# Patient Record
Sex: FEMALE | NOT HISPANIC OR LATINO | Employment: OTHER | URBAN - METROPOLITAN AREA
[De-identification: names, ages, dates, MRNs, and addresses within clinical notes are randomized per-mention and may not be internally consistent; named-entity substitution may affect disease eponyms.]

---

## 2017-06-28 ENCOUNTER — OFFICE VISIT - HEALTHEAST (OUTPATIENT)
Dept: INTERNAL MEDICINE | Facility: CLINIC | Age: 72
End: 2017-06-28

## 2017-06-28 DIAGNOSIS — E78.5 HYPERLIPIDEMIA: ICD-10-CM

## 2017-06-28 DIAGNOSIS — K58.9 IRRITABLE BOWEL SYNDROME: ICD-10-CM

## 2017-06-28 LAB
CHOLEST SERPL-MCNC: 212 MG/DL
FASTING STATUS PATIENT QL REPORTED: YES
HDLC SERPL-MCNC: 59 MG/DL
LDLC SERPL CALC-MCNC: 136 MG/DL
TRIGL SERPL-MCNC: 86 MG/DL

## 2017-06-28 ASSESSMENT — MIFFLIN-ST. JEOR: SCORE: 1025.66

## 2017-07-24 ENCOUNTER — RECORDS - HEALTHEAST (OUTPATIENT)
Dept: ADMINISTRATIVE | Facility: OTHER | Age: 72
End: 2017-07-24

## 2017-08-16 ENCOUNTER — RECORDS - HEALTHEAST (OUTPATIENT)
Dept: ADMINISTRATIVE | Facility: OTHER | Age: 72
End: 2017-08-16

## 2017-08-21 ENCOUNTER — OFFICE VISIT - HEALTHEAST (OUTPATIENT)
Dept: FAMILY MEDICINE | Facility: CLINIC | Age: 72
End: 2017-08-21

## 2017-08-21 ENCOUNTER — COMMUNICATION - HEALTHEAST (OUTPATIENT)
Dept: INTERNAL MEDICINE | Facility: CLINIC | Age: 72
End: 2017-08-21

## 2017-08-21 DIAGNOSIS — S39.012A LUMBAR STRAIN, INITIAL ENCOUNTER: ICD-10-CM

## 2017-08-22 ENCOUNTER — RECORDS - HEALTHEAST (OUTPATIENT)
Dept: ADMINISTRATIVE | Facility: OTHER | Age: 72
End: 2017-08-22

## 2017-08-25 ENCOUNTER — HOSPITAL ENCOUNTER (OUTPATIENT)
Dept: MAMMOGRAPHY | Facility: CLINIC | Age: 72
Discharge: HOME | End: 2017-08-25
Attending: INTERNAL MEDICINE

## 2017-08-25 DIAGNOSIS — Z12.31 VISIT FOR SCREENING MAMMOGRAM: ICD-10-CM

## 2017-09-20 ENCOUNTER — AMBULATORY - HEALTHEAST (OUTPATIENT)
Dept: NURSING | Facility: CLINIC | Age: 72
End: 2017-09-20

## 2017-09-20 DIAGNOSIS — Z00.00 HEALTH MAINTENANCE EXAMINATION: ICD-10-CM

## 2017-12-10 ENCOUNTER — COMMUNICATION - HEALTHEAST (OUTPATIENT)
Dept: INTERNAL MEDICINE | Facility: CLINIC | Age: 72
End: 2017-12-10

## 2017-12-10 DIAGNOSIS — N95.1 SYMPTOMATIC MENOPAUSAL OR FEMALE CLIMACTERIC STATES: ICD-10-CM

## 2017-12-26 ENCOUNTER — OFFICE VISIT - HEALTHEAST (OUTPATIENT)
Dept: INTERNAL MEDICINE | Facility: CLINIC | Age: 72
End: 2017-12-26

## 2017-12-26 DIAGNOSIS — Z86.79 HISTORY OF CARDIAC DYSRHYTHMIA: ICD-10-CM

## 2017-12-26 DIAGNOSIS — E78.5 HYPERLIPIDEMIA: ICD-10-CM

## 2017-12-26 DIAGNOSIS — Z00.00 ROUTINE GENERAL MEDICAL EXAMINATION AT A HEALTH CARE FACILITY: ICD-10-CM

## 2017-12-26 DIAGNOSIS — M81.0 SENILE OSTEOPOROSIS: ICD-10-CM

## 2017-12-26 ASSESSMENT — MIFFLIN-ST. JEOR: SCORE: 1005.24

## 2018-06-06 ENCOUNTER — AMBULATORY - HEALTHEAST (OUTPATIENT)
Dept: INTERNAL MEDICINE | Facility: CLINIC | Age: 73
End: 2018-06-06

## 2018-06-06 ENCOUNTER — AMBULATORY - HEALTHEAST (OUTPATIENT)
Dept: LAB | Facility: CLINIC | Age: 73
End: 2018-06-06

## 2018-06-06 DIAGNOSIS — M81.0 SENILE OSTEOPOROSIS: ICD-10-CM

## 2018-06-06 DIAGNOSIS — E78.5 HYPERLIPIDEMIA: ICD-10-CM

## 2018-06-06 LAB
ALBUMIN SERPL-MCNC: 3.8 G/DL (ref 3.5–5)
ALP SERPL-CCNC: 63 U/L (ref 45–120)
ALT SERPL W P-5'-P-CCNC: 15 U/L (ref 0–45)
ANION GAP SERPL CALCULATED.3IONS-SCNC: 9 MMOL/L (ref 5–18)
AST SERPL W P-5'-P-CCNC: 24 U/L (ref 0–40)
BILIRUB SERPL-MCNC: 0.9 MG/DL (ref 0–1)
BUN SERPL-MCNC: 20 MG/DL (ref 8–28)
CALCIUM SERPL-MCNC: 9.8 MG/DL (ref 8.5–10.5)
CHLORIDE BLD-SCNC: 105 MMOL/L (ref 98–107)
CHOLEST SERPL-MCNC: 211 MG/DL
CO2 SERPL-SCNC: 28 MMOL/L (ref 22–31)
CREAT SERPL-MCNC: 1.03 MG/DL (ref 0.6–1.1)
FASTING STATUS PATIENT QL REPORTED: ABNORMAL
GFR SERPL CREATININE-BSD FRML MDRD: 52 ML/MIN/1.73M2
GLUCOSE BLD-MCNC: 90 MG/DL (ref 70–125)
HDLC SERPL-MCNC: 64 MG/DL
LDLC SERPL CALC-MCNC: 134 MG/DL
POTASSIUM BLD-SCNC: 4.7 MMOL/L (ref 3.5–5)
PROT SERPL-MCNC: 6.8 G/DL (ref 6–8)
SODIUM SERPL-SCNC: 142 MMOL/L (ref 136–145)
TRIGL SERPL-MCNC: 65 MG/DL

## 2018-06-07 LAB
25(OH)D3 SERPL-MCNC: 33.3 NG/ML (ref 30–80)
25(OH)D3 SERPL-MCNC: 33.3 NG/ML (ref 30–80)

## 2018-07-23 ENCOUNTER — RECORDS - HEALTHEAST (OUTPATIENT)
Dept: ADMINISTRATIVE | Facility: OTHER | Age: 73
End: 2018-07-23

## 2018-07-27 ENCOUNTER — RECORDS - HEALTHEAST (OUTPATIENT)
Dept: ADMINISTRATIVE | Facility: OTHER | Age: 73
End: 2018-07-27

## 2018-07-27 ENCOUNTER — RECORDS - HEALTHEAST (OUTPATIENT)
Dept: BONE DENSITY | Facility: CLINIC | Age: 73
End: 2018-07-27

## 2018-07-27 DIAGNOSIS — M81.0 AGE-RELATED OSTEOPOROSIS WITHOUT CURRENT PATHOLOGICAL FRACTURE: ICD-10-CM

## 2018-08-01 ENCOUNTER — AMBULATORY - HEALTHEAST (OUTPATIENT)
Dept: INTERNAL MEDICINE | Facility: CLINIC | Age: 73
End: 2018-08-01

## 2018-08-01 ENCOUNTER — COMMUNICATION - HEALTHEAST (OUTPATIENT)
Dept: ADMINISTRATIVE | Facility: CLINIC | Age: 73
End: 2018-08-01

## 2018-08-01 DIAGNOSIS — M85.80 OSTEOPENIA WITH HIGH RISK OF FRACTURE: ICD-10-CM

## 2018-09-12 ENCOUNTER — HOSPITAL ENCOUNTER (OUTPATIENT)
Dept: MAMMOGRAPHY | Facility: CLINIC | Age: 73
Discharge: HOME | End: 2018-09-12
Attending: INTERNAL MEDICINE

## 2018-09-12 DIAGNOSIS — Z12.31 VISIT FOR SCREENING MAMMOGRAM: ICD-10-CM

## 2018-09-14 ENCOUNTER — AMBULATORY - HEALTHEAST (OUTPATIENT)
Dept: NURSING | Facility: CLINIC | Age: 73
End: 2018-09-14

## 2018-09-14 DIAGNOSIS — Z23 FLU VACCINE NEED: ICD-10-CM

## 2018-10-18 ENCOUNTER — COMMUNICATION - HEALTHEAST (OUTPATIENT)
Dept: INTERNAL MEDICINE | Facility: CLINIC | Age: 73
End: 2018-10-18

## 2018-10-18 DIAGNOSIS — E78.5 HYPERLIPIDEMIA: ICD-10-CM

## 2018-11-27 ENCOUNTER — OFFICE VISIT - HEALTHEAST (OUTPATIENT)
Dept: ENDOCRINOLOGY | Facility: CLINIC | Age: 73
End: 2018-11-27

## 2018-11-27 DIAGNOSIS — M81.0 SENILE OSTEOPOROSIS: ICD-10-CM

## 2018-11-27 ASSESSMENT — MIFFLIN-ST. JEOR: SCORE: 999.35

## 2018-12-27 ENCOUNTER — OFFICE VISIT - HEALTHEAST (OUTPATIENT)
Dept: INTERNAL MEDICINE | Facility: CLINIC | Age: 73
End: 2018-12-27

## 2018-12-27 DIAGNOSIS — Z00.00 ROUTINE GENERAL MEDICAL EXAMINATION AT A HEALTH CARE FACILITY: ICD-10-CM

## 2018-12-27 DIAGNOSIS — Z86.79 HISTORY OF CARDIAC DYSRHYTHMIA: ICD-10-CM

## 2018-12-27 DIAGNOSIS — M81.0 SENILE OSTEOPOROSIS: ICD-10-CM

## 2018-12-27 DIAGNOSIS — E78.5 HYPERLIPIDEMIA: ICD-10-CM

## 2018-12-27 DIAGNOSIS — I49.9 CARDIAC ARRHYTHMIA, UNSPECIFIED CARDIAC ARRHYTHMIA TYPE: ICD-10-CM

## 2018-12-27 LAB
ALBUMIN SERPL-MCNC: 4 G/DL (ref 3.5–5)
ALP SERPL-CCNC: 50 U/L (ref 45–120)
ALT SERPL W P-5'-P-CCNC: 20 U/L (ref 0–45)
ANION GAP SERPL CALCULATED.3IONS-SCNC: 6 MMOL/L (ref 5–18)
AST SERPL W P-5'-P-CCNC: 26 U/L (ref 0–40)
BILIRUB SERPL-MCNC: 1.1 MG/DL (ref 0–1)
BUN SERPL-MCNC: 14 MG/DL (ref 8–28)
CALCIUM SERPL-MCNC: 9 MG/DL (ref 8.5–10.5)
CHLORIDE BLD-SCNC: 104 MMOL/L (ref 98–107)
CHOLEST SERPL-MCNC: 191 MG/DL
CO2 SERPL-SCNC: 30 MMOL/L (ref 22–31)
CREAT SERPL-MCNC: 0.95 MG/DL (ref 0.6–1.1)
FASTING STATUS PATIENT QL REPORTED: YES
GFR SERPL CREATININE-BSD FRML MDRD: 57 ML/MIN/1.73M2
GLUCOSE BLD-MCNC: 97 MG/DL (ref 70–125)
HDLC SERPL-MCNC: 63 MG/DL
LDLC SERPL CALC-MCNC: 112 MG/DL
POTASSIUM BLD-SCNC: 4.2 MMOL/L (ref 3.5–5)
PROT SERPL-MCNC: 6.9 G/DL (ref 6–8)
SODIUM SERPL-SCNC: 140 MMOL/L (ref 136–145)
TRIGL SERPL-MCNC: 82 MG/DL

## 2018-12-27 ASSESSMENT — MIFFLIN-ST. JEOR: SCORE: 997.99

## 2019-05-09 ENCOUNTER — COMMUNICATION - HEALTHEAST (OUTPATIENT)
Dept: ENDOCRINOLOGY | Facility: CLINIC | Age: 74
End: 2019-05-09

## 2019-05-09 DIAGNOSIS — M81.0 SENILE OSTEOPOROSIS: ICD-10-CM

## 2019-05-29 ENCOUNTER — OFFICE VISIT - HEALTHEAST (OUTPATIENT)
Dept: FAMILY MEDICINE | Facility: CLINIC | Age: 74
End: 2019-05-29

## 2019-05-29 DIAGNOSIS — M81.0 SENILE OSTEOPOROSIS: ICD-10-CM

## 2019-05-29 DIAGNOSIS — I49.9 CARDIAC ARRHYTHMIA, UNSPECIFIED CARDIAC ARRHYTHMIA TYPE: ICD-10-CM

## 2019-05-29 DIAGNOSIS — E78.5 HYPERLIPIDEMIA, UNSPECIFIED HYPERLIPIDEMIA TYPE: ICD-10-CM

## 2019-05-29 DIAGNOSIS — I34.0 NON-RHEUMATIC MITRAL REGURGITATION: ICD-10-CM

## 2019-05-29 DIAGNOSIS — Z76.89 ENCOUNTER TO ESTABLISH CARE: ICD-10-CM

## 2019-06-14 ENCOUNTER — RECORDS - HEALTHEAST (OUTPATIENT)
Dept: ADMINISTRATIVE | Facility: OTHER | Age: 74
End: 2019-06-14

## 2019-09-12 ENCOUNTER — AMBULATORY - HEALTHEAST (OUTPATIENT)
Dept: NURSING | Facility: CLINIC | Age: 74
End: 2019-09-12

## 2019-09-12 DIAGNOSIS — Z23 FLU VACCINE NEED: ICD-10-CM

## 2019-09-18 ENCOUNTER — HOSPITAL ENCOUNTER (OUTPATIENT)
Dept: MAMMOGRAPHY | Facility: CLINIC | Age: 74
Discharge: HOME | End: 2019-09-18
Attending: FAMILY MEDICINE

## 2019-09-18 DIAGNOSIS — Z12.31 VISIT FOR SCREENING MAMMOGRAM: ICD-10-CM

## 2019-10-18 ENCOUNTER — RECORDS - HEALTHEAST (OUTPATIENT)
Dept: ADMINISTRATIVE | Facility: OTHER | Age: 74
End: 2019-10-18

## 2019-10-22 ENCOUNTER — RECORDS - HEALTHEAST (OUTPATIENT)
Dept: ADMINISTRATIVE | Facility: OTHER | Age: 74
End: 2019-10-22

## 2019-11-25 ENCOUNTER — OFFICE VISIT - HEALTHEAST (OUTPATIENT)
Dept: FAMILY MEDICINE | Facility: CLINIC | Age: 74
End: 2019-11-25

## 2019-11-25 DIAGNOSIS — I49.9 CARDIAC ARRHYTHMIA, UNSPECIFIED CARDIAC ARRHYTHMIA TYPE: ICD-10-CM

## 2019-11-25 DIAGNOSIS — M81.0 SENILE OSTEOPOROSIS: ICD-10-CM

## 2019-11-25 DIAGNOSIS — E78.5 HYPERLIPIDEMIA: ICD-10-CM

## 2019-11-25 LAB
ANION GAP SERPL CALCULATED.3IONS-SCNC: 10 MMOL/L (ref 5–18)
BUN SERPL-MCNC: 14 MG/DL (ref 8–28)
CALCIUM SERPL-MCNC: 10 MG/DL (ref 8.5–10.5)
CHLORIDE BLD-SCNC: 103 MMOL/L (ref 98–107)
CHOLEST SERPL-MCNC: 193 MG/DL
CO2 SERPL-SCNC: 27 MMOL/L (ref 22–31)
CREAT SERPL-MCNC: 1.03 MG/DL (ref 0.6–1.1)
FASTING STATUS PATIENT QL REPORTED: YES
GFR SERPL CREATININE-BSD FRML MDRD: 52 ML/MIN/1.73M2
GLUCOSE BLD-MCNC: 105 MG/DL (ref 70–125)
HDLC SERPL-MCNC: 63 MG/DL
HGB BLD-MCNC: 13.6 G/DL (ref 12–16)
LDLC SERPL CALC-MCNC: 112 MG/DL
POTASSIUM BLD-SCNC: 5.5 MMOL/L (ref 3.5–5)
SODIUM SERPL-SCNC: 140 MMOL/L (ref 136–145)
TRIGL SERPL-MCNC: 89 MG/DL
TSH SERPL DL<=0.005 MIU/L-ACNC: 2.75 UIU/ML (ref 0.3–5)

## 2019-11-25 ASSESSMENT — MIFFLIN-ST. JEOR: SCORE: 1003.43

## 2019-11-26 LAB
25(OH)D3 SERPL-MCNC: 38.4 NG/ML (ref 30–80)
25(OH)D3 SERPL-MCNC: 38.4 NG/ML (ref 30–80)

## 2020-05-18 ENCOUNTER — COMMUNICATION - HEALTHEAST (OUTPATIENT)
Dept: FAMILY MEDICINE | Facility: CLINIC | Age: 75
End: 2020-05-18

## 2020-05-18 DIAGNOSIS — I49.9 CARDIAC ARRHYTHMIA, UNSPECIFIED CARDIAC ARRHYTHMIA TYPE: ICD-10-CM

## 2020-06-18 ENCOUNTER — ANESTHESIA - HEALTHEAST (OUTPATIENT)
Dept: CARDIOLOGY | Facility: CLINIC | Age: 75
End: 2020-06-18

## 2020-06-18 ENCOUNTER — SURGERY - HEALTHEAST (OUTPATIENT)
Dept: CARDIOLOGY | Facility: CLINIC | Age: 75
End: 2020-06-18

## 2020-06-18 ENCOUNTER — RECORDS - HEALTHEAST (OUTPATIENT)
Dept: ADMINISTRATIVE | Facility: OTHER | Age: 75
End: 2020-06-18

## 2020-06-19 ENCOUNTER — RECORDS - HEALTHEAST (OUTPATIENT)
Dept: ADMINISTRATIVE | Facility: OTHER | Age: 75
End: 2020-06-19

## 2020-06-19 ASSESSMENT — MIFFLIN-ST. JEOR
SCORE: 1017.94
SCORE: 1017.94

## 2020-06-25 ENCOUNTER — OFFICE VISIT - HEALTHEAST (OUTPATIENT)
Dept: FAMILY MEDICINE | Facility: CLINIC | Age: 75
End: 2020-06-25

## 2020-06-25 DIAGNOSIS — R00.0 WIDE-COMPLEX TACHYCARDIA: ICD-10-CM

## 2020-06-25 DIAGNOSIS — I48.91 ATRIAL FIBRILLATION WITH RAPID VENTRICULAR RESPONSE (H): ICD-10-CM

## 2020-06-25 DIAGNOSIS — I47.29 NSVT (NONSUSTAINED VENTRICULAR TACHYCARDIA) (H): ICD-10-CM

## 2020-06-26 ENCOUNTER — COMMUNICATION - HEALTHEAST (OUTPATIENT)
Dept: SCHEDULING | Facility: CLINIC | Age: 75
End: 2020-06-26

## 2020-06-26 DIAGNOSIS — I48.91 ATRIAL FIBRILLATION WITH RAPID VENTRICULAR RESPONSE (H): ICD-10-CM

## 2020-06-26 DIAGNOSIS — I47.29 NSVT (NONSUSTAINED VENTRICULAR TACHYCARDIA) (H): ICD-10-CM

## 2020-06-26 DIAGNOSIS — I42.8 NON-ISCHEMIC CARDIOMYOPATHY (H): ICD-10-CM

## 2020-06-26 DIAGNOSIS — R00.0 WIDE-COMPLEX TACHYCARDIA: ICD-10-CM

## 2020-07-01 ENCOUNTER — COMMUNICATION - HEALTHEAST (OUTPATIENT)
Dept: CARDIOLOGY | Facility: CLINIC | Age: 75
End: 2020-07-01

## 2020-07-02 ENCOUNTER — OFFICE VISIT - HEALTHEAST (OUTPATIENT)
Dept: CARDIOLOGY | Facility: CLINIC | Age: 75
End: 2020-07-02

## 2020-07-02 DIAGNOSIS — R00.0 WIDE-COMPLEX TACHYCARDIA: ICD-10-CM

## 2020-07-02 DIAGNOSIS — I10 ESSENTIAL HYPERTENSION: ICD-10-CM

## 2020-07-02 DIAGNOSIS — I38 VALVULAR HEART DISEASE: ICD-10-CM

## 2020-07-02 DIAGNOSIS — I25.10 CORONARY ARTERY DISEASE INVOLVING NATIVE CORONARY ARTERY OF NATIVE HEART WITHOUT ANGINA PECTORIS: ICD-10-CM

## 2020-07-02 DIAGNOSIS — I48.0 PAROXYSMAL ATRIAL FIBRILLATION (H): ICD-10-CM

## 2020-07-02 DIAGNOSIS — E78.2 MIXED HYPERLIPIDEMIA: ICD-10-CM

## 2020-07-02 ASSESSMENT — MIFFLIN-ST. JEOR: SCORE: 999.79

## 2020-07-15 ENCOUNTER — OFFICE VISIT - HEALTHEAST (OUTPATIENT)
Dept: CARDIOLOGY | Facility: CLINIC | Age: 75
End: 2020-07-15

## 2020-07-15 ENCOUNTER — AMBULATORY - HEALTHEAST (OUTPATIENT)
Dept: CARDIOLOGY | Facility: CLINIC | Age: 75
End: 2020-07-15

## 2020-07-15 DIAGNOSIS — I25.10 CORONARY ARTERY DISEASE INVOLVING NATIVE CORONARY ARTERY OF NATIVE HEART WITHOUT ANGINA PECTORIS: ICD-10-CM

## 2020-07-15 DIAGNOSIS — I48.0 PAROXYSMAL ATRIAL FIBRILLATION (H): ICD-10-CM

## 2020-07-15 DIAGNOSIS — I10 ESSENTIAL HYPERTENSION: ICD-10-CM

## 2020-07-15 DIAGNOSIS — I47.29 NSVT (NONSUSTAINED VENTRICULAR TACHYCARDIA) (H): ICD-10-CM

## 2020-07-15 DIAGNOSIS — Z79.899 LONG TERM USE OF DRUG: ICD-10-CM

## 2020-07-15 DIAGNOSIS — I38 VALVULAR HEART DISEASE: ICD-10-CM

## 2020-07-17 ENCOUNTER — AMBULATORY - HEALTHEAST (OUTPATIENT)
Dept: LAB | Facility: CLINIC | Age: 75
End: 2020-07-17

## 2020-07-17 DIAGNOSIS — Z79.899 LONG TERM USE OF DRUG: ICD-10-CM

## 2020-07-17 LAB
ALT SERPL W P-5'-P-CCNC: 17 U/L (ref 0–45)
TSH SERPL DL<=0.005 MIU/L-ACNC: 5.2 UIU/ML (ref 0.3–5)

## 2020-07-20 LAB
AMIODARONE SERPL-MCNC: 1.7 UG/ML (ref 0.5–2)
DESETHYLAMIODARONE SERPL-MCNC: 1 UG/ML

## 2020-08-18 ENCOUNTER — OFFICE VISIT - HEALTHEAST (OUTPATIENT)
Dept: FAMILY MEDICINE | Facility: CLINIC | Age: 75
End: 2020-08-18

## 2020-08-18 DIAGNOSIS — I49.9 CARDIAC ARRHYTHMIA, UNSPECIFIED CARDIAC ARRHYTHMIA TYPE: ICD-10-CM

## 2020-08-18 DIAGNOSIS — I34.0 NONRHEUMATIC MITRAL VALVE REGURGITATION: ICD-10-CM

## 2020-08-18 DIAGNOSIS — I10 ESSENTIAL HYPERTENSION: ICD-10-CM

## 2020-08-18 DIAGNOSIS — I47.29 NSVT (NONSUSTAINED VENTRICULAR TACHYCARDIA) (H): ICD-10-CM

## 2020-08-19 ENCOUNTER — AMBULATORY - HEALTHEAST (OUTPATIENT)
Dept: FAMILY MEDICINE | Facility: CLINIC | Age: 75
End: 2020-08-19

## 2020-08-19 ENCOUNTER — AMBULATORY - HEALTHEAST (OUTPATIENT)
Dept: LAB | Facility: CLINIC | Age: 75
End: 2020-08-19

## 2020-08-19 DIAGNOSIS — I49.9 CARDIAC ARRHYTHMIA, UNSPECIFIED CARDIAC ARRHYTHMIA TYPE: ICD-10-CM

## 2020-08-19 DIAGNOSIS — R79.89 ELEVATED SERUM CREATININE: ICD-10-CM

## 2020-08-19 LAB
ANION GAP SERPL CALCULATED.3IONS-SCNC: 7 MMOL/L (ref 5–18)
BUN SERPL-MCNC: 15 MG/DL (ref 8–28)
CALCIUM SERPL-MCNC: 9.5 MG/DL (ref 8.5–10.5)
CHLORIDE BLD-SCNC: 104 MMOL/L (ref 98–107)
CO2 SERPL-SCNC: 29 MMOL/L (ref 22–31)
CREAT SERPL-MCNC: 1.3 MG/DL (ref 0.6–1.1)
ERYTHROCYTE [DISTWIDTH] IN BLOOD BY AUTOMATED COUNT: 11.9 % (ref 11–14.5)
GFR SERPL CREATININE-BSD FRML MDRD: 40 ML/MIN/1.73M2
GLUCOSE BLD-MCNC: 94 MG/DL (ref 70–125)
HCT VFR BLD AUTO: 38.7 % (ref 35–47)
HGB BLD-MCNC: 13 G/DL (ref 12–16)
MAGNESIUM SERPL-MCNC: 2.2 MG/DL (ref 1.8–2.6)
MCH RBC QN AUTO: 32.9 PG (ref 27–34)
MCHC RBC AUTO-ENTMCNC: 33.5 G/DL (ref 32–36)
MCV RBC AUTO: 98 FL (ref 80–100)
PLATELET # BLD AUTO: 167 THOU/UL (ref 140–440)
PMV BLD AUTO: 10.4 FL (ref 7–10)
POTASSIUM BLD-SCNC: 4.9 MMOL/L (ref 3.5–5)
RBC # BLD AUTO: 3.94 MILL/UL (ref 3.8–5.4)
SODIUM SERPL-SCNC: 140 MMOL/L (ref 136–145)
T4 FREE SERPL-MCNC: 1.1 NG/DL (ref 0.7–1.8)
TSH SERPL DL<=0.005 MIU/L-ACNC: 8.2 UIU/ML (ref 0.3–5)
WBC: 5.9 THOU/UL (ref 4–11)

## 2020-08-20 ENCOUNTER — COMMUNICATION - HEALTHEAST (OUTPATIENT)
Dept: ADMINISTRATIVE | Facility: CLINIC | Age: 75
End: 2020-08-20

## 2020-08-22 ENCOUNTER — OFFICE VISIT - HEALTHEAST (OUTPATIENT)
Dept: FAMILY MEDICINE | Facility: CLINIC | Age: 75
End: 2020-08-22

## 2020-08-22 DIAGNOSIS — R31.9 BLOOD IN URINE: ICD-10-CM

## 2020-08-22 DIAGNOSIS — I25.10 CORONARY ARTERY DISEASE INVOLVING NATIVE HEART WITHOUT ANGINA PECTORIS, UNSPECIFIED VESSEL OR LESION TYPE: ICD-10-CM

## 2020-08-22 DIAGNOSIS — I48.91 ATRIAL FIBRILLATION, UNSPECIFIED TYPE (H): ICD-10-CM

## 2020-08-22 LAB
BACTERIA #/AREA URNS HPF: ABNORMAL HPF
RBC #/AREA URNS AUTO: >100 HPF
SQUAMOUS #/AREA URNS AUTO: ABNORMAL LPF
WBC #/AREA URNS AUTO: ABNORMAL HPF

## 2020-08-24 ENCOUNTER — COMMUNICATION - HEALTHEAST (OUTPATIENT)
Dept: CARDIOLOGY | Facility: CLINIC | Age: 75
End: 2020-08-24

## 2020-08-25 ENCOUNTER — OFFICE VISIT - HEALTHEAST (OUTPATIENT)
Dept: FAMILY MEDICINE | Facility: CLINIC | Age: 75
End: 2020-08-25

## 2020-08-25 DIAGNOSIS — I48.0 PAROXYSMAL ATRIAL FIBRILLATION (H): ICD-10-CM

## 2020-08-25 DIAGNOSIS — R31.0 GROSS HEMATURIA: ICD-10-CM

## 2020-08-25 LAB
ALBUMIN UR-MCNC: ABNORMAL MG/DL
APPEARANCE UR: CLEAR
BILIRUB UR QL STRIP: NEGATIVE
COLOR UR AUTO: YELLOW
GLUCOSE UR STRIP-MCNC: NEGATIVE MG/DL
HGB UR QL STRIP: ABNORMAL
KETONES UR STRIP-MCNC: NEGATIVE MG/DL
LEUKOCYTE ESTERASE UR QL STRIP: ABNORMAL
NITRATE UR QL: NEGATIVE
PH UR STRIP: 5 [PH] (ref 5–8)
SP GR UR STRIP: 1.01 (ref 1–1.03)
UROBILINOGEN UR STRIP-ACNC: ABNORMAL

## 2020-08-26 ENCOUNTER — HOSPITAL ENCOUNTER (OUTPATIENT)
Dept: CARDIOLOGY | Facility: CLINIC | Age: 75
Discharge: HOME | End: 2020-08-26
Attending: FAMILY MEDICINE

## 2020-08-26 DIAGNOSIS — I49.9 CARDIAC ARRHYTHMIA, UNSPECIFIED CARDIAC ARRHYTHMIA TYPE: ICD-10-CM

## 2020-08-26 DIAGNOSIS — I47.29 NSVT (NONSUSTAINED VENTRICULAR TACHYCARDIA) (H): ICD-10-CM

## 2020-08-26 LAB — BACTERIA SPEC CULT: NORMAL

## 2020-09-02 ENCOUNTER — AMBULATORY - HEALTHEAST (OUTPATIENT)
Dept: CARDIOLOGY | Facility: CLINIC | Age: 75
End: 2020-09-02

## 2020-09-22 ENCOUNTER — COMMUNICATION - HEALTHEAST (OUTPATIENT)
Dept: CARDIOLOGY | Facility: CLINIC | Age: 75
End: 2020-09-22

## 2020-09-25 ENCOUNTER — COMMUNICATION - HEALTHEAST (OUTPATIENT)
Dept: SCHEDULING | Facility: CLINIC | Age: 75
End: 2020-09-25

## 2020-09-25 ENCOUNTER — RECORDS - HEALTHEAST (OUTPATIENT)
Dept: ADMINISTRATIVE | Facility: OTHER | Age: 75
End: 2020-09-25

## 2020-09-29 ENCOUNTER — OFFICE VISIT - HEALTHEAST (OUTPATIENT)
Dept: FAMILY MEDICINE | Facility: CLINIC | Age: 75
End: 2020-09-29

## 2020-09-29 DIAGNOSIS — J96.01 ACUTE RESPIRATORY FAILURE WITH HYPOXIA (H): ICD-10-CM

## 2020-09-29 DIAGNOSIS — F41.9 ANXIETY: ICD-10-CM

## 2020-09-29 DIAGNOSIS — I48.0 PAROXYSMAL ATRIAL FIBRILLATION (H): ICD-10-CM

## 2020-09-29 DIAGNOSIS — I10 ESSENTIAL HYPERTENSION: ICD-10-CM

## 2020-09-29 LAB
ANION GAP SERPL CALCULATED.3IONS-SCNC: 11 MMOL/L (ref 5–18)
BUN SERPL-MCNC: 20 MG/DL (ref 8–28)
CALCIUM SERPL-MCNC: 9.7 MG/DL (ref 8.5–10.5)
CHLORIDE BLD-SCNC: 99 MMOL/L (ref 98–107)
CO2 SERPL-SCNC: 29 MMOL/L (ref 22–31)
CREAT SERPL-MCNC: 1.36 MG/DL (ref 0.6–1.1)
GFR SERPL CREATININE-BSD FRML MDRD: 38 ML/MIN/1.73M2
GLUCOSE BLD-MCNC: 115 MG/DL (ref 70–125)
POTASSIUM BLD-SCNC: 4.7 MMOL/L (ref 3.5–5)
SODIUM SERPL-SCNC: 139 MMOL/L (ref 136–145)

## 2020-09-29 ASSESSMENT — MIFFLIN-ST. JEOR: SCORE: 988.46

## 2020-10-14 ENCOUNTER — OFFICE VISIT - HEALTHEAST (OUTPATIENT)
Dept: CARDIOLOGY | Facility: CLINIC | Age: 75
End: 2020-10-14

## 2020-10-14 ENCOUNTER — COMMUNICATION - HEALTHEAST (OUTPATIENT)
Dept: FAMILY MEDICINE | Facility: CLINIC | Age: 75
End: 2020-10-14

## 2020-10-14 ENCOUNTER — COMMUNICATION - HEALTHEAST (OUTPATIENT)
Dept: CARDIOLOGY | Facility: CLINIC | Age: 75
End: 2020-10-14

## 2020-10-14 DIAGNOSIS — I48.0 PAROXYSMAL ATRIAL FIBRILLATION (H): ICD-10-CM

## 2020-10-14 DIAGNOSIS — I10 ESSENTIAL HYPERTENSION: ICD-10-CM

## 2020-10-14 DIAGNOSIS — I50.21 ACUTE HFREF (HEART FAILURE WITH REDUCED EJECTION FRACTION) (H): ICD-10-CM

## 2020-10-14 DIAGNOSIS — N18.31 STAGE 3A CHRONIC KIDNEY DISEASE (H): ICD-10-CM

## 2020-10-14 DIAGNOSIS — I50.31 ACUTE HEART FAILURE WITH PRESERVED EJECTION FRACTION (H): ICD-10-CM

## 2020-10-14 DIAGNOSIS — I34.1 MVP (MITRAL VALVE PROLAPSE): ICD-10-CM

## 2020-10-14 LAB
ANION GAP SERPL CALCULATED.3IONS-SCNC: 9 MMOL/L (ref 5–18)
BNP SERPL-MCNC: 344 PG/ML (ref 0–140)
BUN SERPL-MCNC: 23 MG/DL (ref 8–28)
CALCIUM SERPL-MCNC: 9.2 MG/DL (ref 8.5–10.5)
CHLORIDE BLD-SCNC: 102 MMOL/L (ref 98–107)
CO2 SERPL-SCNC: 27 MMOL/L (ref 22–31)
CREAT SERPL-MCNC: 1.36 MG/DL (ref 0.6–1.1)
GFR SERPL CREATININE-BSD FRML MDRD: 38 ML/MIN/1.73M2
GLUCOSE BLD-MCNC: 149 MG/DL (ref 70–125)
POTASSIUM BLD-SCNC: 4.1 MMOL/L (ref 3.5–5)
SODIUM SERPL-SCNC: 138 MMOL/L (ref 136–145)

## 2020-10-23 ENCOUNTER — HOSPITAL ENCOUNTER (OUTPATIENT)
Dept: MAMMOGRAPHY | Facility: CLINIC | Age: 75
Discharge: HOME | End: 2020-10-23
Attending: FAMILY MEDICINE

## 2020-10-23 DIAGNOSIS — Z12.31 VISIT FOR SCREENING MAMMOGRAM: ICD-10-CM

## 2020-10-26 ENCOUNTER — OFFICE VISIT - HEALTHEAST (OUTPATIENT)
Dept: CARDIOLOGY | Facility: CLINIC | Age: 75
End: 2020-10-26

## 2020-10-26 DIAGNOSIS — E03.9 HYPOTHYROIDISM: ICD-10-CM

## 2020-10-26 DIAGNOSIS — I48.0 PAROXYSMAL ATRIAL FIBRILLATION (H): ICD-10-CM

## 2020-10-26 DIAGNOSIS — I34.0 NONRHEUMATIC MITRAL VALVE REGURGITATION: ICD-10-CM

## 2020-10-26 DIAGNOSIS — I25.10 CORONARY ARTERY DISEASE INVOLVING NATIVE CORONARY ARTERY OF NATIVE HEART WITHOUT ANGINA PECTORIS: ICD-10-CM

## 2020-10-26 DIAGNOSIS — I36.1 TRICUSPID VALVE REGURGITATION, NONRHEUMATIC: ICD-10-CM

## 2020-10-26 DIAGNOSIS — I34.1 MVP (MITRAL VALVE PROLAPSE): ICD-10-CM

## 2020-10-26 DIAGNOSIS — I07.1 TRICUSPID INSUFFICIENCY: ICD-10-CM

## 2020-10-26 DIAGNOSIS — R00.2 PALPITATIONS: ICD-10-CM

## 2020-10-26 DIAGNOSIS — I25.10 CAD (CORONARY ARTERY DISEASE): ICD-10-CM

## 2020-10-26 DIAGNOSIS — E78.2 MIXED HYPERLIPIDEMIA: ICD-10-CM

## 2020-10-26 ASSESSMENT — MIFFLIN-ST. JEOR: SCORE: 988.91

## 2020-11-07 ENCOUNTER — COMMUNICATION - HEALTHEAST (OUTPATIENT)
Dept: CARDIOLOGY | Facility: CLINIC | Age: 75
End: 2020-11-07

## 2020-11-12 ENCOUNTER — COMMUNICATION - HEALTHEAST (OUTPATIENT)
Dept: SCHEDULING | Facility: CLINIC | Age: 75
End: 2020-11-12

## 2020-11-12 DIAGNOSIS — R00.0 WIDE-COMPLEX TACHYCARDIA: ICD-10-CM

## 2020-11-12 DIAGNOSIS — I47.29 NSVT (NONSUSTAINED VENTRICULAR TACHYCARDIA) (H): ICD-10-CM

## 2020-11-12 DIAGNOSIS — I48.91 ATRIAL FIBRILLATION WITH RAPID VENTRICULAR RESPONSE (H): ICD-10-CM

## 2020-11-14 ENCOUNTER — COMMUNICATION - HEALTHEAST (OUTPATIENT)
Dept: CARDIOLOGY | Facility: CLINIC | Age: 75
End: 2020-11-14

## 2020-11-20 ENCOUNTER — AMBULATORY - HEALTHEAST (OUTPATIENT)
Dept: CARDIOLOGY | Facility: CLINIC | Age: 75
End: 2020-11-20

## 2020-11-20 ENCOUNTER — RECORDS - HEALTHEAST (OUTPATIENT)
Dept: ADMINISTRATIVE | Facility: OTHER | Age: 75
End: 2020-11-20

## 2020-12-03 ENCOUNTER — AMBULATORY - HEALTHEAST (OUTPATIENT)
Dept: CARDIOLOGY | Facility: CLINIC | Age: 75
End: 2020-12-03

## 2020-12-03 ENCOUNTER — HOSPITAL ENCOUNTER (OUTPATIENT)
Dept: CARDIOLOGY | Facility: CLINIC | Age: 75
Discharge: HOME | End: 2020-12-03
Attending: INTERNAL MEDICINE

## 2020-12-03 DIAGNOSIS — E03.9 HYPOTHYROIDISM: ICD-10-CM

## 2020-12-03 DIAGNOSIS — I25.10 CAD (CORONARY ARTERY DISEASE): ICD-10-CM

## 2020-12-03 DIAGNOSIS — R00.2 PALPITATIONS: ICD-10-CM

## 2020-12-03 DIAGNOSIS — I07.1 TRICUSPID INSUFFICIENCY: ICD-10-CM

## 2020-12-03 LAB
AORTIC ROOT: 2.4 CM
BSA FOR ECHO PROCEDURE: 1.52 M2
CHOLEST SERPL-MCNC: 230 MG/DL
CV BLOOD PRESSURE: ABNORMAL MMHG
CV ECHO HEIGHT: 65 IN
CV ECHO WEIGHT: 112 LBS
DOP CALC LVOT AREA: 2.54 CM2
DOP CALC LVOT DIAMETER: 1.8 CM
DOP CALC LVOT PEAK VEL: 76.7 CM/S
DOP CALC LVOT STROKE VOLUME: 38.7 CM3
DOP CALC MV VTI: 34.3 CM
DOP CALCLVOT PEAK VEL VTI: 15.2 CM
EJECTION FRACTION: 70 % (ref 55–75)
FASTING STATUS PATIENT QL REPORTED: YES
FRACTIONAL SHORTENING: 40.4 % (ref 28–44)
HDLC SERPL-MCNC: 67 MG/DL
INTERVENTRICULAR SEPTUM IN END DIASTOLE: 0.88 CM (ref 0.6–0.9)
IVS/PW RATIO: 1
LA AREA 1: 25.7 CM2
LA AREA 2: 26.2 CM2
LDLC SERPL CALC-MCNC: 139 MG/DL
LEFT ATRIUM LENGTH: 6 CM
LEFT ATRIUM SIZE: 5 CM
LEFT ATRIUM TO AORTIC ROOT RATIO: 2.08 NO UNITS
LEFT ATRIUM VOLUME INDEX: 62.8 ML/M2
LEFT ATRIUM VOLUME: 95.4 ML
LEFT VENTRICLE CARDIAC INDEX: 1.8 L/MIN/M2
LEFT VENTRICLE CARDIAC OUTPUT: 2.8 L/MIN
LEFT VENTRICLE DIASTOLIC VOLUME INDEX: 28.9 CM3/M2 (ref 29–61)
LEFT VENTRICLE DIASTOLIC VOLUME: 44 CM3 (ref 46–106)
LEFT VENTRICLE HEART RATE: 72 BPM
LEFT VENTRICLE MASS INDEX: 91 G/M2
LEFT VENTRICLE SYSTOLIC VOLUME INDEX: 8.6 CM3/M2 (ref 8–24)
LEFT VENTRICLE SYSTOLIC VOLUME: 13 CM3 (ref 14–42)
LEFT VENTRICULAR INTERNAL DIMENSION IN DIASTOLE: 4.68 CM (ref 3.8–5.2)
LEFT VENTRICULAR INTERNAL DIMENSION IN SYSTOLE: 2.79 CM (ref 2.2–3.5)
LEFT VENTRICULAR MASS: 138.4 G
LEFT VENTRICULAR OUTFLOW TRACT MEAN GRADIENT: 1 MMHG
LEFT VENTRICULAR OUTFLOW TRACT MEAN VELOCITY: 51.5 CM/S
LEFT VENTRICULAR OUTFLOW TRACT PEAK GRADIENT: 2 MMHG
LEFT VENTRICULAR POSTERIOR WALL IN END DIASTOLE: 0.89 CM (ref 0.6–0.9)
LV STROKE VOLUME INDEX: 25.4 ML/M2
MITRAL REGURGITANT VELOCITY TIME INTEGRAL: 161 CM
MITRAL VALVE MEAN INFLOW VELOCITY: 77.5 CM/S
MITRAL VALVE PEAK VELOCITY: 163 CM/S
MR FLOW: 44 CM3
MR MEAN GRADIENT: 73 MMHG
MR MEAN VELOCITY: 385 CM/S
MR PEAK GRADIENT: 109.8 MMHG
MR PISA EROA: 0.3 CM2
MR PISA RADIUS: 1 CM
MR PISA VN NYQUIST: 23.1 CM/S
MV AREA VTI: 1.13 CM2
MV DECELERATION TIME: 176 MS
MV MEAN GRADIENT: 3 MMHG
MV PEAK E VELOCITY: 134 CM/S
MV PEAK GRADIENT: 10.6 MMHG
MV REGURGITANT VOLUME: 44.6 CC
MV VALVE AREA BY CONTINUITY EQUATION: 1.1 CM2
NUC REST DIASTOLIC VOLUME INDEX: 1792 LBS
NUC REST SYSTOLIC VOLUME INDEX: 65 IN
PISA MR PEAK VEL: 524 CM/S
TRICUSPID REGURGITATION PEAK PRESSURE GRADIENT: 59.9 MMHG
TRICUSPID VALVE ANULAR PLANE SYSTOLIC EXCURSION: 1.4 CM
TRICUSPID VALVE PEAK REGURGITANT VELOCITY: 387 CM/S
TRIGL SERPL-MCNC: 118 MG/DL
TSH SERPL DL<=0.005 MIU/L-ACNC: 11.8 UIU/ML (ref 0.3–5)

## 2020-12-03 ASSESSMENT — MIFFLIN-ST. JEOR: SCORE: 993.91

## 2020-12-07 ENCOUNTER — COMMUNICATION - HEALTHEAST (OUTPATIENT)
Dept: FAMILY MEDICINE | Facility: CLINIC | Age: 75
End: 2020-12-07

## 2020-12-16 ENCOUNTER — OFFICE VISIT - HEALTHEAST (OUTPATIENT)
Dept: FAMILY MEDICINE | Facility: CLINIC | Age: 75
End: 2020-12-16

## 2020-12-16 DIAGNOSIS — E03.8 SUBCLINICAL HYPOTHYROIDISM: ICD-10-CM

## 2020-12-18 ENCOUNTER — COMMUNICATION - HEALTHEAST (OUTPATIENT)
Dept: FAMILY MEDICINE | Facility: CLINIC | Age: 75
End: 2020-12-18

## 2020-12-18 DIAGNOSIS — J81.0 ACUTE PULMONARY EDEMA (H): ICD-10-CM

## 2020-12-24 ENCOUNTER — AMBULATORY - HEALTHEAST (OUTPATIENT)
Dept: CARDIOLOGY | Facility: CLINIC | Age: 75
End: 2020-12-24

## 2020-12-24 DIAGNOSIS — Z79.899 LONG TERM USE OF DRUG: ICD-10-CM

## 2020-12-29 ENCOUNTER — AMBULATORY - HEALTHEAST (OUTPATIENT)
Dept: CARDIOLOGY | Facility: CLINIC | Age: 75
End: 2020-12-29

## 2020-12-31 ENCOUNTER — OFFICE VISIT - HEALTHEAST (OUTPATIENT)
Dept: CARDIOLOGY | Facility: CLINIC | Age: 75
End: 2020-12-31

## 2020-12-31 ENCOUNTER — COMMUNICATION - HEALTHEAST (OUTPATIENT)
Dept: CARDIOLOGY | Facility: CLINIC | Age: 75
End: 2020-12-31

## 2020-12-31 ENCOUNTER — AMBULATORY - HEALTHEAST (OUTPATIENT)
Dept: CARDIOLOGY | Facility: CLINIC | Age: 75
End: 2020-12-31

## 2020-12-31 DIAGNOSIS — I34.0 NONRHEUMATIC MITRAL VALVE REGURGITATION: ICD-10-CM

## 2020-12-31 DIAGNOSIS — I34.0 MITRAL REGURGITATION: ICD-10-CM

## 2020-12-31 DIAGNOSIS — I25.10 CAD (CORONARY ARTERY DISEASE): ICD-10-CM

## 2021-01-04 ENCOUNTER — AMBULATORY - HEALTHEAST (OUTPATIENT)
Dept: CARDIOLOGY | Facility: HOSPITAL | Age: 76
End: 2021-01-04

## 2021-01-04 DIAGNOSIS — Z11.59 ENCOUNTER FOR SCREENING FOR OTHER VIRAL DISEASES: ICD-10-CM

## 2021-01-05 ENCOUNTER — COMMUNICATION - HEALTHEAST (OUTPATIENT)
Dept: CARDIOLOGY | Facility: CLINIC | Age: 76
End: 2021-01-05

## 2021-01-05 ENCOUNTER — SURGERY - HEALTHEAST (OUTPATIENT)
Dept: CARDIOLOGY | Facility: CLINIC | Age: 76
End: 2021-01-05

## 2021-01-05 DIAGNOSIS — I34.0 MITRAL REGURGITATION: ICD-10-CM

## 2021-01-05 DIAGNOSIS — I25.10 CAD (CORONARY ARTERY DISEASE): ICD-10-CM

## 2021-01-08 ENCOUNTER — COMMUNICATION - HEALTHEAST (OUTPATIENT)
Dept: CARDIOLOGY | Facility: CLINIC | Age: 76
End: 2021-01-08

## 2021-01-10 ENCOUNTER — AMBULATORY - HEALTHEAST (OUTPATIENT)
Dept: FAMILY MEDICINE | Facility: CLINIC | Age: 76
End: 2021-01-10

## 2021-01-10 DIAGNOSIS — Z11.59 ENCOUNTER FOR SCREENING FOR OTHER VIRAL DISEASES: ICD-10-CM

## 2021-01-11 ENCOUNTER — COMMUNICATION - HEALTHEAST (OUTPATIENT)
Dept: SCHEDULING | Facility: CLINIC | Age: 76
End: 2021-01-11

## 2021-01-11 LAB
SARS-COV-2 PCR COMMENT: NORMAL
SARS-COV-2 RNA SPEC QL NAA+PROBE: NEGATIVE
SARS-COV-2 VIRUS SPECIMEN SOURCE: NORMAL

## 2021-01-13 ENCOUNTER — SURGERY - HEALTHEAST (OUTPATIENT)
Dept: CARDIOLOGY | Facility: HOSPITAL | Age: 76
End: 2021-01-13

## 2021-01-13 LAB
T3 SERPL-MCNC: 36 NG/DL (ref 45–175)
T4 FREE SERPL-MCNC: 1.1 NG/DL (ref 0.7–1.8)

## 2021-01-13 ASSESSMENT — MIFFLIN-ST. JEOR: SCORE: 992.3

## 2021-01-18 ENCOUNTER — OFFICE VISIT - HEALTHEAST (OUTPATIENT)
Dept: CARDIOLOGY | Facility: CLINIC | Age: 76
End: 2021-01-18

## 2021-01-18 ENCOUNTER — COMMUNICATION - HEALTHEAST (OUTPATIENT)
Dept: CARDIOLOGY | Facility: CLINIC | Age: 76
End: 2021-01-18

## 2021-01-18 DIAGNOSIS — I34.0 SEVERE MITRAL REGURGITATION: Primary | ICD-10-CM

## 2021-01-18 DIAGNOSIS — I34.0 NONRHEUMATIC MITRAL VALVE REGURGITATION: ICD-10-CM

## 2021-01-19 ENCOUNTER — AMBULATORY - HEALTHEAST (OUTPATIENT)
Dept: CARDIOLOGY | Facility: CLINIC | Age: 76
End: 2021-01-19

## 2021-01-19 DIAGNOSIS — R06.09 DOE (DYSPNEA ON EXERTION): ICD-10-CM

## 2021-01-19 DIAGNOSIS — I34.0 SEVERE MITRAL REGURGITATION: ICD-10-CM

## 2021-01-20 ENCOUNTER — COMMUNICATION - HEALTHEAST (OUTPATIENT)
Dept: CARDIOLOGY | Facility: CLINIC | Age: 76
End: 2021-01-20

## 2021-01-22 ENCOUNTER — AMBULATORY - HEALTHEAST (OUTPATIENT)
Dept: FAMILY MEDICINE | Facility: CLINIC | Age: 76
End: 2021-01-22

## 2021-01-22 ENCOUNTER — COMMUNICATION - HEALTHEAST (OUTPATIENT)
Dept: CARDIOLOGY | Facility: CLINIC | Age: 76
End: 2021-01-22

## 2021-01-22 DIAGNOSIS — Z11.59 ENCOUNTER FOR SCREENING FOR OTHER VIRAL DISEASES: Primary | ICD-10-CM

## 2021-01-22 DIAGNOSIS — Z11.59 ENCOUNTER FOR SCREENING FOR OTHER VIRAL DISEASES: ICD-10-CM

## 2021-01-25 ENCOUNTER — AMBULATORY - HEALTHEAST (OUTPATIENT)
Dept: CARDIOLOGY | Facility: CLINIC | Age: 76
End: 2021-01-25

## 2021-01-25 ENCOUNTER — OFFICE VISIT - HEALTHEAST (OUTPATIENT)
Dept: CARDIOLOGY | Facility: CLINIC | Age: 76
End: 2021-01-25

## 2021-01-25 ENCOUNTER — TRANSFERRED RECORDS (OUTPATIENT)
Dept: HEALTH INFORMATION MANAGEMENT | Facility: CLINIC | Age: 76
End: 2021-01-25

## 2021-01-25 ENCOUNTER — CARE COORDINATION (OUTPATIENT)
Dept: CARDIOLOGY | Facility: CLINIC | Age: 76
End: 2021-01-25

## 2021-01-25 ENCOUNTER — RECORDS - HEALTHEAST (OUTPATIENT)
Dept: ADMINISTRATIVE | Facility: OTHER | Age: 76
End: 2021-01-25

## 2021-01-25 DIAGNOSIS — I34.0 SEVERE MITRAL REGURGITATION: ICD-10-CM

## 2021-01-25 DIAGNOSIS — R06.09 DOE (DYSPNEA ON EXERTION): ICD-10-CM

## 2021-01-25 DIAGNOSIS — I48.0 PAROXYSMAL ATRIAL FIBRILLATION (H): ICD-10-CM

## 2021-01-25 DIAGNOSIS — I10 ESSENTIAL HYPERTENSION: ICD-10-CM

## 2021-01-25 DIAGNOSIS — I25.10 CORONARY ARTERY DISEASE INVOLVING NATIVE CORONARY ARTERY OF NATIVE HEART WITHOUT ANGINA PECTORIS: ICD-10-CM

## 2021-01-25 DIAGNOSIS — I34.0 NONRHEUMATIC MITRAL VALVE REGURGITATION: ICD-10-CM

## 2021-01-25 LAB
ALBUMIN SERPL-MCNC: 3.9 G/DL (ref 3.5–5)
ALP SERPL-CCNC: 108 U/L (ref 45–120)
ALT SERPL W P-5'-P-CCNC: 66 U/L (ref 0–45)
ANION GAP SERPL CALCULATED.3IONS-SCNC: 9 MMOL/L (ref 5–18)
AST SERPL W P-5'-P-CCNC: 47 U/L (ref 0–40)
ATRIAL RATE - MUSE: 76 BPM
BASOPHILS # BLD AUTO: 0 THOU/UL (ref 0–0.2)
BASOPHILS NFR BLD AUTO: 1 % (ref 0–2)
BILIRUB SERPL-MCNC: 0.6 MG/DL (ref 0–1)
BNP SERPL-MCNC: 388 PG/ML (ref 0–144)
BUN SERPL-MCNC: 21 MG/DL (ref 8–28)
CALCIUM SERPL-MCNC: 9.4 MG/DL (ref 8.5–10.5)
CHLORIDE BLD-SCNC: 101 MMOL/L (ref 98–107)
CO2 SERPL-SCNC: 31 MMOL/L (ref 22–31)
CREAT SERPL-MCNC: 1.36 MG/DL (ref 0.6–1.1)
DIASTOLIC BLOOD PRESSURE - MUSE: NORMAL
EOSINOPHIL # BLD AUTO: 0.2 THOU/UL (ref 0–0.4)
EOSINOPHIL NFR BLD AUTO: 3 % (ref 0–6)
ERYTHROCYTE [DISTWIDTH] IN BLOOD BY AUTOMATED COUNT: 13.3 % (ref 11–14.5)
GFR SERPL CREATININE-BSD FRML MDRD: 38 ML/MIN/1.73M2
GLUCOSE BLD-MCNC: 147 MG/DL (ref 70–125)
HCT VFR BLD AUTO: 40.2 % (ref 35–47)
HGB BLD-MCNC: 12.7 G/DL (ref 12–16)
IMM GRANULOCYTES # BLD: 0 THOU/UL
IMM GRANULOCYTES NFR BLD: 1 %
INR PPP: 1.22 (ref 0.9–1.1)
INTERPRETATION ECG - MUSE: NORMAL
LYMPHOCYTES # BLD AUTO: 1.4 THOU/UL (ref 0.8–4.4)
LYMPHOCYTES NFR BLD AUTO: 22 % (ref 20–40)
MAGNESIUM SERPL-MCNC: 2.1 MG/DL (ref 1.8–2.6)
MCH RBC QN AUTO: 32.2 PG (ref 27–34)
MCHC RBC AUTO-ENTMCNC: 31.6 G/DL (ref 32–36)
MCV RBC AUTO: 102 FL (ref 80–100)
MONOCYTES # BLD AUTO: 0.6 THOU/UL (ref 0–0.9)
MONOCYTES NFR BLD AUTO: 10 % (ref 2–10)
NEUTROPHILS # BLD AUTO: 4 THOU/UL (ref 2–7.7)
NEUTROPHILS NFR BLD AUTO: 64 % (ref 50–70)
P AXIS - MUSE: NORMAL
PLATELET # BLD AUTO: 233 THOU/UL (ref 140–440)
PMV BLD AUTO: 11.3 FL (ref 8.5–12.5)
POTASSIUM BLD-SCNC: 3.5 MMOL/L (ref 3.5–5)
PR INTERVAL - MUSE: 284 MS
PROT SERPL-MCNC: 7.6 G/DL (ref 6–8)
QRS DURATION - MUSE: 86 MS
QT - MUSE: 410 MS
QTC - MUSE: 461 MS
R AXIS - MUSE: 59 DEGREES
RBC # BLD AUTO: 3.94 MILL/UL (ref 3.8–5.4)
SODIUM SERPL-SCNC: 141 MMOL/L (ref 136–145)
SYSTOLIC BLOOD PRESSURE - MUSE: NORMAL
T AXIS - MUSE: 60 DEGREES
VENTRICULAR RATE- MUSE: 76 BPM
WBC: 6.3 THOU/UL (ref 4–11)

## 2021-01-25 ASSESSMENT — MIFFLIN-ST. JEOR: SCORE: 1005.91

## 2021-01-25 NOTE — PROGRESS NOTES
Reason for today s visit: Pre-op RN Visit    Preop H/P with Tati Mejia PA-C on 1/25/21 - see note in Care Everywhere    Patient scheduled for Transcatheter Mitral Valve Repair with MitraClip on Feb 1  arrival time: 0530 for 1st case  at [facility] with Dr. Fitzpatrick    Referring provider: Dr. Kim  Primary Cardiology: Dr. Kim  PCP: Selina Pozo MD      CT surgery consult completed: Dr. Kraus (date 1/18/21)  Dental Clearance obtained: pt is up to date with routine dental cleaning      Tests completed today:  Pre-procedure labs drawn: CMP, CBC, INR, Mag, BNP  EKG      6MWT: Not completed due to safety precautions of covid19 pandemic    STS score: 3% repair, 6.2% rplcmt    Serum albumin (date completed 1/25/21): 3.9  Alvarenga index (date completed 1/25/21): 6/6      Total Frailty Score: 0      Patient instructed on medications:   Take last dose of eliquis on Jan 27  Loading dose of Plavix: no  Loading dose of ASA: 325 mg in 3C  Am of procedure, take am meds with a few sips of water      Patient instructed on skin prep:  [facility] and instructed for skin prep be done evening before procedure.      Advanced Directive:  Does the patient have an advanced directive: gave copy of honoring choices      Surgery and anesthesia consents  Tati Mejia PA-C reviewed risks of mitraclip/ALEXANDR and signed consents  Consents faxed to 3C  Anesthesia consents to be completed am of surgery      Pre and post procedure education was also reviewed with the patient and spouse. No further questions and ready to proceed with surgery as planned.    Instructed to come to the main entrance of [facility] at 0530 AM on Feb 1      All questions were answered to patient and Spouse by Tati Mejia PA-C and Rory Villalba RN    Spouse, Giuseppe present at the time of appointment.      Rory Villalba RN  [facility] Valve [facility]  Phone: [phone]  Fax: [phone]  01/25/21  3:27 PM

## 2021-01-26 ENCOUNTER — PREP FOR PROCEDURE (OUTPATIENT)
Dept: CARDIOLOGY | Facility: CLINIC | Age: 76
End: 2021-01-26

## 2021-01-26 DIAGNOSIS — I34.0 SEVERE MITRAL REGURGITATION: Primary | ICD-10-CM

## 2021-01-26 RX ORDER — ASPIRIN 325 MG
325 TABLET ORAL ONCE
Status: CANCELLED | OUTPATIENT
Start: 2021-01-26 | End: 2021-01-26

## 2021-01-26 RX ORDER — SODIUM CHLORIDE 9 MG/ML
INJECTION, SOLUTION INTRAVENOUS CONTINUOUS
Status: CANCELLED | OUTPATIENT
Start: 2021-01-26

## 2021-01-26 RX ORDER — LIDOCAINE 40 MG/G
CREAM TOPICAL
Status: CANCELLED | OUTPATIENT
Start: 2021-01-26

## 2021-01-26 RX ORDER — CEFAZOLIN SODIUM 2 G/50ML
2 SOLUTION INTRAVENOUS
Status: CANCELLED | OUTPATIENT
Start: 2021-01-26

## 2021-01-28 ENCOUNTER — AMBULATORY - HEALTHEAST (OUTPATIENT)
Dept: FAMILY MEDICINE | Facility: CLINIC | Age: 76
End: 2021-01-28

## 2021-01-28 DIAGNOSIS — Z11.59 ENCOUNTER FOR SCREENING FOR OTHER VIRAL DISEASES: ICD-10-CM

## 2021-01-29 ENCOUNTER — TELEPHONE (OUTPATIENT)
Dept: CARDIOLOGY | Facility: CLINIC | Age: 76
End: 2021-01-29

## 2021-01-29 PROBLEM — I10 HYPERTENSIVE DISORDER: Status: ACTIVE | Noted: 2020-09-25

## 2021-01-29 PROBLEM — F43.22 ADJUSTMENT DISORDER WITH ANXIOUS MOOD: Status: ACTIVE | Noted: 2021-01-29

## 2021-01-29 PROBLEM — N18.31 STAGE 3A CHRONIC KIDNEY DISEASE (H): Status: ACTIVE | Noted: 2020-10-14

## 2021-01-29 PROBLEM — I50.31 ACUTE HEART FAILURE WITH PRESERVED EJECTION FRACTION (H): Status: ACTIVE | Noted: 2020-10-14

## 2021-01-29 PROBLEM — I25.10 CORONARY ARTERIOSCLEROSIS: Status: ACTIVE | Noted: 2020-09-25

## 2021-01-29 PROBLEM — F41.9 ANXIETY: Status: ACTIVE | Noted: 2020-09-25

## 2021-01-29 PROBLEM — I48.0 PAROXYSMAL ATRIAL FIBRILLATION (H): Status: ACTIVE | Noted: 2020-07-02

## 2021-01-29 PROBLEM — I34.1 MITRAL VALVE PROLAPSE: Status: ACTIVE | Noted: 2020-09-25

## 2021-01-29 PROBLEM — K58.9 IRRITABLE BOWEL SYNDROME: Status: ACTIVE | Noted: 2021-01-29

## 2021-01-29 PROBLEM — I36.1 TRICUSPID VALVE REGURGITATION, NONRHEUMATIC: Status: ACTIVE | Noted: 2021-01-29

## 2021-01-29 PROBLEM — M81.0 OSTEOPOROSIS: Status: ACTIVE | Noted: 2020-09-25

## 2021-01-29 PROBLEM — E03.8 SUBCLINICAL HYPOTHYROIDISM: Status: ACTIVE | Noted: 2020-12-16

## 2021-01-29 RX ORDER — MULTIVITAMIN,THERAPEUTIC
0.5 TABLET ORAL
COMMUNITY
End: 2021-11-27

## 2021-01-29 RX ORDER — METOPROLOL SUCCINATE 50 MG/1
50 TABLET, EXTENDED RELEASE ORAL EVERY 24 HOURS
COMMUNITY
Start: 2020-05-21 | End: 2021-08-06

## 2021-01-29 RX ORDER — LISINOPRIL 5 MG/1
5 TABLET ORAL EVERY 24 HOURS
COMMUNITY
Start: 2020-06-26 | End: 2021-12-10

## 2021-01-29 RX ORDER — FUROSEMIDE 20 MG
20 TABLET ORAL DAILY
COMMUNITY
Start: 2020-12-18 | End: 2021-11-27

## 2021-01-29 RX ORDER — AMIODARONE HYDROCHLORIDE 200 MG/1
200 TABLET ORAL EVERY EVENING
COMMUNITY
Start: 2020-10-15 | End: 2021-08-30

## 2021-01-29 RX ORDER — CLINDAMYCIN PHOSPHATE 900 MG/50ML
900 INJECTION, SOLUTION INTRAVENOUS
Status: CANCELLED | OUTPATIENT
Start: 2021-01-29

## 2021-01-29 RX ORDER — ROSUVASTATIN CALCIUM 20 MG/1
20 TABLET, COATED ORAL AT BEDTIME
COMMUNITY
End: 2021-09-27

## 2021-01-29 NOTE — TELEPHONE ENCOUNTER
Left voicemail for patient to complete Travel Screen for Cardiac Cath Lab appointment on 2/1 and inform patient of updated Visitor Policy.       covid in process. 1/28 [facility].

## 2021-01-30 ENCOUNTER — COMMUNICATION - HEALTHEAST (OUTPATIENT)
Dept: SCHEDULING | Facility: CLINIC | Age: 76
End: 2021-01-30

## 2021-01-31 ENCOUNTER — ANESTHESIA EVENT (OUTPATIENT)
Dept: CARDIOLOGY | Facility: CLINIC | Age: 76
DRG: 267 | End: 2021-01-31
Payer: COMMERCIAL

## 2021-01-31 NOTE — ANESTHESIA PREPROCEDURE EVALUATION
Anesthesia Pre-Procedure Evaluation    Patient: Kelly Robledo   MRN: [id number] : 1945        Preoperative Diagnosis: valuvlar disease  other   Procedure : Procedure(s):  CV MITRAL CLIP     No past medical history on file.   No past surgical history on file.   Allergies   Allergen Reactions     Penicillins Hives      Social History     Tobacco Use     Smoking status: Not on file   Substance Use Topics     Alcohol use: Not on file      Wt Readings from Last 1 Encounters:   No data found for Wt        Anesthesia Evaluation            ROS/MED HX  ENT/Pulmonary:       Neurologic:       Cardiovascular: Comment: First degree AVB     (+) hypertension--CAD ---valvular problems/murmurs type: MR severe. Previous cardiac testing   Echo: Date: 12/3/20 Results:  Mitral Valve: The following structural abnormalities were observed:   non-specific thickening. Mildly decreased anterior leaflet mobility.   Moderately decreased posterior leaflet mobility. Severe mitral   regurgitation. The jet is posterior directed and is eccentric.    Left atrial volume is severely increased.    Left ventricle ejection fraction is normal. The calculated left   ventricular ejection fraction is 70%.    Moderate tricuspid valve regurgitation. Moderate pulmonary hypertension   present. The estimated systolic pulmonary artery pressure is 68 mmhg.    Normal right ventricular size. TAPSE is abnormal, which is consistent   with abnormal right ventricular systolic function.    When compared to the previous study dated 2020, pulmonary   hypertension is present.  Stress Test: Date: Results:    ECG Reviewed: Date: Results:    Cath: Date: 21 Results:  Left main with 30% ostial narrowing  LAD with mild to moderate diffuse disease, and a 50% stenosis in the   midportion, that appears unchanged from her prior angiogram  Ramus with a 50% proximal to mid stenosis and mild disease distally  LCx is a small vessel with no significant obtuse marginals  noted  RCA is a large caliber, dominant vessel supplying the inferior and   posterior walls.  There is 40% mid to distal focal stenosis, also   unchanged from prior.    METS/Exercise Tolerance:     Hematologic:       Musculoskeletal:       GI/Hepatic:       Renal/Genitourinary:       Endo:    (-) thyroid disease   Psychiatric/Substance Use:       Infectious Disease:       Malignancy:       Other:            Physical Exam    Airway        Mallampati: II   TM distance: > 3 FB   Neck ROM: full     Respiratory Devices and Support         Dental  no notable dental history         Cardiovascular          Rhythm and rate: regular and normal     Pulmonary   pulmonary exam normal                OUTSIDE LABS:  CBC: No results found for: WBC, HGB, HCT, PLT  BMP: No results found for: NA, POTASSIUM, CHLORIDE, CO2, BUN, CR, GLC  COAGS: No results found for: PTT, INR, FIBR  POC: No results found for: BGM, HCG, HCGS  HEPATIC: No results found for: ALBUMIN, PROTTOTAL, ALT, AST, GGT, ALKPHOS, BILITOTAL, BILIDIRECT, HO  OTHER: No results found for: PH, LACT, A1C, SHADI, PHOS, MAG, LIPASE, AMYLASE, TSH, T4, T3, CRP, SED    Anesthesia Plan     History & Physical Review      ASA Status:  3.   Plan for General with Intravenous induction. Maintenance will be Inhalation.     Additional equipment: Arterial Line.    Drips/Meds.  Drips/Meds: Epinephrine and Norepi.  PONV prophylaxis:  Ondansetron (or other 5HT-3) and Dexamethasone or Solumedrol.       Consents  Anesthesia Plan(s) and associated risks, benefits, and realistic alternatives discussed.    Questions answered and patient/representative(s) expressed understanding.    Discussed with:  Patient.       Extended Intubation/Ventilatory Support Discussed No No     Use of blood products discussed: No.          Postoperative Care  Postoperative pain management: IV analgesics.           Humberto Collins MD

## 2021-02-01 ENCOUNTER — APPOINTMENT (OUTPATIENT)
Dept: CARDIOLOGY | Facility: CLINIC | Age: 76
DRG: 267 | End: 2021-02-01
Attending: INTERNAL MEDICINE
Payer: COMMERCIAL

## 2021-02-01 ENCOUNTER — HOSPITAL ENCOUNTER (INPATIENT)
Facility: CLINIC | Age: 76
LOS: 1 days | Discharge: HOME | DRG: 267 | End: 2021-02-02
Attending: INTERNAL MEDICINE | Admitting: INTERNAL MEDICINE
Payer: COMMERCIAL

## 2021-02-01 ENCOUNTER — RECORDS - HEALTHEAST (OUTPATIENT)
Dept: ADMINISTRATIVE | Facility: OTHER | Age: 76
End: 2021-02-01

## 2021-02-01 ENCOUNTER — COMMUNICATION - HEALTHEAST (OUTPATIENT)
Dept: FAMILY MEDICINE | Facility: CLINIC | Age: 76
End: 2021-02-01

## 2021-02-01 ENCOUNTER — ANESTHESIA (OUTPATIENT)
Dept: CARDIOLOGY | Facility: CLINIC | Age: 76
DRG: 267 | End: 2021-02-01
Payer: COMMERCIAL

## 2021-02-01 ENCOUNTER — AMBULATORY - HEALTHEAST (OUTPATIENT)
Dept: CARDIOLOGY | Facility: CLINIC | Age: 76
End: 2021-02-01

## 2021-02-01 DIAGNOSIS — I25.10 CORONARY ARTERIOSCLEROSIS: Primary | ICD-10-CM

## 2021-02-01 DIAGNOSIS — I34.0 SEVERE MITRAL REGURGITATION: ICD-10-CM

## 2021-02-01 LAB
ABO + RH BLD: NORMAL
ABO + RH BLD: NORMAL
ALBUMIN SERPL-MCNC: 3.3 G/DL (ref 3.4–5)
ALP SERPL-CCNC: 81 U/L (ref 40–150)
ALT SERPL W P-5'-P-CCNC: 47 U/L (ref 0–50)
ANION GAP SERPL CALCULATED.3IONS-SCNC: 3 MMOL/L (ref 3–14)
ANION GAP SERPL CALCULATED.3IONS-SCNC: <1 MMOL/L (ref 3–14)
AST SERPL W P-5'-P-CCNC: 30 U/L (ref 0–45)
BILIRUB SERPL-MCNC: 1 MG/DL (ref 0.2–1.3)
BLD GP AB SCN SERPL QL: NORMAL
BLD PROD TYP BPU: NORMAL
BLD UNIT ID BPU: 0
BLD UNIT ID BPU: 0
BLOOD BANK CMNT PATIENT-IMP: NORMAL
BLOOD PRODUCT CODE: NORMAL
BLOOD PRODUCT CODE: NORMAL
BPU ID: NORMAL
BPU ID: NORMAL
BUN SERPL-MCNC: 22 MG/DL (ref 7–30)
BUN SERPL-MCNC: 22 MG/DL (ref 7–30)
CALCIUM SERPL-MCNC: 9.1 MG/DL (ref 8.5–10.1)
CALCIUM SERPL-MCNC: 9.4 MG/DL (ref 8.5–10.1)
CHLORIDE SERPL-SCNC: 103 MMOL/L (ref 94–109)
CHLORIDE SERPL-SCNC: 107 MMOL/L (ref 94–109)
CO2 SERPL-SCNC: 29 MMOL/L (ref 20–32)
CO2 SERPL-SCNC: 31 MMOL/L (ref 20–32)
CREAT SERPL-MCNC: 1.26 MG/DL (ref 0.52–1.04)
CREAT SERPL-MCNC: 1.37 MG/DL (ref 0.52–1.04)
ERYTHROCYTE [DISTWIDTH] IN BLOOD BY AUTOMATED COUNT: 13.2 % (ref 10–15)
GFR SERPL CREATININE-BSD FRML MDRD: 37 ML/MIN/{1.73_M2}
GFR SERPL CREATININE-BSD FRML MDRD: 41 ML/MIN/{1.73_M2}
GLUCOSE BLDC GLUCOMTR-MCNC: 136 MG/DL (ref 70–99)
GLUCOSE SERPL-MCNC: 127 MG/DL (ref 70–99)
GLUCOSE SERPL-MCNC: 138 MG/DL (ref 70–99)
HCT VFR BLD AUTO: 35.7 % (ref 35–47)
HGB BLD-MCNC: 11.3 G/DL (ref 11.7–15.7)
KCT BLD-ACNC: 201 SEC (ref 75–150)
KCT BLD-ACNC: 213 SEC (ref 75–150)
KCT BLD-ACNC: 229 SEC (ref 75–150)
KCT BLD-ACNC: 270 SEC (ref 75–150)
KCT BLD-ACNC: 290 SEC (ref 75–150)
MAGNESIUM SERPL-MCNC: 2.1 MG/DL (ref 1.6–2.3)
MCH RBC QN AUTO: 31.7 PG (ref 26.5–33)
MCHC RBC AUTO-ENTMCNC: 31.7 G/DL (ref 31.5–36.5)
MCV RBC AUTO: 100 FL (ref 78–100)
NUM BPU REQUESTED: 2
PLATELET # BLD AUTO: 169 10E9/L (ref 150–450)
POTASSIUM SERPL-SCNC: 3.8 MMOL/L (ref 3.4–5.3)
POTASSIUM SERPL-SCNC: 4.3 MMOL/L (ref 3.4–5.3)
POTASSIUM SERPL-SCNC: 5.5 MMOL/L (ref 3.4–5.3)
POTASSIUM SERPL-SCNC: 5.5 MMOL/L (ref 3.4–5.3)
PROT SERPL-MCNC: 6.5 G/DL (ref 6.8–8.8)
RBC # BLD AUTO: 3.57 10E12/L (ref 3.8–5.2)
SODIUM SERPL-SCNC: 135 MMOL/L (ref 133–144)
SODIUM SERPL-SCNC: 140 MMOL/L (ref 133–144)
SPECIMEN EXP DATE BLD: NORMAL
TRANSFUSION STATUS PATIENT QL: NORMAL
WBC # BLD AUTO: 8.3 10E9/L (ref 4–11)

## 2021-02-01 PROCEDURE — 370N000017 HC ANESTHESIA TECHNICAL FEE, PER MIN: Performed by: INTERNAL MEDICINE

## 2021-02-01 PROCEDURE — 272N000001 HC OR GENERAL SUPPLY STERILE: Performed by: INTERNAL MEDICINE

## 2021-02-01 PROCEDURE — C1769 GUIDE WIRE: HCPCS | Performed by: INTERNAL MEDICINE

## 2021-02-01 PROCEDURE — 36415 COLL VENOUS BLD VENIPUNCTURE: CPT | Performed by: INTERNAL MEDICINE

## 2021-02-01 PROCEDURE — 250N000011 HC RX IP 250 OP 636: Performed by: ANESTHESIOLOGY

## 2021-02-01 PROCEDURE — 999N001017 HC STATISTIC GLUCOSE BY METER IP

## 2021-02-01 PROCEDURE — 85027 COMPLETE CBC AUTOMATED: CPT | Performed by: INTERNAL MEDICINE

## 2021-02-01 PROCEDURE — C1887 CATHETER, GUIDING: HCPCS | Performed by: INTERNAL MEDICINE

## 2021-02-01 PROCEDURE — C1894 INTRO/SHEATH, NON-LASER: HCPCS | Performed by: INTERNAL MEDICINE

## 2021-02-01 PROCEDURE — 250N000009 HC RX 250: Performed by: NURSE ANESTHETIST, CERTIFIED REGISTERED

## 2021-02-01 PROCEDURE — 02UG3JZ SUPPLEMENT MITRAL VALVE WITH SYNTHETIC SUBSTITUTE, PERCUTANEOUS APPROACH: ICD-10-PCS | Performed by: INTERNAL MEDICINE

## 2021-02-01 PROCEDURE — 93355 ECHO TRANSESOPHAGEAL (TEE): CPT | Performed by: INTERNAL MEDICINE

## 2021-02-01 PROCEDURE — 250N000011 HC RX IP 250 OP 636: Performed by: INTERNAL MEDICINE

## 2021-02-01 PROCEDURE — 83735 ASSAY OF MAGNESIUM: CPT | Performed by: INTERNAL MEDICINE

## 2021-02-01 PROCEDURE — 250N000024 HC ISOFLURANE, PER MIN: Performed by: INTERNAL MEDICINE

## 2021-02-01 PROCEDURE — P9016 RBC LEUKOCYTES REDUCED: HCPCS | Performed by: INTERNAL MEDICINE

## 2021-02-01 PROCEDURE — 250N000013 HC RX MED GY IP 250 OP 250 PS 637: Performed by: INTERNAL MEDICINE

## 2021-02-01 PROCEDURE — 93010 ELECTROCARDIOGRAM REPORT: CPT | Mod: 76 | Performed by: INTERNAL MEDICINE

## 2021-02-01 PROCEDURE — 258N000003 HC RX IP 258 OP 636: Performed by: ANESTHESIOLOGY

## 2021-02-01 PROCEDURE — 33418 REPAIR TCAT MITRAL VALVE: CPT | Mod: Q0 | Performed by: INTERNAL MEDICINE

## 2021-02-01 PROCEDURE — 93355 ECHO TRANSESOPHAGEAL (TEE): CPT

## 2021-02-01 PROCEDURE — 278N000051 HC OR IMPLANT GENERAL: Performed by: INTERNAL MEDICINE

## 2021-02-01 PROCEDURE — 250N000011 HC RX IP 250 OP 636: Performed by: NURSE ANESTHETIST, CERTIFIED REGISTERED

## 2021-02-01 PROCEDURE — 84132 ASSAY OF SERUM POTASSIUM: CPT | Performed by: PHYSICIAN ASSISTANT

## 2021-02-01 PROCEDURE — 85347 COAGULATION TIME ACTIVATED: CPT

## 2021-02-01 PROCEDURE — 86900 BLOOD TYPING SEROLOGIC ABO: CPT | Performed by: INTERNAL MEDICINE

## 2021-02-01 PROCEDURE — 99221 1ST HOSP IP/OBS SF/LOW 40: CPT | Mod: AI | Performed by: INTERNAL MEDICINE

## 2021-02-01 PROCEDURE — 272N000002 HC OR SUPPLY OTHER OPNP: Performed by: INTERNAL MEDICINE

## 2021-02-01 PROCEDURE — C1760 CLOSURE DEV, VASC: HCPCS | Performed by: INTERNAL MEDICINE

## 2021-02-01 PROCEDURE — 999N000054 HC STATISTIC EKG NON-CHARGEABLE

## 2021-02-01 PROCEDURE — 258N000003 HC RX IP 258 OP 636: Performed by: NURSE ANESTHETIST, CERTIFIED REGISTERED

## 2021-02-01 PROCEDURE — 86901 BLOOD TYPING SEROLOGIC RH(D): CPT | Performed by: INTERNAL MEDICINE

## 2021-02-01 PROCEDURE — 86923 COMPATIBILITY TEST ELECTRIC: CPT | Performed by: INTERNAL MEDICINE

## 2021-02-01 PROCEDURE — 250N000009 HC RX 250: Performed by: INTERNAL MEDICINE

## 2021-02-01 PROCEDURE — 80053 COMPREHEN METABOLIC PANEL: CPT | Performed by: INTERNAL MEDICINE

## 2021-02-01 PROCEDURE — 86850 RBC ANTIBODY SCREEN: CPT | Performed by: INTERNAL MEDICINE

## 2021-02-01 PROCEDURE — 214N000001 HC R&B CCU UMMC

## 2021-02-01 PROCEDURE — 99207 PR APP CREDIT; MD BILLING SHARED VISIT: CPT | Performed by: PHYSICIAN ASSISTANT

## 2021-02-01 PROCEDURE — 80048 BASIC METABOLIC PNL TOTAL CA: CPT | Performed by: INTERNAL MEDICINE

## 2021-02-01 DEVICE — IMPLANTABLE DEVICE: Type: IMPLANTABLE DEVICE | Site: HEART | Status: FUNCTIONAL

## 2021-02-01 RX ORDER — NALOXONE HYDROCHLORIDE 0.4 MG/ML
0.2 INJECTION, SOLUTION INTRAMUSCULAR; INTRAVENOUS; SUBCUTANEOUS
Status: DISCONTINUED | OUTPATIENT
Start: 2021-02-01 | End: 2021-02-01 | Stop reason: HOSPADM

## 2021-02-01 RX ORDER — NITROGLYCERIN 0.4 MG/1
0.4 TABLET SUBLINGUAL EVERY 5 MIN PRN
Status: DISCONTINUED | OUTPATIENT
Start: 2021-02-01 | End: 2021-02-02 | Stop reason: HOSPADM

## 2021-02-01 RX ORDER — NALOXONE HYDROCHLORIDE 0.4 MG/ML
0.4 INJECTION, SOLUTION INTRAMUSCULAR; INTRAVENOUS; SUBCUTANEOUS
Status: DISCONTINUED | OUTPATIENT
Start: 2021-02-01 | End: 2021-02-02 | Stop reason: HOSPADM

## 2021-02-01 RX ORDER — ONDANSETRON 4 MG/1
4 TABLET, ORALLY DISINTEGRATING ORAL EVERY 30 MIN PRN
Status: DISCONTINUED | OUTPATIENT
Start: 2021-02-01 | End: 2021-02-01 | Stop reason: HOSPADM

## 2021-02-01 RX ORDER — SODIUM CHLORIDE, SODIUM LACTATE, POTASSIUM CHLORIDE, CALCIUM CHLORIDE 600; 310; 30; 20 MG/100ML; MG/100ML; MG/100ML; MG/100ML
INJECTION, SOLUTION INTRAVENOUS CONTINUOUS
Status: DISCONTINUED | OUTPATIENT
Start: 2021-02-01 | End: 2021-02-01 | Stop reason: HOSPADM

## 2021-02-01 RX ORDER — LIDOCAINE 40 MG/G
CREAM TOPICAL
Status: DISCONTINUED | OUTPATIENT
Start: 2021-02-01 | End: 2021-02-01 | Stop reason: HOSPADM

## 2021-02-01 RX ORDER — LIDOCAINE 40 MG/G
CREAM TOPICAL
Status: DISCONTINUED | OUTPATIENT
Start: 2021-02-01 | End: 2021-02-02 | Stop reason: HOSPADM

## 2021-02-01 RX ORDER — PROTAMINE SULFATE 10 MG/ML
INJECTION, SOLUTION INTRAVENOUS PRN
Status: DISCONTINUED | OUTPATIENT
Start: 2021-02-01 | End: 2021-02-01

## 2021-02-01 RX ORDER — ONDANSETRON 4 MG/1
4 TABLET, ORALLY DISINTEGRATING ORAL EVERY 6 HOURS PRN
Status: DISCONTINUED | OUTPATIENT
Start: 2021-02-01 | End: 2021-02-02 | Stop reason: HOSPADM

## 2021-02-01 RX ORDER — FLUMAZENIL 0.1 MG/ML
0.2 INJECTION, SOLUTION INTRAVENOUS
Status: ACTIVE | OUTPATIENT
Start: 2021-02-01 | End: 2021-02-02

## 2021-02-01 RX ORDER — CLINDAMYCIN PHOSPHATE 900 MG/50ML
900 INJECTION, SOLUTION INTRAVENOUS
Status: COMPLETED | OUTPATIENT
Start: 2021-02-01 | End: 2021-02-01

## 2021-02-01 RX ORDER — HYDROMORPHONE HYDROCHLORIDE 1 MG/ML
.3-.5 INJECTION, SOLUTION INTRAMUSCULAR; INTRAVENOUS; SUBCUTANEOUS EVERY 10 MIN PRN
Status: DISCONTINUED | OUTPATIENT
Start: 2021-02-01 | End: 2021-02-01 | Stop reason: HOSPADM

## 2021-02-01 RX ORDER — ACETAMINOPHEN 325 MG/1
650 TABLET ORAL EVERY 4 HOURS PRN
Status: DISCONTINUED | OUTPATIENT
Start: 2021-02-01 | End: 2021-02-02 | Stop reason: HOSPADM

## 2021-02-01 RX ORDER — SODIUM CHLORIDE 9 MG/ML
INJECTION, SOLUTION INTRAVENOUS CONTINUOUS
Status: DISCONTINUED | OUTPATIENT
Start: 2021-02-01 | End: 2021-02-01 | Stop reason: HOSPADM

## 2021-02-01 RX ORDER — SODIUM CHLORIDE, SODIUM LACTATE, POTASSIUM CHLORIDE, CALCIUM CHLORIDE 600; 310; 30; 20 MG/100ML; MG/100ML; MG/100ML; MG/100ML
INJECTION, SOLUTION INTRAVENOUS CONTINUOUS PRN
Status: DISCONTINUED | OUTPATIENT
Start: 2021-02-01 | End: 2021-02-01

## 2021-02-01 RX ORDER — NOREPINEPHRINE BITARTRATE 0.06 MG/ML
0.03-0.4 INJECTION, SOLUTION INTRAVENOUS CONTINUOUS
Status: DISCONTINUED | OUTPATIENT
Start: 2021-02-01 | End: 2021-02-01 | Stop reason: HOSPADM

## 2021-02-01 RX ORDER — NALOXONE HYDROCHLORIDE 0.4 MG/ML
0.2 INJECTION, SOLUTION INTRAMUSCULAR; INTRAVENOUS; SUBCUTANEOUS
Status: DISCONTINUED | OUTPATIENT
Start: 2021-02-01 | End: 2021-02-01

## 2021-02-01 RX ORDER — AMIODARONE HYDROCHLORIDE 200 MG/1
200 TABLET ORAL EVERY EVENING
Status: DISCONTINUED | OUTPATIENT
Start: 2021-02-01 | End: 2021-02-02 | Stop reason: HOSPADM

## 2021-02-01 RX ORDER — NALOXONE HYDROCHLORIDE 0.4 MG/ML
0.2 INJECTION, SOLUTION INTRAMUSCULAR; INTRAVENOUS; SUBCUTANEOUS
Status: DISCONTINUED | OUTPATIENT
Start: 2021-02-01 | End: 2021-02-02 | Stop reason: HOSPADM

## 2021-02-01 RX ORDER — HEPARIN SODIUM 1000 [USP'U]/ML
INJECTION, SOLUTION INTRAVENOUS; SUBCUTANEOUS PRN
Status: DISCONTINUED | OUTPATIENT
Start: 2021-02-01 | End: 2021-02-01

## 2021-02-01 RX ORDER — ASPIRIN 325 MG
325 TABLET ORAL ONCE
Status: COMPLETED | OUTPATIENT
Start: 2021-02-01 | End: 2021-02-01

## 2021-02-01 RX ORDER — FENTANYL CITRATE 50 UG/ML
25-50 INJECTION, SOLUTION INTRAMUSCULAR; INTRAVENOUS EVERY 5 MIN PRN
Status: DISCONTINUED | OUTPATIENT
Start: 2021-02-01 | End: 2021-02-01 | Stop reason: HOSPADM

## 2021-02-01 RX ORDER — NALOXONE HYDROCHLORIDE 0.4 MG/ML
0.4 INJECTION, SOLUTION INTRAMUSCULAR; INTRAVENOUS; SUBCUTANEOUS
Status: DISCONTINUED | OUTPATIENT
Start: 2021-02-01 | End: 2021-02-01 | Stop reason: HOSPADM

## 2021-02-01 RX ORDER — OXYCODONE HYDROCHLORIDE 5 MG/1
5 TABLET ORAL EVERY 4 HOURS PRN
Status: DISCONTINUED | OUTPATIENT
Start: 2021-02-01 | End: 2021-02-02 | Stop reason: HOSPADM

## 2021-02-01 RX ORDER — LISINOPRIL 5 MG/1
5 TABLET ORAL DAILY
Status: DISCONTINUED | OUTPATIENT
Start: 2021-02-02 | End: 2021-02-02 | Stop reason: HOSPADM

## 2021-02-01 RX ORDER — FENTANYL CITRATE 50 UG/ML
INJECTION, SOLUTION INTRAMUSCULAR; INTRAVENOUS PRN
Status: DISCONTINUED | OUTPATIENT
Start: 2021-02-01 | End: 2021-02-01

## 2021-02-01 RX ORDER — FENTANYL CITRATE 50 UG/ML
25-50 INJECTION, SOLUTION INTRAMUSCULAR; INTRAVENOUS
Status: DISCONTINUED | OUTPATIENT
Start: 2021-02-01 | End: 2021-02-01 | Stop reason: HOSPADM

## 2021-02-01 RX ORDER — METOPROLOL SUCCINATE 50 MG/1
50 TABLET, EXTENDED RELEASE ORAL EVERY MORNING
Status: DISCONTINUED | OUTPATIENT
Start: 2021-02-02 | End: 2021-02-02 | Stop reason: HOSPADM

## 2021-02-01 RX ORDER — LIDOCAINE HYDROCHLORIDE 20 MG/ML
INJECTION, SOLUTION INFILTRATION; PERINEURAL PRN
Status: DISCONTINUED | OUTPATIENT
Start: 2021-02-01 | End: 2021-02-01

## 2021-02-01 RX ORDER — ONDANSETRON 2 MG/ML
4 INJECTION INTRAMUSCULAR; INTRAVENOUS EVERY 30 MIN PRN
Status: DISCONTINUED | OUTPATIENT
Start: 2021-02-01 | End: 2021-02-01 | Stop reason: HOSPADM

## 2021-02-01 RX ORDER — ONDANSETRON 2 MG/ML
INJECTION INTRAMUSCULAR; INTRAVENOUS PRN
Status: DISCONTINUED | OUTPATIENT
Start: 2021-02-01 | End: 2021-02-01

## 2021-02-01 RX ORDER — SODIUM CHLORIDE 9 MG/ML
INJECTION, SOLUTION INTRAVENOUS CONTINUOUS
Status: DISCONTINUED | OUTPATIENT
Start: 2021-02-01 | End: 2021-02-01

## 2021-02-01 RX ORDER — DEXAMETHASONE SODIUM PHOSPHATE 4 MG/ML
INJECTION, SOLUTION INTRA-ARTICULAR; INTRALESIONAL; INTRAMUSCULAR; INTRAVENOUS; SOFT TISSUE PRN
Status: DISCONTINUED | OUTPATIENT
Start: 2021-02-01 | End: 2021-02-01

## 2021-02-01 RX ORDER — HYDRALAZINE HYDROCHLORIDE 20 MG/ML
10 INJECTION INTRAMUSCULAR; INTRAVENOUS
Status: DISCONTINUED | OUTPATIENT
Start: 2021-02-01 | End: 2021-02-02 | Stop reason: HOSPADM

## 2021-02-01 RX ORDER — NALOXONE HYDROCHLORIDE 0.4 MG/ML
0.4 INJECTION, SOLUTION INTRAMUSCULAR; INTRAVENOUS; SUBCUTANEOUS
Status: DISCONTINUED | OUTPATIENT
Start: 2021-02-01 | End: 2021-02-01

## 2021-02-01 RX ORDER — ROSUVASTATIN CALCIUM 20 MG/1
20 TABLET, COATED ORAL AT BEDTIME
Status: DISCONTINUED | OUTPATIENT
Start: 2021-02-01 | End: 2021-02-02 | Stop reason: HOSPADM

## 2021-02-01 RX ORDER — ONDANSETRON 2 MG/ML
4 INJECTION INTRAMUSCULAR; INTRAVENOUS EVERY 6 HOURS PRN
Status: DISCONTINUED | OUTPATIENT
Start: 2021-02-01 | End: 2021-02-02 | Stop reason: HOSPADM

## 2021-02-01 RX ORDER — ASPIRIN 81 MG/1
81 TABLET ORAL DAILY
Status: DISCONTINUED | OUTPATIENT
Start: 2021-02-02 | End: 2021-02-02 | Stop reason: HOSPADM

## 2021-02-01 RX ORDER — PROPOFOL 10 MG/ML
INJECTION, EMULSION INTRAVENOUS PRN
Status: DISCONTINUED | OUTPATIENT
Start: 2021-02-01 | End: 2021-02-01

## 2021-02-01 RX ORDER — HYDROMORPHONE HCL IN WATER/PF 6 MG/30 ML
0.2 PATIENT CONTROLLED ANALGESIA SYRINGE INTRAVENOUS
Status: DISCONTINUED | OUTPATIENT
Start: 2021-02-01 | End: 2021-02-02 | Stop reason: HOSPADM

## 2021-02-01 RX ORDER — ATROPINE SULFATE 0.1 MG/ML
0.5 INJECTION INTRAVENOUS EVERY 5 MIN PRN
Status: ACTIVE | OUTPATIENT
Start: 2021-02-01 | End: 2021-02-02

## 2021-02-01 RX ORDER — FUROSEMIDE 20 MG
20 TABLET ORAL DAILY
Status: DISCONTINUED | OUTPATIENT
Start: 2021-02-02 | End: 2021-02-02 | Stop reason: HOSPADM

## 2021-02-01 RX ADMIN — NOREPINEPHRINE BITARTRATE 6.4 MCG: 1 INJECTION, SOLUTION, CONCENTRATE INTRAVENOUS at 09:35

## 2021-02-01 RX ADMIN — SODIUM CHLORIDE, POTASSIUM CHLORIDE, SODIUM LACTATE AND CALCIUM CHLORIDE: 600; 310; 30; 20 INJECTION, SOLUTION INTRAVENOUS at 07:45

## 2021-02-01 RX ADMIN — DEXAMETHASONE SODIUM PHOSPHATE 6 MG: 4 INJECTION, SOLUTION INTRA-ARTICULAR; INTRALESIONAL; INTRAMUSCULAR; INTRAVENOUS; SOFT TISSUE at 09:56

## 2021-02-01 RX ADMIN — PROPOFOL 20 MG: 10 INJECTION, EMULSION INTRAVENOUS at 09:51

## 2021-02-01 RX ADMIN — NOREPINEPHRINE BITARTRATE 6.4 MCG: 1 INJECTION, SOLUTION, CONCENTRATE INTRAVENOUS at 09:27

## 2021-02-01 RX ADMIN — SUGAMMADEX 200 MG: 100 INJECTION, SOLUTION INTRAVENOUS at 10:10

## 2021-02-01 RX ADMIN — Medication 3000 UNITS: at 09:21

## 2021-02-01 RX ADMIN — EPINEPHRINE 10 MCG: 1 INJECTION PARENTERAL at 08:12

## 2021-02-01 RX ADMIN — Medication 5000 UNITS: at 08:57

## 2021-02-01 RX ADMIN — LIDOCAINE HYDROCHLORIDE 60 MG: 20 INJECTION, SOLUTION INFILTRATION; PERINEURAL at 08:13

## 2021-02-01 RX ADMIN — EPINEPHRINE 10 MCG: 1 INJECTION PARENTERAL at 08:43

## 2021-02-01 RX ADMIN — APIXABAN 5 MG: 5 TABLET, FILM COATED ORAL at 20:03

## 2021-02-01 RX ADMIN — NOREPINEPHRINE BITARTRATE 6.4 MCG: 1 INJECTION, SOLUTION, CONCENTRATE INTRAVENOUS at 09:32

## 2021-02-01 RX ADMIN — ASPIRIN 325 MG ORAL TABLET 325 MG: 325 PILL ORAL at 06:59

## 2021-02-01 RX ADMIN — ROCURONIUM BROMIDE 20 MG: 10 INJECTION INTRAVENOUS at 09:10

## 2021-02-01 RX ADMIN — PROPOFOL 80 MG: 10 INJECTION, EMULSION INTRAVENOUS at 08:13

## 2021-02-01 RX ADMIN — EPINEPHRINE 10 MCG: 1 INJECTION PARENTERAL at 09:23

## 2021-02-01 RX ADMIN — PROTAMINE SULFATE 10 MG: 10 INJECTION, SOLUTION INTRAVENOUS at 10:19

## 2021-02-01 RX ADMIN — EPINEPHRINE 5 MCG: 1 INJECTION PARENTERAL at 09:15

## 2021-02-01 RX ADMIN — NOREPINEPHRINE BITARTRATE 6.4 MCG: 1 INJECTION, SOLUTION, CONCENTRATE INTRAVENOUS at 09:39

## 2021-02-01 RX ADMIN — ROSUVASTATIN CALCIUM 20 MG: 20 TABLET, FILM COATED ORAL at 22:18

## 2021-02-01 RX ADMIN — NOREPINEPHRINE BITARTRATE 6.4 MCG: 1 INJECTION, SOLUTION, CONCENTRATE INTRAVENOUS at 09:23

## 2021-02-01 RX ADMIN — Medication 3000 UNITS: at 09:33

## 2021-02-01 RX ADMIN — SODIUM CHLORIDE, POTASSIUM CHLORIDE, SODIUM LACTATE AND CALCIUM CHLORIDE: 600; 310; 30; 20 INJECTION, SOLUTION INTRAVENOUS at 10:16

## 2021-02-01 RX ADMIN — Medication 2000 UNITS: at 09:10

## 2021-02-01 RX ADMIN — EPINEPHRINE 0.03 MCG/KG/MIN: 1 INJECTION PARENTERAL at 08:55

## 2021-02-01 RX ADMIN — CLINDAMYCIN PHOSPHATE 900 MG: 900 INJECTION, SOLUTION INTRAVENOUS at 08:23

## 2021-02-01 RX ADMIN — ROCURONIUM BROMIDE 50 MG: 10 INJECTION INTRAVENOUS at 08:16

## 2021-02-01 RX ADMIN — EPINEPHRINE 10 MCG: 1 INJECTION PARENTERAL at 09:27

## 2021-02-01 RX ADMIN — PROTAMINE SULFATE 35 MG: 10 INJECTION, SOLUTION INTRAVENOUS at 09:51

## 2021-02-01 RX ADMIN — EPINEPHRINE 10 MCG: 1 INJECTION PARENTERAL at 09:44

## 2021-02-01 RX ADMIN — HYDRALAZINE HYDROCHLORIDE 10 MG: 20 INJECTION, SOLUTION INTRAMUSCULAR; INTRAVENOUS at 18:17

## 2021-02-01 RX ADMIN — FENTANYL CITRATE 100 MCG: 50 INJECTION, SOLUTION INTRAMUSCULAR; INTRAVENOUS at 08:13

## 2021-02-01 RX ADMIN — EPINEPHRINE 5 MCG: 1 INJECTION PARENTERAL at 08:35

## 2021-02-01 RX ADMIN — NOREPINEPHRINE BITARTRATE 0.03 MCG/KG/MIN: 0.06 INJECTION, SOLUTION INTRAVENOUS at 09:19

## 2021-02-01 RX ADMIN — ONDANSETRON 4 MG: 2 INJECTION INTRAMUSCULAR; INTRAVENOUS at 09:56

## 2021-02-01 RX ADMIN — AMIODARONE HYDROCHLORIDE 200 MG: 200 TABLET ORAL at 20:03

## 2021-02-01 ASSESSMENT — ACTIVITIES OF DAILY LIVING (ADL)
ADLS_ACUITY_SCORE: 15
ADLS_ACUITY_SCORE: 15

## 2021-02-01 ASSESSMENT — MIFFLIN-ST. JEOR: SCORE: 989.88

## 2021-02-01 NOTE — Clinical Note
Hemodynamic equipment used: 5 lead ECG, LIKEPAK Hands Off Patches, LIFEPAK With 3 Leads, Calometric ETCO2 device, Machine BP Cuff and pulse oximeter probe.

## 2021-02-01 NOTE — ANESTHESIA PROCEDURE NOTES
ALEXANDR Probe Insertion Note:    Staff -   Anesthesiologist:  Humberto Collins MD  Performed By: anesthesiologist    Probe Status PRE Insertion: NO obvious damage  Probe type:  Adult 2D    Bite block used:   Yes  Insertion Technique: Easy, no oropharyngeal manipulation  Insertion complications: None obvious    Billing Report: A ALEXANDR report is being generated by the cardiology department.    Probe Status POST Removal: NO obvious damage

## 2021-02-01 NOTE — Clinical Note
General anesthesia is planned for this procedure.    Provider immediate assessment completed.   Anesthesia assumes care of patient.

## 2021-02-01 NOTE — ANESTHESIA POSTPROCEDURE EVALUATION
Patient: Kelly Robledo    Procedure(s):  CV MITRAL CLIP    Diagnosis:valuvlar diseaseother  Diagnosis Additional Information: No value filed.    Anesthesia Type:  General    Note:  Disposition: Admission   Postop Pain Control: Uneventful            Sign Out: Well controlled pain   PONV: No   Neuro/Psych: Uneventful            Sign Out: Acceptable/Baseline neuro status   Airway/Respiratory: Uneventful            Sign Out: Acceptable/Baseline resp. status   CV/Hemodynamics: Uneventful            Sign Out: Acceptable CV status   Other NRE: NONE   DID A NON-ROUTINE EVENT OCCUR? No    Event details/Postop Comments:  2+ pedal pulses bilaterally.          Last vitals:  Vitals:    02/01/21 1215 02/01/21 1230 02/01/21 1245   BP: (!) 138/91 (!) 143/86 137/75   Pulse: 72 73 71   Resp: 19 19 19   Temp:      SpO2:          Electronically Signed By: Humberto Collins MD  February 1, 2021  12:52 PM

## 2021-02-01 NOTE — Clinical Note
Transesophogeal Echocardiogram performed throughout procedure to guide placement of mitraclip and to assess valve post clip deployment.

## 2021-02-01 NOTE — OR NURSING
Fernando Ramos from [facility] in to see patient at 1300.  He was notified that patient came from cath lab with swelling in left groin which has remained unchanged.  Upon arrival anesthesia stated they had given additional protamine and had applied pressure before she came to PACU.  He stated that the left side was more elevated than the right.   He was notified that potassium came back 5.5.  This am preop she was 3.8, so he stated to redraw the potassium.   Tati Mejia was at bedside at 1330 and was was notified of the labs including the HGB of 11.3,   .  She stated to call Cisco Ramos from [facility] with the potassium result.She evaluated groin site and applied some pressure to the left puncture site and stated that it looked okay.  The pressure we were feeling was bone.in perineal area.

## 2021-02-01 NOTE — Clinical Note
Prepped: groin. Prepped with: ChloraPrep. The patient was draped. .Pre-procedure site marking:Insertion site not predetermined

## 2021-02-01 NOTE — Clinical Note
dry, intact, no bleeding and no hematoma. Left femoral site. angioseal to venous access and manual pressure held on arterial site until hemostasis achieved. Site covered with a band-aid.

## 2021-02-01 NOTE — ADDENDUM NOTE
Addendum  created 02/01/21 1642 by Humberto Collins MD    Attestation recorded in Intraprocedure, Child order released for a procedure order, Clinical Note Signed, Flowsheet accepted, Intraprocedure Attestations filed, Intraprocedure Blocks edited, Intraprocedure Flowsheets edited

## 2021-02-01 NOTE — Clinical Note
dry, intact, no bleeding and no hematoma. Right femoral site. perclose x2 and mattress suture to approximate skin closure.

## 2021-02-01 NOTE — ANESTHESIA PROCEDURE NOTES
Airway   Date/Time: 2/1/2021 8:15 AM   Patient location during procedure: OR  Staff -   CRNA: Frank Albarran APRN CRNA  Performed By: CRNA    Consent for Airway   Urgency: elective    Indications and Patient Condition  Indications for airway management: beni-procedural  Induction type:intravenousMask difficulty assessment: 1 - vent by mask    Final Airway Details  Final airway type: endotracheal airway  Successful airway:ETT - single  Endotracheal Airway Details   ETT size (mm): 7.0  Cuffed: yes  Successful intubation technique: direct laryngoscopy  Grade View of Cords: 1  Adjucts: stylet  Measured from: gums/teeth  Secured at (cm): 21  Secured with: pink tape  Bite block used: None    Post intubation assessment   Placement verified by: capnometry, equal breath sounds and chest rise   Number of attempts at approach: 1  Number of other approaches attempted: 0  Secured with:pink tape  Ease of procedure: easy  Dentition: Intact

## 2021-02-01 NOTE — H&P
[facility]  Kelly Robledo MRN: [id number]  1945  Date of Admission:(Not on file)  Primary care provider: Selina Pozo         Chief Complaint:   Short of breath         History of Present Illness:   Kelly Robledo is a 75 year old female with a past medical history significant for severe mitral regurgitation, mod-severe TR, HTN, paroxysmal a-fib, Grade II diastolic dysfunction, and moderate non-obstructive CAD who presents for mitral clip procedure. Per chart review, her MR was worked up during an admission for tachyarrythmia June 2020. She had a subsequent admission for HF exacerbation, and was recommended she be evaluated for mitral clip.  Patient visited in the PACU. She feels well. Denies any dyspnea, chest pain, numbness, weakness, fever, chills, or headache.            Review of Systems:    10 point review of systems negative except for stated above in HPI.          Past Medical History:   Medical History reviewed.   No past medical history on file.          Past Surgical History:   Surgical History reviewed.   No past surgical history on file.          Social History:   Social History reviewed.  Social History     Tobacco Use     Smoking status: Not on file   Substance Use Topics     Alcohol use: Not on file             Family History:   Family History reviewed.   No family history on file.          Allergies:     Allergies   Allergen Reactions     Penicillins Hives             Medications:   Medications Reviewed.   No current facility-administered medications for this encounter.      Current Outpatient Medications   Medication Sig     amiodarone (PACERONE) 200 MG tablet Take 200 mg by mouth every evening     apixaban ANTICOAGULANT (ELIQUIS ANTICOAGULANT) 5 MG tablet Take 5 mg by mouth 2 times daily     furosemide (LASIX) 20 MG tablet Take 20 mg by mouth daily     lisinopril (ZESTRIL) 5 MG tablet Take 5 mg by mouth every 24 hours     metoprolol  succinate ER (TOPROL-XL) 50 MG 24 hr tablet Take 50 mg by mouth every 24 hours     Multiple Vitamins-Minerals (ONCOVITE) TABS Take 0.5 tablets by mouth     rosuvastatin (CRESTOR) 20 MG tablet Take 20 mg by mouth At Bedtime             Physical Exam:   Vitals were reviewed.  There were no vitals taken for this visit.    General: AAOx3, NAD  Skin: Not jaundiced, no rash, no ecchymoses  HEENT: MMM, PERRLA, EOM intact  CV: RRR, normal S1S2, no murmur, clicks, rubs  Resp: Clear to auscultation bilaterally, no wheezes, rhonchi  Abd: Soft, non-tender, BS+, no masses appreciated  Extremities: warm and well perfused, palpable pulses, no edema. Left groin with small palpable hematoma medial to puncture site. No bruit. Distal pulse intact  Neuro: No lateralizing symptoms or focal neurologic deficits        Labs:   Routine Labs:  No results found for: TROPI, TROPONIN, TROPR, TROPN  CMPNo lab results found in last 7 days.  CBCNo lab results found in last 7 days.  INRNo lab results found in last 7 days.        Diagnostics:      TTE 12/3/2020    Mitral Valve: The following structural abnormalities were observed:   non-specific thickening. Mildly decreased anterior leaflet mobility.   Moderately decreased posterior leaflet mobility. Severe mitral   regurgitation. The jet is posterior directed and is eccentric.    Left atrial volume is severely increased.    Left ventricle ejection fraction is normal. The calculated left   ventricular ejection fraction is 70%.    Moderate tricuspid valve regurgitation. Moderate pulmonary hypertension   present. The estimated systolic pulmonary artery pressure is 68 mmhg.    Normal right ventricular size. TAPSE is abnormal, which is consistent   with abnormal right ventricular systolic function.    When compared to the previous study dated 6/19/2020, pulmonary   hypertension is present.    Assessment and Plan:     Kelly Robledo is a 75 year old female with a past medical history significant for  severe mitral regurgitation, mod-severe TR, HTN, paroxysmal a-fib, Grade II diastolic dysfunction, and moderate non-obstructive CAD      #Severe MR s/p clip x1  #Moderate TR  Patient now s/p clip x1, MR reduced to trace. Noted to have small left hematoma. Given protamine and pressure held in cath lab. Perclose x2 on RFA. Potassium noted to be high in PACU, unclear why as she was 3.8 pre-op and had not received replacement. Possible spurious lab.   - admit to [facility] tele  - bed rest per protocol   - TTE in AM  - recheck potassium now    #Hyperkalemia   - recheck potassium now    #Paroxysmal atrial fibrillation   - amio 200 at bedtime  - metop succ 50 every day  - eliquis 5 bid    #HFpEF   - lasix 20 every day  - lisino 5 every day    #Moderate non-obstructive CAD  - rosuva 20 every day      Prophylaxis:  DVT:Eliquis 5mg   Disposition: Admission to I.   Code Status: FULL CODE      Rashad Ramos PA-C  [facility] Cardiology Team      Physician Attestation   I, Jeanmarie Kilgore, saw and evaluated Kelly Robledo as part of a shared visit.  I have reviewed and discussed with the advanced practice provider their history, physical and plan.    I personally reviewed the vital signs, medications, labs and imaging.      Key management decisions made by me: S/P clip, stable  Jeanmarie Kilgore

## 2021-02-02 ENCOUNTER — AMBULATORY - HEALTHEAST (OUTPATIENT)
Dept: CARDIOLOGY | Facility: CLINIC | Age: 76
End: 2021-02-02

## 2021-02-02 ENCOUNTER — AMBULATORY - HEALTHEAST (OUTPATIENT)
Dept: NURSING | Facility: CLINIC | Age: 76
End: 2021-02-02

## 2021-02-02 ENCOUNTER — APPOINTMENT (OUTPATIENT)
Dept: CARDIOLOGY | Facility: CLINIC | Age: 76
DRG: 267 | End: 2021-02-02
Attending: INTERNAL MEDICINE
Payer: COMMERCIAL

## 2021-02-02 ENCOUNTER — APPOINTMENT (OUTPATIENT)
Dept: GENERAL RADIOLOGY | Facility: CLINIC | Age: 76
DRG: 267 | End: 2021-02-02
Attending: INTERNAL MEDICINE
Payer: COMMERCIAL

## 2021-02-02 ENCOUNTER — PATIENT OUTREACH (OUTPATIENT)
Dept: CARE COORDINATION | Facility: CLINIC | Age: 76
End: 2021-02-02

## 2021-02-02 ENCOUNTER — RECORDS - HEALTHEAST (OUTPATIENT)
Dept: ADMINISTRATIVE | Facility: OTHER | Age: 76
End: 2021-02-02

## 2021-02-02 ENCOUNTER — APPOINTMENT (OUTPATIENT)
Dept: OCCUPATIONAL THERAPY | Facility: CLINIC | Age: 76
DRG: 267 | End: 2021-02-02
Attending: INTERNAL MEDICINE
Payer: COMMERCIAL

## 2021-02-02 VITALS
HEART RATE: 71 BPM | SYSTOLIC BLOOD PRESSURE: 122 MMHG | DIASTOLIC BLOOD PRESSURE: 71 MMHG | RESPIRATION RATE: 16 BRPM | OXYGEN SATURATION: 96 % | HEIGHT: 65 IN | BODY MASS INDEX: 18.83 KG/M2 | WEIGHT: 113 LBS | TEMPERATURE: 98.2 F

## 2021-02-02 DIAGNOSIS — Z98.890 S/P MITRAL VALVE CLIP IMPLANTATION: ICD-10-CM

## 2021-02-02 DIAGNOSIS — Z95.818 S/P MITRAL VALVE CLIP IMPLANTATION: ICD-10-CM

## 2021-02-02 LAB
ANION GAP SERPL CALCULATED.3IONS-SCNC: 7 MMOL/L (ref 3–14)
BUN SERPL-MCNC: 24 MG/DL (ref 7–30)
CALCIUM SERPL-MCNC: 9.2 MG/DL (ref 8.5–10.1)
CHLORIDE SERPL-SCNC: 103 MMOL/L (ref 94–109)
CO2 SERPL-SCNC: 25 MMOL/L (ref 20–32)
CREAT SERPL-MCNC: 1.11 MG/DL (ref 0.52–1.04)
ERYTHROCYTE [DISTWIDTH] IN BLOOD BY AUTOMATED COUNT: 13.5 % (ref 10–15)
GFR SERPL CREATININE-BSD FRML MDRD: 48 ML/MIN/{1.73_M2}
GLUCOSE SERPL-MCNC: 130 MG/DL (ref 70–99)
HCT VFR BLD AUTO: 35.8 % (ref 35–47)
HGB BLD-MCNC: 11.5 G/DL (ref 11.7–15.7)
INTERPRETATION ECG - MUSE: NORMAL
MAGNESIUM SERPL-MCNC: 2.2 MG/DL (ref 1.6–2.3)
MCH RBC QN AUTO: 31.7 PG (ref 26.5–33)
MCHC RBC AUTO-ENTMCNC: 32.1 G/DL (ref 31.5–36.5)
MCV RBC AUTO: 99 FL (ref 78–100)
PLATELET # BLD AUTO: 201 10E9/L (ref 150–450)
POTASSIUM SERPL-SCNC: 4.7 MMOL/L (ref 3.4–5.3)
RBC # BLD AUTO: 3.63 10E12/L (ref 3.8–5.2)
SODIUM SERPL-SCNC: 135 MMOL/L (ref 133–144)
WBC # BLD AUTO: 8.9 10E9/L (ref 4–11)

## 2021-02-02 PROCEDURE — 93306 TTE W/DOPPLER COMPLETE: CPT

## 2021-02-02 PROCEDURE — 250N000013 HC RX MED GY IP 250 OP 250 PS 637: Performed by: INTERNAL MEDICINE

## 2021-02-02 PROCEDURE — 99238 HOSP IP/OBS DSCHRG MGMT 30/<: CPT | Mod: 25 | Performed by: PHYSICIAN ASSISTANT

## 2021-02-02 PROCEDURE — 93306 TTE W/DOPPLER COMPLETE: CPT | Mod: 26 | Performed by: INTERNAL MEDICINE

## 2021-02-02 PROCEDURE — 83735 ASSAY OF MAGNESIUM: CPT | Performed by: INTERNAL MEDICINE

## 2021-02-02 PROCEDURE — 85027 COMPLETE CBC AUTOMATED: CPT | Performed by: INTERNAL MEDICINE

## 2021-02-02 PROCEDURE — 97535 SELF CARE MNGMENT TRAINING: CPT | Mod: GO

## 2021-02-02 PROCEDURE — 93010 ELECTROCARDIOGRAM REPORT: CPT | Performed by: INTERNAL MEDICINE

## 2021-02-02 PROCEDURE — 71045 X-RAY EXAM CHEST 1 VIEW: CPT

## 2021-02-02 PROCEDURE — 36415 COLL VENOUS BLD VENIPUNCTURE: CPT | Performed by: INTERNAL MEDICINE

## 2021-02-02 PROCEDURE — 80048 BASIC METABOLIC PNL TOTAL CA: CPT | Performed by: INTERNAL MEDICINE

## 2021-02-02 PROCEDURE — 97110 THERAPEUTIC EXERCISES: CPT | Mod: GO

## 2021-02-02 PROCEDURE — 97165 OT EVAL LOW COMPLEX 30 MIN: CPT | Mod: GO

## 2021-02-02 PROCEDURE — 71045 X-RAY EXAM CHEST 1 VIEW: CPT | Mod: 26 | Performed by: RADIOLOGY

## 2021-02-02 PROCEDURE — 93005 ELECTROCARDIOGRAM TRACING: CPT

## 2021-02-02 RX ADMIN — ASPIRIN 81 MG: 81 TABLET ORAL at 07:44

## 2021-02-02 RX ADMIN — METOPROLOL SUCCINATE 50 MG: 50 TABLET, EXTENDED RELEASE ORAL at 07:44

## 2021-02-02 RX ADMIN — FUROSEMIDE 20 MG: 20 TABLET ORAL at 07:44

## 2021-02-02 RX ADMIN — LISINOPRIL 5 MG: 5 TABLET ORAL at 07:44

## 2021-02-02 RX ADMIN — APIXABAN 5 MG: 5 TABLET, FILM COATED ORAL at 07:44

## 2021-02-02 ASSESSMENT — ACTIVITIES OF DAILY LIVING (ADL)
ADLS_ACUITY_SCORE: 17
ADLS_ACUITY_SCORE: 15

## 2021-02-02 ASSESSMENT — MIFFLIN-ST. JEOR: SCORE: 998.44

## 2021-02-02 NOTE — PROGRESS NOTES
PT came to unit 6C on 2/1 with the following items:    - patagonia green jacket  - glasses/case  - toothbrush  - pair of shoes  - misc clothing items

## 2021-02-02 NOTE — PLAN OF CARE
[id number] 2/2/2021    Patient is a 75 year old female admitted for mitral regurgitation with a PMH of HTN, Afib, and osteoporosis.    Neuro: A&Ox4 (q4 neuro checks); PERRLA normal  Cardiac: WDL; normal sinus rhythm; pulses all intact and regular; S1 & S2  Respiratory: WDL; no crackles or wheezing noted; 95% O2 on room air; no SOB  GI/: WDL; last BM was yesterday; normoactive bowel sounds  Endocrine: WDL  Diet/Appetite: Normal diet; appropriate appetite   Skin: No issues noted/ no breakdown; incision sites both have well marginalized edges, no drainage, and no pain reported by patient; mepilex on patients coccyx removed this morning (no redness or damage to the skin, skin was blanchable and looks good)  LDA: Left peripheral IV; dressing intact; no signs of swelling  Activity: Stand by assist  Pain: Denies pain  Plan: Discharging home today after echocardiogram     Nidhi Huston RN on 2/2/2021 at 10:34 AM

## 2021-02-02 NOTE — DISCHARGE SUMMARY
[address] [address]  p: [phone]    Discharge Summary: Cardiology Service    Kelly Robledo MRN# [id number]   YOB: 1945 Age: 75 year old       Admission Date: 2/1/2021  Discharge Date: 02/02/2021      Discharge Diagnoses:  1. Severe MR s/p mitral clip x1  2. Moderate TR  3. Paroxysmal atrial fibrillation   4. HFpEF   5. Moderate non-obstructive CAD     Pertinent Procedures:  1. Mitral clip     Consults:  NA    Imaging with results:  Echocardiogram 2/1/2021:  Interpretation Summary  Global and regional left ventricular function is normal with an EF of 60-65%.  Global right ventricular function is normal.  Post MitraClip placement with mild to moderate residual mitral regurgitation  present. The mean gradient across the mitral valve is 4 mmHg.  Mild to moderate tricuspid insufficiency is present.  Left to right atrial shunt seen at the atrial level (from recent septal  puncture).  The inferior vena cava was normal in size with preserved respiratory  variability.  No pericardial effusion is present.  _____________________________________________________________________________  __        Left Ventricle  Left ventricular size is normal. Left ventricular wall thickness is normal.  Global and regional left ventricular function is normal with an EF of 60-65%.  Left ventricular diastolic function is not assessable. No regional wall motion  abnormalities are seen.     Right Ventricle  The right ventricle is normal size. Global right ventricular function is  normal.     Atria  The right atria appears normal. Severe left atrial enlargement is present.  Left to right atrial shunt seen at the atrial level (from recent septal  puncture).     Mitral Valve  Post MitraClip placement with mild to moderate residual mitral regurgitation  present. The mean gradient across the mitral valve is 4 mmHg.        Aortic Valve  Aortic valve is normal in  structure and function.     Tricuspid Valve  The tricuspid valve is normal. Mild to moderate tricuspid insufficiency is  present. Estimated pulmonary artery systolic pressure is 40 mmHg plus right  atrial pressure. Pulmonary hypertension is present.     Pulmonic Valve  The pulmonic valve is normal.     Vessels  The aorta root is normal. The inferior vena cava was normal in size with  preserved respiratory variability.     Pericardium  No pericardial effusion is present.        Brief HPI:  Kelly Robledo is a 75 year old female with a past medical history significant for severe mitral regurgitation, mod-severe TR, HTN, paroxysmal a-fib, Grade II diastolic dysfunction, and moderate non-obstructive CAD    Hospital Course by Diagnosis:  #Severe MR s/p clip x1, residual mild-mod MR  #Moderate TR  Patient now s/p clip x1, MR reduced to trace. Noted to have small left hematoma. Given protamine and pressure held in cath lab. Perclose x2 on RFA. Potassium noted to be high in PACU, unclear why as she was 3.8 pre-op and had not received replacement - normal on recheck. POD 1 TTE with mild-moderate MR, mean gradient of 4mmHg.  - Apixaban 5mg bid + aspirin 81mg   - follow up with [facility] valve clinic     #Paroxysmal atrial fibrillation   - amio 200 at bedtime  - metop succ 50 every day  - eliquis 5 bid     #HFpEF   - lasix 20 every day  - lisino 5 every day     #Moderate non-obstructive CAD  - rosuva 20 every day  - aspirin 81mg daily    Condition on discharge  Temp:  [96.1  F (35.6  C)-98.4  F (36.9  C)] 98.3  F (36.8  C)  Pulse:  [] 73  Resp:  [14-19] 18  BP: (108-184)/() 123/72  SpO2:  [94 %-100 %] 98 %  General: Alert, interactive, NAD  Eyes: sclera anicteric, EOMI  Neck: JVP not appreciably elevated  Cardiovascular: regular rate and rhythm, normal S1 and S2, no murmurs, gallops, or rubs  Resp: clear to auscultation bilaterally, no rales, wheezes, or rhonchi  GI: Soft, nontender, nondistended. +BS.  No HSM or  masses, no rebound or guarding.  Extremities: no edema, no cyanosis or clubbing, dorsalis pedis and posterior tibialis pulses 2+ bilaterally. Groin access sites are c/d/i, no hematoma.   Skin: Warm and dry, no jaundice or rash  Neuro: CN 2-12 intact, moves all extremities equally  Psych: Alert & oriented x 3      Medication Changes:    Discharge medications:   Discharge Medication List as of 2/2/2021 12:34 PM      START taking these medications    Details   aspirin (ASA) 81 MG EC tablet Take 1 tablet (81 mg) by mouth daily, Disp-30 tablet, R-1, E-Prescribe         CONTINUE these medications which have NOT CHANGED    Details   amiodarone (PACERONE) 200 MG tablet Take 200 mg by mouth every evening, Historical      apixaban ANTICOAGULANT (ELIQUIS ANTICOAGULANT) 5 MG tablet Take 5 mg by mouth 2 times daily, Historical      furosemide (LASIX) 20 MG tablet Take 20 mg by mouth daily, Historical      lisinopril (ZESTRIL) 5 MG tablet Take 5 mg by mouth every 24 hours, Historical      metoprolol succinate ER (TOPROL-XL) 50 MG 24 hr tablet Take 50 mg by mouth every 24 hours, Historical      Multiple Vitamins-Minerals (ONCOVITE) TABS Take 0.5 tablets by mouth, Historical      rosuvastatin (CRESTOR) 20 MG tablet Take 20 mg by mouth At Bedtime, Historical             Labs or imaging requiring follow-up after discharge:  Per [facility]       Follow-up:  [facility] valve clinic as they recommend.     Code status:  FULL    Patient discussed with Dr. Julio César Ramos PA-C  [facility] Cardiology Team

## 2021-02-02 NOTE — DISCHARGE SUMMARY
Discharge Summary    Discharged to: Home ([facility] home)  Via: Personal motor vehicle   Accompanied by: Giuseppe ()  Belongings: Clothing, coat, and glasses  Teaching: Angio seal pamphlet given and signs of infection at incision sites    Clinic appointment: You will follow up with Tati Mejia PA-C on February 11 at 9:50am.    You will have a repeat echocardiogram on March 9 at 11am at [facility][1].  Report called/faxed: N/A, went back home  Tele and IV: Both removed    Nidhi Huston RN on 2/2/2021 at 1:03 PM

## 2021-02-02 NOTE — PLAN OF CARE
Addendum to Nidhi Huston's note:    Neuro: CMS intact and equal in arms and legs, movement strong and equal bilatrerally.  Cardio: faint heart murmur noted by this writer, heard from [place] to left midclavicular line.  Skin: bilateral groin puncture sites clean and dry, well approximated; no bruising, bleeding, or hematoma noted.    Patient verbalized readiness for discharge,  will drive to hospital to drive patient home.

## 2021-02-02 NOTE — PLAN OF CARE
BP elevated in afternoon 150s/80s. PRN hydralazine given x1 and BP down to 120/64. Notified [facility] cross-cover (Dr. Lennon) of elevated BP. Sinus rhythm on the monitor. Patient voiding, ambulating, tolerating PO intake, neuro status intact, and tolerating room air. Bilateral groin sites CDI. Plan to continue to monitor patient over night. Echo in the morning. Notify [facility] with changes or concerns.

## 2021-02-02 NOTE — PLAN OF CARE
Occupational Therapy Discharge Summary    Reason for therapy discharge:    Discharged to home.    Progress towards therapy goal(s). See goals on Care Plan in [facility] electronic health record for goal details.  Goals partially met.  Barriers to achieving goals:   discharge on same date as initial evaluation.    Therapy recommendation(s):    Continued therapy is recommended.  Rationale/Recommendations:  Pt will benefit from OP CR to progress functional endurance and maximize ADL/IADL independence.

## 2021-02-02 NOTE — PLAN OF CARE
[id number]. She feels good and wants to go home early in day if possible. She says she has a COVID shot scheduled for this afternoon in her clinic and wants to make sure she makes her appointment. She says she just cat naps at night  A/groins are flat and soft, she is alert and oriented.   P/monitor for changes

## 2021-02-02 NOTE — PROGRESS NOTES
02/02/21 0800   Quick Adds   Type of Visit Initial Occupational Therapy Evaluation   Living Environment   People in home spouse   Current Living Arrangements house   Home Accessibility stairs to enter home;stairs within home   Number of Stairs, Main Entrance 3;2  (3 or 4 at front entrace, 2 at garage entrance)   Stair Railings, Main Entrance none   Number of Stairs, Within Home, Primary 10   Stair Railings, Within Home, Primary railings on both sides of stairs   Transportation Anticipated family or friend will provide   Living Environment Comments House contains main floor and second floor. 2 BR on main floor and one upstairs. Pt most frequently uses upstairs bathroom w/ tub shower.    Self-Care   Usual Activity Tolerance excellent   Current Activity Tolerance good   Regular Exercise Yes   Activity/Exercise Type walking   Exercise Amount/Frequency daily   Equipment Currently Used at Home raised toilet seat   Activity/Exercise/Self-Care Comment Pt currently I in all ADLs. She and  are retired and normally walk 2-3 miles a day. Planning to travel to [place] for the winter, pending Covid vaccination.   Disability/Function   Hearing Difficulty or Deaf no   Wear Glasses or Blind yes   Vision Management glasses   Concentrating, Remembering or Making Decisions Difficulty no   Difficulty Communicating no   Difficulty Eating/Swallowing no   Walking or Climbing Stairs Difficulty no   Dressing/Bathing Difficulty no   Toileting issues no   Doing Errands Independently Difficulty (such as shopping) no   Fall history within last six months no   Change in Functional Status Since Onset of Current Illness/Injury yes   General Information   Onset of Illness/Injury or Date of Surgery 02/01/21   Referring Physician Klaus Fitzpatrick   Patient/Family Therapy Goal Statement (OT) To go home   Additional Occupational Profile Info/Pertinent History of Current Problem Kelly Robledo is a 75 year old female with a past medical history  significant for severe mitral regurgitation, mod-severe TR, HTN, paroxysmal a-fib, Grade II diastolic dysfunction, and moderate non-obstructive CAD who presents for mitral clip procedure. Per chart review, her MR was worked up during an admission for tachyarrythmia June 2020. She had a subsequent admission for HF exacerbation, and was recommended she be evaluated for mitral clip.   Existing Precautions/Restrictions   (no excessive/repetitive hip flexion x 2 weeks)   Heart Disease Risk Factors Age;Medical history;High blood pressure   General Observations and Info Activity: Up in chair   Cognitive Status Examination   Orientation Status orientation to person, place and time   Affect/Mental Status (Cognitive) WNL   Follows Commands WFL   Cognitive Status Comments Cognition grossly intact   Visual Perception   Visual Impairment/Limitations WNL   Sensory   Sensory Quick Adds No deficits were identified   Pain Assessment   Patient Currently in Pain No   Range of Motion Comprehensive   General Range of Motion no range of motion deficits identified;bilateral upper extremity ROM WNL   Strength Comprehensive (MMT)   General Manual Muscle Testing (MMT) Assessment no strength deficits identified   Coordination   Upper Extremity Coordination No deficits were identified   Instrumental Activities of Daily Living (IADL)   Previous Responsibilities housekeeping;meal prep;shopping;laundry;medication management   IADL Comments  drives and manages finances   Clinical Impression   Criteria for Skilled Therapeutic Interventions Met (OT) yes   OT Diagnosis Decreased ADL/IADL I   OT Problem List-Impairments impacting ADL activity tolerance impaired;post-surgical precautions   Assessment of Occupational Performance 1-3 Performance Deficits   Identified Performance Deficits Home management, Meal prep   Planned Therapy Interventions (OT) IADL retraining;progressive activity/exercise;home program guidelines   Clinical Decision Making  Complexity (OT) low complexity   Therapy Frequency (OT) 1x eval and treat   Predicted Duration of Therapy 2/2/21   Risk & Benefits of therapy have been explained care plan/treatment goals reviewed;patient;risks/benefits reviewed   OT Discharge Planning    OT Discharge Recommendation (DC Rec) Home with assist;home with outpatient cardiac rehab   OT Rationale for DC Rec Pt appears near functional baseline for activity tolerance. Recommend assistance w/ heavy IADLs at home to adhere to post-op precautions. OP CR to progress aerobic endurance for ADL/IADL I   Total Evaluation Time (Minutes)   Total Evaluation Time (Minutes) 7

## 2021-02-02 NOTE — CONSULTS
Care Management Follow Up    Length of Stay (days): 1    Expected Discharge Date:  2/2/21     Concerns to be Addressed:  Discharge planning  Patient plan of care discussed at interdisciplinary rounds: Yes    Anticipated Discharge Disposition:  home     Anticipated Discharge Services:  Outpatient cardiac rehab  Anticipated Discharge DME:  none    Additional Information:  Pt is a 75-year-old female admitted to [facility] 2/1/21. SW auto consulted for discharge planning post mitral clip. OT evaluated pt and recommends home with assist and outpatient cardiac rehab. No SW needs identified.    KARTHIK Joy, Dorothea Dix Psychiatric CenterSW  6C   [facility]  Pager [phone]  Phone [phone]

## 2021-02-02 NOTE — DISCHARGE INSTRUCTIONS
Patient Instructions - Going Home after Mitral Valve Repair with MITRACLIP:    Going Home: It s normal to feel a little anxious after leaving the hospital and when fewer people are nearby. People do much better when they feel as though they have support- if you live alone we suggest you arrange to have someone stay with you for a day or two to help you recover.   Medications:  Take all of your medications as directed in your discharge summary. Do not stop taking these medications without speaking with your cardiologist. If you have any questions about any of your medications, speak to your pharmacist or provider.     Follow up Appointments    You will follow up with Tati Mejia PA-C on February 11 at 9:50am.    You will have a repeat echocardiogram on March 9 at 11am at [facility].    You will have a follow up with Dr. Fitzpatrick or Dr. Aquino in about a month.  If you need to reschedule any of these appointments, please call:  [phone]      See your regular cardiologist in 6 months.  Your regular heart doctor will continue to be your heart specialist.     See your family doctor in 2 weeks.  Make an appointment once you get home.    Site Care: Shower every day- let the water run over your incision or access site, but do not rub the area. No tub baths, pools or hot-tubs for the 1st week you are at home. Do not put ointments, powders, or lotions on your access site and/or incision.   It is normal to feel a small lump the size of a marble in the groin access site.  That is the closure device used to close the vein.  If you are experiencing discomfort, redness or increasing swelling, please call us.  Dental Work: Avoid major dental work for the next 6 months if possible. . You will need antibiotics before dental work or surgery. This would decrease the risk of infection.   Driving:  You should not drive for the first 5 days after your procedure. The first time you drive, you must have someone  with you. You may ride in a car.   Activity: People recover at different rates depending on their general health and the type of heart valve procedure. Most people take a few weeks to feel fully recovered. Daily activity and exercise are an important part of your recovery.  Do not lift, push or pull anything > 10 lb. for the first 5 days.    CALL THE VALVE CLINIC IF YOU HAVE ANY QUESTIONS OR CONCERNS:  [phone]    For the first 5 days after your valve procedure   Do Not:     Lift, push, or pull more than ten pounds (including pets, laundry, groceries, etc)    Garden, including lawn mowing and raking    Excessively bear down or strain when having a bowel movement     Ride a bike  Do:    Weigh yourself every day in the morning after using the bathroom.  If you notice weight gain of more than 3 pounds in 1 day or more than 5 pounds in 1 week, call the cardiology clinic.     Get up and get dressed every day. Do not stay in bed.  Set a daily routine.     Walk as much as you can. You may walk up and down stairs. When you are tired, rest.     Cough and deep breathe. Use your incentive spirometer 5-10 times each hour when you are awake.     We strongly recommend you participate in a cardiac rehabilitation program. This type of program will help you learn about your heart health, prevent more heart problems, participate in safe and heart-healthy activities, and learn how to return to your activities of daily living and hobbies.     Let the cardiology clinic know if you need to leave town before your first follow-up visit.     When to call the Cardiology Clinic to speak with a nurse?     Swelling of the legs, ankles, or feet    Increasing shortness of breath    Change in the color or temperature of your lower legs and feet    Abdominal pain or unrelieved nausea and/or vomiting    Redness and warmth around your access site and/or incision that does not go away    Yellow or green drainage from your access site and/or  incision   Weight gain of 3 pounds in one day or 5 pounds in one week    Develop any numbness, tingling, or limited movement of your arms or legs.     Chest pain that does not go away    New confusion or you cannot think clearly    Fever and chills         [facility]:  [phone]  If you are calling after hours, please listen to the entire voicemail, a live  will answer at the end of the message

## 2021-02-02 NOTE — PLAN OF CARE
Admission    Diagnosis:  Admitted from: PACU  Via: Bed  Accompanied by: PACU staff.  updated by PACU. Patient declined having me call him again on arrival.   Belongings: Clothing and glasses at bedside. No electronics or valuables with patient.   Admission Profile: Complete  Teaching: Orientation to unit, call don't fall, use of console, meal times, visiting hours, when to call for the RN (angina/sob/dizziness, etc.), and enforced importance of safety. Discussed bedrest expectations with patient who verbalized understanding.   Access: PIV x2  Telemetry: Placed on patient  Height/Weight: Complete  2 Person skin assessment: Completed with Radha Garvin RN- Skin intact

## 2021-02-03 NOTE — PROGRESS NOTES
[facility]: Post-Discharge Note  SITUATION                                                      Admission:    Admission Date: 02/01/21   Reason for Admission: Severe MR s/p mitral clip x1  Discharge:   Discharge Date: 02/02/21  Discharge Diagnosis: Severe MR s/p mitral clip x1    BACKGROUND                                                      Kelly Robledo is a 75 year old female with a past medical history significant for severe mitral regurgitation, mod-severe TR, HTN, paroxysmal a-fib, Grade II diastolic dysfunction, and moderate non-obstructive CAD    ASSESSMENT      Discharge Assessment  Patient reports symptoms are: Improved  Does the patient have all of their medications?: Yes  Does patient know what their new medications are for?: Yes  Does patient have a follow-up appointment scheduled?: No  Does patient have any other questions or concerns?: No    Post-op  Did the patient have surgery or a procedure: Yes  Fever: No  Chills: No  Eating & Drinking: eating and drinking without complaints/concerns  Bowel Function: normal  Urinary Status: voiding without complaint/concerns        PLAN                                                      Outpatient Plan:  Follow up with the [facility] valve clinic as they recommend. They will help you arrange these visits.    No future appointments.        Corrie Bach

## 2021-02-04 ENCOUNTER — OFFICE VISIT - HEALTHEAST (OUTPATIENT)
Dept: FAMILY MEDICINE | Facility: CLINIC | Age: 76
End: 2021-02-04

## 2021-02-04 DIAGNOSIS — Z95.818 S/P MITRAL VALVE CLIP IMPLANTATION: ICD-10-CM

## 2021-02-04 DIAGNOSIS — I36.1 TRICUSPID VALVE REGURGITATION, NONRHEUMATIC: ICD-10-CM

## 2021-02-04 DIAGNOSIS — N18.31 STAGE 3A CHRONIC KIDNEY DISEASE (H): ICD-10-CM

## 2021-02-04 DIAGNOSIS — I48.0 PAROXYSMAL ATRIAL FIBRILLATION (H): ICD-10-CM

## 2021-02-04 DIAGNOSIS — Z98.890 S/P MITRAL VALVE CLIP IMPLANTATION: ICD-10-CM

## 2021-02-04 DIAGNOSIS — Z09 HOSPITAL DISCHARGE FOLLOW-UP: ICD-10-CM

## 2021-02-04 DIAGNOSIS — I25.10 CORONARY ARTERY DISEASE INVOLVING NATIVE CORONARY ARTERY OF NATIVE HEART WITHOUT ANGINA PECTORIS: ICD-10-CM

## 2021-02-04 DIAGNOSIS — I34.0 NONRHEUMATIC MITRAL VALVE REGURGITATION: ICD-10-CM

## 2021-02-04 LAB — GLUCOSE BLDC GLUCOMTR-MCNC: 111 MG/DL (ref 70–99)

## 2021-02-10 ENCOUNTER — COMMUNICATION - HEALTHEAST (OUTPATIENT)
Dept: CARDIOLOGY | Facility: CLINIC | Age: 76
End: 2021-02-10

## 2021-02-11 ENCOUNTER — OFFICE VISIT - HEALTHEAST (OUTPATIENT)
Dept: CARDIOLOGY | Facility: CLINIC | Age: 76
End: 2021-02-11

## 2021-02-11 DIAGNOSIS — I34.1 MITRAL VALVE PROLAPSE: ICD-10-CM

## 2021-02-11 DIAGNOSIS — Z95.818 S/P MITRAL VALVE CLIP IMPLANTATION: ICD-10-CM

## 2021-02-11 DIAGNOSIS — Z98.890 S/P MITRAL VALVE CLIP IMPLANTATION: ICD-10-CM

## 2021-02-11 DIAGNOSIS — I10 ESSENTIAL HYPERTENSION: ICD-10-CM

## 2021-02-11 DIAGNOSIS — I48.0 PAROXYSMAL ATRIAL FIBRILLATION (H): ICD-10-CM

## 2021-02-11 ASSESSMENT — MIFFLIN-ST. JEOR: SCORE: 1000.55

## 2021-02-23 ENCOUNTER — AMBULATORY - HEALTHEAST (OUTPATIENT)
Dept: NURSING | Facility: CLINIC | Age: 76
End: 2021-02-23

## 2021-02-25 ENCOUNTER — AMBULATORY - HEALTHEAST (OUTPATIENT)
Dept: CARDIOLOGY | Facility: CLINIC | Age: 76
End: 2021-02-25

## 2021-02-25 DIAGNOSIS — Z95.818 S/P MITRAL VALVE CLIP IMPLANTATION: ICD-10-CM

## 2021-02-25 DIAGNOSIS — I34.0 MITRAL REGURGITATION: ICD-10-CM

## 2021-02-25 DIAGNOSIS — Z98.890 S/P MITRAL VALVE CLIP IMPLANTATION: ICD-10-CM

## 2021-03-01 ENCOUNTER — OFFICE VISIT - HEALTHEAST (OUTPATIENT)
Dept: CARDIOLOGY | Facility: CLINIC | Age: 76
End: 2021-03-01

## 2021-03-01 ENCOUNTER — AMBULATORY - HEALTHEAST (OUTPATIENT)
Dept: CARDIOLOGY | Facility: CLINIC | Age: 76
End: 2021-03-01

## 2021-03-01 ENCOUNTER — COMMUNICATION - HEALTHEAST (OUTPATIENT)
Dept: FAMILY MEDICINE | Facility: CLINIC | Age: 76
End: 2021-03-01

## 2021-03-01 ENCOUNTER — AMBULATORY - HEALTHEAST (OUTPATIENT)
Dept: FAMILY MEDICINE | Facility: CLINIC | Age: 76
End: 2021-03-01

## 2021-03-01 DIAGNOSIS — E78.2 MIXED HYPERLIPIDEMIA: ICD-10-CM

## 2021-03-01 DIAGNOSIS — I48.0 PAROXYSMAL ATRIAL FIBRILLATION (H): ICD-10-CM

## 2021-03-01 DIAGNOSIS — I25.10 CAD (CORONARY ARTERY DISEASE): ICD-10-CM

## 2021-03-01 DIAGNOSIS — I34.1 MITRAL VALVE PROLAPSE: ICD-10-CM

## 2021-03-01 DIAGNOSIS — Z95.818 S/P MITRAL VALVE CLIP IMPLANTATION: ICD-10-CM

## 2021-03-01 DIAGNOSIS — Z98.890 S/P MITRAL VALVE CLIP IMPLANTATION: ICD-10-CM

## 2021-03-01 DIAGNOSIS — E03.8 SUBCLINICAL HYPOTHYROIDISM: ICD-10-CM

## 2021-03-01 ASSESSMENT — MIFFLIN-ST. JEOR: SCORE: 993.8

## 2021-03-05 ENCOUNTER — AMBULATORY - HEALTHEAST (OUTPATIENT)
Dept: LAB | Facility: CLINIC | Age: 76
End: 2021-03-05

## 2021-03-05 DIAGNOSIS — E03.8 SUBCLINICAL HYPOTHYROIDISM: ICD-10-CM

## 2021-03-05 LAB
T4 FREE SERPL-MCNC: 1 NG/DL (ref 0.7–1.8)
TSH SERPL DL<=0.005 MIU/L-ACNC: 10.49 UIU/ML (ref 0.3–5)

## 2021-03-07 ENCOUNTER — COMMUNICATION - HEALTHEAST (OUTPATIENT)
Dept: FAMILY MEDICINE | Facility: CLINIC | Age: 76
End: 2021-03-07

## 2021-03-07 DIAGNOSIS — J81.0 ACUTE PULMONARY EDEMA (H): ICD-10-CM

## 2021-03-08 ENCOUNTER — COMMUNICATION - HEALTHEAST (OUTPATIENT)
Dept: CARDIOLOGY | Facility: CLINIC | Age: 76
End: 2021-03-08

## 2021-03-09 ENCOUNTER — COMMUNICATION - HEALTHEAST (OUTPATIENT)
Dept: FAMILY MEDICINE | Facility: CLINIC | Age: 76
End: 2021-03-09

## 2021-03-09 ENCOUNTER — HOSPITAL ENCOUNTER (OUTPATIENT)
Dept: CARDIOLOGY | Facility: CLINIC | Age: 76
Discharge: HOME | End: 2021-03-09

## 2021-03-09 DIAGNOSIS — I34.0 SEVERE MITRAL REGURGITATION: ICD-10-CM

## 2021-03-09 DIAGNOSIS — I25.10 CORONARY ARTERY DISEASE INVOLVING NATIVE CORONARY ARTERY OF NATIVE HEART WITHOUT ANGINA PECTORIS: ICD-10-CM

## 2021-03-09 LAB
AORTIC ROOT: 3.1 CM
AORTIC VALVE MEAN VELOCITY: 88.7 CM/S
ASCENDING AORTA: 3.2 CM
AV DIMENSIONLESS INDEX VTI: 0.7
AV MEAN GRADIENT: 4 MMHG
AV PEAK GRADIENT: 6.1 MMHG
AV VALVE AREA: 2.2 CM2
AV VELOCITY RATIO: 0.7
BSA FOR ECHO PROCEDURE: 1.52 M2
CV BLOOD PRESSURE: ABNORMAL MMHG
CV ECHO HEIGHT: 65 IN
CV ECHO WEIGHT: 112 LBS
DOP CALC AO PEAK VEL: 123 CM/S
DOP CALC AO VTI: 28.3 CM
DOP CALC LVOT AREA: 3.14 CM2
DOP CALC LVOT DIAMETER: 2 CM
DOP CALC LVOT PEAK VEL: 87.7 CM/S
DOP CALC LVOT STROKE VOLUME: 62.5 CM3
DOP CALC MV VTI: 51 CM
DOP CALCLVOT PEAK VEL VTI: 19.9 CM
EJECTION FRACTION: 60 % (ref 55–75)
FRACTIONAL SHORTENING: 29.3 % (ref 28–44)
INTERVENTRICULAR SEPTUM IN END DIASTOLE: 0.9 CM (ref 0.6–0.9)
IVS/PW RATIO: 0.8
LA AREA 1: 26.5 CM2
LA AREA 2: 26.6 CM2
LEFT ATRIUM LENGTH: 6.02 CM
LEFT ATRIUM SIZE: 3.8 CM
LEFT ATRIUM VOLUME INDEX: 65.5 ML/M2
LEFT ATRIUM VOLUME: 99.5 ML
LEFT VENTRICLE CARDIAC INDEX: 3 L/MIN/M2
LEFT VENTRICLE CARDIAC OUTPUT: 4.6 L/MIN
LEFT VENTRICLE DIASTOLIC VOLUME INDEX: 36.2 CM3/M2 (ref 29–61)
LEFT VENTRICLE DIASTOLIC VOLUME: 55 CM3 (ref 46–106)
LEFT VENTRICLE HEART RATE: 73 BPM
LEFT VENTRICLE MASS INDEX: 86.9 G/M2
LEFT VENTRICLE SYSTOLIC VOLUME INDEX: 14.5 CM3/M2 (ref 8–24)
LEFT VENTRICLE SYSTOLIC VOLUME: 22 CM3 (ref 14–42)
LEFT VENTRICULAR INTERNAL DIMENSION IN DIASTOLE: 4.1 CM (ref 3.8–5.2)
LEFT VENTRICULAR INTERNAL DIMENSION IN SYSTOLE: 2.9 CM (ref 2.2–3.5)
LEFT VENTRICULAR MASS: 132.1 G
LEFT VENTRICULAR OUTFLOW TRACT MEAN GRADIENT: 2 MMHG
LEFT VENTRICULAR OUTFLOW TRACT MEAN VELOCITY: 67.1 CM/S
LEFT VENTRICULAR OUTFLOW TRACT PEAK GRADIENT: 3 MMHG
LEFT VENTRICULAR POSTERIOR WALL IN END DIASTOLE: 1.1 CM (ref 0.6–0.9)
LV STROKE VOLUME INDEX: 41.1 ML/M2
MITRAL REGURGITANT VELOCITY TIME INTEGRAL: 205 CM
MITRAL VALVE E/A RATIO: 2.2
MITRAL VALVE MEAN INFLOW VELOCITY: 89.6 CM/S
MITRAL VALVE PEAK VELOCITY: 181 CM/S
MR FLOW: 43 CM3
MR MEAN GRADIENT: 90 MMHG
MR MEAN VELOCITY: 441 CM/S
MR PEAK GRADIENT: 132.7 MMHG
MR PISA EROA: 0.2 CM2
MR PISA RADIUS: 0.8 CM
MR PISA VN NYQUIST: 30.8 CM/S
MV AREA VTI: 1.23 CM2
MV AVERAGE E/E' RATIO: 27.7 CM/S
MV DECELERATION TIME: 346 MS
MV E'TISSUE VEL-LAT: 7.99 CM/S
MV E'TISSUE VEL-MED: 3.51 CM/S
MV LATERAL E/E' RATIO: 19.9
MV MEAN GRADIENT: 4 MMHG
MV MEDIAL E/E' RATIO: 45.3
MV PEAK A VELOCITY: 71.3 CM/S
MV PEAK E VELOCITY: 159 CM/S
MV PEAK GRADIENT: 13.1 MMHG
MV REGURGITANT VOLUME: 44.1 CC
MV VALVE AREA BY CONTINUITY EQUATION: 1.2 CM2
NUC REST DIASTOLIC VOLUME INDEX: 1792 LBS
NUC REST SYSTOLIC VOLUME INDEX: 65 IN
PISA MR PEAK VEL: 576 CM/S
TRICUSPID REGURGITATION PEAK PRESSURE GRADIENT: 45.4 MMHG
TRICUSPID VALVE ANULAR PLANE SYSTOLIC EXCURSION: 2.9 CM
TRICUSPID VALVE PEAK REGURGITANT VELOCITY: 337 CM/S

## 2021-03-09 ASSESSMENT — MIFFLIN-ST. JEOR: SCORE: 993.91

## 2021-03-10 ENCOUNTER — COMMUNICATION - HEALTHEAST (OUTPATIENT)
Dept: CARDIOLOGY | Facility: CLINIC | Age: 76
End: 2021-03-10

## 2021-03-11 ENCOUNTER — OFFICE VISIT - HEALTHEAST (OUTPATIENT)
Dept: CARDIOLOGY | Facility: CLINIC | Age: 76
End: 2021-03-11

## 2021-03-11 DIAGNOSIS — I34.0 SEVERE MITRAL REGURGITATION: ICD-10-CM

## 2021-03-11 DIAGNOSIS — Z98.890 S/P MITRAL VALVE CLIP IMPLANTATION: ICD-10-CM

## 2021-03-11 DIAGNOSIS — I10 ESSENTIAL HYPERTENSION: ICD-10-CM

## 2021-03-11 DIAGNOSIS — I48.0 PAROXYSMAL ATRIAL FIBRILLATION (H): ICD-10-CM

## 2021-03-11 DIAGNOSIS — Z95.818 S/P MITRAL VALVE CLIP IMPLANTATION: ICD-10-CM

## 2021-03-11 LAB
ANION GAP SERPL CALCULATED.3IONS-SCNC: 11 MMOL/L (ref 5–18)
BUN SERPL-MCNC: 23 MG/DL (ref 8–28)
CALCIUM SERPL-MCNC: 9.2 MG/DL (ref 8.5–10.5)
CHLORIDE BLD-SCNC: 101 MMOL/L (ref 98–107)
CO2 SERPL-SCNC: 29 MMOL/L (ref 22–31)
CREAT SERPL-MCNC: 1.44 MG/DL (ref 0.6–1.1)
ERYTHROCYTE [DISTWIDTH] IN BLOOD BY AUTOMATED COUNT: 13.3 % (ref 11–14.5)
GFR SERPL CREATININE-BSD FRML MDRD: 35 ML/MIN/1.73M2
GLUCOSE BLD-MCNC: 101 MG/DL (ref 70–125)
HCT VFR BLD AUTO: 39.8 % (ref 35–47)
HGB BLD-MCNC: 12.8 G/DL (ref 12–16)
MCH RBC QN AUTO: 31.9 PG (ref 27–34)
MCHC RBC AUTO-ENTMCNC: 32.2 G/DL (ref 32–36)
MCV RBC AUTO: 99 FL (ref 80–100)
PLATELET # BLD AUTO: 201 THOU/UL (ref 140–440)
PMV BLD AUTO: 11.2 FL (ref 8.5–12.5)
POTASSIUM BLD-SCNC: 4 MMOL/L (ref 3.5–5)
RBC # BLD AUTO: 4.01 MILL/UL (ref 3.8–5.4)
SODIUM SERPL-SCNC: 141 MMOL/L (ref 136–145)
WBC: 6.6 THOU/UL (ref 4–11)

## 2021-03-14 ENCOUNTER — COMMUNICATION - HEALTHEAST (OUTPATIENT)
Dept: CARDIOLOGY | Facility: CLINIC | Age: 76
End: 2021-03-14

## 2021-03-15 ENCOUNTER — COMMUNICATION - HEALTHEAST (OUTPATIENT)
Dept: CARDIOLOGY | Facility: CLINIC | Age: 76
End: 2021-03-15

## 2021-05-20 ENCOUNTER — RECORDS - HEALTHEAST (OUTPATIENT)
Dept: ADMINISTRATIVE | Facility: OTHER | Age: 76
End: 2021-05-20

## 2021-05-20 ENCOUNTER — OFFICE VISIT - HEALTHEAST (OUTPATIENT)
Dept: FAMILY MEDICINE | Facility: CLINIC | Age: 76
End: 2021-05-20

## 2021-05-20 DIAGNOSIS — Z95.818 S/P MITRAL VALVE CLIP IMPLANTATION: ICD-10-CM

## 2021-05-20 DIAGNOSIS — I10 ESSENTIAL HYPERTENSION: ICD-10-CM

## 2021-05-20 DIAGNOSIS — I34.0 NONRHEUMATIC MITRAL VALVE REGURGITATION: ICD-10-CM

## 2021-05-20 DIAGNOSIS — I48.0 PAROXYSMAL ATRIAL FIBRILLATION (H): ICD-10-CM

## 2021-05-20 DIAGNOSIS — Z98.890 S/P MITRAL VALVE CLIP IMPLANTATION: ICD-10-CM

## 2021-05-20 DIAGNOSIS — I78.1 ASYMPTOMATIC SPIDER VEINS OF BOTH LOWER EXTREMITIES: ICD-10-CM

## 2021-05-24 ENCOUNTER — RECORDS - HEALTHEAST (OUTPATIENT)
Dept: ADMINISTRATIVE | Facility: CLINIC | Age: 76
End: 2021-05-24

## 2021-05-25 ENCOUNTER — RECORDS - HEALTHEAST (OUTPATIENT)
Dept: ADMINISTRATIVE | Facility: CLINIC | Age: 76
End: 2021-05-25

## 2021-05-26 ENCOUNTER — RECORDS - HEALTHEAST (OUTPATIENT)
Dept: ADMINISTRATIVE | Facility: CLINIC | Age: 76
End: 2021-05-26

## 2021-05-27 ENCOUNTER — RECORDS - HEALTHEAST (OUTPATIENT)
Dept: ADMINISTRATIVE | Facility: CLINIC | Age: 76
End: 2021-05-27

## 2021-05-27 VITALS — WEIGHT: 116.6 LBS | BODY MASS INDEX: 19.4 KG/M2

## 2021-05-27 VITALS — HEART RATE: 72 BPM | SYSTOLIC BLOOD PRESSURE: 118 MMHG | DIASTOLIC BLOOD PRESSURE: 88 MMHG

## 2021-05-29 NOTE — PROGRESS NOTES
Assessment/plan   Kelly Robledo is a 73 y.o. female who is new to my practice.    Kelly was seen today for establish care.    Diagnoses and all orders for this visit:    Encounter to establish care  Reviewed her pertinent medical history and updated EMR as appropriate.  She is doing well.  No medication refills due.    Non-rheumatic mitral regurgitation  Cardiac arrhythmia, unspecified cardiac arrhythmia type  With cardiology, has her annual echocardiogram and cardiology follow-up in the next couple weeks.  Continues on pindolol, she expressed some desire to eventually change to a different beta-blocker and she will discuss this with her cardiologist at her upcoming visit.  Primarily financial in nature.  Otherwise tolerating well.    Hyperlipidemia, unspecified hyperlipidemia type  Stable on her Crestor.  Discussed she would not be in need of fasting labs today as she just had these done 6 months ago and would not  based on the results.  She is happy with this.    Osteoporosis Senile  On alendronate, following with osteoporosis clinic.  Encouraged continuation of her weightbearing physical activity.      I spent 25 minutes with the patient, >50% of which was in counseling regarding patient's medical issues as noted above.    Follow up: 6 months for labs at that point    Selina Pozo MD    Subjective:      HPI: Kelly Robledo is a 73 y.o. female who is here for:    Chief Complaint   Patient presents with     [facility]     fasting labs       HLD: on Crestor, tolerating this well.  Previously was getting labs checked 2x/year but not sure why. Discussed today no indication to do so.     MVP and longstanding h/o cardiac arrhythmia: has mitral valve prolpase and regurgitation. Sees Dr. Connell with [facility]. Intermittent palpitations from this, for the past 50 years. On pindolol for this for years. Very expensive for her. Has an echo next Monday and cardiology after this. On an  ASA/ statin; denies any CP/exertional symptoms, shortness of breath, orthopnea, palpitations, lower extremity edema.  She is a never smoker. She walks about 5 miles/day in [place], and tries to do this in [place] as well if weather permits.     Osteopenia: Follows with the osteopenia clinic, currently on alendronate.  Denies any acid reflux or side effects.    Elevated BP w/o Dx of HTN: She has been told before her heart rates are generally in the 110-130s. Not checking BPs at home. BP here at clinic was 140 then 138 for systolic's.     Review of Systems:  12 point comprehensive review of systems was negative except as noted and HPI     Social History:  Social History     Social History Narrative    Spends 6 months in [place].     . No children. Family in the [place] area.     Enjoys walking- very regular with this especially in [place].     Likes reading - romantic happy endings.     Never alcohol or smoking.    Selina Pozo MD       Medical History:  Patient Active Problem List   Diagnosis     Hyperlipidemia     Mitral regurgitation     Cardiac arrhythmia, unspecified cardiac arrhythmia type     Irritable Bowel Syndrome     Osteoporosis Senile     Past Medical History:   Diagnosis Date     Abdominal pain      Diverticulosis      Past Surgical History:   Procedure Laterality Date     BREAST BIOPSY Bilateral     benign     HYSTERECTOMY  1970's     OOPHORECTOMY Bilateral 1970's     Penicillins  Family History   Problem Relation Age of Onset     Breast cancer Sister 47       Medications:  Current Outpatient Medications   Medication Sig Dispense Refill     alendronate (FOSAMAX) 35 MG tablet 1 TAB BY MOUTH EVERY 7 DAYS.TAKE IN MORNING ON EMPTY STOMACH W/ FULL GLASS WATER 30MIN BEFORE FOOD 12 tablet 1     aspirin 81 MG EC tablet Take 81 mg by mouth daily.       multivitamin therapeutic (THERAGRAN) tablet Take 1 tablet by mouth daily.       pindolol (VISKEN) 5 MG tablet TAKE 1 TABLET (5 MG TOTAL) BY MOUTH 2 (TWO) TIMES A DAY.  180 tablet 3     rosuvastatin (CRESTOR) 20 MG tablet TAKE 1 TABLET (20 MG TOTAL) BY MOUTH AT BEDTIME. 90 tablet 3     No current facility-administered medications for this visit.        Imaging & Labs reviewed this visit:   Results for ANTIONE MISHRA (MRN [id number]) as of 5/29/2019 11:10   Ref. Range 12/27/2018 09:49   Sodium Latest Ref Range: 136 - 145 mmol/L 140   Potassium Latest Ref Range: 3.5 - 5.0 mmol/L 4.2   Chloride Latest Ref Range: 98 - 107 mmol/L 104   CO2 Latest Ref Range: 22 - 31 mmol/L 30   Anion Gap, Calculation Latest Ref Range: 5 - 18 mmol/L 6   BUN Latest Ref Range: 8 - 28 mg/dL 14   Creatinine Latest Ref Range: 0.60 - 1.10 mg/dL 0.95   GFR MDRD Af Amer Latest Ref Range: >60 mL/min/1.73m2 >60   GFR MDRD Non Af Amer Latest Ref Range: >60 mL/min/1.73m2 57 (L)   Calcium Latest Ref Range: 8.5 - 10.5 mg/dL 9.0   AST Latest Ref Range: 0 - 40 U/L 26   ALT Latest Ref Range: 0 - 45 U/L 20   ALBUMIN Latest Ref Range: 3.5 - 5.0 g/dL 4.0   Protein, Total Latest Ref Range: 6.0 - 8.0 g/dL 6.9   Cholesterol Latest Ref Range: <=199 mg/dL 191   Alkaline Phosphatase Latest Ref Range: 45 - 120 U/L 50   Bilirubin, Total Latest Ref Range: 0.0 - 1.0 mg/dL 1.1 (H)   Triglycerides Latest Ref Range: <=149 mg/dL 82   HDL Cholesterol Latest Ref Range: >=50 mg/dL 63   LDL Calculated Latest Ref Range: <=129 mg/dL 112   Glucose Latest Ref Range: 70 - 125 mg/dL 97       Objective:      Vitals:    05/29/19 0955 05/29/19 1017   BP: (!) 144/92 138/82   Pulse: 84    Weight: 117 lb (53.1 kg)        Physical Exam:     General: Alert, no acute distress.   HEENT: normocephalic   Neck: supple without adenopathy or thyromegaly.  Lungs: Good aeration bilaterally. Clear to auscultation without wheezes, rales or rhonci.   Heart: regular rate and rhythm, normal S1 and S2, no murmurs  Neuro: alert, interactive moving all extremities equally      Selina Pozo MD

## 2021-05-31 VITALS — HEIGHT: 64 IN | BODY MASS INDEX: 20.14 KG/M2 | WEIGHT: 118 LBS

## 2021-05-31 VITALS — BODY MASS INDEX: 19.94 KG/M2 | WEIGHT: 119.8 LBS

## 2021-05-31 VITALS — WEIGHT: 119 LBS | BODY MASS INDEX: 19.83 KG/M2 | HEIGHT: 65 IN

## 2021-06-01 ENCOUNTER — COMMUNICATION - HEALTHEAST (OUTPATIENT)
Dept: CARDIOLOGY | Facility: CLINIC | Age: 76
End: 2021-06-01
Payer: COMMERCIAL

## 2021-06-01 ENCOUNTER — COMMUNICATION - HEALTHEAST (OUTPATIENT)
Dept: FAMILY MEDICINE | Facility: CLINIC | Age: 76
End: 2021-06-01

## 2021-06-01 ENCOUNTER — OFFICE VISIT - HEALTHEAST (OUTPATIENT)
Dept: CARDIOLOGY | Facility: CLINIC | Age: 76
End: 2021-06-01

## 2021-06-01 DIAGNOSIS — Z98.890 S/P MITRAL VALVE CLIP IMPLANTATION: ICD-10-CM

## 2021-06-01 DIAGNOSIS — I25.10 CAD (CORONARY ARTERY DISEASE): ICD-10-CM

## 2021-06-01 DIAGNOSIS — I25.10 CORONARY ARTERY DISEASE INVOLVING NATIVE CORONARY ARTERY OF NATIVE HEART WITHOUT ANGINA PECTORIS: ICD-10-CM

## 2021-06-01 DIAGNOSIS — I42.8 NON-ISCHEMIC CARDIOMYOPATHY (H): ICD-10-CM

## 2021-06-01 DIAGNOSIS — I34.0 NONRHEUMATIC MITRAL VALVE REGURGITATION: ICD-10-CM

## 2021-06-01 DIAGNOSIS — I48.0 PAROXYSMAL ATRIAL FIBRILLATION (H): ICD-10-CM

## 2021-06-01 DIAGNOSIS — I49.3 PVC'S (PREMATURE VENTRICULAR CONTRACTIONS): ICD-10-CM

## 2021-06-01 DIAGNOSIS — Z95.818 S/P MITRAL VALVE CLIP IMPLANTATION: ICD-10-CM

## 2021-06-02 VITALS — WEIGHT: 116.4 LBS | BODY MASS INDEX: 19.87 KG/M2 | HEIGHT: 64 IN

## 2021-06-02 VITALS — WEIGHT: 116.7 LBS | HEIGHT: 64 IN | BODY MASS INDEX: 19.92 KG/M2

## 2021-06-02 VITALS — BODY MASS INDEX: 20.08 KG/M2 | WEIGHT: 117 LBS

## 2021-06-03 VITALS
DIASTOLIC BLOOD PRESSURE: 84 MMHG | HEART RATE: 96 BPM | BODY MASS INDEX: 20.08 KG/M2 | WEIGHT: 117.6 LBS | SYSTOLIC BLOOD PRESSURE: 132 MMHG | HEIGHT: 64 IN

## 2021-06-03 NOTE — PATIENT INSTRUCTIONS - HE
Let's try increasing metoprolol for 100mg total each day- ok to take 50mg in AM and 50mg in PM if you prefer.   Watch for low heart rate <60, or light headed/dizzy, fatigue.

## 2021-06-03 NOTE — PROGRESS NOTES
"Assessment/plan   Kelly Robledo is a 74 y.o. female with non rheumatic mitral regurgitation, cardiac arrhythmia, HLD, osteoporosis who is established to my practice.    Kelly was seen today for medication refill.    Diagnoses and all orders for this visit:    Cardiac arrhythmia, unspecified cardiac arrhythmia type  Her heart rate on metoprolol 50 mg daily currently is in the upper 90s and she feels like it is \"racing\".  She has had a recent Holter monitor and previous evaluation with cardiology back in October.  She does have some benign ectopy with palpitations per that monitoring.  She denies anxiety.  At this time I have recommended to increase her metoprolol to 100 mg daily.  She would like to take this as 50 mg in the morning and at bedtime.   -     metoprolol succinate (TOPROL-XL) 50 MG 24 hr tablet; Take 2 tablets (100 mg total) by mouth daily.  -     Hemoglobin  -     TSH    Hyperlipidemia  Stable, well controlled and tolerating her statin.  Refill sent today and checking cholesterol levels.  -     rosuvastatin (CRESTOR) 20 MG tablet; TAKE 1 TABLET (20 MG TOTAL) BY MOUTH AT BEDTIME.  -     Lipid Cascade    Osteoporosis Senile--> currently osteopenia  Her last DEXA was 7/27/2018 when she was on a Fosamax holiday, showed she had osteopenia.  She has previously completed several years of Fosamax, most recently from July 2018 to November 2019.  She declines IV medication.  She is also had hormone replacement therapy in the past.  She would like to repeat her DEXA in 2 years instead of this year.  She declines going back to the osteoporosis clinic at this time.  Encourage calcium, vitamin D and weightbearing activity  - Vitamin D level  -     Basic Metabolic Panel    Flu shot in September.     Follow up: 1 year    Selina Pozo MD    Subjective:      HPI: Kelly Robledo is a 74 y.o. female who is here for:    Chief Complaint   Patient presents with     Medication Refill     metoprolol & rosuvastatin, " "stopped taking the fosamax because she was having diarrhea 10-15 minutes after taking it and always nauseated     Fasting     HLD: on  rosuvastatin, tolerating this well.   No myalgias.     MVP and longstanding h/o cardiac arrhythmia: has mitral valve prolpase and regurgitation. Sees Dr. Connell with [facility], last cardiology visit October 2019.    Recently wore another heart monitor recently in October.  Intermittent palpitations from this, for the past 50 years. On pindolol for this for years, was transitioned to metoprolol in October as pindolol was expensive. She currently takes Toprol 25mg in AM and PM.     On an ASA/ statin; denies any CP/exertional symptoms, shortness of breath, orthopnea, palpitations, lower extremity edema.  She is a never smoker. She walks about 5 miles/day in [place], and tries to do this in [place] as well if weather permits.    Osteoporosis:  Was previously following with the osteoporosis clinic on alendronate.  However she was getting diarrhea 15 minutes after taking it and chronically nauseated from this so she stopped taking the alendronate about 2 weeks ago.  Overall has taken it for years, >5 years on and off. Most recently alendronate was July 2018 to Nov 2019. Has declined IV medications.       Assessment:     1. The spine bone density L1-L4(L3) with T-score -2.4, stable compared to 2016.  2. Femoral bone densities show left femoral neck T- score -2.4 and right total hip T-score -2.1, and significant decline of 8.7% on the right hip compared to 2016.  3. Trabecular bone score indicates poor trabecular bone architecture.        72 y.o. female with LOW BONE DENSITY (OSTEOPENIA) and HIGH fracture risk, adjusted for the TBS, with major osteoporotic fracture risk 17.2% and hip fracture risk 5.3%.       Review of Systems:  Intermittent chills and not feeling as \"great\" after her Flu shot- sx started 3hr after getting the shot and lasted a couplehours.     12 point comprehensive review of " "systems was negative except as noted and HPI     Social History:  Social History     Social History Narrative    Spends 6 months in [place].     . No children. Family in the [place] area.     Enjoys walking- very regular with this especially in [place].     Likes reading - romantic happy endings.     Never alcohol or smoking.    Selina Pozo MD       Medical History:   Problem list, PMH, PSH, FHx and allergies were reviewed and updated accordingly in EMR.    Medications:  Current Outpatient Medications   Medication Sig Dispense Refill     aspirin 81 MG EC tablet Take 81 mg by mouth daily.       metoprolol succinate (TOPROL-XL) 50 MG 24 hr tablet Take 2 tablets (100 mg total) by mouth daily. 90 tablet 3     multivitamin therapeutic (THERAGRAN) tablet Take 1 tablet by mouth daily.       rosuvastatin (CRESTOR) 20 MG tablet TAKE 1 TABLET (20 MG TOTAL) BY MOUTH AT BEDTIME. 90 tablet 3     No current facility-administered medications for this visit.        Imaging & Labs reviewed this visit:     No results found for: HGBA1C  No results found for this or any previous visit.  Lab Results   Component Value Date    TSH 2.86 12/13/2011       Objective:      Vitals:    11/25/19 0907   BP: 132/84   Pulse: 96   Weight: 117 lb 9.6 oz (53.3 kg)   Height: 5' 4\" (1.626 m)       Physical Exam:     General: Alert, no acute distress.   HEENT: normocephalic conjunctivae are clear, Normal pearly TMs bilaterally without erythema, pus or fluid. Position and landmarks are normal.  Nose is clear.  Oropharynx is moist and clear, without tonsillar hypertrophy, asymmetry, exudate or lesions.   Neck: supple without adenopathy or thyromegaly.  Lungs: Good aeration bilaterally. Clear to auscultation without wheezes, rales or rhonci.   Heart: regular but nearly tachycardic rate and regular rhythm, normal S1 and S2, no murmurs  Abdomen: soft and nontender  Skin: clear without rash or lesions  Neuro: alert, interactive moving all extremities " equally, normal muscle tone in all 4 extremities    Selina Pozo MD

## 2021-06-04 VITALS
BODY MASS INDEX: 19.7 KG/M2 | WEIGHT: 118.4 LBS | WEIGHT: 118.4 LBS | BODY MASS INDEX: 19.7 KG/M2 | HEIGHT: 65 IN | BODY MASS INDEX: 19.7 KG/M2

## 2021-06-04 VITALS — BODY MASS INDEX: 19.85 KG/M2 | WEIGHT: 119.3 LBS

## 2021-06-05 VITALS — WEIGHT: 112 LBS | HEIGHT: 65 IN | BODY MASS INDEX: 18.66 KG/M2

## 2021-06-05 VITALS — HEIGHT: 65 IN | WEIGHT: 112 LBS | BODY MASS INDEX: 18.66 KG/M2

## 2021-06-08 NOTE — TELEPHONE ENCOUNTER
Last office visit was 11/25/2019    Pharmacy Comments: PATIENT REQUEST NEW RX: PATIENT IS REQUESTING MORE PILLS SOONER, SAYS SHE WANTS MORE

## 2021-06-08 NOTE — TELEPHONE ENCOUNTER
Thanks for checking with her- please let her know that makes sense and I've sent in a 90 day supply- ok to defer next check up until later if still doing well then! We met back on 5/2019 so it would be good to do a physical sometime this fall when able.  Thanks,  Selina Pozo MD

## 2021-06-08 NOTE — TELEPHONE ENCOUNTER
Not sure what that message means; please schedule virtual visit with pt so we can discuss what she is feeling. I will refill current dose until we can discuss sx further. My last visit with her was in Nov. Thanks. ROMERO

## 2021-06-08 NOTE — TELEPHONE ENCOUNTER
Patient stated she was only requesting a 90 day supply of medication, nothing more. Would you still like patient to schedule a visit. Please advise. Thank you, Nneka Salvador

## 2021-06-09 NOTE — TELEPHONE ENCOUNTER
Wellness Screening Tool  Symptom Screening:  Do you have one of the following NEW symptoms:    Fever (subjective or >100.0)?  No    A new cough?  No    Shortness of breath?  No     Chills? No     New loss of taste or smell? No     Generalized body aches? No     New persistent headache? No     New sore throat? No     Nausea, vomiting, or diarrhea?  No    Within the past 3 weeks, have you been exposed to someone with a known positive illness below:    COVID-19 (known or suspected)?          Chicken pox?  No    Mealses?  No    Pertussis?  No    Patient notified of visitor policy- They may have one person accompany them to their appointment, but they will need to wear a mask and will be screened upon arrival for symptoms: Yes  Pt informed to wear a mask: Yes  Pt notified if they develop any symptoms listed above, prior to their appointment, they are to call the clinic directly at [phone] for further instructions.  Yes  Patient's appointment status: Patient will be seen in clinic as scheduled on 7/2

## 2021-06-09 NOTE — PATIENT INSTRUCTIONS - HE
Kelly Robledo,    It was a pleasure to talk to you today from the [facility].     My recommendations after this visit include:    You were started on amiodarone and it is a medication to keep you in normal rhythm.  I discussed sun sensitivity with this medication and need for lab work every 6 months.  Please call your primary clinic and get labs drawn in the next 1 to 2 weeks.  I have ordered these labs.  In the future you will get a letter when your lab work is due.  We recommend yearly eye exams.  You will have a lung test called pulmonary function when COVID restrictions are lifted.    Please call if palpitations are frequent.      To followup with Dr. Kim as planned in October 2020.      My contact information:  Art Kaur CNP  After Hours or Scheduling  [phone]  My Nurse---Ivanna Dunbar [phone]

## 2021-06-09 NOTE — TELEPHONE ENCOUNTER
Medication Question or Clarification  Who is calling: Patient  What medication are you calling about (include dose and sig)?:   lisinopriL (PRINIVIL,ZESTRIL) 5 MG tablet  30 tablet  0  6/21/2020      Sig - Route: Take 1 tablet (5 mg total) by mouth daily. - Oral     Sent to pharmacy as: lisinopriL 5 mg tablet (PRINIVIL,ZESTRIL)     E-Prescribing Status: Receipt confirmed by pharmacy (6/20/2020  4:30 PM CDT)       And    apixaban ANTICOAGULANT (ELIQUIS) 5 mg Tab tablet  60 tablet  0  6/20/2020      Sig - Route: Take 1 tablet (5 mg total) by mouth 2 (two) times a day. - Oral     Sent to pharmacy as: apixaban 5 mg tablet (ELIQUIS)     E-Prescribing Status: Receipt confirmed by pharmacy (6/20/2020  4:30 PM CDT      And    amiodarone (PACERONE) 200 MG tablet  90 tablet  0  6/20/2020 7/20/2020     Sig - Route: Take 1 tablet (200 mg total) by mouth 3 (three) times a day. - Oral     Sent to pharmacy as: amiodarone 200 mg tablet (PACERONE)     E-Prescribing Status: Receipt confirmed by pharmacy (6/20/2020  4:30 PM CDT)         Who prescribed the medication?: ERAN Alejandra MD  What is your question/concern?: These three medications are new from hospital discharge, patient is requesting a 90 day supply on all for better coverage with her insurance please. The patient states was informed by her pharmacy to call for the 90 day supply  Requested Pharmacy: [id number]  Okay to leave a detailed message?: Yes

## 2021-06-09 NOTE — PROGRESS NOTES
"Kelly Robledo is a 74 y.o. female who is being evaluated via a billable telephone visit.      The patient has been notified of following:     \"This telephone visit will be conducted via a call between you and your physician/provider. We have found that certain health care needs can be provided without the need for a physical exam.  This service lets us provide the care you need with a short phone conversation.  If a prescription is necessary we can send it directly to your pharmacy.  If lab work is needed we can place an order for that and you can then stop by our lab to have the test done at a later time.    Telephone visits are billed at different rates depending on your insurance coverage. During this emergency period, for some insurers they may be billed the same as an in-person visit.  Please reach out to your insurance provider with any questions.    If during the course of the call the physician/provider feels a telephone visit is not appropriate, you will not be charged for this service.\"    Patient has given verbal consent to a Telephone visit? Yes        Patient would like to receive their AVS by AVS Preference: Magy.    Additional provider notes:   Hospital Follow-up Visit:    Assessment/Plan:     NSVT (nonsustained ventricular tachycardia) (H)  Atrial fibrillation with rapid ventricular response (H)  Wide-complex tachycardia (H)  Improving  follow up with cardiologist  Hospital notes, lab results, operative notes, imagings were reviewed.    Post Discharge Medication Reconciliation Status: discharge medications reconciled, continue medications without change     Subjective:     Kelly Robledo is a 74 y.o. female who presents for a hospital discharge follow up.  Feeling improved.  No further palpitation.  No chest pain.  On a \"heart failure diet\".  Hasn't schedule an appointment with her cardiology.  New meds are metoprolol, eliquis, and amiodarone.    Hospital/Nursing Home/IP Rehab Facility: [facility]  Date of Admission: 6/17/2020  Date of Discharge:6/20/2020  Reason(s) for Admission:heart palpitation            Do you have any problems taking your medication regularly?  None       Have you had any changes in your medication since discharge? None       Have you had any difficulty following your discharge or treatment plan?  No    Summary of hospitalization:  Hospital discharge summary reviewed  Diagnostic Tests/Treatments reviewed.  Follow up needed: None  Other Healthcare Providers Involved in Patient's Care: Patient Care Team:  Selina Pozo MD as PCP - General (Family Medicine)  Selina Pozo MD as Assigned PCP      Update since discharge: improved     Post Discharge Medication Reconciliation: discharge medications reconciled, continue medications without change  Plan of care communicated with: patient    Objective:     There were no vitals filed for this visit.      Physical Exam:  Telephone encounter      Coding guidelines for this visit:  Type of Medical   Decision Making Face-to-Face Visit       within 7 Days of discharge Face-to-Face Visit        within 14 days of discharge   Moderate Complexity [id number]   High Complexity [id number]       Electronically signed by Michelle Hernandez MD 06/25/20 1:03 PM     Phone call duration:  30 minutes    Michelle Hernandez MD

## 2021-06-10 NOTE — PATIENT INSTRUCTIONS - HE
You should stop the aspirin until the bleeding stops.     I would recommend that you discuss with your cardiologist if you still need to be on the aspirin since you are on eliquis.     If the bleeding worsens or is not getting any better for sure contact your doctor. Also if you develop lightheadedness, contact your doctor.     I want you to follow up with a urologist. Our  will be calling you to help schedule this.

## 2021-06-10 NOTE — PROGRESS NOTES
"PHONE VISIT  Due to current COVID19 pandemic, pt was offered virtual visit in lieu of in office evaluation to limit exposures.      Assessment/plan  Kelly Robledo is a 74 y.o. female who is established to my practice.      Paroxysmal atrial fibrillation (H)  [id number] score of 5   NSVT (nonsustained ventricular tachycardia) (H) seen during hospitalization  No palpitations since discharge in June; however 2-3 days of feeling \"slower\" heart rate but objectively vitals show pulse consistently in the 70s on her checks. We reviewed this may be a subjective sensation given that she chronically was used to a HR of 90s despite beta blockade at higher doses even. At this time, plan for labs, she understands I am unable to do an exam or EKG today and declines this in person eval which was offered/recommended. She was also advised to notify cardiology and consideration for more monitoring pending lab results.  - Amioderone, LFTs and TSH labs recently done on 7/16 reviewed  - Plan to repeat TSH given slightly elevated though improved (normal 11/209, but elevated during 6/2020 hospitalization at 8.4-->5.2 on recheck in July)  - Also check BMP, Mg and CBC     - Continue Eliquis 5mg two times a day  - Continue ASA  - Continue amiodarone and metoprolol 50mg once daily   - PFTs planned per cardiology post COVID next spring    Addendum: 8/19/2020 2:29 PM  Called pt with regard to Dr. Garner's recommendation sent as staff message to consider holtor monitor and she is going to consider this further. I have placed the order. She indicates today actually is one of the best days yet and is really feeling so much better for unclear reasons. HRs still in the 70s on her home checks. She understands risks/benefits of the monitoring. Her CBC was reviewed and normal. Other labs pending.      Essential hypertension   Well controlled based on home BPs  Continue bblocker and Lisinopril 5m daily    Valvular heart disease   Following with " "cardiology; does not need surgical intervention      Coronary artery disease involving native coronary artery of native heart without angina pectoris and plaque moderate and No anginal symptoms.  - Continues ASA 81mg and bblocker and statin            COVID19 precautions reviewed, questions answered. Referred to [facility] for latest updates.     Telephone visit start time: 2:55pm  Telephone visit end time: 3:06pm  Total time: 11min    Follow up: with me in person or cardiology if sx progress; will start ith labs    Selina Pozo MD    Subjective:      HPI: Kelly Robledo is a 74 y.o. female who is being evaluated via a billable telephone visit due to COVID19 pandemic precautions.    Chief Complaint   Patient presents with     Medication Management     Was recently started on amiodarone and metoprolol, was feeling like her heart rate was slower than normal- took it for one minute and it was 70. SOB when walking up the steps or hills when she walks outside. Feels like it is only happening since she started her medication       Concern for \"low heart rate\": she strongly declines in person evaluation due to COVID situation right now. Discussed this makes it challenging for a thorough assessment as we cannot do an exam/EKG today. She understands this and desired to proceed with phone visit only today, ok for lab check if needed.     Following with cardiology, Started on amiodarone and metoprolol around June 20th due to nonsustained VT and afib with RVR prompting hospitalization at that time. Also on Eliquis/ASA.      States home BPs have been normal: just now--134/82, pulse of 70. Concern today is that her heart rate Feels \"slow\" to her, just for a few days. Was doing ok in June and July. Every time she checks her pulse lately it is always in the 70s, never less than 60.    We reviewed her pulse was previously always in the upper 90s chronically despite high dose bblocker for years/decades.    No palpitations.  No chest " pain  No LH/dizzines or syncope  No LE swelling  Tolerating meds well     Review of Systems:  A little bruising with aspirin and eliquis combo, but otherwise doing well    12 point comprehensive review of systems was negative except as noted and HPI     Social History:  Social History     Social History Narrative    Spends 6 months in [place].     . No children. Family in the [place] area.     Enjoys walking- very regular with this especially in [place].     Likes reading - romantic happy endings.     Never alcohol or smoking.    Selina Pozo MD       Medical History:   I have reviewed and updated the patient's Past Medical History, Social History, Family History and Medication List.    Medications:  Current Outpatient Medications   Medication Sig Dispense Refill     apixaban ANTICOAGULANT (ELIQUIS) 5 mg Tab tablet Take 1 tablet (5 mg total) by mouth 2 (two) times a day. 60 tablet 3     aspirin 81 MG EC tablet Take 81 mg by mouth daily.       lisinopriL (PRINIVIL,ZESTRIL) 5 MG tablet Take 1 tablet (5 mg total) by mouth daily. 90 tablet 3     metoprolol succinate (TOPROL-XL) 50 MG 24 hr tablet Take 1 tablet (50 mg total) by mouth daily. 30 tablet 6     multivitamin therapeutic (THERAGRAN) tablet Take 0.5 tablets by mouth daily.        rosuvastatin (CRESTOR) 20 MG tablet TAKE 1 TABLET (20 MG TOTAL) BY MOUTH AT BEDTIME. (Patient taking differently: Take 20 mg by mouth every morning. ) 90 tablet 3     amiodarone (PACERONE) 200 MG tablet Take 1 tablet (200 mg total) by mouth daily. 30 tablet 6     No current facility-administered medications for this visit.        Imaging & Labs reviewed this visit:   Recent labs from June to July 2020:  Amiodarone level 1.0  ALT 17  Potassium 4.7  TSH 5.2     Results for ANTIONE MISHRA (MRN [id number]) as of 8/18/2020 14:59   Ref. Range 6/23/2016 08:57 12/16/2016 08:20 6/28/2017 10:09 6/6/2018 08:53 12/27/2018 09:49 11/25/2019 09:36 6/16/2020 21:37 6/17/2020 05:31 6/17/2020 14:38  6/17/2020 16:55 6/17/2020 22:53 6/18/2020 05:46 6/19/2020 03:25 6/19/2020 07:57 6/19/2020 17:05 6/20/2020 06:32 7/17/2020 09:15   TSH Latest Ref Range: 0.30 - 5.00 uIU/mL      2.75 8.40 (H)          5.20 (H)       Objective:      There were no vitals filed for this visit.    Physical Exam: Not performed in context of phone visit    Selina Pozo MD

## 2021-06-10 NOTE — PROGRESS NOTES
ASSESSMENT:  1. Gross hematuria    Recheck a urine today which does still show large hematuria but the patient states that it is seeming to clear up to her eyes.  Since she has only been off of the aspirin about 3 days, I would expect this to continue to get better.  She should continue on her Eliquis.  Her PCP already has an order in to recheck her BMP in a month which is entirely appropriate.  She seems happy with this explanation, we will pursue that recheck in a month and she will let us know if it seems to be getting worse instead of better.    She already does have a follow-up scheduled upcoming with the urologist to just to make sure that everything looks good up in the bladder that would explain the hematuria.      - Urinalysis-UC if Indicated  - Culture, Urine    2. Paroxysmal atrial fibrillation (H)    She has a follow-up scheduled with the cardiologist but not until October, but everything seems to go okay, if that should be fine.          PLAN:  There are no Patient Instructions on file for this visit.    Orders Placed This Encounter   Procedures     Culture, Urine     Urinalysis-UC if Indicated     There are no discontinued medications.    No follow-ups on file.    CHIEF COMPLAINT:  Chief Complaint   Patient presents with     Hematuria     Thinks it is from a med she is taking - stopped Asa 81mg 3 days ago.        HISTORY OF PRESENT ILLNESS:  Kelly is a 74 y.o. female presenting to the clinic today for a follow-up.  She went into walk-in clinic about 3 days ago with hematuria which was rather remarkable.  She has recently been diagnosed with paroxysmal atrial fibrillation, and she was put on Eliquis in addition to an aspirin a day.  She started having hematuria then and she became alarmed by the amount of hematuria she was having so she went in to get checked.  They confirm that it was certainly a lot of hematuria there without sign of infection.  They advised that she go off of the aspirin and she  has been off of it now for 3 days.  She thinks that the urine is lightening up a little bit, but still is a bit pink.  She has had no fevers or chills.  No flank pain.  No suprapubic pain no other abnormalities, no dysuria or urgency of urination.  She is not had a history of bladder issues in the past.  She has continued on the Eliquis as directed.    REVIEW OF SYSTEMS:     All other systems are negative.    PFSH:    Reviewed      TOBACCO USE:  Social History     Tobacco Use   Smoking Status Never Smoker   Smokeless Tobacco Never Used       VITALS:  Vitals:    08/25/20 1517   BP: 152/88   Patient Site: Left Arm   Patient Position: Sitting   Cuff Size: Adult Regular   Pulse: 89   SpO2: 97%   Weight: 113 lb 8 oz (51.5 kg)     Wt Readings from Last 3 Encounters:   08/25/20 113 lb 8 oz (51.5 kg)   08/22/20 115 lb (52.2 kg)   07/02/20 114 lb 6.4 oz (51.9 kg)     Body mass index is 18.89 kg/m .    PHYSICAL EXAM:  Constitutional:  Well appearing patient in no acute distress.  Vitals:  Per nursing notes.    HEENT:  Normocephalic, atraumatic.  Ears are clear bilaterally, with no fluid or redness, and landmarks visible.  Pupils are equal and reactive to light, extraocular muscles intact, visual fields are full.  Nose is normal, and oropharynx is clear without redness.    Neck is without lymphadenopathy.    Recent Results (from the past 24 hour(s))   Urinalysis-UC if Indicated   Result Value Ref Range    Color, UA Yellow Colorless, Yellow, Straw, Light Yellow    Clarity, UA Clear Clear    Glucose, UA Negative Negative    Bilirubin, UA Negative Negative    Ketones, UA Negative Negative    Specific Gravity, UA 1.010 1.005 - 1.030    Blood, UA Large (!) Negative    pH, UA 5.0 5.0 - 8.0    Protein, UA 30 mg/dL (!) Negative mg/dL    Urobilinogen, UA 0.2 E.U./dL 0.2 E.U./dL, 1.0 E.U./dL    Nitrite, UA Negative Negative    Leukocytes, UA Small (!) Negative           MEDICATIONS:  Current Outpatient Medications   Medication Sig  Dispense Refill     apixaban ANTICOAGULANT (ELIQUIS) 5 mg Tab tablet Take 1 tablet (5 mg total) by mouth 2 (two) times a day. 60 tablet 3     lisinopriL (PRINIVIL,ZESTRIL) 5 MG tablet Take 1 tablet (5 mg total) by mouth daily. 90 tablet 3     metoprolol succinate (TOPROL-XL) 50 MG 24 hr tablet Take 1 tablet (50 mg total) by mouth daily. 30 tablet 6     multivitamin therapeutic (THERAGRAN) tablet Take 0.5 tablets by mouth daily.        rosuvastatin (CRESTOR) 20 MG tablet TAKE 1 TABLET (20 MG TOTAL) BY MOUTH AT BEDTIME. (Patient taking differently: Take 20 mg by mouth every morning. ) 90 tablet 3     amiodarone (PACERONE) 200 MG tablet Take 1 tablet (200 mg total) by mouth daily. 30 tablet 6     No current facility-administered medications for this visit.

## 2021-06-10 NOTE — PROGRESS NOTES
"Kelly Robledo is a 74 y.o. female who is being evaluated via a billable telephone visit.      The patient has been notified of following:     \"This telephone visit will be conducted via a call between you and your physician/provider. We have found that certain health care needs can be provided without the need for a physical exam.  This service lets us provide the care you need with a short phone conversation.  If a prescription is necessary we can send it directly to your pharmacy.  If lab work is needed we can place an order for that and you can then stop by our lab to have the test done at a later time.    Telephone visits are billed at different rates depending on your insurance coverage. During this emergency period, for some insurers they may be billed the same as an in-person visit.  Please reach out to your insurance provider with any questions.    If during the course of the call the physician/provider feels a telephone visit is not appropriate, you will not be charged for this service.\"    Patient has given verbal consent to a Telephone visit? Yes    What phone number would you like to be contacted at? [phone]    Patient would like to receive their AVS by AVS Preference: Magy.      Julianne You LPN    "

## 2021-06-10 NOTE — PROGRESS NOTES
SUBJECTIVE: note blood in urine. asymptomatic otherwise. No trauma.    No other bleeding noted     no blood in stool/gums, bruising. No vag bleeding.    Recent blood test except stage 3 ckd. Creatinine 1.3. She is <60KG.     No recnet med changes but started eliquis and amiodarone about 2 months ago for a fib.     Also on baby asa for cad.     No hx tobacco.     OBJECTIVE: /83   Pulse 78   Wt 115 lb (52.2 kg)   SpO2 98%   BMI 19.14 kg/m   no apparent distress       ICD-10-CM    1. Blood in urine  R31.9 Urinalysis-UC if Indicated   2. Atrial fibrillation, unspecified type (H)  I48.91    3. Coronary artery disease involving native heart without angina pectoris, unspecified vessel or lesion type  I25.10     We will hold the asa in the short term. If her recent renal function worsens (she is currenlty 1.3 and she weighs <60 kg), a lower dose of eliquis may be indicated.  Since the CAD is stable and she is on eliquis for her AFIB, we should consider stopping her ASA long term as well. Urology for eval of underlying cause of her hematuria. Discussed when to go to the ER. No sign hemodynamic compromise currently.

## 2021-06-11 ENCOUNTER — HOSPITAL ENCOUNTER (OUTPATIENT)
Dept: CARDIOLOGY | Facility: CLINIC | Age: 76
Discharge: HOME | End: 2021-06-11
Attending: INTERNAL MEDICINE
Payer: COMMERCIAL

## 2021-06-11 ENCOUNTER — AMBULATORY - HEALTHEAST (OUTPATIENT)
Dept: CARDIOLOGY | Facility: CLINIC | Age: 76
End: 2021-06-11

## 2021-06-11 DIAGNOSIS — I25.10 CAD (CORONARY ARTERY DISEASE): ICD-10-CM

## 2021-06-11 DIAGNOSIS — I49.3 PVC'S (PREMATURE VENTRICULAR CONTRACTIONS): ICD-10-CM

## 2021-06-11 LAB
ALT SERPL W P-5'-P-CCNC: 31 U/L (ref 0–45)
AST SERPL W P-5'-P-CCNC: 31 U/L (ref 0–40)
CHOLEST SERPL-MCNC: 235 MG/DL
FASTING STATUS PATIENT QL REPORTED: YES
HDLC SERPL-MCNC: 67 MG/DL
LDLC SERPL CALC-MCNC: 141 MG/DL
TRIGL SERPL-MCNC: 137 MG/DL

## 2021-06-11 NOTE — TELEPHONE ENCOUNTER
"Discussed again with pt.  Pt reports last night was a \"much better night,\" and she is comfortable waiting to discuss further until her appt in October.  Pt will call back if she's having any other issues.  -rajorge  "

## 2021-06-11 NOTE — PROGRESS NOTES
"ASSESSMENT and PLAN:  1. Hyperlipidemia  Doing well.  She'd like to have her labs rechecked today.  - Lipid Cascade  - Comprehensive Metabolic Panel    2. Irritable bowel syndrome  Doing well.  No refills needed.      Patient Instructions   Today's labs will be available on [facility].  If you aren't signed up, then we'll mail them out.  If anything needs more immediate follow up, we'll also call.        CHIEF COMPLAINT:  Chief Complaint   Patient presents with     Labs Only     Fasting       HISTORY OF PRESENT ILLNESS:  Kelly Robledo is a 71 y.o. female  presenting to the clinic today for follow up of her hyperlipidemia.  This is a six month f/u.  She's doing well. She likes the 20mg tablets of crestor b/c she doesn't need to split them.    She has IBS and that's been very stable and well controlled.    REVIEW OF SYSTEMS:   MSK: some AM back pain   All other systems are negative.    PFSH:  She had 2 brother in laws pass away this summer       TOBACCO USE:  History   Smoking Status     Never Smoker   Smokeless Tobacco     Never Used       VITALS:  Vitals:    06/28/17 0930   BP: 118/68   Patient Site: Right Arm   Pulse: 80   Weight: 119 lb (54 kg)   Height: 5' 5\" (1.651 m)     Wt Readings from Last 3 Encounters:   06/28/17 119 lb (54 kg)   12/16/16 120 lb (54.4 kg)   06/23/16 117 lb 9.6 oz (53.3 kg)         PHYSICAL EXAM:  Constitutional:  Reveals an alert, pleasant adult female.   Vitals:  Noted.   Head: Normocephalic, without obvious abnormality, atraumatic   Lungs: Clear to auscultation bilaterally, respirations unlabored.   Heart: Regular rate and rhythm, S1 and S2 normal, no murmur, rub, or gallop,   Abdomen: non-distended  Extremities: Extremities normal, atraumatic, no cyanosis or edema   Skin: tan  Neurologic:  Grossly Normal     QUALITY MEASURES:  I have had an Advance Directives discussion with the patient.; she was provided w/ a packet    MEDICATIONS:  Current Outpatient Prescriptions   Medication Sig " Dispense Refill     aspirin 81 MG EC tablet Take 81 mg by mouth daily.       dicyclomine (BENTYL) 10 MG capsule Take 1 capsule (10 mg total) by mouth as needed (Take 1 capsule three to four times daily as needed.). 90 capsule 3     multivitamin therapeutic (THERAGRAN) tablet Take 1 tablet by mouth daily.       pindolol (VISKEN) 5 MG tablet Take 1 tablet (5 mg total) by mouth 2 (two) times a day. 180 tablet 3     rosuvastatin (CRESTOR) 20 MG tablet Take 1 tablet (20 mg total) by mouth bedtime. 90 tablet 3     calcium-vitamin D (CALCIUM-VITAMIN D) 500 mg(1,250mg) -200 unit per tablet Take 1 tablet by mouth daily.       Current Facility-Administered Medications   Medication Dose Route Frequency Provider Last Rate Last Dose     pneumococcal conj. 13-valent vaccine 0.5 mL (PREVNAR-13)  0.5 mL Intramuscular Once Ivonne Ramos MD

## 2021-06-11 NOTE — TELEPHONE ENCOUNTER
Okay.  It still my opinion that having her seen in [facility]. fib clinic before my visit in October would be my preference.sharee

## 2021-06-11 NOTE — TELEPHONE ENCOUNTER
I would advise a 24-hour Holter monitor.  In addition I did have her follow-up in [facility] fib clinic pending the results.mdg

## 2021-06-11 NOTE — PROGRESS NOTES
Hospital Follow-up Visit:    Assessment/Plan:     1. Acute respiratory failure with hypoxia (H)    She is doing much better after being in the hospital overnight and getting diuresed last week.  We will check some labs today and follow-up on the results of the become available.  I think she probably should have a follow-up with cardiology before the end of October, so I will have our  make her a follow-up appointment with cardiology for 2 weeks from discharge which would be next week.  If she is getting worse acutely she will obviously let us know or be seen again.      - Basic Metabolic Panel  - Ambulatory referral to Cardiology    2. Paroxysmal atrial fibrillation (H)    She is in a sinus rhythm right now and her rate is very controlled and she seems to be doing well on this issue.  She continues with her Eliquis twice a day.  - Ambulatory referral to Cardiology    3. Essential hypertension    Blood pressure is excellent today on the current medications that she is on as well as having been diuresed last week.    4. Anxiety    Her anxiety is chronically pretty bad but she is actually doing quite well today given what she has been through last week.  We will continue to monitor that going forward as well.           Subjective:     Kelly Robledo is a 74 y.o. female who presents for a hospital discharge follow up.      Hospital/Nursing Home/IP Rehab Facility: [facility]  Date of Admission: 9/23/2020  Date of Discharge:9/24/2020   Reason(s) for Admission:SOB, pulmonary edema            Do you have any problems taking your medication regularly?  None       Have you had any changes in your medication since discharge? None       Have you had any difficulty following your discharge or treatment plan?  No    Summary of hospitalization:  Hospital discharge summary reviewed  Diagnostic Tests/Treatments reviewed.  Follow up needed: lab today  Other Healthcare Providers Involved in Patient's Care: Patient  "Care Team:  Selina Pozo MD as PCP - General (Family Medicine)  Selina Pozo MD as Assigned PCP      Update since discharge: {improved   Information from family, SNF, care coordination:      Post Discharge Medication Reconciliation: Post Discharge Medication Reconciliation Status: discharge medications reconciled, continue medications without change  Plan of care communicated with: patient     Objective:     Vitals:    09/29/20 0831   BP: 120/76   Pulse: 68   SpO2: 96%   Weight: 111 lb 14.4 oz (50.8 kg)   Height: 5' 5\" (1.651 m)         Physical Exam:    Well developed, well nourished, no acute distress.  HEENT: normocephalic/atraumatic, PERRLA/EOMI, TMs: Gray, normal light reflex, no nasal discharge.  Oral mucosa: no erythema/exudate  Neck: No LAD/masses/thyromegaly/bruits  Lungs: clear bilaterally  Heart: regular rate and rhythm, no murmurs/gallops/rubs  Abdomen: Normal bowel sounds, soft, non-tender, non-distended, no masses, neg Morales's/McBurney's, no rebound/guarding  Lymphatics: no supraclavicular/axillary/epitrochlear/inguinal LAD. No edema.  Neuro: A&O x 3, CN II-XII intact, strength 5/5, reflexes symmetric, sensory intact to light touch.  Psych: Behavior appropriate, engaging.  Thought processes congruent, non-tangential.  Musculoskeletal: no gross deformities.  Skin: no rashes or lesions.       Coding guidelines for this visit:  Type of Medical   Decision Making Face-to-Face Visit       within 7 Days of discharge Face-to-Face Visit        within 14 days of discharge   Moderate Complexity [id number]   High Complexity 25960 [id number]       Electronically signed by Pancho Henriquez MD 09/29/20 8:40 AM     "

## 2021-06-11 NOTE — TELEPHONE ENCOUNTER
I do not feel like I know her all that well with 1 visit during the hospitalization.  The recent Holter monitor did not report any significant rhythm issues but she does have a history of complex atrial fibrillation.  She saw Art in July.  Does Art or Joseline have any time to follow-up with her in the near future.? GUERDA Kim

## 2021-06-11 NOTE — TELEPHONE ENCOUNTER
Pt said her HR keeps jumping around, and has a very hard time describing what she is actually feeling.  She states her heart rate is regular, 52 - 60.  She said this feeling happens at all times of day, but keeps her up at night.  Pt added this is not new, it is just worsening.  Tried explaining to her what palpitations are, and she would not commit to calling it a palpitation.    Pt takes amiodarone 200 mg daily, and metoprolol succinate 50 mg daily.    Dr Kim - any new recommendations?  Pt will see you 10/26.  -[facility]

## 2021-06-11 NOTE — TELEPHONE ENCOUNTER
----- Message from Vish Pastrana sent at 9/22/2020  9:37 AM CDT -----  Regarding: [facility] PT / SYMPTOMS  General phone call:    Caller: Kelly Kaufman     Primary cardiologist: [facility]     Detailed reason for call: Pt states that her heartbeat has been feeling like it's jumping all over the place, it is not a smooth feeling in her chest. Pt is requesting for a call back to discuss any recommendations that [facility] might have.     New or active symptoms? Yes     Best phone number: [phone]    Best time to contact: Anytime     Ok to leave a detailed message? Yes     Device? No     Additional Info:

## 2021-06-12 ENCOUNTER — COMMUNICATION - HEALTHEAST (OUTPATIENT)
Dept: CARDIOLOGY | Facility: CLINIC | Age: 76
End: 2021-06-12
Payer: COMMERCIAL

## 2021-06-12 NOTE — PROGRESS NOTES
Chief Complaint   Patient presents with     Back Pain     sent email to PCP. Radiates down right leg from buttock.       HPI:    Patient is here for moderate intermittent right lower back pain radiating to right calf, started after she bent down to pickup something. She took Advil with reasonable relief. NO fever, chills, bowel and bladder incontinence, tingling/numbness of lower extremities.     ROS: Pertinent ROS noted in HPI.     Allergies   Allergen Reactions     Penicillins        Patient Active Problem List   Diagnosis     Hyperlipidemia     Mitral Regurgitation     Other specified cardiac dysrhythmias     Irritable Bowel Syndrome     Osteoporosis Senile       Family History   Problem Relation Age of Onset     Breast cancer Sister 47       Social History     Social History     Marital status:      Spouse name: N/A     Number of children: N/A     Years of education: N/A     Occupational History     Not on file.     Social History Main Topics     Smoking status: Never Smoker     Smokeless tobacco: Never Used     Alcohol use Not on file     Drug use: Not on file     Sexual activity: Not on file     Other Topics Concern     Not on file     Social History Narrative         Objective:    Vitals:    08/21/17 1209   BP:    Pulse: 88   Resp:    Temp:    SpO2:        Gen:NAD  CV: RRR,no M, R, G  Pulm: CTAB  MSK: normal inspection of back, normal palpation of back including spine and paraspinal areas, full ROM of back. Normal gait. Negative straight leg raising tests bilaterally  Neuro: 2+ patellar DTRs bilaterally.       Lumbar strain, initial encounter  -     cyclobenzaprine (FLEXERIL) 10 MG tablet; Take 1 tablet (10 mg total) by mouth at bedtime.

## 2021-06-12 NOTE — PATIENT INSTRUCTIONS - HE
Please collect 6 to 8 blood pressure readings in the next few weeks and call or write with readings.Sit for 10 minutes before you check your blood pressure and vary the time of day.Please call with increased shortness of breath or heart questions, call with a weight gain of 3 to 5 pounds in a few day period of time.My nurse is Anamaria and her number is [phone].We will be in touch with the results.  We are we going to plan a 2 week monitor.Please call if the tingling gets worse.

## 2021-06-12 NOTE — PATIENT INSTRUCTIONS - HE
Kelly Robledo,    It was a pleasure to see you today at the [facility].     My recommendations after this visit include:    Do daily weight and call if your weight increases by 3 pounds over 1 to 3 days or your weight goes up by more than 5 pounds in a week.    Please call if palpitation episodes more frequent.      To followup with Dr. Kim as planned on October 26.  No need to follow with me.      My contact information:  Art Kaur, [place]  After Hours or Scheduling  [phone]  My Nurse---Ivanna Dunbar [phone]

## 2021-06-13 NOTE — PROGRESS NOTES
Assessment/plan   Antione Mishra is a 75 y.o. female who is established to my practice.    Antione was seen today for test results.    Diagnoses and all orders for this visit:    Subclinical hypothyroidism  Reviewed her recent labs c/w subclinical hypothyroidism based on elevated TSH and normal T4; starting in June PRIOR to initiation of amiodarone, though possibly impacted by it yet. Her cardiologist Dr. Kim and I had discussed her options here and we both feel she should not yet be started on T4 supplementation. I spent time today to review the dx, options for tx when TSH is >10 in her age, and our concerns. Antione was made aware of our recommendations to not treat this given her risk factors of CAD, tendency for tachyarrhythemia at baseline; increasing risk of angina or arrhythmia further if on thyroid replacment. She is comfortable with the plan and appreciated the conversation today. We will continue to monitor per cardiology med monitoring.      Follow up: annual exam    Selina Pozo MD    Subjective:      HPI: Antione Mishra is a 75 y.o. female who is here for:    Chief Complaint   Patient presents with     Test Results     discuss thyroid results, no concerns otherwise       Thyroid testing results: Here today at my request to review her thyroid results since June; which Dr. Kim has been checking since she was started on amiodarone shortly after the first abnormal lab test was identified.     Results for ANTIONE MISHRA (MRN [id number]) as of 12/21/2020 15:16   Ref. Range 12/6/2010 08:13 12/13/2011 10:55 11/25/2019 09:36 6/16/2020 21:37 7/17/2020 09:15 8/19/2020 08:36 9/23/2020 19:20 12/3/2020 08:17   TSH Latest Ref Range: 0.30 - 5.00 uIU/mL 3.10 2.86 2.75 8.40 (H) 5.20 (H) 8.20 (H) 13.84 (H) 11.80 (H)      Ref. Range 6/16/2020 21:37 8/19/2020 08:36 9/23/2020 19:20   Free T4 Latest Ref Range: 0.7 - 1.8 ng/dL 0.9 1.1 1.1       Wt Readings from Last 3 Encounters:   12/16/20 112 lb 8 oz (51 kg)    12/03/20 112 lb (50.8 kg)   10/26/20 112 lb (50.8 kg)       Review of Systems:  She feels well; denies any hair, skin or nail changes. No constipation or hot/cold intolerance. No unexplained weight loss.    12 point comprehensive review of systems was negative except as noted and HPI     Social History:  Social History     Social History Narrative    Spends 6 months in [place].     . No children. Family in the [place] area.     Enjoys walking- very regular with this especially in [place].     Likes reading - romantic happy endings.     Never alcohol or smoking.    Selina Pozo MD       Medical History:  Patient Active Problem List   Diagnosis     Hyperlipidemia     Irritable bowel syndrome     Osteoporosis Senile     Mitral valve prolapse     Nonrheumatic mitral valve regurgitation     Coronary artery disease involving native coronary artery of native heart without angina pectoris     Adjustment disorder with anxious mood     Anxiety     Tricuspid valve regurgitation, nonrheumatic     Essential hypertension     Paroxysmal atrial fibrillation (H)     MVP (mitral valve prolapse)     Stage 3a chronic kidney disease     Acute heart failure with preserved ejection fraction (H)     Subclinical hypothyroidism     Past Medical History:   Diagnosis Date     Abdominal pain      Atrial fibrillation (H)      Diverticulosis      Moderate mitral regurgitation      NSVT (nonsustained ventricular tachycardia) (H) 6/17/2020     Palpitations      Past Surgical History:   Procedure Laterality Date     BREAST BIOPSY Bilateral     benign     CV CORONARY ANGIOGRAM N/A 6/18/2020    Procedure: Coronary Angiogram;  Surgeon: Sergey Conner MD;  Location: [facility];  Service: Cardiology     CV LEFT HEART CATHETERIZATION WO LEFT VETRICULOGRAM Left 6/18/2020    Procedure: Left Heart Catheterization Without Left Ventriculogram;  Surgeon: Sergey Conner MD;  Location: [facility];  Service: Cardiology      CV RIGHT HEART CATH N/A 6/18/2020    Procedure: Right Heart Catheterization;  Surgeon: Sergey Conner MD;  Location: [facility];  Service: Cardiology     HYSTERECTOMY  1970's     OOPHORECTOMY Bilateral 1970's     Penicillins  Family History   Problem Relation Age of Onset     Breast cancer Sister 47       Medications:  Current Outpatient Medications   Medication Sig Dispense Refill     amiodarone (PACERONE) 200 MG tablet Take 1 tablet (200 mg total) by mouth at bedtime. 90 tablet 3     ELIQUIS 5 mg Tab tablet TAKE 1 TABLET BY MOUTH TWICE A DAY 60 tablet 3     lisinopriL (PRINIVIL,ZESTRIL) 5 MG tablet Take 1 tablet (5 mg total) by mouth daily. 90 tablet 3     metoprolol succinate (TOPROL-XL) 50 MG 24 hr tablet Take 1 tablet (50 mg total) by mouth daily. 30 tablet 6     multivitamin therapeutic (THERAGRAN) tablet Take 0.5 tablets by mouth daily.        rosuvastatin (CRESTOR) 20 MG tablet Take 20 mg by mouth at bedtime.       furosemide (LASIX) 20 MG tablet Take 1 tablet (20 mg total) by mouth daily. 90 tablet 0     No current facility-administered medications for this visit.        Imaging & Labs reviewed this visit: as per HPI    Objective:      Vitals:    12/16/20 0742 12/16/20 0823   BP: 146/72 116/70   Pulse: 72    Weight: 112 lb 8 oz (51 kg)        Physical Exam:     General: Alert, no acute distress.   HEENT: normocephalic conjunctivae are clear. EOMI  Neck: supple   Lungs: Normal effort  Heart: regular rate   Abdomen: soft   Skin: clear without rash or lesions  Neuro: alert, oriented  Psych: mood good, affect appropriate, good eye contact, insight and judgment intact, normal speech pattern.      Selina Pozo MD

## 2021-06-13 NOTE — TELEPHONE ENCOUNTER
Refill request for medication: lasix   Last visit addressing this medication: 12/16/2020  Follow up plan none listed - note still open   months  Last refill on 9/24/2020, quantity #90       Appointment: Not due     JOSEFINA Ceron

## 2021-06-13 NOTE — TELEPHONE ENCOUNTER
RN cannot approve Refill Request    RN can NOT refill this medication med is not covered by policy/route to provider. Last office visit: Visit date not found Last Physical: Visit date not found Last MTM visit: Visit date not found Last visit same specialty: Visit date not found.  Next visit within 3 mo: Visit date not found  Next physical within 3 mo: Visit date not found      Wendy Mar, [facility] Triage/Med Refill 11/14/2020    Requested Prescriptions   Pending Prescriptions Disp Refills     ELIQUIS 5 mg Tab tablet [Pharmacy Med Name: ELIQUIS 5 MG TABLET] 60 tablet 3     Sig: TAKE 1 TABLET BY MOUTH TWICE A DAY       Apixaban/Rivaroxaban/Dabigatran Refill Protocol Failed - 11/12/2020  9:28 AM        Failed - Route to appropriate pool/provider     Last Anticoagulation Summary:           Passed - Renal function test in last year     Creatinine   Date Value Ref Range Status   10/14/2020 1.36 (H) 0.60 - 1.10 mg/dL Final             Passed - PCP or prescribing provider visit in past 12 months       Last office visit with prescriber/PCP: Visit date not found OR same dept: Visit date not found OR same specialty: Visit date not found  Last physical: Visit date not found Last MTM visit: Visit date not found   Next visit within 3 mo: Visit date not found  Next physical within 3 mo: Visit date not found  Prescriber OR PCP: Selina Rice MD  Last diagnosis associated with med order: 1. NSVT (nonsustained ventricular tachycardia) (H)  - ELIQUIS 5 mg Tab tablet [Pharmacy Med Name: ELIQUIS 5 MG TABLET]; TAKE 1 TABLET BY MOUTH TWICE A DAY  Dispense: 60 tablet; Refill: 3    2. Atrial fibrillation with rapid ventricular response (H)  - ELIQUIS 5 mg Tab tablet [Pharmacy Med Name: ELIQUIS 5 MG TABLET]; TAKE 1 TABLET BY MOUTH TWICE A DAY  Dispense: 60 tablet; Refill: 3    3. Wide-complex tachycardia (H)  - ELIQUIS 5 mg Tab tablet [Pharmacy Med Name: ELIQUIS 5 MG TABLET]; TAKE 1 TABLET BY MOUTH TWICE A DAY  Dispense:  60 tablet; Refill: 3    If protocol passes may refill for 12 months if within 3 months of last provider visit (or a total of 15 months).

## 2021-06-13 NOTE — TELEPHONE ENCOUNTER
Please schedule pt for video visit with me (or in person) to review her thyroid tests further per conversation I had with Dr. Kim.   Thanks,   Dr. Pozo

## 2021-06-13 NOTE — TELEPHONE ENCOUNTER
12-18-20  Reason for Call:  Medication or medication refill:    Do you use a [facility]?  Name of the pharmacy and phone number for the current request: [facility] on [address] * PATIENT ONLY HAS 2 PILLS LEFT, PLEASE REFILL ASAP* we never received the requests from [facility] to refill this from last week     Name of the medication requested: furosemide (LASIX) 20 MG tablet         Other request: none    Can we leave a detailed message on this number? Yes    Phone number patient can be reached at: Cell number on file:    Telephone Information:   Mobile [phone]       Best Time: anytime    Call taken on 12/18/2020 at 9:46 AM by Edita Gant

## 2021-06-14 NOTE — PROGRESS NOTES
"Kelly Robledo is a 75 y.o. female who is being evaluated via a billable telephone visit.      The patient has been notified of following:     \"This telephone visit will be conducted via a call between you and your physician/provider. We have found that certain health care needs can be provided without the need for a physical exam.  This service lets us provide the care you need with a short phone conversation.  If a prescription is necessary we can send it directly to your pharmacy.  If lab work is needed we can place an order for that and you can then stop by our lab to have the test done at a later time.    Telephone visits are billed at different rates depending on your insurance coverage. During this emergency period, for some insurers they may be billed the same as an in-person visit.  Please reach out to your insurance provider with any questions.    If during the course of the call the physician/provider feels a telephone visit is not appropriate, you will not be charged for this service.\"    Patient has given verbal consent to a Telephone visit? Yes    What phone number would you like to be contacted at? [phone]    Patient would like to receive their AVS by AVS Preference: MyChart.    Phone call duration: 9 minutes  Start: 2:00 PM  End: 2:09 PM    Additional provider notes:         Cardiothoracic Surgery Consult    Date of Service: 1/18/2021    REFERRING CARDIOLOGIST: Klaus Fitzpatrick MD    REASON FOR CONSULTATION: Severe mitral insufficiency, symptomatic.      HISTORY OF PRESENT ILLNESS:   Kelly Robledo is a 75 y.o. female who presents with progressive shortness of breath. This is really only present on inclines  She was found to have progression of her mitral insufficiency to severe over the last 6 months without any change to her known, moderate coronary artery disease. She was also recently admitted for a heart failure exacerbation. She has co morbidities of CKD, pulmonary hypertension, and " chronic, paroxysmal atrial fibrillation.      IMPRESSION:   Kelly Robledo is a 75 y.o. female with severe mitral insufficiency- recommend MitraClip mitral valve repair.     - Her preference is for MitraClip and she is a good candidate.  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 With Mitraclip her coronary disease may be addressed at a later date if they become associated with symptoms.     PLAN:  1) She is a good candidate for MitraClip. She is low risk for CAB/MVR as well and would therefore be a candidate for CPB standby.    Thank you very much for this referral.       Johan Kraus MD  Cardiothoracic Surgery   Pager [phone]      PAST MEDICAL HISTORY:   Past Medical History:   Diagnosis Date     Abdominal pain      Atrial fibrillation (H)      Diverticulosis      Moderate mitral regurgitation      NSVT (nonsustained ventricular tachycardia) (H) 6/17/2020     Palpitations        PAST SURGICAL HISTORY:   Past Surgical History:   Procedure Laterality Date     BREAST BIOPSY Bilateral     benign     CV CORONARY ANGIOGRAM N/A 6/18/2020    Procedure: Coronary Angiogram;  Surgeon: Sergey Conner MD;  Location: [facility];  Service: Cardiology     CV CORONARY ANGIOGRAM N/A 1/13/2021    Procedure: Coronary Angiogram;  Surgeon: Klaus Fitzpatrick MD;  Location: [facility] Card[facility] Cath Lab;  Service: Cardiology     CV LEFT HEART CATHETERIZATION WO LEFT VETRICULOGRAM Left 6/18/2020    Procedure: Left Heart Catheterization Without Left Ventriculogram;  Surgeon: Sergey Conner MD;  Location: [facility];  Service: Cardiology     CV RIGHT HEART CATH N/A 6/18/2020    Procedure: Right Heart Catheterization;  Surgeon: Sergey Conner MD;  Location: [facility];  Service: Cardiology     CV RIGHT HEART CATH N/A 1/13/2021     Procedure: Right Heart Catheterization;  Surgeon: Klaus Fitzpatrick MD;  Location: [facility] Cardiac [facility] Lab;  Service: Cardiology     HYSTERECTOMY  1970's     OOPHORECTOMY Bilateral 1970's       ALLERGIES:   Allergies   Allergen Reactions     Penicillins Hives        CURRENT MEDICATIONS:  Current Outpatient Medications on File Prior to Visit   Medication Sig Dispense Refill     amiodarone (PACERONE) 200 MG tablet Take 1 tablet (200 mg total) by mouth at bedtime. 90 tablet 3     ELIQUIS 5 mg Tab tablet TAKE 1 TABLET BY MOUTH TWICE A DAY 60 tablet 3     furosemide (LASIX) 20 MG tablet Take 1 tablet (20 mg total) by mouth daily. 90 tablet 0     lisinopriL (PRINIVIL,ZESTRIL) 5 MG tablet Take 1 tablet (5 mg total) by mouth daily. 90 tablet 3     metoprolol succinate (TOPROL-XL) 50 MG 24 hr tablet Take 1 tablet (50 mg total) by mouth daily. 30 tablet 6     multivitamin therapeutic (THERAGRAN) tablet Take 0.5 tablets by mouth daily.        rosuvastatin (CRESTOR) 20 MG tablet Take 20 mg by mouth at bedtime.       No current facility-administered medications on file prior to visit.        FAMILY HISTORY:   Family History   Problem Relation Age of Onset     Breast cancer Sister 47     The family history was reviewed and is not pertinent to the patient's disease/illness    SOCIAL HISTORY:   Social History     Socioeconomic History     Marital status:      Spouse name: Not on file     Number of children: Not on file     Years of education: Not on file     Highest education level: Not on file   Occupational History     Not on file   Social Needs     Financial resource strain: Not on file     Food insecurity     Worry: Not on file     Inability: Not on file     Transportation needs     Medical: Not on file     Non-medical: Not on file   Tobacco Use     Smoking status: Never Smoker     Smokeless tobacco: Never Used   Substance and Sexual Activity     Alcohol use: No     Drug use: No     Sexual activity: Not on file    Lifestyle     Physical activity     Days per week: Not on file     Minutes per session: Not on file     Stress: Not on file   Relationships     Social connections     Talks on phone: Not on file     Gets together: Not on file     Attends [employer] service: Not on file     Active member of club or organization: Not on file     Attends meetings of clubs or organizations: Not on file     Relationship status: Not on file     Intimate partner violence     Fear of current or ex partner: Not on file     Emotionally abused: Not on file     Physically abused: Not on file     Forced sexual activity: Not on file   Other Topics Concern     Not on file   Social History Narrative    Spends 6 months in [place].     . No children. Family in the [place] area.     Enjoys walking- very regular with this especially in [place].     Likes reading - romantic happy endings.     Never alcohol or smoking.    Selina Pozo MD       REVIEW OF SYSTEMS:  A complete 10 point review of systems was obtained and is negative other than the above stated complaints    PHYSICAL EXAMINATION:   No vitals or exam were performed for this telephone visit.      LABORATORY STUDIES:   Lab Results   Component Value Date    WBC 5.8 01/13/2021    HGB 12.4 01/13/2021    HCT 38.7 01/13/2021     (H) 01/13/2021     01/13/2021      Lab Results   Component Value Date    CREATININE 1.44 (H) 01/13/2021    BUN 20 01/13/2021     01/13/2021    K 3.8 01/13/2021     01/13/2021    CO2 26 01/13/2021     No results found for: HGBA1C  EKG: NSR rate 79    CARDIAC CATHETERIZATION: This study was personally reviewed by me  30% ostial LM  mLAD 50%  RI 50%  LCx small non-dominant  RCA 40% mid to distal    RHC RA 7, RV 57/6, PA 50/16, PCWP 26, /62, LVEDP 15    TRANSTHORACIC ECHOCARDIOGRAM: This study was personally reviewed by me  LVEF 70%, LA severely dilated, moderate TR, moderate PH, PAP 68, Severe mitral insufficieincy posterior jet, decreased  leaflet mobility    TRANSESOPHAGEAL ECHOCARDIOGRAM (6/19/2020): This study was personally reviewed by me  LVEF normal, mild to moderate MR, moderate TR.    Johan Kraus MD

## 2021-06-14 NOTE — TELEPHONE ENCOUNTER
2-1-21  Dr Fitzpatrick from  of , patient just had Mitral valve  repair on 2-1-21 Dr Fitzpatrick was calling to give you a update  schuyler

## 2021-06-14 NOTE — PROGRESS NOTES
Valve Clinic Referral    Dx: MR, TR, CAD  Referred by Dr. Kim     Pt is . No children. Lives with  Giuseppe. Functionally indep and walks daily. Non-smoking history. She spends 6 months out of the year in [place]. She sees a cardiologist in [place] for palpitations and has known about MR for several years.    She came to  in June with NSVT and new onset AF RVR. She was transferred to [facility] for heart cath and ALEXANDR, and was also evaluated by CT surgeon Dr Kraus during that admission. Heart cath showed moderate multivessel dz.  VHD was thought to be moderate on ALEXANDR. Pt was started on eliquis and amiodarone. Primary symptoms on admission was palpitations and NSVT/Afib, which was better controlled with amiodarone, therefore GDMT was recommended.    She had a repeat TTE on Dec 3rd which now shows Severe MR. Pt was referred to the valve clinic.       Medical history:  CAD, PAF, CKD3, anxiety, PHTN, subclinical hypothyroid,    STS score: 3% MV repair, 6.2% MV rplcmt      TTE: (6/17/20)  EF 60%, Mod/Svr TR, Mod MR, MV mg 2    [place] [facility] [place]  Mild/mod MR, Mod TR, Mod PHTN, normal EF    TTE: (12/3/20)  EF 70%, Mod TR, Svr MR, Mod PHTN pa pressure 68  MV mg 3 (130/70, HR 72)      Heart Cath: (6/18/20 - [facility])    Ost LM lesion is 35% stenosed.    Mid LAD lesion is 55% stenosed.    Ramus lesion is 55% stenosed.    The left circumflex was moderate.    The RCA was moderate and is considered normal.      Most recent labs:  Creat/GFR: 1.36 / 38         Plan:  Video consult with Dr. Fitzpatrick on 12/31        Rory Villalba RN  [facility]  [facility]  Phone: [phone]  Fax: [phone]

## 2021-06-14 NOTE — PROGRESS NOTES
Vitals - Patient Reported  Systolic (Patient Reported): 119  Diastolic (Patient Reported): 64  Weight (Patient Reported): 114 lb (51.7 kg)  Pulse (Patient Reported): 68    Review of Systems - History obtained from the patient  Hematological and Lymphatic ROS: positive for - easy bruising  Respiratory ROS: positive for - cough  Cardiovascular ROS: positive for - shortness of breath with activity   Neurological ROS: positive for - loss of balance (minor)    Franny Chen, CMA

## 2021-06-14 NOTE — PROGRESS NOTES
Reason for today s visit: Pre-op RN Visit    Patient scheduled for Transcatheter Mitral Valve Repair with MitraClip on Feb 1  arrival time: 0530 for 1st case  at [facility] with Dr. Fitzpatrick    Referring provider: Dr. Kim  Primary Cardiology: Dr. Kim  PCP: Selina Pozo MD      CT surgery consult completed: Dr. Kraus (date 1/18/21)  Dental Clearance obtained: pt is up to date with routine dental cleaning      Tests completed today:  Pre-procedure labs drawn: CMP, CBC, INR, Mag, BNP  EKG      6MWT: Not completed due to safety precautions of covid19 pandemic    STS score: 3% repair, 6.2% rplcmt    Serum albumin (date completed 1/25/21): 3.9  Alvarenga index (date completed 1/25/21): 6/6      Total Frailty Score: 0      Patient instructed on medications:   Take last dose of eliquis on Jan 27  Loading dose of Plavix: no  Loading dose of ASA: 325 mg in 3C  Am of procedure, take am meds with a few sips of water      Patient instructed on skin prep:  Patient sent home with hibiclens and instructed for skin prep be done evening before procedure.      Advanced Directive:  Does the patient have an advanced directive: gave copy of honoring choices      Surgery and anesthesia consents  Tati Mejia PA-C reviewed risks of mitraclip/ALEXANDR and signed consents  Consents faxed to 3C  Anesthesia consents to be completed am of surgery      Pre and post procedure education was also reviewed with the patient and spouse. No further questions and ready to proceed with surgery as planned.    Instructed to come to the main entrance of [facility] at 0530 AM on Feb 1      All questions were answered to patient and Spouse by Tati Mejia PA-C and Rory Villalba RN    Spouse, Giuseppe present at the time of appointment.      Rory Villalba RN  [facility]  [facility]  Phone: [phone]  Fax: [phone]  01/25/21  3:27 PM

## 2021-06-14 NOTE — TELEPHONE ENCOUNTER
Sherif completed for MitraClip. Pt received MR and  MitraClip education via mail. She has reviewed the material and has no questions; ready to proceed. Plan for MitraClip at [facility] with Dr. Fitzpatrick on Feb 1. Pt will be scheduled for preop H/P and covid test. All questions answered.      Rory Villalba RN  [facility]  [facility]  Phone: [phone]  Fax: [phone]  01/18/21  3:34 PM

## 2021-06-14 NOTE — TELEPHONE ENCOUNTER
[facility] RN Pre-Procedure Education Note    Procedure: Cor Angio pPCi With Dr. Fitzpatrick  Date of Procedure: 1/13/21  Arrival time: 0730  Location: [facility]     Diagnosis: MR/CAD  Cardiologist Ordering Procedure: Dr. Fitzpatrick  Primary Cardiologist: Dr. Kim  PCP: Selina Pozo MD    H&P completed by: Dr. Fitzpatrick  Previous Cath Report: in epic  Bypass grafts: No  Labs within 7 days: am of procedure  Renal Issues: Yes  Diabetic: No    Does patient have contrast/IV dye allergy: no      Patient Education  Explained indications/risks for diagnostic evaluation, including one or more of the following:  coronary angiogram  Explained indications/risks for therapeutic interventions, including one or more of the following: PTCA and stent.  These risks are in addition to baseline risks associated with a Diagnostic Evaluation.  Patient state understanding of procedure and risks and agrees to proceed    Additional education comment: Pt was mailed procedure letter and written education material. This information was reviewed with the patient. No further questions at this time.    Pre-procedure instructions  Patient instructed to be NPO after midnight.  Patient instructed to arrange for transportation home following procedure  No driving for 24 hours post procedure  Depending on the results of the test, provider may decide to keep patient overnight in the hospital for further evaluation.  Bring Cpap if applicable.   Will require responsible adult to stay at home with pt for 24 hours if same-day discharge    Pre-procedure medication instructions  medication instructions: Take last dose of eliquis on Jan 9  AM of procedure, only take metoprolol and amiodarone with a sip of water      Current Outpatient Medications   Medication Sig Dispense Refill     amiodarone (PACERONE) 200 MG tablet Take 1 tablet (200 mg total) by mouth at bedtime. 90 tablet 3     ELIQUIS 5 mg Tab tablet TAKE 1 TABLET BY MOUTH TWICE A DAY  60 tablet 3     furosemide (LASIX) 20 MG tablet Take 1 tablet (20 mg total) by mouth daily. 90 tablet 0     lisinopriL (PRINIVIL,ZESTRIL) 5 MG tablet Take 1 tablet (5 mg total) by mouth daily. 90 tablet 3     metoprolol succinate (TOPROL-XL) 50 MG 24 hr tablet Take 1 tablet (50 mg total) by mouth daily. 30 tablet 6     multivitamin therapeutic (THERAGRAN) tablet Take 0.5 tablets by mouth daily.        rosuvastatin (CRESTOR) 20 MG tablet Take 20 mg by mouth at bedtime.       No current facility-administered medications for this visit.        Allergies   Allergen Reactions     Penicillins Hives             Rory Villalba RN

## 2021-06-14 NOTE — TELEPHONE ENCOUNTER
See consult note from Dr Fitzpatrick 12/31/20    I called and spoke with the patient. She agrees to proceed with Cor Angio RL to evaluate CAD and shortness of breath. She will also be scheduled for a follow up with cardiac surgery. The patent was evaluated by CT surgeon Dr Kraus during her hospital admission in June. At that time, her Heart cath showed moderate multivessel dz, also VHD was thought to be moderate on ALEXANDR. Pt was started on eliquis and amiodarone for AF during that admission and GDMT was recommended for CAD and VHD.     Now that her MR has progressed, she will need to be re-evaluated by cardiac surgery as part of the mitraclip evaluation.     I will have our scheduling team call the patient to arrange her covid test, heart cath and surgery follow up consult.     Dental. Pt saw her dentist last week and is up to date with all necessary dental recommendations. No infections or dental work required per patient.     I will mail the patient a MitraClip education packet. All questions answered and patient agrees with above plan.      Rory Villalba RN  [facility]  [facility]  Phone: [phone]  Fax: [phone]  12/31/20  10:46 AM

## 2021-06-14 NOTE — TELEPHONE ENCOUNTER
Called Dr. Fitzpatrick back- severe MR, heart failure; nice results on MitraClip. Doing well at this time. Going home tomorrow.

## 2021-06-15 NOTE — TELEPHONE ENCOUNTER
Refill Approved    Rx renewed per Medication Renewal Policy. Medication was last renewed on 12/18/20.    Raghavendra Augustin, [facility] Connection Triage/Med Refill 3/8/2021     Requested Prescriptions   Pending Prescriptions Disp Refills     furosemide (LASIX) 20 MG tablet [Pharmacy Med Name: FUROSEMIDE 20 MG TABLET] 90 tablet 0     Sig: TAKE 1 TABLET BY MOUTH EVERY DAY       Diuretics/Combination Diuretics Refill Protocol  Passed - 3/8/2021  8:14 AM        Passed - Visit with PCP or prescribing provider visit in past 12 months     Last office visit with prescriber/PCP: Visit date not found OR same dept: 2/4/2021 Selina Pozo MD OR same specialty: 2/4/2021 Selina Pozo MD  Last physical: Visit date not found Last MTM visit: Visit date not found   Next visit within 3 mo: Visit date not found  Next physical within 3 mo: Visit date not found  Prescriber OR PCP: Annika Singh MD  Last diagnosis associated with med order: 1. Acute pulmonary edema (H)  - furosemide (LASIX) 20 MG tablet [Pharmacy Med Name: FUROSEMIDE 20 MG TABLET]; TAKE 1 TABLET BY MOUTH EVERY DAY  Dispense: 90 tablet; Refill: 0    If protocol passes may refill for 12 months if within 3 months of last provider visit (or a total of 15 months).             Passed - Serum Potassium in past 12 months      Lab Results   Component Value Date    Potassium 3.5 01/25/2021             Passed - Serum Sodium in past 12 months      Lab Results   Component Value Date    Sodium 141 01/25/2021             Passed - Blood pressure on file in past 12 months     BP Readings from Last 1 Encounters:   03/01/21 158/90             Passed - Serum Creatinine in past 12 months      Creatinine   Date Value Ref Range Status   01/25/2021 1.36 (H) 0.60 - 1.10 mg/dL Final

## 2021-06-15 NOTE — PATIENT INSTRUCTIONS - HE
Please call my nurse before going to any dental procedures for antibiotics because of your heart valve surgery.Becaue you are allergic to penicillin the antibiotic we would use is Clindamycin.Call with any heart related concerns.My nurse is Anamaria and her number is [phone]    Please record your home blood pressure numbers and call with 6 to 8 readings in the next 3 to 4 weeks.

## 2021-06-15 NOTE — TELEPHONE ENCOUNTER
3-9-21  Reason for Call:  Medication or medication refill:    Do you use a [place] [facility]?  Name of the pharmacy and phone number for the current request: [facility] [address]    Name of the medication requested: rosuvastatin (CRESTOR) 20 MG tablet    Other request: none    Can we leave a detailed message on this number? Yes    Phone number patient can be reached at: Home number on file [phone] (home)    Best Time: anytime    Call taken on 3/9/2021 at 8:19 AM by Edita Gant

## 2021-06-15 NOTE — PROGRESS NOTES
Assessment and Plan:     1. History of cardiac dysrhythmia  Stable.  Medications refilled.  - pindolol (VISKEN) 5 MG tablet; TAKE 1 TABLET (5 MG TOTAL) BY MOUTH 2 (TWO) TIMES A DAY.  Dispense: 180 tablet; Refill: 3    2. Hyperlipidemia  He has had fasting blood work done.  We will refill her prescription.  - rosuvastatin (CRESTOR) 20 MG tablet; Take 1 tablet (20 mg total) by mouth at bedtime.  Dispense: 90 tablet; Refill: 3    3. Osteoporosis Senile  She will be due for a bone density later this summer.    4. Routine general medical examination at a health care facility  She is up-to-date on age-appropriate cancer screenings and immunizations.    The patient's current medical problems were reviewed.    I have had an Advance Directives discussion with the patient.  The following health maintenance schedule was reviewed with the patient and provided in printed form in the after visit summary:   Health Maintenance   Topic Date Due     ZOSTER VACCINE  11/20/2003     FALL RISK ASSESSMENT  06/28/2018     DXA SCAN  07/25/2018     MAMMOGRAM  08/25/2019     COLONOSCOPY  06/14/2022     ADVANCE DIRECTIVES DISCUSSED WITH PATIENT  06/28/2022     TD 18+ HE  12/19/2023     PNEUMOCOCCAL POLYSACCHARIDE VACCINE AGE 65 AND OVER  Completed     INFLUENZA VACCINE RULE BASED  Completed     PNEUMOCOCCAL CONJUGATE VACCINE FOR ADULTS (PCV13 OR PREVNAR)  Completed        Subjective:   Chief Complaint: Kelly Robledo is an 72 y.o. female here for an Annual Wellness visit.   HPI:  Kelly Robledo is a very pleasant 72-year-old female here today for her annual exam.  She is generally doing well.  She will be heading to [place] this Friday.  She cannot wait because she hates this weather.  She had lab work done earlier this summer.  Her only concern is that she has some low back pain when she first wakes up in the morning.  It improves when she is been active.    Review of Systems:    Please see above.  The rest of the review of systems are  negative for all systems.    Patient Care Team:  Ivonne Ramos MD as PCP - General     Patient Active Problem List   Diagnosis     Hyperlipidemia     Mitral Regurgitation     Other specified cardiac dysrhythmias     Irritable Bowel Syndrome     Osteoporosis Senile     Past Medical History:   Diagnosis Date     Abdominal pain      Diverticulosis       Past Surgical History:   Procedure Laterality Date     BREAST BIOPSY Bilateral     benign     HYSTERECTOMY  1970's     OOPHORECTOMY Bilateral 1970's      Family History   Problem Relation Age of Onset     Breast cancer Sister 47      Social History     Social History     Marital status:      Spouse name: N/A     Number of children: N/A     Years of education: N/A     Occupational History     Not on file.     Social History Main Topics     Smoking status: Never Smoker     Smokeless tobacco: Never Used     Alcohol use No     Drug use: No     Sexual activity: Not on file     Other Topics Concern     Not on file     Social History Narrative      Current Outpatient Prescriptions   Medication Sig Dispense Refill     aspirin 81 MG EC tablet Take 81 mg by mouth daily.       calcium-vitamin D (CALCIUM-VITAMIN D) 500 mg(1,250mg) -200 unit per tablet Take 1 tablet by mouth daily.       cyclobenzaprine (FLEXERIL) 10 MG tablet Take 1 tablet (10 mg total) by mouth at bedtime. 10 tablet 0     dicyclomine (BENTYL) 10 MG capsule Take 1 capsule (10 mg total) by mouth as needed (Take 1 capsule three to four times daily as needed.). 90 capsule 3     ibuprofen (ADVIL,MOTRIN) 200 MG tablet Take 200 mg by mouth every 6 (six) hours as needed for pain.       multivitamin therapeutic (THERAGRAN) tablet Take 1 tablet by mouth daily.       pindolol (VISKEN) 5 MG tablet TAKE 1 TABLET (5 MG TOTAL) BY MOUTH 2 (TWO) TIMES A DAY. 180 tablet 3     rosuvastatin (CRESTOR) 20 MG tablet Take 1 tablet (20 mg total) by mouth at bedtime. 90 tablet 3     Current Facility-Administered Medications  "  Medication Dose Route Frequency Provider Last Rate Last Dose     pneumococcal conj. 13-valent vaccine 0.5 mL (PREVNAR-13)  0.5 mL Intramuscular Once Ivonne Ramos MD          Objective:   Vital Signs:   Visit Vitals     /86     Pulse 96     Ht 5' 4\" (1.626 m)     Wt 118 lb (53.5 kg)     BMI 20.25 kg/m2        VisionScreening:  No exam data present     PHYSICAL EXAM  PHYSICAL EXAM:  Constitutional:  Reveals an alert, pleasant adult female.   Vitals:  Noted.   Head: Normocephalic, without obvious abnormality, atraumatic   Ears: TM's normal bilaterally   Eyes: PERRL, conjunctiva/corneas clear, EOM's intact   Throat: Lips, mucosa, and tongue normal; teeth and gums normal   Neck: Normal ROM, no carotid bruits, no thyromegaly   Lungs: Clear to auscultation bilaterally, respirations unlabored.   Breast exam:  Mild erythema overlying scar from bx on the left breast (chronic per pt),  no discrete nodule is palpable in the axilla bilaterally  Heart: Regular rate and rhythm, S1 and S2 normal, no murmur, rub, or gallop,   Abdomen: Soft, non-tender, bowel sounds active all four quadrants, no masses, no organomegaly   Extremities: Extremities normal, atraumatic, no cyanosis or edema   Pelvic:Not examined  Skin: Skin color, texture, turgor normal, no rashes or lesions   Neurologic: Normal       Assessment Results 12/26/2017   Activities of Daily Living No help needed   Instrumental Activities of Daily Living No help needed   Mini Cog Total Score 5   Some recent data might be hidden     A Mini-Cog score of 0-2 suggests the possibility of dementia, score of 3-5 suggests no dementia    Identified Health Risks:     The patient was counseled and encouraged to consider modifying their diet and eating habits. She was provided with information on recommended healthy diet options.  Information regarding advance directives (living zhong), including where she can download the appropriate form, was provided to the patient via the " AVS.

## 2021-06-15 NOTE — TELEPHONE ENCOUNTER
S/P mitraclip 2/1/21    Pt states that she had a toothache and saw her dentist last week. They took xrays and did not find any concerns/infection.     Pt is asking if she can take tylenol or ibuprofen PRN. Advised to take tylenol 1-2 tabs PRN. Follow directions on bottle.      Rory Villalba RN  [facility]  Phone: [phone]  Fax: [phone]  03/15/21  9:28 AM

## 2021-06-15 NOTE — TELEPHONE ENCOUNTER
Pt should wait 6 mo for any elective dental cleaning and dental work. After 6 months, she will require lifelong SBE prophylaxis.    oRry Villalba RN  [facility]  Phone: [phone]  Fax: [phone]  03/09/21  8:16 AM

## 2021-06-15 NOTE — TELEPHONE ENCOUNTER
Please schedule for TSH lab visit before she goes to [place] in March if pt is agreeable   (this was noted per Dr. Kim's outreach with me)

## 2021-06-15 NOTE — TELEPHONE ENCOUNTER
Clindamycin would be appropriate however the note that you have sent also indicates holding off on dental work for 6 months.  Can we confirm this with the structural group?  If indeed this is correct and she is having just a cleaning then we should postpone it for this length of time.  Her procedure was just in February of this year.ty mdg

## 2021-06-15 NOTE — PATIENT INSTRUCTIONS - HE
Follow up Appointments  - You will follow up with Tati Mejia PA-C on February 11 at 9:50am.  - You will have a repeat echocardiogram on March 9 at 11am at [facility].  -  You will have a follow up with Dr. Fitzpatrick or Dr. Aquino in about a month. If you need to reschedule any of these appointments, please call: [phone]  - See your regular cardiologist in 6 months. Your regular heart doctor will continue to be your heart specialist.

## 2021-06-15 NOTE — TELEPHONE ENCOUNTER
Last Refill: Not yet prescribed by Dr. Pozo  Last Visit: 2/4/21   Nonrheumatic mitral valve regurgitation     Hospital discharge follow-up     S/P mitral valve clip implantation     Tricuspid valve regurgitation, nonrheumatic     Paroxysmal atrial fibrillation (H)     Coronary artery disease involving native coronary artery of native heart without angina pectoris     Stage 3a chronic kidney disease    Future orders are placed but no lab appt yet

## 2021-06-15 NOTE — TELEPHONE ENCOUNTER
3-8-21  Reason for Call:  Medication or medication refill:    Do you use a [place] [facility]?  Name of the pharmacy and phone number for the current request: [facility]    Name of the medication requested:   ELIQUIS 5 mg Tab tablet    furosemide (LASIX) 20 MG tablet    rosuvastatin (CRESTOR) 20 MG tablet    Other request: none    Can we leave a detailed message on this number? Yes    Phone number patient can be reached at: Home number on file [phone] (home)    Best Time: anytime    Call taken on 3/8/2021 at 8:13 AM by Edita Gant

## 2021-06-15 NOTE — PROGRESS NOTES
Hospital Follow-up Visit:    Assessment/Plan:      Kelly Robledo is a 75 year old female with a past medical history significant for severe mitral regurgitation, mod-severe TR, HTN, paroxysmal a-fib, Grade II diastolic dysfunction, and moderate non-obstructive CAD here today for:    Hospital follow up    Nonrheumatic severe mitral valve regurgitation  s/p Mitral clip x1, residual mild-mod MR  Moderate TR  Patient now s/p clip x1, MR reduced to trace. Post op TTE with mild-moderate MR, mean gradient of 4mmHg. Labs reviewed; not needing any additional today  - Apixaban 5mg bid + aspirin 81mg   - follow up with [facility]     Paroxysmal atrial fibrillation   - amiodarone 200 at bedtime  - metoprolol succ 50mg every day  - eliquis 5mg bid     HFpEF   - lasix 20mg every day  - lisinopril 5mg every day     Moderate non-obstructive CAD  - rosuvstatin 20mg every day  - aspirin 81mg daily    Stage 3A CKD  BMP reviewed;stable    Follow up Appointments  -You will follow up with Tati Mejia PA-C on February 11 at 9:50am.  - You will have a repeat echocardiogram on March 9 at 11am at [facility].  - You will have a follow up with Dr. Fitzpatrick or Dr. Aquino in about a month. If you need to reschedule any of these appointments, please call: [phone]  - See your regular cardiologist in 6 months. Your regular heart doctor will continue to be your heart specialist.         Subjective:     Kelly Robledo is a 75 y.o. female who presents for a hospital discharge follow up.      Hospital/Nursing Home/IP Rehab Facility: [facility]  Date of Admission: 2/1/2021  Date of Discharge:2/2/2021  Reason(s) for Admission:Mitral clip procedure performed for hx of:   1. Severe MR s/p mitral clip x1  2. Moderate TR  3. Paroxysmal atrial fibrillation   4. HFpEF   5. Moderate non-obstructive CAD               Do you have any problems taking your medication regularly?  None       Have you had any changes in your medication since  "discharge? They prescribed \"ocuvite\" vitamin but we reviewed today this was likely just a transfer of her \"multivitamin\" and to resume home dosing of once daily generic MTV       Have you had any difficulty following your discharge or treatment plan?  No    Summary of hospitalization:  Hospital discharge summary reviewed         Diagnostic Tests/Treatments reviewed.  Imaging with results:  Echocardiogram 2/1/2021:  Interpretation Summary  Global and regional left ventricular function is normal with an EF of 60-65%.  Global right ventricular function is normal.  Post MitraClip placement with mild to moderate residual mitral regurgitation  present. The mean gradient across the mitral valve is 4 mmHg.  Mild to moderate tricuspid insufficiency is present.  Left to right atrial shunt seen at the atrial level (from recent septal  puncture).  The inferior vena cava was normal in size with preserved respiratory  variability.  No pericardial effusion is present.    Follow up needed: None  Other Healthcare Providers Involved in Patient's Care: Patient Care Team:  Selina Pozo MD as PCP - General (Family Medicine)  Jocelyn Kaur CNP as Assigned Heart and Vascular Provider    Update since discharge: {improved . Feeling really good- has some bruising at IV sites in arm and groin. She is no longer short of breath at all (used to be that way prior to the procedure if she walked up hills). Never had any chest pain. Feels the palpitations are less, like the heart isn't working as hard which is good.    Got her first COVID vaccine on 2/1. Reviewed expectations for 2nd dose.    Information from family, SNF, care coordination: n/a     Post Discharge Medication Reconciliation: Post Discharge Medication Reconciliation Status: discharge medications reconciled and changed, per note/orders  Plan of care communicated with: patient         Wt Readings from Last 3 Encounters:   02/04/21 115 lb 3.2 oz (52.3 kg)   01/25/21 114 " lb (51.7 kg)   01/13/21 111 lb (50.3 kg)         Objective:     Vitals:    02/04/21 1310   BP: 122/72   Pulse: 76   Weight: 115 lb 3.2 oz (52.3 kg)         Physical Exam:  General: Alert, no acute distress.   HEENT: normocephalic conjunctivae are clear  Neck: supple without adenopathy or thyromegaly.  Lungs: Good aeration bilaterally. Clear to auscultation without wheezes, rales or rhonci.   Heart: regular rate and rhythm, normal S1 and S2, no murmurs  Abdomen: soft and nontender  Skin: clear without rash or lesions  Neuro: alert, interactive moving all extremities equally      Coding guidelines for this visit:  Type of Medical   Decision Making Face-to-Face Visit       within 7 Days of discharge Face-to-Face Visit        within 14 days of discharge   Moderate Complexity [id number]   High Complexity [id number]       Electronically signed by Selina Pozo MD 02/04/21 1:18 PM

## 2021-06-15 NOTE — TELEPHONE ENCOUNTER
I'm a little confused about this encounter request but I sent a refill of Rosuvastatin 20mg to her [facility] pharmacy- not sure if that's the one she needed based on this first message? Please double check with pt    Thanks,   Dr. Pozo

## 2021-06-16 ENCOUNTER — AMBULATORY - HEALTHEAST (OUTPATIENT)
Dept: CARDIOLOGY | Facility: CLINIC | Age: 76
End: 2021-06-16

## 2021-06-16 DIAGNOSIS — E78.5 HYPERLIPIDEMIA: ICD-10-CM

## 2021-06-16 DIAGNOSIS — I25.10 CORONARY ARTERY DISEASE INVOLVING NATIVE CORONARY ARTERY OF NATIVE HEART WITHOUT ANGINA PECTORIS: ICD-10-CM

## 2021-06-17 NOTE — TELEPHONE ENCOUNTER
Telephone Encounter by Anamaria Patel RN at 3/8/2021 10:19 AM     Author: Anamaria Patel RN Service: -- Author Type: Registered Nurse    Filed: 3/8/2021  4:17 PM Encounter Date: 3/8/2021 Status: Addendum    : Anamaria Patel RN (Registered Nurse)    Related Notes: Original Note by Anamaria Patel RN (Registered Nurse) filed at 3/8/2021 10:44 AM       Pt LVM asking for antibiotic for dental visit be sent to [facility].  Per 2/2/21 discharge instructions:    Dental Work: Avoid major dental work for the next 6 months if possible. . You will need antibiotics before dental work or surgery. This would decrease the risk of infection.        Dr Kim - pt has penicillin allergy, said you mentioned Clindamycin.  Do you want me to order 600 mg to be taken 30-60 minutes prior to procedure?  -ramez

## 2021-06-17 NOTE — PROGRESS NOTES
Assessment/plan   Kelly Robledo is a 75 y.o. female who is established to my practice.    Kelly was seen today for concerns.    Diagnoses and all orders for this visit:    Asymptomatic spider veins of both lower extremities  S/P mitral valve clip implantation  HTN  Paroxysmal afib  CHF, diastolic  She is 3 months status post mitral valve clip implantation and doing well with significant improvement in functional status.  She follows with cardiology and is on aspirin 81 mg indefinitely for that as well as on Eliquis for her paroxysmal atrial fibrillation, currently in normal sinus rhythm on her amiodarone.    We reviewed all her medications including those for blood pressure, her A. fib, and the aspirin for her mitral valve.  Discussed the Eliquis is the more likely culprit causing easy bleeding and bruising and capillary disruption and contributing a little more to her spider veins of the lower extremities.  She is otherwise asymptomatic.  No increase in lower extremity swelling, no weight gain.  She does not desire any cosmetic change at this time and does not need vascular surgery referral.    Follow up: Return in about 6 months (around 11/20/2021) for Annual physical.      Selina Pozo MD    Subjective:      HPI: Kelly Robledo is a 75 y.o. female who is here for:    Chief Complaint   Patient presents with     Concerns     thinks the new veins on her legs may be a side effect of a medication that she is taking, unsure of which one it could be because she started a couple of new ones.       Spider vein concerns: Seems to notice more veins on her legs in the past several months since her Feb admission for the MitraClip for her severe mitral regurgitation (form MVP) when she was started on new meds; not bothersome other than the look of them.       She is feeling so much better from her cardiac standpoint- no longer with any shortness of breath since the procedure. Walks 4mi/day in [place], 2mi/day here. No  chest pain.     Review of Systems:  Weights have been stable, no LE swelling; no chest pain or palpitations  12 point comprehensive review of systems was negative except as noted and HPI     Social History:  Social History     Social History Narrative    Spends 6 months in [place].     . No children. Family in the [place] area.     Enjoys walking- very regular with this especially in [place].     Likes reading - romantic happy endings.     Never alcohol or smoking.    Selina Pozo MD       Medical History:  Patient Active Problem List   Diagnosis     Hyperlipidemia     Irritable bowel syndrome     Osteoporosis Senile     Mitral valve prolapse     Nonrheumatic mitral valve regurgitation     Coronary artery disease involving native coronary artery of native heart without angina pectoris     Adjustment disorder with anxious mood     Anxiety     Tricuspid valve regurgitation, nonrheumatic     Essential hypertension     Paroxysmal atrial fibrillation (H)     Stage 3a chronic kidney disease     Subclinical hypothyroidism     CAD (coronary artery disease)     S/P mitral valve clip implantation     Past Medical History:   Diagnosis Date     Abdominal pain      Atrial fibrillation (H)      Diverticulosis      Moderate mitral regurgitation      NSVT (nonsustained ventricular tachycardia) (H) 6/17/2020     Palpitations      Past Surgical History:   Procedure Laterality Date     BREAST BIOPSY Bilateral     benign     CV CORONARY ANGIOGRAM N/A 6/18/2020    Procedure: Coronary Angiogram;  Surgeon: Sergey Conner MD;  Location: [facility] Cath Lab;  Service: Cardiology     CV CORONARY ANGIOGRAM N/A 1/13/2021    Procedure: Coronary Angiogram;  Surgeon: Klaus Fitzpatrick MD;  Location: [facility] Cardiac Cath Lab;  Service: Cardiology     CV LEFT HEART CATHETERIZATION WO LEFT VETRICULOGRAM Left 6/18/2020    Procedure: Left Heart Catheterization Without Left Ventriculogram;  Surgeon: Sergey Conner MD;  Location:  [facility] Cath Lab;  Service: Cardiology     CV RIGHT HEART CATH N/A 6/18/2020    Procedure: Right Heart Catheterization;  Surgeon: Sergey Conner MD;  Location: [facility] Cath Lab;  Service: Cardiology     CV RIGHT HEART CATH N/A 1/13/2021    Procedure: Right Heart Catheterization;  Surgeon: Klaus Fitzpatrick MD;  Location: [facility] Cardiac Cath Lab;  Service: Cardiology     HYSTERECTOMY  1970's     OOPHORECTOMY Bilateral 1970's     Penicillins  Family History   Problem Relation Age of Onset     Breast cancer Sister 47       Medications:  Current Outpatient Medications   Medication Sig Dispense Refill     amiodarone (PACERONE) 200 MG tablet Take 1 tablet (200 mg total) by mouth at bedtime. 90 tablet 3     aspirin 81 MG EC tablet Take 81 mg by mouth.       clindamycin (CLEOCIN) 300 MG capsule Take 2 capsules (600 mg total) by mouth once as needed (30-60 minutes prior to any dental visit). 4 capsule 3     ELIQUIS 5 mg Tab tablet TAKE 1 TABLET BY MOUTH TWICE A DAY 60 tablet 3     furosemide (LASIX) 20 MG tablet TAKE 1 TABLET BY MOUTH EVERY DAY 90 tablet 3     lisinopriL (PRINIVIL,ZESTRIL) 5 MG tablet Take 1 tablet (5 mg total) by mouth daily. 90 tablet 3     metoprolol succinate (TOPROL-XL) 50 MG 24 hr tablet Take 1 tablet (50 mg total) by mouth daily. 30 tablet 6     multivitamin therapeutic (THERAGRAN) tablet Take 0.5 tablets by mouth daily.        rosuvastatin (CRESTOR) 20 MG tablet Take 1 tablet (20 mg total) by mouth at bedtime. 90 tablet 3     No current facility-administered medications for this visit.        Imaging & Labs reviewed this visit: none      Objective:      Vitals:    05/20/21 1349   BP: 118/88   Pulse: 72   Weight: 116 lb 9.6 oz (52.9 kg)       Physical Exam:     General: Alert, no acute distress.   HEENT: normocephalic conjunctivae are clear  Neck: supple without adenopathy or thyromegaly.  Lungs: Good aeration bilaterally. Clear to auscultation without wheezes, rales or rhonci.    Heart: regular rate and rhythm, normal S1 and S2, no murmurs  Abdomen: soft and nontender  Skin: clear without rash or lesions; BLLE notable for spider and varicose veins especially at her ankles; these are not tender to palpation        Selina Pozo MD

## 2021-06-18 NOTE — PATIENT INSTRUCTIONS - HE
Patient Instructions by Tati Mejia PA-C at 2/11/2021  9:50 AM     Author: Tati Mejia PA-C Service: -- Author Type: Physician Assistant    Filed: 2/11/2021 10:05 AM Encounter Date: 2/11/2021 Status: Signed    : Tati Mejia PA-C (Physician Assistant)       Kelly Robledo,    It was a pleasure to see you today in the clinic regarding your recent MitraClip procedure.     My recommendations after this visit include:     - continue current medications    You should followup with Dr. Fitzpatrick sometime after your echo on March 9 for your 1 month visit.  If he doesn't have availability, then I am happy to see you      If you have questions or concerns, please call using the numbers below:    Valve Clinic Pool  [phone]    After Hours/Scheduling  [phone]    Otherwise you can dial the nurse directly at:    Rory Villalba RN  Valve clinic coordinator  [phone]

## 2021-06-18 NOTE — PATIENT INSTRUCTIONS - HE
Patient Instructions by Tati Mejia PA-C at 1/25/2021  2:10 PM     Author: Tati Mejia PA-C Service: -- Author Type: Physician Assistant    Filed: 1/25/2021  2:38 PM Encounter Date: 1/25/2021 Status: Signed    : Tati Mejia PA-C (Physician Assistant)       Kelly Robledo,    It was a pleasure to see you today in the clinic regarding your upcoming MitraClip procedure.     My recommendations after this visit include:     - report to the [facility] on Monday morning at the instructed time      If you have questions or concerns, please call using the numbers below:    Valve Clinic Pool  [phone]    After Hours/Scheduling  [phone]    Otherwise you can dial the nurse directly at:    Rory Villalba RN  Valve clinic coordinator  [phone]

## 2021-06-18 NOTE — PATIENT INSTRUCTIONS - HE
Patient Instructions by Tati Mejia PA-C at 3/11/2021  1:30 PM     Author: Tati Mejia PA-C Service: -- Author Type: Physician Assistant    Filed: 3/11/2021  1:49 PM Encounter Date: 3/11/2021 Status: Signed    : Tati Mejia PA-C (Physician Assistant)       Kelly Robledo,    It was a pleasure to see you in the clinic today for your 1 month follow up visit after MitraClip.     My recommendations after this visit include:     - okay to go to [place]  - I'll send the Abx prescription to your pharmacy    You should followup with Dr. Kim in September for your 6 month visit      If you have questions or concerns, please call using the numbers below:    Valve Clinic Pool  [phone]    After Hours/Scheduling  [phone]    Otherwise you can dial the nurse directly at:    Rory Villalba RN  Valve clinic coordinator  [phone]

## 2021-06-19 ENCOUNTER — COMMUNICATION - HEALTHEAST (OUTPATIENT)
Dept: CARDIOLOGY | Facility: CLINIC | Age: 76
End: 2021-06-19
Payer: COMMERCIAL

## 2021-06-21 NOTE — LETTER
"Letter by Johan Kraus MD at      Author: Johan Kraus MD Service: -- Author Type: --    Filed:  Encounter Date: 1/18/2021 Status: (Other)         [facility] VALVE CLINIC POOL                                  January 18, 2021    Patient: Kelly Robledo   MR Number: [id number]   YOB: 1945   Date of Visit: 1/18/2021     Dear Dr. Oshea:    Thank you for referring Kelly Robldeo to me for evaluation. Below are the relevant portions of my assessment and plan of care.    If you have questions, please do not hesitate to call me. I look forward to following Kelly along with you.    Sincerely,        Johan Kraus MD          CC  No Recipients  Johan Kraus MD  1/18/2021  2:10 PM  Sign when Signing Visit  Kelly Robledo is a 75 y.o. female who is being evaluated via a billable telephone visit.      The patient has been notified of following:     \"This telephone visit will be conducted via a call between you and your physician/provider. We have found that certain health care needs can be provided without the need for a physical exam.  This service lets us provide the care you need with a short phone conversation.  If a prescription is necessary we can send it directly to your pharmacy.  If lab work is needed we can place an order for that and you can then stop by our lab to have the test done at a later time.    Telephone visits are billed at different rates depending on your insurance coverage. During this emergency period, for some insurers they may be billed the same as an in-person visit.  Please reach out to your insurance provider with any questions.    If during the course of the call the physician/provider feels a telephone visit is not appropriate, you will not be charged for this service.\"    Patient has given verbal consent to a Telephone visit? Yes    What phone number would you like to be contacted at? [phone]    Patient would like to receive their " AVS by AVS Preference: MyChart.    Phone call duration: 9 minutes  Start: 2:00 PM  End: 2:09 PM    Additional provider notes:         Cardiothoracic Surgery Consult    Date of Service: 1/18/2021    REFERRING CARDIOLOGIST: Klasu Fitzpatrick MD    REASON FOR CONSULTATION: Severe mitral insufficiency, symptomatic.      HISTORY OF PRESENT ILLNESS:   Kelly Robledo is a 75 y.o. female who presents with progressive shortness of breath. This is really only present on inclines  She was found to have progression of her mitral insufficiency to severe over the last 6 months without any change to her known, moderate coronary artery disease. She was also recently admitted for a heart failure exacerbation. She has co morbidities of CKD, pulmonary hypertension, and chronic, paroxysmal atrial fibrillation.      IMPRESSION:   Kelly Robledo is a 75 y.o. female with severe mitral insufficiency- recommend MitraClip mitral valve repair.     - Her preference is for MitraClip and she is a good candidate.  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 With Mitraclip her coronary disease may be addressed at a later date if they become associated with symptoms.     PLAN:  1) She is a good candidate for MitraClip. She is low risk for CAB/MVR as well and would therefore be a candidate for CPB standby.    Thank you very much for this referral.       Johan Kraus MD  Cardiothoracic Surgery   Pager [phone]      PAST MEDICAL HISTORY:   Past Medical History:   Diagnosis Date   ? Abdominal pain    ? Atrial fibrillation (H)    ? Diverticulosis    ? Moderate mitral regurgitation    ? NSVT (nonsustained ventricular tachycardia) (H) 6/17/2020   ? Palpitations        PAST SURGICAL HISTORY:   Past Surgical History:   Procedure Laterality Date   ? BREAST BIOPSY Bilateral     benign   ? CV CORONARY ANGIOGRAM  N/A 6/18/2020    Procedure: Coronary Angiogram;  Surgeon: Sergey Conner MD;  Location: [facility] Cath Lab;  Service: Cardiology   ? CV CORONARY ANGIOGRAM N/A 1/13/2021    Procedure: Coronary Angiogram;  Surgeon: Klaus Fitzpatrick MD;  Location: [facility] Cardiac Cath Lab;  Service: Cardiology   ? CV LEFT HEART CATHETERIZATION WO LEFT VETRICULOGRAM Left 6/18/2020    Procedure: Left Heart Catheterization Without Left Ventriculogram;  Surgeon: Sergey Conner MD;  Location: [facility] Cath Lab;  Service: Cardiology   ? CV RIGHT HEART CATH N/A 6/18/2020    Procedure: Right Heart Catheterization;  Surgeon: Sergey Conner MD;  Location: [facility] Cath Lab;  Service: Cardiology   ? CV RIGHT HEART CATH N/A 1/13/2021    Procedure: Right Heart Catheterization;  Surgeon: Klaus Fitzpatrick MD;  Location: [facility] Cardiac Cath Lab;  Service: Cardiology   ? HYSTERECTOMY  1970's   ? OOPHORECTOMY Bilateral 1970's       ALLERGIES:   Allergies   Allergen Reactions   ? Penicillins Hives        CURRENT MEDICATIONS:  Current Outpatient Medications on File Prior to Visit   Medication Sig Dispense Refill   ? amiodarone (PACERONE) 200 MG tablet Take 1 tablet (200 mg total) by mouth at bedtime. 90 tablet 3   ? ELIQUIS 5 mg Tab tablet TAKE 1 TABLET BY MOUTH TWICE A DAY 60 tablet 3   ? furosemide (LASIX) 20 MG tablet Take 1 tablet (20 mg total) by mouth daily. 90 tablet 0   ? lisinopriL (PRINIVIL,ZESTRIL) 5 MG tablet Take 1 tablet (5 mg total) by mouth daily. 90 tablet 3   ? metoprolol succinate (TOPROL-XL) 50 MG 24 hr tablet Take 1 tablet (50 mg total) by mouth daily. 30 tablet 6   ? multivitamin therapeutic (THERAGRAN) tablet Take 0.5 tablets by mouth daily.      ? rosuvastatin (CRESTOR) 20 MG tablet Take 20 mg by mouth at bedtime.       No current facility-administered medications on file prior to visit.        FAMILY HISTORY:   Family History   Problem Relation Age of Onset   ? Breast cancer Sister 47      The family history was reviewed and is not pertinent to the patient's disease/illness    SOCIAL HISTORY:   Social History     Socioeconomic History   ? Marital status:      Spouse name: Not on file   ? Number of children: Not on file   ? Years of education: Not on file   ? Highest education level: Not on file   Occupational History   ? Not on file   Social Needs   ? Financial resource strain: Not on file   ? Food insecurity     Worry: Not on file     Inability: Not on file   ? Transportation needs     Medical: Not on file     Non-medical: Not on file   Tobacco Use   ? Smoking status: Never Smoker   ? Smokeless tobacco: Never Used   Substance and Sexual Activity   ? Alcohol use: No   ? Drug use: No   ? Sexual activity: Not on file   Lifestyle   ? Physical activity     Days per week: Not on file     Minutes per session: Not on file   ? Stress: Not on file   Relationships   ? Social connections     Talks on phone: Not on file     Gets together: Not on file     Attends Shinto service: Not on file     Active member of club or organization: Not on file     Attends meetings of clubs or organizations: Not on file     Relationship status: Not on file   ? Intimate partner violence     Fear of current or ex partner: Not on file     Emotionally abused: Not on file     Physically abused: Not on file     Forced sexual activity: Not on file   Other Topics Concern   ? Not on file   Social History Narrative    Spends 6 months in [place].     . No children. Family in the [place] area.     Enjoys walking- very regular with this especially in [place].     Likes reading - romantic happy endings.     Never alcohol or smoking.    Selina Pozo MD       REVIEW OF SYSTEMS:  A complete 10 point review of systems was obtained and is negative other than the above stated complaints    PHYSICAL EXAMINATION:   No vitals or exam were performed for this telephone visit.      LABORATORY STUDIES:   Lab Results   Component Value Date     WBC 5.8 01/13/2021    HGB 12.4 01/13/2021    HCT 38.7 01/13/2021     (H) 01/13/2021     01/13/2021      Lab Results   Component Value Date    CREATININE 1.44 (H) 01/13/2021    BUN 20 01/13/2021     01/13/2021    K 3.8 01/13/2021     01/13/2021    CO2 26 01/13/2021     No results found for: HGBA1C  EKG: NSR rate 79    CARDIAC CATHETERIZATION: This study was personally reviewed by me  30% ostial LM  mLAD 50%  RI 50%  LCx small non-dominant  RCA 40% mid to distal    RHC RA 7, RV 57/6, PA 50/16, PCWP 26, /62, LVEDP 15    TRANSTHORACIC ECHOCARDIOGRAM: This study was personally reviewed by me  LVEF 70%, LA severely dilated, moderate TR, moderate PH, PAP 68, Severe mitral insufficieincy posterior jet, decreased leaflet mobility    TRANSESOPHAGEAL ECHOCARDIOGRAM (6/19/2020): This study was personally reviewed by me  LVEF normal, mild to moderate MR, moderate TR.    Johan Kraus MD

## 2021-06-21 NOTE — LETTER
Letter by Rory Villalba RN at      Author: Rory Villalba RN Service: -- Author Type: --    Filed:  Encounter Date: 1/20/2021 Status: (Other)         Patient: Kelly Robledo   MR Number: [id number]   YOB: 1945   Date of Visit: 1/20/2021       [facility] - Valve Clinic    Dear Dr. Kim:      Thank you for your referral to [facility] Valve [facility]. Kelly Robledo (1945) was evaluated by our Valve Team for mitral regurgitation.      After assessing your patients diagnostic test results and conducting a thorough clinical, cardiovascular and surgical assessment, our Heart Team has recommended that your patient undergo Transcatheter Mitral Valve Repair with MitraClip.    The procedure is scheduled to take place on Monday February 1, 2021  at [facility] with Dr. Fitzpatrick.     Patients typically return to Valve Clinic for follow up at 1 week, 30 days and one year post procedure.  Outside of these follow up points, you can expect to continue providing care to your patient.        Sincerely,     Dr. Klaus Fitzpatrick, Director, Structural Heart Program  Dr. Charan Aquino, Interventional Cardiology   Rory Villalba, JAMILA, Valve Clinic Coordinator      [facility] Valve Clinic  [facility]  Phone: [phone]  Fax: [phone]            Selina Pozo MD

## 2021-06-21 NOTE — PROGRESS NOTES
Progress Note    Reason for Visit:  Chief Complaint     Osteopenia          Progress Note:    HPI: This patient seen saltation at the request of the primary care physician because of osteopenia with high risk of fracture.  Thank you for referring this pleasant 73-year-old female patient who had a bone DEXA scan which showed T score at the spine is -2.4 at the left hip -2.4 at the right hip -2.1.    She has lost 8.7% at the hip.  Her risk of major osteoporotic fracture is 17.5% and hip fracture 5.3%.    She is denying any previous bone fractures she has no acid reflux she had hysterectomy at the age of 27 with oophorectomy due to endometriosis.    She takes calcium 1 pill a day occasionally she does have    She is  no children does not smoke or drink no family history of osteoporosis no previous history of fractures.    She takes aspirin 81 mg she has hyperlipidemia on Crestor 20 mg.    Her creatinine is 1.03 vitamin D is 33.3.      Component      Latest Ref Rng & Units 6/28/2017 6/6/2018   Creatinine      0.60 - 1.10 mg/dL 1.00 1.03   GFR MDRD Af Amer      >60 mL/min/1.73m2 >60 >60   GFR MDRD Non Af Amer      >60 mL/min/1.73m2 54 (L) 52 (L)   Bilirubin, Total      0.0 - 1.0 mg/dL 0.9 0.9   Calcium      8.5 - 10.5 mg/dL 9.3 9.8   Protein, Total      6.0 - 8.0 g/dL 7.0 6.8   ALBUMIN      3.5 - 5.0 g/dL 3.9 3.8   Alkaline Phosphatase      45 - 120 U/L 56 63   AST      0 - 40 U/L 25 24   ALT      0 - 45 U/L 18 15   Cholesterol      <=199 mg/dL 212 (H) 211 (H)   Triglycerides      <=149 mg/dL 86 65   HDL Cholesterol      >=50 mg/dL 59 64   LDL Calculated      <=129 mg/dL 136 (H) 134 (H)   Patient Fasting > 8hrs?       Yes Unknown   Vitamin D, Total (25-Hydroxy)      30.0 - 80.0 ng/mL  33.3       Patient Profile:  72 y.o. female, postmenopausal, is here for the follow up bone density test.   History of fractures - None. Family history of osteoporosis - None.  Family history of hip fracture: None. Smoking history -  No. Osteoporosis treatment past -  Yes;  HRT and Bisphosphonates. Osteoporosis treatment current - No.  Chronic medical problems - Hysterectomy, Oophorectomy and Premature menopause. High risk medications -  None.        Assessment:     1. The spine bone density L1-L4(L3) with T-score -2.4, stable compared to 2016.  2. Femoral bone densities show left femoral neck T- score -2.4 and right total hip T-score -2.1, and significant decline of 8.7% on the right hip compared to 2016.  3. Trabecular bone score indicates poor trabecular bone architecture.        72 y.o. female with LOW BONE DENSITY (OSTEOPENIA) and HIGH fracture risk, adjusted for the TBS, with major osteoporotic fracture risk 17.2% and hip fracture risk 5.3%.       Review of Systems:    Nervous System: No headache, dizziness, fainting or memory loss. No tingling sensation of hand or feet.  Ears: No hearing loss or ringing in the ears  Eyes: No blurring of vision, redness, itching or dryness.  Nose: No nosebleed or loss of smell  Mouth: No mouth sores or loss of taste  Throat: No hoarseness or difficulty swallowing  Neck: No enlarged thyroid or lymph nodes.  Heart: No chest pain, palpitation or irregular heartbeat. No swelling of hands or feet  Lungs: No shortness of breath, cough, night sweats, wheezing or hemoptysis.  Gastrointestinal: No nausea or vomiting, constipation or diarrhea.  No acid reflux, abdominal pain or blood in stools.  Kidney/Bladdr: No polyuria, polydipsia, nocturia or hematuria.  Genital/Sexual: No loss of libido  Skin: No rash, hair loss or hirsutism.  No abnormal striae  Muscles/Joints/Bones: No morning stiffness, muscle aches and pain or loss of height.    Current Medications:  Current Outpatient Medications   Medication Sig     aspirin 81 MG EC tablet Take 81 mg by mouth daily.     calcium-vitamin D (CALCIUM-VITAMIN D) 500 mg(1,250mg) -200 unit per tablet Take 1 tablet by mouth daily.     ibuprofen (ADVIL,MOTRIN) 200 MG tablet Take  "200 mg by mouth every 6 (six) hours as needed for pain.     multivitamin therapeutic (THERAGRAN) tablet Take 1 tablet by mouth daily.     pindolol (VISKEN) 5 MG tablet TAKE 1 TABLET (5 MG TOTAL) BY MOUTH 2 (TWO) TIMES A DAY.     rosuvastatin (CRESTOR) 20 MG tablet TAKE 1 TABLET (20 MG TOTAL) BY MOUTH AT BEDTIME.     cyclobenzaprine (FLEXERIL) 10 MG tablet Take 1 tablet (10 mg total) by mouth at bedtime.     dicyclomine (BENTYL) 10 MG capsule Take 1 capsule (10 mg total) by mouth as needed (Take 1 capsule three to four times daily as needed.).       Patients Active Problems:  Patient Active Problem List   Diagnosis     Hyperlipidemia     Mitral Regurgitation     Other specified cardiac dysrhythmias     Irritable Bowel Syndrome     Osteoporosis Senile       History:   reports that  has never smoked. she has never used smokeless tobacco. She reports that she does not drink alcohol or use drugs.   reports that  has never smoked. she has never used smokeless tobacco. She reports that she does not drink alcohol or use drugs.  Social History     Tobacco Use   Smoking Status Never Smoker   Smokeless Tobacco Never Used      reports that  has never smoked. she has never used smokeless tobacco. She reports that she does not drink alcohol or use drugs.  Social History     Substance and Sexual Activity   Sexual Activity Not on file     Past Medical History:   Diagnosis Date     Abdominal pain      Diverticulosis      Family History   Problem Relation Age of Onset     Breast cancer Sister 47     Past Medical History:   Diagnosis Date     Abdominal pain      Diverticulosis      Past Surgical History:   Procedure Laterality Date     BREAST BIOPSY Bilateral     benign     HYSTERECTOMY  1970's     OOPHORECTOMY Bilateral 1970's       Vitals   height is 5' 4\" (1.626 m) and weight is 116 lb 11.2 oz (52.9 kg). Her blood pressure is 142/82.         Exam  General appearance: The patient looked well, not in acute distress.  Eyes: no " evidence of thyroid eye disease.   Retinal exam: No evidence of diabetic retinopathy.  Mouth and Throat: Normal  Neck: No evidence of thyromegaly, enlarged lymph node or tenderness  Chest: Trachea is central. Chest is clear to auscultation and percussion. Breat sounds are normal.  Cardiovascular exam: JVP is not raised. Heart sounds are normal, no murmurs or rub  Peripheral pulses are palpable.   Abdomen: No masses or tenderness.    Back: No vertebral tenderness or kyphosis.  Extremities: No evidence of leg edema.   Skin: Normal to touch.  No abnormal striae  Neurologic exam:  Visual fields are intact by confrontation, grossly intact. No evidence of peripheral neuropathy.  Detailed foot exam normal.        Diagnosis:  No diagnosis found.    Orders:   No orders of the defined types were placed in this encounter.        Assessment and Plan: Osteopenia with high risk of fracture I discussed the pathophysiology of the disease with the patient she does warrant treatment because of her high risk of fracture she has been on Fosamax for 1 year 5 years ago but she could not related because of bone aches.    I advised the patient to take Prolia but she declined that so we will put her on Fosamax 35 mg once a week and I told her if she had any acid reflux or side effects to let me know.    She will continue on calcium and vitamin D and she travels to [place] regularly and she walks in the sun.    Hypertension blood pressure 142/80 2 repeat 150/90 because of her reduction in her GFR it is 52 I believe the patient has been having high blood pressure for some time she was also heart rate was around 90.  I did advise the patient to start metoprolol extended release 25 mg once a day and keep a an eye on her blood pressure at home should not exceed 130/80.    Patient will return to clinic in 1 year.    I have reviewed and ordered clinical lab test    I have reviewed and ordered radiology tests.    I have reviewed and ordered her  medication as required.    I have reviewed her test results and advised with the performing physician.    I have reviewed the patient's old records.    I have reviewed and summarized the patient old records.    I did spend 60 minutes with the patient more than 50% was spent on counseling and managing her care.

## 2021-06-22 NOTE — PROGRESS NOTES
Assessment and Plan:     1. Hyperlipidemia  She is doing well with Crestor.  She is fasting today for lab work.  - rosuvastatin (CRESTOR) 20 MG tablet; TAKE 1 TABLET (20 MG TOTAL) BY MOUTH AT BEDTIME.  Dispense: 90 tablet; Refill: 3  - Comprehensive Metabolic Panel  - Lipid Cascade    2. History of cardiac dysrhythmia  She is essentially asymptomatic with this.  She is managed well on pindolol.  Lab work today to look at electrolytes and kidney function.  Medication refilled.  - pindolol (VISKEN) 5 MG tablet; TAKE 1 TABLET (5 MG TOTAL) BY MOUTH 2 (TWO) TIMES A DAY.  Dispense: 180 tablet; Refill: 3  - Comprehensive Metabolic Panel    3. Routine general medical examination at a health care facility  He was given paperwork on getting an advanced care directive.  Her immunizations and cancer screenings are up-to-date.  - Comprehensive Metabolic Panel    4. Osteoporosis Senile  Stable.  She is seeing endocrine for management of this.    The patient's current medical problems were reviewed.    I have had an Advance Directives discussion with the patient.  The following health maintenance schedule was reviewed with the patient and provided in printed form in the after visit summary:   Health Maintenance   Topic Date Due     ZOSTER VACCINES (1 of 2) 11/20/1993     FALL RISK ASSESSMENT  06/28/2018     DXA SCAN  07/27/2020     COLONOSCOPY  06/14/2022     ADVANCE DIRECTIVES DISCUSSED WITH PATIENT  12/26/2022     TD 18+ HE  12/19/2023     PNEUMOCOCCAL POLYSACCHARIDE VACCINE AGE 65 AND OVER  Completed     INFLUENZA VACCINE RULE BASED  Completed     PNEUMOCOCCAL CONJUGATE VACCINE FOR ADULTS (PCV13 OR PREVNAR)  Completed        Subjective:   Chief Complaint: Kelly Robledo is an 73 y.o. female here for an Annual Wellness visit.   HPI:  Kelly Robledo is here for her annual exam.  She's doing well.  She's headed to [place] next week.  She's anxious to get away from the snow.  She's fasting.  She's seeing endocrine for her bone  density and dermatology for skin exams.  She was told at one visit that her blood pressure was elevated.  It was at a time where she just found mice in her home.  She is been monitoring her blood pressure at home and she has had excellent readings.  She did not start metoprolol that she was prescribed.    Review of Systems:  HEENT: hoarse voice just today    Please see above.  The rest of the review of systems are negative for all systems.    Patient Care Team:  Ivonne Ramos MD as PCP - General     Patient Active Problem List   Diagnosis     Hyperlipidemia     Mitral Regurgitation     Cardiac arrhythmia, unspecified cardiac arrhythmia type     Irritable Bowel Syndrome     Osteoporosis Senile     Past Medical History:   Diagnosis Date     Abdominal pain      Diverticulosis       Past Surgical History:   Procedure Laterality Date     BREAST BIOPSY Bilateral     benign     HYSTERECTOMY  1970's     OOPHORECTOMY Bilateral 1970's      Family History   Problem Relation Age of Onset     Breast cancer Sister 47      Social History     Socioeconomic History     Marital status:      Spouse name: Not on file     Number of children: Not on file     Years of education: Not on file     Highest education level: Not on file   Social Needs     Financial resource strain: Not on file     Food insecurity - worry: Not on file     Food insecurity - inability: Not on file     Transportation needs - medical: Not on file     Transportation needs - non-medical: Not on file   Occupational History     Not on file   Tobacco Use     Smoking status: Never Smoker     Smokeless tobacco: Never Used   Substance and Sexual Activity     Alcohol use: No     Drug use: No     Sexual activity: Not on file   Other Topics Concern     Not on file   Social History Narrative     Not on file      Current Outpatient Medications   Medication Sig Dispense Refill     alendronate (FOSAMAX) 35 MG tablet Take 1 tablet (35 mg total) by mouth every 7 days Take  "in the morning on an empty stomach with a full glass of water 30 minutes before food. 12 tablet 1     aspirin 81 MG EC tablet Take 81 mg by mouth daily.       calcium-vitamin D (CALCIUM-VITAMIN D) 500 mg(1,250mg) -200 unit per tablet Take 1 tablet by mouth daily.       multivitamin therapeutic (THERAGRAN) tablet Take 1 tablet by mouth daily.       pindolol (VISKEN) 5 MG tablet TAKE 1 TABLET (5 MG TOTAL) BY MOUTH 2 (TWO) TIMES A DAY. 180 tablet 3     rosuvastatin (CRESTOR) 20 MG tablet TAKE 1 TABLET (20 MG TOTAL) BY MOUTH AT BEDTIME. 90 tablet 3     No current facility-administered medications for this visit.       Objective:   Vital Signs:   Visit Vitals  /60 (Patient Site: Right Arm, Patient Position: Sitting, Cuff Size: Adult Regular)   Pulse (!) 104   Ht 5' 4\" (1.626 m)   Wt 116 lb 6.4 oz (52.8 kg)   BMI 19.98 kg/m         VisionScreening:  No exam data present     PHYSICAL EXAM  Constitutional:  Reveals an alert, pleasant adult female.   Vitals:  Noted.   Head: Normocephalic, without obvious abnormality, atraumatic   Ears: TM's normal bilaterally   Eyes: PERRL, conjunctiva/corneas clear, EOM's intact   Throat: Lips, mucosa, and tongue normal; teeth and gums normal   Neck: Normal ROM, no carotid bruits, no thyromegaly   Lungs: Clear to auscultation bilaterally, respirations unlabored.   Breast exam:  Mild erythema overlying scar from bx on the left breast (chronic),  no discrete nodule is palpable in the axilla bilaterally  Heart: Regular rate and rhythm, S1 and S2 normal, no murmur, rub, or gallop,   Abdomen: Soft, non-tender, bowel sounds active , no masses, no organomegaly   Extremities: Extremities normal, atraumatic, no cyanosis or edema   Pelvic:Not examined  Skin: Skin color, texture, turgor normal, no rashes or lesions   Neurologic: Normal        Assessment Results 12/27/2018   Activities of Daily Living No help needed   Instrumental Activities of Daily Living No help needed   Mini Cog Total Score 4 "   Some recent data might be hidden     A Mini-Cog score of 0-2 suggests the possibility of dementia, score of 3-5 suggests no dementia    Identified Health Risks:     The patient was counseled and encouraged to consider modifying their diet and eating habits. She was provided with information on recommended healthy diet options.  Information regarding advance directives (living zhong), including where she can download the appropriate form, was provided to the patient via the AVS.

## 2021-06-25 NOTE — TELEPHONE ENCOUNTER
RN cannot approve Refill Request    RN can NOT refill this medication   Patient request early refill. Medication last filled 6/26/20 for qty 90 refill 3. Provider to advise on request. . Last office visit: 5/20/2021 Selina Pozo MD Last Physical: Visit date not found Last MTM visit: Visit date not found Last visit same specialty: 5/20/2021 Selina Pozo MD.  Next visit within 3 mo: Visit date not found  Next physical within 3 mo: Visit date not found      Raghavendra LUTera Augustin, [facility] Triage/Med Refill 6/2/2021    Requested Prescriptions   Pending Prescriptions Disp Refills     lisinopriL (PRINIVIL,ZESTRIL) 5 MG tablet 90 tablet 3     Sig: Take 1 tablet (5 mg total) by mouth daily.       Ace Inhibitors Refill Protocol Passed - 6/1/2021  9:26 AM        Passed - PCP or prescribing provider visit in past 12 months       Last office visit with prescriber/PCP: 5/20/2021 Selina Pozo MD OR same dept: Visit date not found OR same specialty: 5/20/2021 Selina Pozo MD  Last physical: Visit date not found Last MTM visit: Visit date not found   Next visit within 3 mo: Visit date not found  Next physical within 3 mo: Visit date not found  Prescriber OR PCP: Selina Pozo MD  Last diagnosis associated with med order: 1. Non-ischemic cardiomyopathy (H)  - lisinopriL (PRINIVIL,ZESTRIL) 5 MG tablet; Take 1 tablet (5 mg total) by mouth daily.  Dispense: 90 tablet; Refill: 3    If protocol passes may refill for 12 months if within 3 months of last provider visit (or a total of 15 months).             Passed - Serum Potassium in past 12 months     Lab Results   Component Value Date    Potassium 4.0 03/11/2021             Passed - Blood pressure filed in past 12 months     BP Readings from Last 1 Encounters:   06/01/21 160/76             Passed - Serum Creatinine in past 12 months     Creatinine   Date Value Ref Range Status   03/11/2021 1.44 (H) 0.60 - 1.10 mg/dL Final

## 2021-06-26 NOTE — PATIENT INSTRUCTIONS - HE
We are going to plan cholesterol blood work and a 24 hour monitor in the near future.Try changing the furosemide/lasix to every other day watching for increased swelling or weight gain more than 3 to 5 pounds in a few day period of time and notify us.If doing ok on the lower dose let us know in a few weeks.Please call or write with 8 to 10 resting blood pressure readings over the next 3 to 4 weeks.

## 2021-06-26 NOTE — PROGRESS NOTES
===View-only below this line===  ----- Message -----  From: Dayne Kim MD  Sent: 6/15/2021   4:56 PM CDT  To: Anamaria Patel RN    Holter monitor looks good.  There really is no significant ectopic beats.  I sent her a my chart note.  We were also discussing the suboptimal cholesterol numbers and would like to have her increase the rosuvastatin to 40 mg daily.  Can we send her a new prescription and schedule a lipid panel AST and ALT in 2 months.  Thank you mdg    === Rosuvastatin dose updated, FLP, AST, ALT ordered.  -ramez

## 2021-06-29 NOTE — PROGRESS NOTES
Progress Notes by Jocelyn Kaur CNP at 10/14/2020 12:50 PM     Author: Jocelyn Kaur CNP Service: -- Author Type: Nurse Practitioner    Filed: 10/14/2020  2:03 PM Encounter Date: 10/14/2020 Status: Signed    : Jocelyn Kaur CNP (Nurse Practitioner)            Thank you, Dr. Pozo, for asking the [facility] team to see Ms. Kelly Robledo to evaluate PAF and hospitalized with acute HF in Sept 2020.    Assessment/Recommendations     Assessment/Plan:    Diagnoses and all orders for this visit:    Paroxysmal atrial fibrillation (H) and appears well controlled on amiodarone 200 mg p.o. daily and discussed this will likely be long-term medication.    MVP (mitral valve prolapse) history.    Essential hypertension and blood pressure just at goal today.    Acute HFpEF (heart failure with preserved ejection fraction) (H) and  at hospital admission was pulmonary edema on September 23, 2020 and 679 on hospital admission in June 2020.  Creatinine around 1.3.  Doing daily weights and encouraged to continue this.  Given weight parameters to call with of gaining 3 pounds over 3 days or gaining 5 pounds or more in a week and then to call.  Having lower appetite as she is adjusting to low-sodium diet.  -     Basic Metabolic Panel  -     BNP(B-type Natriuretic Peptide)    Stage 3a chronic kidney disease and cautioned not to use NSAIDs.  Tylenol okay.  On low-dose lisinopril.        [id number] score of 5 and on Eliquis.  Follow up in clinic with Dr. Kim as planned in October 26 and will follow with me only as needed.  Please call if palpitation episodes more frequent.     History of Present Illness/Subjective     Kelly Robledo is a very pleasant 74 y.o. female who comes in today for EP .  Kelly Robledo has a known history of hypertension, hyperlipidemia, moderate coronary artery disease, moderate MR, chronic kidney disease stage III, paroxysmal atrial fibrillation,  hypertension and nonsustained VT seen during hospitalization earlier this year.  She had coronary angiogram and echo during hospitalization in July with nonsustained VT episode.  Leonarda reports that she rarely has palpitations and feels well on amiodarone.  She denies any side effects on medication.  Her weight normally is 114 to 115 pounds.  She had no edema or abdominal distention prior to admission.  She was just significantly more short of breath when admitted to the hospital with acute pulmonary edema on September 23.  She denies any cardiac symptoms except she thinks she is more short of breath with activity this year than she was last year.  She is short of breath with walking hills but not on flat ground even when walking longer distance.        Cardiographics (reviewed):  Results for orders placed during the hospital encounter of 06/17/20   Echo Transesophageal [ECH01] 06/19/2020    Narrative   Left ventricle ejection fraction is normal.    Normal right ventricular size and systolic function.    Moderate tricuspid valve regurgitation. Moderate pulmonary hypertension   present.    Mild to moderate mitral regurgitation.        Results for orders placed during the hospital encounter of 06/17/20   Cardiac Catheterization [CATH01] 06/18/2020    Narrative   Angiography via left radial  LM ostial 40%  LAD mid 50%  Ramus prox 50%  Circ min dz  RCA normal            Cardiac testing personally reviewed:      Results for orders placed or performed during the hospital encounter of 06/16/20   ECG 12 lead with MUSE   Result Value Ref Range    SYSTOLIC BLOOD PRESSURE      DIASTOLIC BLOOD PRESSURE      VENTRICULAR RATE 183 BPM    ATRIAL RATE 62 BPM    P-R INTERVAL      QRS DURATION 124 ms    Q-T INTERVAL 380 ms    QTC CALCULATION (BEZET) 663 ms    P Axis      R AXIS -57 degrees    T AXIS 103 degrees    MUSE DIAGNOSIS       Atrial fibrillation with rapid ventricular response with premature ventricular or aberrantly  conducted complexes  Left axis deviation  Right bundle branch block  Possible Lateral infarct (cited on or before 17-JUN-2020)  Inferior infarct (cited on or before 17-JUN-2020)  Abnormal ECG  When compared with ECG of 17-JUN-2020 15:41,  Previous ECG has undetermined rhythm, needs review  Serial changes of Lateral infarct Present  Confirmed by CARLOS COOPER MD LOC:JN ([id number]) on 6/19/2020 2:53:46 PM            Problem List:  Patient Active Problem List   Diagnosis   ? Hyperlipidemia   ? Irritable bowel syndrome   ? Osteoporosis Senile   ? Mitral valve prolapse   ? Nonrheumatic mitral valve regurgitation   ? Coronary artery disease involving native coronary artery of native heart without angina pectoris   ? Adjustment disorder with anxious mood   ? Anxiety   ? Tricuspid valve regurgitation, nonrheumatic   ? Essential hypertension   ? Paroxysmal atrial fibrillation (H)   ? MVP (mitral valve prolapse)   ? Stage 3a chronic kidney disease     Revi  e  Physical Examination Review of Systems   [facility]  Vitals:    10/14/20 1250   BP: 140/78   Pulse: 68   SpO2: 96%     Body mass index is 17.81 kg/m .  Wt Readings from Last 3 Encounters:   10/14/20 107 lb (48.5 kg)   09/29/20 111 lb 14.4 oz (50.8 kg)   09/24/20 110 lb 9.6 oz (50.2 kg)     General Appearance:   Alert, well-appearing and in no acute distress.   HEENT: Atraumatic, normocephalic.  No scleral icterus, normal conjunctivae; mucous membranes pink and moist.     Chest: Chest symmetric, spine straight.   Lungs:   Respirations unlabored: Lungs are clear to auscultation.   Cardiovascular:   Normal first and second heart sounds with no murmurs, rubs, or gallops.  Regular, regular.  Radial and posterior tibial pulses are intact.  Normal JVD, no edema.       Extremities: No cyanosis or clubbing   Musculoskeletal: Moves all extremities   Skin: Warm, dry, intact.    Neurologic: Mood and affect are appropriate, alert and oriented to person, place, time, and situation     General: Night Sweats  Eyes: WNL  Ears/Nose/Throat: WNL  Lungs: WNL  Heart: Shortness of Breath with activity  Stomach: WNL  Bladder: WNL  Muscle/Joints: WNL  Skin: WNL  Nervous System: WNL  Mental Health: WNL     Blood: Easy Bruising       Medical History  Surgical History Family History Social History     Past Medical History:   Diagnosis Date   ? Abdominal pain    ? Atrial fibrillation (H)    ? Diverticulosis    ? Moderate mitral regurgitation    ? NSVT (nonsustained ventricular tachycardia) (H) 6/17/2020   ? Palpitations     Past Surgical History:   Procedure Laterality Date   ? BREAST BIOPSY Bilateral     benign   ? CV CORONARY ANGIOGRAM N/A 6/18/2020    Procedure: Coronary Angiogram;  Surgeon: Sergey Conner MD;  Location: [facility];  Service: Cardiology   ? CV LEFT HEART CATHETERIZATION WO LEFT VETRICULOGRAM Left 6/18/2020    Procedure: Left Heart Catheterization Without Left Ventriculogram;  Surgeon: Sergey Conner MD;  Location: [facility];  Service: Cardiology   ? CV RIGHT HEART CATH N/A 6/18/2020    Procedure: Right Heart Catheterization;  Surgeon: Sergey Conner MD;  Location: [facility];  Service: Cardiology   ? HYSTERECTOMY  1970's   ? OOPHORECTOMY Bilateral 1970's    Family History   Problem Relation Age of Onset   ? Breast cancer Sister 47    Social History     Tobacco Use   Smoking Status Never Smoker   Smokeless Tobacco Never Used     Social History     Substance and Sexual Activity   Alcohol Use No        Medications  Allergies     Current Outpatient Medications   Medication Sig Dispense Refill   ? amiodarone (PACERONE) 200 MG tablet Take 200 mg by mouth at bedtime.     ? apixaban ANTICOAGULANT (ELIQUIS) 5 mg Tab tablet Take 1 tablet (5 mg total) by mouth 2 (two) times a day. 60 tablet 3   ? furosemide (LASIX) 20 MG tablet Take 1 tablet (20 mg total) by mouth daily. 90 tablet 0   ? lisinopriL (PRINIVIL,ZESTRIL) 5 MG tablet Take 1 tablet (5  mg total) by mouth daily. 90 tablet 3   ? metoprolol succinate (TOPROL-XL) 50 MG 24 hr tablet Take 1 tablet (50 mg total) by mouth daily. 30 tablet 6   ? multivitamin therapeutic (THERAGRAN) tablet Take 0.5 tablets by mouth daily.      ? rosuvastatin (CRESTOR) 20 MG tablet Take 20 mg by mouth at bedtime.       No current facility-administered medications for this visit.       Allergies   Allergen Reactions   ? Penicillins Hives      Medical, surgical, family, social history, and medications were all reviewed and updated as necessary.   Lab Results    Chemistry/lipid CBC Cardiac Enzymes/BNP/TSH/INR     Lab Results   Component Value Date    CREATININE 1.36 (H) 09/29/2020    BUN 20 09/29/2020     09/29/2020    K 4.7 09/29/2020    CL 99 09/29/2020    CO2 29 09/29/2020     Creatinine (mg/dL)   Date Value   09/29/2020 1.36 (H)   09/24/2020 1.27 (H)   09/23/2020 1.60 (H)   08/19/2020 1.30 (H)     Lab Results   Component Value Date     (H) 09/23/2020    Lab Results   Component Value Date    WBC 9.0 09/24/2020    HGB 13.5 09/24/2020    HCT 40.5 09/24/2020    MCV 97 09/24/2020     09/24/2020     Lab Results   Component Value Date    INR 1.37 (H) 09/23/2020      Lab Results   Component Value Date    CHOL 193 11/25/2019    HDL 63 11/25/2019    LDLCALC 112 11/25/2019    TRIG 89 11/25/2019

## 2021-06-29 NOTE — PROGRESS NOTES
"Progress Notes by Jocelyn Kaur CNP at 7/15/2020  7:50 AM     Author: Jocelyn Kaur CNP Service: -- Author Type: Nurse Practitioner    Filed: 7/15/2020  8:32 AM Encounter Date: 7/15/2020 Status: Signed    : Jocelyn Kaur CNP (Nurse Practitioner)       The patient has been notified of following:     \"This telephone visit will be conducted via a call between you and your physician/provider. We have found that certain health care needs can be provided without the need for a physical exam.  This service lets us provide the care you need with a phone conversation.  If a prescription is necessary we can send it directly to your pharmacy.  If lab work is needed we can place an order for that and you can then stop by our lab to have the test done at a later time. If during the course of the call the physician/provider feels a telephone visit is not appropriate, you will not be charged for this service.\"         HEART CARE PHONE ENCOUNTER        Appointment is being conducted as a telephone visit, to reduce risk of exposure given the current status of Coronovirus in our community. This telephone visit is being conducted via a call between the patient and physician/provider. Health care needs are being provided without a physical exam.     Assessment/Recommendations   Assessment & PLAN:     Diagnoses and all orders for this visit:    Paroxysmal atrial fibrillation (H) and reports that only symptoms with atrial fib are palpitations.  She has had no palpitations since discharge from the hospital.  Discussed natural progression of atrial fib.  Denies any side effects on amiodarone.  Discussed risk with amio to liver, thyroid, skin, eyes and lungs with pulmonary fibrosis being the most significant but dose and time dependent. Recommended routine sunscreen use with exposure to sun and yearly eye exams.  6-week labs to be done within the next 1 to 2 weeks and will order thyroid Garita as TSH was " slightly elevated on initiation of amiodarone.  Handout sent to patient on amiodarone.  To delay PFTs until post COVID.  Will place in amiodarone program.        -     ALT; Future; Expected date: 07/16/2020  -     Amiodarone (Cordarone )And Metabolite; Future; Expected date: 07/16/2020  -     Thyroid Cascade; Future; Expected date: 07/16/2020  -     PFT Complete; Future; Expected date: 01/15/2021    Essential hypertension and unable to evaluate hypertension control his phone visit and no home blood pressure monitoring done.    NSVT (nonsustained ventricular tachycardia) (H) seen during hospitalization    Valvular heart disease but does not need surgical intervention and will follow with cardiology    Coronary artery disease involving native coronary artery of native heart without angina pectoris and plaque moderate and started on statin and aspirin 81 mg orally every day.  No anginal symptoms.        [id number] score of 5 and on Eliquis.  Kelly reports that Eliquis is affordable for her to stay on and not having any bleeding problems.  I discussed that it will go generic likely within the next year.  She is also on baby aspirin without problems.  Questions answered regarding anticoagulation.      Follow up in clinic with Dr. Kim in October 2020 as planned.      Total time of call between patient and provider was 21 minutes .  Start time 08 00 and end time 08 21.       History of Present Illness/Subjective    Kelly Robledo is a 74 y.o. female who is being evaluated via a billable telephone visit.    Kelly Robledo is a 74-year-old female who was hospitalized in June with palpitations.  Her medical history is now significant for hypertension, paroxysmal atrial fib, hyperlipidemia, moderate coronary artery disease and moderate MR.  Nonsustained VT was also seen during hospitalization.  She had coronary angiogram and echo during that hospitalization.  We reviewed those test results and see below for more details.   Her only symptom with atrial fib is palpitations but very disturbing to her.  She has had no palpitations since discharge from the hospital.  She is occasionally hearing her heart beating in her right ear when lying on her right side at night.  She is struggling with low-sodium and low-fat diet.  We discussed dietary adjustments and questions answered.  She denies any cardiac symptoms on questioning.    I have reviewed and updated the patient's Past Medical History, Social History, Family History and Medication List.   Cardiographics reviewed and prep for this visit are as follows  June 2020 ALEXANDR shows:  Summary       Left ventricle ejection fraction is normal.    Normal right ventricular size and systolic function.    Moderate tricuspid valve regurgitation. Moderate pulmonary hypertension present.    Mild to moderate mitral regurgitation.     June 2020 coronary angiogram shows:  Conclusion          Ost LM lesion is 35% stenosed.    Mid LAD lesion is 55% stenosed.    Ramus lesion is 55% stenosed.    The left circumflex was moderate.    The RCA was moderate and is considered normal.       Physical Examination not perform given phone encounter Review of Systems                                                Medical History  Surgical History Family History Social History   Past Medical History:   Diagnosis Date   ? Abdominal pain    ? Diverticulosis    ? Moderate mitral regurgitation    ? Palpitations     Past Surgical History:   Procedure Laterality Date   ? BREAST BIOPSY Bilateral     benign   ? CV CORONARY ANGIOGRAM N/A 6/18/2020    Procedure: Coronary Angiogram;  Surgeon: Sergey Conner MD;  Location: [facility];  Service: Cardiology   ? CV LEFT HEART CATHETERIZATION WO LEFT VETRICULOGRAM Left 6/18/2020    Procedure: Left Heart Catheterization Without Left Ventriculogram;  Surgeon: Sergey Conner MD;  Location: [facility];  Service: Cardiology   ? CV RIGHT HEART CATH N/A  6/18/2020    Procedure: Right Heart Catheterization;  Surgeon: Sergey Conner MD;  Location: [facility];  Service: Cardiology   ? HYSTERECTOMY  1970's   ? OOPHORECTOMY Bilateral 1970's    Family History   Problem Relation Age of Onset   ? Breast cancer Sister 47    Social History     Socioeconomic History   ? Marital status:      Spouse name: Not on file   ? Number of children: Not on file   ? Years of education: Not on file   ? Highest education level: Not on file   Occupational History   ? Not on file   Social Needs   ? Financial resource strain: Not on file   ? Food insecurity     Worry: Not on file     Inability: Not on file   ? Transportation needs     Medical: Not on file     Non-medical: Not on file   Tobacco Use   ? Smoking status: Never Smoker   ? Smokeless tobacco: Never Used   Substance and Sexual Activity   ? Alcohol use: No   ? Drug use: No   ? Sexual activity: Not on file   Lifestyle   ? Physical activity     Days per week: Not on file     Minutes per session: Not on file   ? Stress: Not on file   Relationships   ? Social connections     Talks on phone: Not on file     Gets together: Not on file     Attends [employer] service: Not on file     Active member of club or organization: Not on file     Attends meetings of clubs or organizations: Not on file     Relationship status: Not on file   ? Intimate partner violence     Fear of current or ex partner: Not on file     Emotionally abused: Not on file     Physically abused: Not on file     Forced sexual activity: Not on file   Other Topics Concern   ? Not on file   Social History Narrative    Spends 6 months in [place].     . No children. Family in the [place] area.     Enjoys walking- very regular with this especially in [place].     Likes reading - romantic happy endings.     Never alcohol or smoking.    Selina Pozo MD          Medications  Allergies   Current Outpatient Medications   Medication Sig Dispense Refill   ?  amiodarone (PACERONE) 200 MG tablet Take 1 tablet (200 mg total) by mouth daily. 30 tablet 6   ? apixaban ANTICOAGULANT (ELIQUIS) 5 mg Tab tablet Take 1 tablet (5 mg total) by mouth 2 (two) times a day. 60 tablet 3   ? aspirin 81 MG EC tablet Take 81 mg by mouth daily.     ? lisinopriL (PRINIVIL,ZESTRIL) 5 MG tablet Take 1 tablet (5 mg total) by mouth daily. 90 tablet 3   ? metoprolol succinate (TOPROL-XL) 50 MG 24 hr tablet Take 1 tablet (50 mg total) by mouth daily. 30 tablet 6   ? multivitamin therapeutic (THERAGRAN) tablet Take 0.5 tablets by mouth daily.      ? rosuvastatin (CRESTOR) 20 MG tablet TAKE 1 TABLET (20 MG TOTAL) BY MOUTH AT BEDTIME. (Patient taking differently: Take 20 mg by mouth every morning. ) 90 tablet 3     No current facility-administered medications for this visit.     Allergies   Allergen Reactions   ? Penicillins Hives         Lab Results    Chemistry/lipid CBC Cardiac Enzymes/BNP/TSH/INR   Lab Results   Component Value Date    CHOL 193 11/25/2019    HDL 63 11/25/2019    LDLCALC 112 11/25/2019    TRIG 89 11/25/2019    CREATININE 0.86 06/18/2020    BUN 10 06/18/2020    K 4.1 06/20/2020     06/18/2020     06/18/2020    CO2 22 06/18/2020    Lab Results   Component Value Date    WBC 8.1 06/17/2020    HGB 13.5 06/19/2020    HCT 40.4 06/17/2020    MCV 99 06/17/2020     06/19/2020    Lab Results   Component Value Date    CKTOTAL 125 06/10/2015    TROPONINI <0.01 06/17/2020     (H) 06/17/2020    TSH 8.40 (H) 06/16/2020        Jocelyn Kaur

## 2021-06-29 NOTE — PROGRESS NOTES
Progress Notes by Adolfo Garner MD at 7/2/2020  9:50 AM     Author: Adolfo Garner MD Service: -- Author Type: Physician    Filed: 7/2/2020 10:27 AM Encounter Date: 7/2/2020 Status: Signed    : Adolfo Garner MD (Physician)           Click to link to [facility]     [facility] HEART CARE NOTE       Assessment/Plan:   1.  Paroxysmal atrial fibrillation: The patient was in atrial fibrillation with RVR with aberrant conduction which was reversed by amiodarone therapy.  Currently she is on amiodarone 200 mg 3 times a day.  Her loading dose is more than 5 g so far.  Amiodarone is changed to 200 mg daily today.  Her heart rate is around 80 bpm, blood pressure mildly high.  Increase metoprolol succinate from 25 mg to 50 mg daily.  Continue chronic anticoagulation Eliquis 5 mg twice a day.    2.  Coronary artery disease: She had a coronary angiogram during hospitalization.  She has no obstructive coronary artery disease.  Continue aspirin, Lipitor and metoprolol.    3.  Mild to moderate valvular heart disease: She has moderate tricuspid regurgitation and mild to moderate mitral valve regurgitation.  She was evaluated by CV surgery.  She does not need a surgical intervention.  The patient has a normal heart function.  Continue to observe.    4.  Essential hypertension: Her blood pressure is mildly high.  Increased metoprolol succinate from 25 to 50 mg daily, continue with lisinopril 5 mg daily.    5.  Dyslipidemia: Continue Lipitor 20 mg at bedtime.    Thank you for the opportunity to be involved in the care of Kelly Robledo. If you have any questions, please feel free to contact me.  I will see the patient again in 3 months and as needed.    Much or all of the text in this note was generated through the use of Dragon Dictate voice-to-text software. Errors in spelling or words which seem out of context are unintentional.   Sound alike errors, in particular, may have escaped editing.       History of Present  Illness:   It is my pleasure to see Kelly Robledo at the [facility] clinic for evaluation of Consult.  Kelly Robledo is a 74 y.o. female with a medical history of paroxysmal atrial fibrillation, mild to moderate valvular heart disease, moderate coronary artery disease, anxiety, hyperlipidemia and hypertension.    The patient was admitted to hospital with atrial fibrillation with rapid ventricular response with aberrant conduction few weeks ago.  She was discharged on June 20, 2020.  The patient states that she has been doing quite well since hospital discharge.    She denies any chest pain, shortness of breath, palpitations, dizziness, orthopnea, PND or leg edema.  She become more energetic.  Her blood pressure still mildly high today.  Heart rate is around 80 bpm.  She had no side effects from her current medications.    Past Medical History:     Patient Active Problem List   Diagnosis   ? Hyperlipidemia   ? Moderate mitral regurgitation   ? Cardiac arrhythmia, unspecified cardiac arrhythmia type   ? Irritable Bowel Syndrome   ? Osteoporosis Senile   ? Palpitations   ? NSVT (nonsustained ventricular tachycardia) (H)   ? Palpitation   ? Wide-complex tachycardia (H)   ? Mitral valve prolapse   ? Atrial fibrillation with rapid ventricular response (H)   ? Nonrheumatic mitral valve regurgitation   ? Coronary artery disease involving native coronary artery of native heart without angina pectoris   ? Adjustment disorder with anxious mood   ? Anxiety   ? Tricuspid valve regurgitation, nonrheumatic       Past Surgical History:     Past Surgical History:   Procedure Laterality Date   ? BREAST BIOPSY Bilateral     benign   ? CV CORONARY ANGIOGRAM N/A 6/18/2020    Procedure: Coronary Angiogram;  Surgeon: Sergey Conner MD;  Location: [facility] Cath Lab;  Service: Cardiology   ? CV LEFT HEART CATHETERIZATION WO LEFT VETRICULOGRAM Left 6/18/2020    Procedure: Left Heart Catheterization Without Left  "Ventriculogram;  Surgeon: Sergey Conner MD;  Location: [facility] Cath Lab;  Service: Cardiology   ? CV RIGHT HEART CATH N/A 6/18/2020    Procedure: Right Heart Catheterization;  Surgeon: Sergey Conner MD;  Location: [facility] Cath Lab;  Service: Cardiology   ? HYSTERECTOMY  1970's   ? OOPHORECTOMY Bilateral 1970's       Family History:     Family History   Problem Relation Age of Onset   ? Breast cancer Sister 47        Social History:    reports that she has never smoked. She has never used smokeless tobacco. She reports that she does not drink alcohol or use drugs.    Review of Systems:   General: WNL  Eyes: WNL  Ears/Nose/Throat: WNL  Lungs: WNL  Heart: WNL  Stomach: WNL  Bladder: WNL  Muscle/Joints: WNL  Skin: WNL  Nervous System: WNL  Mental Health: WNL     Blood: WNL    Meds:     Current Outpatient Medications:   ?  amiodarone (PACERONE) 200 MG tablet, Take 1 tablet (200 mg total) by mouth daily., Disp: 30 tablet, Rfl: 6  ?  apixaban ANTICOAGULANT (ELIQUIS) 5 mg Tab tablet, Take 1 tablet (5 mg total) by mouth 2 (two) times a day., Disp: 60 tablet, Rfl: 3  ?  aspirin 81 MG EC tablet, Take 81 mg by mouth daily., Disp: , Rfl:   ?  lisinopriL (PRINIVIL,ZESTRIL) 5 MG tablet, Take 1 tablet (5 mg total) by mouth daily., Disp: 90 tablet, Rfl: 3  ?  metoprolol succinate (TOPROL-XL) 50 MG 24 hr tablet, Take 1 tablet (50 mg total) by mouth daily., Disp: 30 tablet, Rfl: 6  ?  multivitamin therapeutic (THERAGRAN) tablet, Take 0.5 tablets by mouth daily. , Disp: , Rfl:   ?  rosuvastatin (CRESTOR) 20 MG tablet, TAKE 1 TABLET (20 MG TOTAL) BY MOUTH AT BEDTIME. (Patient taking differently: Take 20 mg by mouth every morning. ), Disp: 90 tablet, Rfl: 3    Allergies:   Penicillins      Objective:      Physical Exam  114 lb 6.4 oz (51.9 kg)  5' 5\" (1.651 m)  Body mass index is 19.04 kg/m .  /80 (Patient Site: Right Arm, Patient Position: Sitting, Cuff Size: Adult Regular)   Pulse 80   Resp 12   Ht " "5' 5\" (1.651 m)   Wt 114 lb 6.4 oz (51.9 kg)   BMI 19.04 kg/m      General Appearance:   Awake, Alert, No acute distress.   HEENT:  Pupil equal and reactive to light. No scleral icterus; the mucous membranes were moist.   Neck: No cervical bruits. No JVD. No thyromegaly.     Chest: The spine was straight. The chest was symmetric.   Lungs:   Mildly diminished breathing sounds. No crackles. No wheezing.   Cardiovascular:   Regular rhythm and rate, normal first and second heart sounds with I/VI systolic murmurs. No rubs or gallops.    Abdomen:  Soft. No tenderness. Non-distended. Bowels sounds are present   Extremities: Equal tibial pulses. No leg edema.   Skin: No rashes or ulcers. Warm, Dry.   Musculoskeletal: No tenderness. No deformity.   Neurologic: Mood and affect are appropriate. No focal deficits.         EKG: Personally reviewed  Atrial fibrillation with rapid ventricular response with premature ventricular or aberrantly conducted complexes   Left axis deviation   Right bundle branch block   Possible Lateral infarct (cited on or before 17-JUN-2020)   Inferior infarct (cited on or before 17-JUN-2020)   Abnormal ECG   When compared with ECG of 17-JUN-2020 15:41,   Previous ECG has undetermined rhythm, needs review   Serial changes of Lateral infarct Present     Cardiac Imaging Studies  ALEXANDR on 6-:    Left ventricle ejection fraction is normal.    Normal right ventricular size and systolic function.    Moderate tricuspid valve regurgitation. Moderate pulmonary hypertension present.    Mild to moderate mitral regurgitation.    L and R heart cath on 6-:  RHC via right IJ  RA mean 11mmHg  PA mean 43  PCWP mean 31mmHg  LVEDP 26mmHg  CO by thermodilution 3   Angiography via left radial  LM ostial 40%  LAD mid 50%  Ramus prox 50%  Circ min dz  RCA normal    Lab Review   Lab Results   Component Value Date     06/18/2020    K 4.1 06/20/2020     06/18/2020    CO2 22 06/18/2020    BUN 10 " 06/18/2020    CREATININE 0.86 06/18/2020    CALCIUM 8.7 06/18/2020     Lab Results   Component Value Date    WBC 8.1 06/17/2020    HGB 13.5 06/19/2020    HCT 40.4 06/17/2020    MCV 99 06/17/2020     06/19/2020     Lab Results   Component Value Date    CHOL 193 11/25/2019    CHOL 191 12/27/2018    CHOL 211 (H) 06/06/2018     Lab Results   Component Value Date    HDL 63 11/25/2019    HDL 63 12/27/2018    HDL 64 06/06/2018     Lab Results   Component Value Date    LDLCALC 112 11/25/2019    LDLCALC 112 12/27/2018    LDLCALC 134 (H) 06/06/2018     Lab Results   Component Value Date    TRIG 89 11/25/2019    TRIG 82 12/27/2018    TRIG 65 06/06/2018     No components found for: CHOLHDL  Lab Results   Component Value Date    TROPONINI <0.01 06/17/2020     Lab Results   Component Value Date     (H) 06/17/2020     Lab Results   Component Value Date    TSH 8.40 (H) 06/16/2020

## 2021-06-29 NOTE — PROGRESS NOTES
Progress Notes by Dayne Kim MD at 10/26/2020  9:30 AM     Author: Dayne Kim MD Service: -- Author Type: Physician    Filed: 10/26/2020 10:06 AM Encounter Date: 10/26/2020 Status: Signed    : Dayne Kim MD (Physician)             [facility] Progress Note    Assessment:  1.  Paroxysmal atrial fibrillation with aberrant conduction with subsequent treatment with amiodarone.  Currently taking 200 mg daily.  She is not reporting current symptoms of palpitations.  She has been maintained on Eliquis anticoagulation.  We talked about the potential side effects of amiodarone and whether we would consider further discussion with EP regarding possible ablation.  At this point she was reluctant to consider and will plan to discuss further with my EP colleague pending results and the plan to monitor.  We are going to follow-up TSH which was elevated 1 month ago.    2.  Coronary artery disease.  Coronary angiogram from June 2020 revealed moderate coronary artery disease.  We are planning follow-up lipids on current dosing of rosuvastatin.  Troponins were negative during the recent admission.    3.  Valvular heart disease.  Moderate mitral regurgitation and moderate to severe tricuspid regurgitation on prior echocardiography.  History done by report of mitral valve prolapse.  Transesophageal echocardiogram from June 2020 reported mild to moderate mitral regurgitation.  Given the recent admission for edema planning to repeat echocardiogram.    4.  Heart failure with preserved ejection fraction.  It is noted that the blood pressure was quite high when she was hospitalized.  She has been on furosemide in addition to her current combination of medications.  Laboratory results from October 2020 were reviewed with the BNP was moderately elevated at 344.  She is going to monitor and report her blood pressure.  She is going to monitor and report her weights.      Plan: Plans as outlined  below with follow-up in 3 months.  Patient will update us with any symptoms and blood pressure monitoring.    1. Tricuspid insufficiency  Echo Complete   2. CAD (coronary artery disease)  Lipid Profile   3. Hypothyroidism  Thyroid Stimulating Hormone (TSH)   4. Palpitations  JESUS Monitor Hook-Up   5. Mixed hyperlipidemia     6. Coronary artery disease involving native coronary artery of native heart without angina pectoris     7. Nonrheumatic mitral valve regurgitation     8. Tricuspid valve regurgitation, nonrheumatic     9. MVP (mitral valve prolapse)     10. Paroxysmal atrial fibrillation (H)           An After Visit Summary was printed and given to the patient.    Subjective:    Kelly Rolbedo is a 74 y.o. female who returned for a planned  follow up visit.The patient was admitted to hospital with atrial fibrillation with rapid ventricular response with aberrant conduction f.  She was discharged on June 20, 2020 after consultation initiation of amiodarone.  She had moderate disease on coronary angiography...  She was seen by Dr. Del Rosario June 2019 with complaints of brief episodes of palpitations that are sporadic and were noted to be a little bit more prominent when he last visited with her.  He reported in his note that mitral regurgitation has been present for a number of years it estimated to be moderate.  There was reported no significant change in echocardiograms in 2012, 2013, 2014, 2015, 2017 and 2019.     Patient daniel in [place] and was seen in [place] for palpitations.  She was changed from pindolol to metoprolol and indicated that the palpitations have become more frequent.  She was seen in the emergency room in [place].  Metoprolol was increased to 150 mg daily.  ZIO report indicates short episodes of SVT with no high-grade AV block..    She was hospitalized in September 2020 for pulmonary edema and did require higher levels of oxygen for short period of time.  She was seen by my colleague   Ramona.  She improved with diuretics.  It is noted that she was markedly hypertensive when she came to the ER.  She reports that in the interim she has been feeling well.  She denies current complaints of palpitations, chest pain, orthopnea, PND.  She states that her weights have been stable and she monitors her salt in her diet.  She does monitor her blood pressure and states that typically is in the 1 30-1 40 range although did not bring blood pressure readings for my review today which she will do.    Review of Systems:   General: WNL  Eyes: WNL  Ears/Nose/Throat: WNL  Lungs: WNL  Heart: WNL  Stomach: WNL  Bladder: WNL  Muscle/Joints: WNL  Skin: WNL  Nervous System: WNL  Mental Health: WNL     Blood: Easy Bruising      Problem List:    Patient Active Problem List   Diagnosis   ? Hyperlipidemia   ? Irritable bowel syndrome   ? Osteoporosis Senile   ? Mitral valve prolapse   ? Nonrheumatic mitral valve regurgitation   ? Coronary artery disease involving native coronary artery of native heart without angina pectoris   ? Adjustment disorder with anxious mood   ? Anxiety   ? Tricuspid valve regurgitation, nonrheumatic   ? Essential hypertension   ? Paroxysmal atrial fibrillation (H)   ? MVP (mitral valve prolapse)   ? Stage 3a chronic kidney disease   ? Acute heart failure with preserved ejection fraction (H)       Social History     Socioeconomic History   ? Marital status:      Spouse name: Not on file   ? Number of children: Not on file   ? Years of education: Not on file   ? Highest education level: Not on file   Occupational History   ? Not on file   Social Needs   ? Financial resource strain: Not on file   ? Food insecurity     Worry: Not on file     Inability: Not on file   ? Transportation needs     Medical: Not on file     Non-medical: Not on file   Tobacco Use   ? Smoking status: Never Smoker   ? Smokeless tobacco: Never Used   Substance and Sexual Activity   ? Alcohol use: No   ? Drug use: No   ?  Sexual activity: Not on file   Lifestyle   ? Physical activity     Days per week: Not on file     Minutes per session: Not on file   ? Stress: Not on file   Relationships   ? Social connections     Talks on phone: Not on file     Gets together: Not on file     Attends [employer] service: Not on file     Active member of club or organization: Not on file     Attends meetings of clubs or organizations: Not on file     Relationship status: Not on file   ? Intimate partner violence     Fear of current or ex partner: Not on file     Emotionally abused: Not on file     Physically abused: Not on file     Forced sexual activity: Not on file   Other Topics Concern   ? Not on file   Social History Narrative    Spends 6 months in [place].     . No children. Family in the [place] area.     Enjoys walking- very regular with this especially in [place].     Likes reading - romantic happy endings.     Never alcohol or smoking.    Selina Pozo MD       Family History   Problem Relation Age of Onset   ? Breast cancer Sister 47       Current Outpatient Medications   Medication Sig Dispense Refill   ? amiodarone (PACERONE) 200 MG tablet Take 1 tablet (200 mg total) by mouth at bedtime. 90 tablet 3   ? apixaban ANTICOAGULANT (ELIQUIS) 5 mg Tab tablet Take 1 tablet (5 mg total) by mouth 2 (two) times a day. 60 tablet 3   ? furosemide (LASIX) 20 MG tablet Take 1 tablet (20 mg total) by mouth daily. 90 tablet 0   ? lisinopriL (PRINIVIL,ZESTRIL) 5 MG tablet Take 1 tablet (5 mg total) by mouth daily. 90 tablet 3   ? metoprolol succinate (TOPROL-XL) 50 MG 24 hr tablet Take 1 tablet (50 mg total) by mouth daily. 30 tablet 6   ? multivitamin therapeutic (THERAGRAN) tablet Take 0.5 tablets by mouth daily.      ? rosuvastatin (CRESTOR) 20 MG tablet Take 20 mg by mouth at bedtime.       No current facility-administered medications for this visit.        Objective:     /80 (Patient Site: Left Arm, Patient Position: Sitting, Cuff Size: Adult  "Regular)   Pulse 68   Resp 16   Ht 5' 5\" (1.651 m)   Wt 112 lb (50.8 kg)   BMI 18.64 kg/m    112 lb (50.8 kg)   [unfilled]  Wt Readings from Last 3 Encounters:   10/26/20 112 lb (50.8 kg)   10/14/20 107 lb (48.5 kg)   20 111 lb 14.4 oz (50.8 kg)   142/80    Physical Exam:    GENERAL APPEARANCE: alert, no apparent distress  HEENT: no scleral icterus or xanthelasma  NECK: jugular venous pressure within normal limits  CHEST: symmetric, the lungs are clear to auscultation  CARDIOVASCULAR: regular rhythm with 1/6 to 2/6 systolic murmur; no carotid bruits  Abdomen: No Organomegaly, masses, bruits, or tenderness. Bowels sounds are present      EXTREMITIES: no cyanosis, clubbing or edema    Cardiac Testing:  Echo Transesophageal  Order# [id number]  Reading physician: Thomas Moreno MD Ordering physician: Sergey Conner MD Study date: 20   Performing Date Performing Department   2020  CARDIAC TESTING [[id number]]   Patient Information    Patient Name   Kelly Robledo MRN   [id number] Sex   Female  1   1945 (74 y.o.)   Indications    Mitral regurgitation   Summary      Left ventricle ejection fraction is normal.    Normal right ventricular size and systolic function.    Moderate tricuspid valve regurgitation. Moderate pulmonary hypertension present.    Mild to moderate mitral regurgitation.           Reviewed By    Selina Pozo MD on 2020 17:17   Echo Complete  Order# [id number]  Reading physician: Kelly Wheatley MD Ordering physician: Marimar Odonnell MD Study date: 20   Performing Date Performing Department   2020  CARDIAC TESTING [[id number]]   Patient Information    Patient Name   Kelly Robledo MRN   [id number] Sex   Female  1   1945 (74 y.o.)   Indications    Abnormal ECG   Summary      1.Left ventricle ejection fraction is normal. The estimated left ventricular ejection fraction is 60%.    2.Normal right ventricular size and " systolic function.    3.Significant left atrial chamber enlargement.    4.Estimated central venous pressure equal to 8 mmHg.    5.Moderate to severe tricuspid regurgitation, moderate mitral regurgitation based on peak regurgitant velocity of 5.7 m/s.    6.When compared to the previous study dated 2009, atrial fibrillation is new, left atrium has significantly enlarged, and mitral and tricuspid insufficiencies appear to be less.        Reviewed By    Sage Jimenez MD on 2020 11:46   Cardiac Catheterization  Order# [id number]  Reading physician: Sergey Conner MD Ordering physician: Sage Jimenez MD Study date: 20   Patient Information    Patient Name   Kelly Robledo MRN   [id number] Sex   Female  1   1945 (74 y.o.)   Physicians    Panel Physicians Referring Physician Case Authorizing Physician   Sergey Conner MD (Primary) Sage Jimenez MD Charaf, Edriss, MD    PCP    Primary Care Provider   Selina Pozo MD   Phone: [phone]      Procedures    Coronary Angiogram   Left Heart Catheterization Without Left Ventriculogram   Right Heart Catheterization   Panel Information    Panel 1    Provider Role   Sergey Conner MD Primary    Procedure Laterality Anesthesia   Coronary Angiogram N/A Conscious Sedation (RN)   Left Heart Catheterization Without Left Ventriculogram Left Conscious Sedation (RN)   Right Heart Catheterization N/A Conscious Sedation (RN)         Pre Procedure Diagnosis    Pulmonary hypertension, severe TR, moderate to severe MR.  Wide-complex tachycardia rule out flow-limiting CAD.  Thank you   Indications    NSVT (nonsustained ventricular tachycardia) (H) [I47.2 (ICD-10-CM)]   Nonrheumatic mitral valve regurgitation [I34.0 (ICD-10-CM)]    Procedure Status    Procedure scheduled as Urgent (Inpatient).         Conclusion         Ost LM lesion is 35% stenosed.    Mid LAD lesion is 55% stenosed.    Ramus lesion is 55% stenosed.    The left  circumflex was moderate.    The RCA was moderate and is considered normal.     Pt with hx of dyspnea and pulm HTN with MR on TTE with afib with RVR     RHC via right IJ  RA mean 11mmHg  PA mean 43  PCWP mean 31mmHg  LVEDP 26mmHg     CO by thermodilution 3     Angiography via left radial  LM ostial 40%  LAD mid 50%  Ramus prox 50%  Circ min dz  RCA normal     Imp/plan  1. Heart failure with pulm HTN due to MR and afib with RVR complicated by moderate disease of LAD/Ramus - will order ALEXANDR tomorrow and consult CT surgery for possible MVR and CABG.  Restart heparin 2 hours post sheath removal no bolus given afib.        INTERPRETATION:  Predominant rhythm is sinus rhythm with rates ranging from 53 beats  per minute to 81 beats per minute.  There was no significant bradycardia or pauses.   Rare atrial premature beats were noted, only 2/24 hours.  Rare ventricular premature  beats were noted, only 4/24 hours.  There was one short run of accelerated  idioventricular rhythm, rate approximately 95 beats per minute for 4 beats.  No  symptoms were reported in the diary.     CONCLUSION:  Normal Holter.        BLAKE CONKLIN MD  sn  D 08/28/2020 13:41:43  T 08/28/2020 14:22:06  R 08/28/2020 14:22:06  [id number]    ECG September 23, 2020 sinus rhythm, nonspecific ST changes compared to June 2020 sinus rhythm has replaced atrial fibrillation with right bundle branch block    Lab Results:    Lab Results   Component Value Date     10/14/2020    K 4.1 10/14/2020     10/14/2020    CO2 27 10/14/2020    BUN 23 10/14/2020    CREATININE 1.36 (H) 10/14/2020    CALCIUM 9.2 10/14/2020     Lab Results   Component Value Date    CHOL 193 11/25/2019    TRIG 89 11/25/2019    HDL 63 11/25/2019    LDLDIRECT 131 (H) 06/05/2013     BNP (pg/mL)   Date Value   10/14/2020 344 (H)   09/23/2020 624 (H)   06/17/2020 679 (H)     Creatinine (mg/dL)   Date Value   10/14/2020 1.36 (H)   09/29/2020 1.36 (H)   09/24/2020 1.27 (H)   09/23/2020 1.60 (H)      LDL Calculated (mg/dL)   Date Value   11/25/2019 112   12/27/2018 112   06/06/2018 134 (H)     Lab Results   Component Value Date    WBC 9.0 09/24/2020    HGB 13.5 09/24/2020    HCT 40.5 09/24/2020    MCV 97 09/24/2020     09/24/2020         This note has been dictated using voice recognition software. Any grammatical or context distortions are unintentional and inherent to the software.

## 2021-06-30 NOTE — PROGRESS NOTES
Progress Notes by Dayne Kim MD at 3/1/2021  9:50 AM     Author: Dayne Kim MD Service: -- Author Type: Physician    Filed: 3/1/2021 10:25 AM Encounter Date: 3/1/2021 Status: Signed    : Dayne Kim MD (Physician)             [facility] [place] Progress Note    Assessment:  1.Mitral valve prolapse, severe MR - S/P mitral valve clip implantation on February 1.  Echocardiogram results from [facility] post procedure reviewed and outlined below.  Plan for follow-up echocardiogram within next week or so and will review.  2.  Paroxysmal atrial fibrillation with aberrant conduction with subsequent treatment with amiodarone.  Patient has had no clinical documentation of recurrence of atrial fibrillation.  The most recent EKG from our office is reviewed and have requested the EKGs from the [facility].  Her TSH has been mildly elevated and I am going to correspond with Dr. Pozo.    3.  Coronary artery disease.  The most recent angiogram results are reviewed where she had mild to moderate disease January 2021.  Ongoing aggressive risk factor modification.  Her lipids were not at goal December 2020.  We are going to plan follow-up lipids and will comment once this is available for review.    4.  Hypertension.  Her blood pressure is high today.  She reports that typically her blood pressures are 130/70 and she checks her blood pressure regularly at home.  She will get some additional blood pressure readings for our review.    Plan: As outlined above we will plan follow-up lipids in follow-up in 3 months.  Await the echocardiogram.    1. S/P mitral valve clip implantation     2. Paroxysmal atrial fibrillation (H)     3. Mitral valve prolapse     4. Mixed hyperlipidemia     5. CAD (coronary artery disease)           An After Visit Summary was printed and given to the patient.    Subjective:    Kelly Robledo is a 75 y.o. female who returned for a planned   follow up visit.Patient with history of MVP and MR who was admitted to hospital in June 2020 with tachypalpitations.  She was diagnosed with atrial fibrillation and was started on Amiodarone and Eliquis.  Coronary angiogram at that time showed moderate CAD and patient was started on beta-blocker.  She was admitted again in September with shortness of breath and edema.  Repeat ALEXANDR showed progression of her MR and she was sent for evaluation for possible MitraClip implantation.      She underwent MitraClip implantation at [facility] on February 1.  Patient did well and was sent home the next day.  She reports feeling very well.  She has good energy and reports no complaints of shortness of breath, dizziness, palpitation, orthopnea or PND.  She would like to travel to [place] for a few months.    I have reviewed notes from recent procedure and the echocardiogram with an upcoming echocardiogram planned.  Her blood pressure runs high in the office but she tells me that blood pressures have been really good at home.  She states that typically her blood pressures are in the 130/70 range.    Review of Systems:   General: WNL  Eyes: WNL  Ears/Nose/Throat: WNL  Lungs: WNL  Heart: WNL  Stomach: WNL  Bladder: WNL  Muscle/Joints: WNL  Skin: WNL  Nervous System: WNL  Mental Health: WNL     Blood: WNL      Problem List:    Patient Active Problem List   Diagnosis   ? Hyperlipidemia   ? Irritable bowel syndrome   ? Osteoporosis Senile   ? Mitral valve prolapse   ? Nonrheumatic mitral valve regurgitation   ? Coronary artery disease involving native coronary artery of native heart without angina pectoris   ? Adjustment disorder with anxious mood   ? Anxiety   ? Tricuspid valve regurgitation, nonrheumatic   ? Essential hypertension   ? Paroxysmal atrial fibrillation (H)   ? Stage 3a chronic kidney disease   ? Subclinical hypothyroidism   ? CAD (coronary artery disease)   ? S/P mitral valve clip implantation       Social History      Socioeconomic History   ? Marital status:      Spouse name: Not on file   ? Number of children: Not on file   ? Years of education: Not on file   ? Highest education level: Not on file   Occupational History   ? Not on file   Social Needs   ? Financial resource strain: Not on file   ? Food insecurity     Worry: Not on file     Inability: Not on file   ? Transportation needs     Medical: Not on file     Non-medical: Not on file   Tobacco Use   ? Smoking status: Never Smoker   ? Smokeless tobacco: Never Used   Substance and Sexual Activity   ? Alcohol use: No   ? Drug use: No   ? Sexual activity: Not on file   Lifestyle   ? Physical activity     Days per week: Not on file     Minutes per session: Not on file   ? Stress: Not on file   Relationships   ? Social connections     Talks on phone: Not on file     Gets together: Not on file     Attends Jewish service: Not on file     Active member of club or organization: Not on file     Attends meetings of clubs or organizations: Not on file     Relationship status: Not on file   ? Intimate partner violence     Fear of current or ex partner: Not on file     Emotionally abused: Not on file     Physically abused: Not on file     Forced sexual activity: Not on file   Other Topics Concern   ? Not on file   Social History Narrative    Spends 6 months in [place].     . No children. Family in the [place] area.     Enjoys walking- very regular with this especially in [place].     Likes reading - romantic happy endings.     Never alcohol or smoking.    Selina Pozo MD       Family History   Problem Relation Age of Onset   ? Breast cancer Sister 47       Current Outpatient Medications   Medication Sig Dispense Refill   ? amiodarone (PACERONE) 200 MG tablet Take 1 tablet (200 mg total) by mouth at bedtime. 90 tablet 3   ? aspirin 81 MG EC tablet Take 81 mg by mouth.     ? ELIQUIS 5 mg Tab tablet TAKE 1 TABLET BY MOUTH TWICE A DAY 60 tablet 3   ? furosemide (LASIX) 20 MG  "tablet Take 1 tablet (20 mg total) by mouth daily. 90 tablet 0   ? lisinopriL (PRINIVIL,ZESTRIL) 5 MG tablet Take 1 tablet (5 mg total) by mouth daily. 90 tablet 3   ? metoprolol succinate (TOPROL-XL) 50 MG 24 hr tablet Take 1 tablet (50 mg total) by mouth daily. 30 tablet 6   ? multivitamin therapeutic (THERAGRAN) tablet Take 0.5 tablets by mouth daily.      ? rosuvastatin (CRESTOR) 20 MG tablet Take 20 mg by mouth at bedtime.       No current facility-administered medications for this visit.        Objective:     /90 (Patient Site: Left Arm, Patient Position: Sitting, Cuff Size: Adult Regular)   Pulse 80   Resp 16   Ht 5' 5.28\" (1.658 m)   Wt 112 lb 1.6 oz (50.8 kg)   BMI 18.49 kg/m    112 lb 1.6 oz (50.8 kg)   [unfilled]  Wt Readings from Last 3 Encounters:   21 112 lb 1.6 oz (50.8 kg)   21 113 lb 9.6 oz (51.5 kg)   21 115 lb 3.2 oz (52.3 kg)   150/86.    Physical Exam:    GENERAL APPEARANCE: alert, no apparent distress  HEENT: no scleral icterus or xanthelasma  NECK: jugular venous pressure within normal limits on today's examination  CHEST: symmetric, the lungs are clear to auscultation  CARDIOVASCULAR: regular rhythm with soft systolic murmur, soft diastolic rumble, S4; no carotid bruits  Abdomen: No Organomegaly, masses, bruits, or tenderness. Bowels sounds are present      EXTREMITIES: no cyanosis, clubbing or edema    Cardiac Testing:  Conclusions:  1. Severe symptomatic mitral regurgitation status post successful   transcatheter mitral valve repair w/ a MitraClip x1, delivered via the   right transfemoral approach.    2.  Trace mitral insufficiency post deployment with a mean gradient of   5mmHg.    Postprocedure patient status:  Patient planned to be extubated in the OR and then transferred to the PACU   for ongoing management.     Klaus Fitzpatrick MD     Name: ANTIONE MISHRA  MRN: [id number]  : 1945  Study Date: 2021 09:21 AM  Age: 75 yrs  Gender: " Female  [facility]  Reason For Study: Mitral Valve Repair  History: S/P Mitralclip  Ordering Physician: ERNESTO LEMUS  Performed By: Jonelle Rice RDCS    BSA: 1.5 m2  Height: 65 in  Weight: 111 lb  HR: 76  BP: 123/72 mmHg  _____________________________________________________________________________  __      Procedure  Complete Portable Echo Adult. Echocardiogram with two-dimensional, color and  spectral Doppler performed.  _____________________________________________________________________________  __      Interpretation Summary  Global and regional left ventricular function is normal with an EF of 60-65%.  Global right ventricular function is normal.  Post MitraClip placement with mild to moderate residual mitral regurgitation  present. The mean gradient across the mitral valve is 4 mmHg.  Mild to moderate tricuspid insufficiency is present.  Left to right atrial shunt seen at the atrial level (from recent septal  puncture).  The inferior vena cava was normal in size with preserved respiratory  variability.  No pericardial effusion is present.  _____________________________________________________________________________     Cardiac Catheterization  Order# [id number]  Reading physician: Klaus Fitzpatrick MD Ordering physician: Klaus Fitzpatrick MD Study date: 21   Patient Information    Patient Name   Kelly Robledo MRN   [id number] Sex   Female  1   1945 (75 y.o.)   Physicians    Panel Physicians Referring Physician Case Authorizing Physician   Klaus Fitzpatrick MD (Primary) Klaus Fitzpatrick MD Ahmed, Mudassar, MD    PCP    Primary Care Provider   Selina Pozo MD   Phone: [phone]      Procedures    Coronary Angiogram   Right Heart Catheterization   Panel Information    Panel 1    Provider Role   Klaus Fitzpatrick MD Primary    Procedure Laterality Anesthesia   Coronary Angiogram N/A Conscious Sedation (RN)   Right Heart Catheterization N/A Conscious Sedation (RN)          Indications    Coronary artery disease involving native coronary artery of native heart, angina presence unspecified [I25.10 (ICD-10-CM)]   Nonrheumatic mitral valve regurgitation [I34.0 (ICD-10-CM)]    Procedure Status    Procedure scheduled as Elective (Outpatient).         Conclusion    76 yo F with recent diagnosis of moderate to severe mitral regurgitation, causing increasing shortness of breath, as well as a recent hospitalization with congestive heart failure.  Being evaluated for transcatheter mitral valve repair using MitraClip, and here for preprocedure coronary angiography.     Coronary geography today showed:     Left main with 30% ostial narrowing  LAD with mild to moderate diffuse disease, and a 50% stenosis in the midportion, that appears unchanged from her prior angiogram  Ramus with a 50% proximal to mid stenosis and mild disease distally  LCx is a small vessel with no significant obtuse marginals noted  RCA is a large caliber, dominant vessel supplying the inferior and posterior walls.  There is 40% mid to distal focal stenosis, also unchanged from prior.        Right heart catheterization showed:  ? PA 50/16  (36)  ? RA mean  7  ? PCW  16 /43  (26) v waves of 45 mmHg  ? AO  164/62  (107)  ? RV  57/-6,  6     LVEDP 15 mmHg     Given that her symptoms have progressed over the past 6 months, with no change in her coronary anatomy, and clear worsening of her mitral regurgitation, she will benefit from mitral valve repair with MitraClip.  Her hemodynamics today also confirmed the presence of severe mitral regurgitation, with large V waves noted on her pulmonary wedge tracing.        Cardiac Catheterization  Order# [id number]  Reading physician: Klaus Fitzpatrick MD Ordering physician: Klaus Fitzpatrick MD Study date: 21   Patient Information    Patient Name   Kelly Robledo MRN   [id number] Sex   Female  1   1945 (75 y.o.)   Physicians    Panel Physicians Referring Physician Case  Authorizing Physician   Klaus Fitzpatrick MD (Primary) Klaus Fitzpatrick MD Ahmed, Mudassar, MD    PCP    Primary Care Provider   Selina Pozo MD   Phone: [phone]      Procedures    Coronary Angiogram   Right Heart Catheterization   Panel Information    Panel 1    Provider Role   Klaus Fitzpatrick MD Primary    Procedure Laterality Anesthesia   Coronary Angiogram N/A Conscious Sedation (RN)   Right Heart Catheterization N/A Conscious Sedation (RN)         Indications    Coronary artery disease involving native coronary artery of native heart, angina presence unspecified [I25.10 (ICD-10-CM)]   Nonrheumatic mitral valve regurgitation [I34.0 (ICD-10-CM)]    Procedure Status    Procedure scheduled as Elective (Outpatient).         Conclusion    74 yo F with recent diagnosis of moderate to severe mitral regurgitation, causing increasing shortness of breath, as well as a recent hospitalization with congestive heart failure.  Being evaluated for transcatheter mitral valve repair using MitraClip, and here for preprocedure coronary angiography.     Coronary geography today showed:     Left main with 30% ostial narrowing  LAD with mild to moderate diffuse disease, and a 50% stenosis in the midportion, that appears unchanged from her prior angiogram  Ramus with a 50% proximal to mid stenosis and mild disease distally  LCx is a small vessel with no significant obtuse marginals noted  RCA is a large caliber, dominant vessel supplying the inferior and posterior walls.  There is 40% mid to distal focal stenosis, also unchanged from prior.        Right heart catheterization showed:  ? PA 50/16  (36)  ? RA mean  7  ? PCW  16 /43  (26) v waves of 45 mmHg  ? AO  164/62  (107)  ? RV  57/-6,  6     LVEDP 15 mmHg     Given that her symptoms have progressed over the past 6 months, with no change in her coronary anatomy, and clear worsening of her mitral regurgitation, she will benefit from mitral valve repair with  MitraClip.  Her hemodynamics today also confirmed the presence of severe mitral regurgitation, with large V waves noted on her pulmonary wedge tracing.     25-JAN-2021 13:54:48 M [address] ROUTINE RETRIEVAL  Sinus rhythm with 1st degree A-V block  Nonspecific ST abnormality  Abnormal ECG  When compared with ECG of 13-JAN-2021 07:58,  WY interval has increased  Confirmed by JOHANNE QUIROZ, MALIKA LOC:KENN ([id number]) on 1/25/2021 4:03:48 PM  25mm/s 10mm/mV 150Hz 9.0.10 12SL 239 CHARLES: 2  Referred by: LILY ROJAS Confirmed By: MALIKA LOC:KENN WHITING MD  Lab Results:    Lab Results   Component Value Date     01/25/2021    K 3.5 01/25/2021     01/25/2021    CO2 31 01/25/2021    BUN 21 01/25/2021    CREATININE 1.36 (H) 01/25/2021    CALCIUM 9.4 01/25/2021     Lab Results   Component Value Date    CHOL 230 (H) 12/03/2020    TRIG 118 12/03/2020    HDL 67 12/03/2020    LDLDIRECT 131 (H) 06/05/2013     BNP (pg/mL)   Date Value   01/25/2021 388 (H)   10/14/2020 344 (H)   09/23/2020 624 (H)     Creatinine (mg/dL)   Date Value   01/25/2021 1.36 (H)   01/13/2021 1.44 (H)   10/14/2020 1.36 (H)   09/29/2020 1.36 (H)     LDL Calculated (mg/dL)   Date Value   12/03/2020 139 (H)   11/25/2019 112   12/27/2018 112     Lab Results   Component Value Date    WBC 6.3 01/25/2021    HGB 12.7 01/25/2021    HCT 40.2 01/25/2021     (H) 01/25/2021     01/25/2021         This note has been dictated using voice recognition software. Any grammatical or context distortions are unintentional and inherent to the software.

## 2021-06-30 NOTE — PROGRESS NOTES
"Progress Notes by Klaus Fitzpatrick MD at 12/31/2020  8:50 AM     Author: Klaus Fitzpatrick MD Service: -- Author Type: Physician    Filed: 12/31/2020 10:11 AM Encounter Date: 12/31/2020 Status: Signed    : Klaus Fitzpatrick MD (Physician)           The patient has been notified of following:     \"This telephone visit will be conducted via a call between you and your physician/provider. We have found that certain health care needs can be provided without the need for a physical exam.  This service lets us provide the care you need with a phone conversation.  If a prescription is necessary we can send it directly to your pharmacy.  If lab work is needed we can place an order for that and you can then stop by our lab to have the test done at a later time. If during the course of the call the physician/provider feels a telephone visit is not appropriate, you will not be charged for this service.\" Verbal consent has been obtained for this service by care team member:         HEART CARE PHONE ENCOUNTER        The patient has chosen to have the visit conducted as a telephone visit, to reduce risk of exposure given the current status of Coronavirus in our community. This telephone visit is being conducted via a call between the patient and physician/provider. Health care needs are being provided without a physical exam.     Assessment/Recommendations   Assessment/Plan:      Severe mitral regurgitation: Given her progressive dyspnea on exertion as well as recent hospitalization for congestive heart failure, she will benefit from definitive repair of her mitral valve.  With her age, and mitral valve anatomy that may make surgical repair more challenging, transcatheter mitral valve repair as the first option would be preferable.    Her ALEXANDR from June 2020 was reviewed with Dr. Hoffman, and it is felt that her anatomy would be amenable to repair with MitraClip.    She would benefit from a repeat coronary angiogram, as her " study in June suggests 50 to 60% proximal to mid LAD stenosis, and will be useful to confirm that this is not progressed, given her increasing shortness of breath.    She also has moderate to severe pulmonary hypertension, and while that is likely to improve once her mitral valve is repaired, she may need closure of her iatrogenic ASD after her MitraClip if there is significant right-to-left shunting.          Follow Up Plan: Valve clinic will arrange a repeat coronary angiogram with right and left heart catheterization  I have reviewed the note as documented.  This accurately captures the substance of my conversation with the patient.    Total time of call between patient and provider was 13 minutes   Start Time:903   Stop Time:916       History of Present Illness/Subjective    Kelly Robledo is a 75 y.o. female who is being evaluated via a billable telephone visit.      Ms Robledo is a pleasant 75-year-old female who has been known to have moderate mitral vegetation for a number of years, and was recently hospitalized in September 2020 with congestive heart failure, manifest as pulmonary edema and increasing shortness of breath.  She had an echocardiogram shortly after that hospitalization, which showed progression of her mitral regurgitation to severe.  She states that she has noticed a decline in her functional capacity over the past 6 months with increasing dyspnea on exertion.  She did have a transesophageal echocardiogram in June 2020 as well, which also showed at least moderate, and likely moderate to severe mitral regurgitation.  The posterior leaflets appear tethered in that study, but had enough length for transcatheter repair.  She had a coronary angiogram at the time as well which showed moderate disease, as well as moderate to severe pulmonary hypertension.    I have reviewed and updated the patient's Past Medical History, Social History, Family History and Medication List.     Physical Examination not  performed given phone encounter Review of Systems                                                Medical History  Surgical History Family History Social History   Past Medical History:   Diagnosis Date   ? Abdominal pain    ? Atrial fibrillation (H)    ? Diverticulosis    ? Moderate mitral regurgitation    ? NSVT (nonsustained ventricular tachycardia) (H) 6/17/2020   ? Palpitations     Past Surgical History:   Procedure Laterality Date   ? BREAST BIOPSY Bilateral     benign   ? CV CORONARY ANGIOGRAM N/A 6/18/2020    Procedure: Coronary Angiogram;  Surgeon: Sergey Conner MD;  Location: [facility] Cath Lab;  Service: Cardiology   ? CV LEFT HEART CATHETERIZATION WO LEFT VETRICULOGRAM Left 6/18/2020    Procedure: Left Heart Catheterization Without Left Ventriculogram;  Surgeon: Sergey Conner MD;  Location: [facility] Cath Lab;  Service: Cardiology   ? CV RIGHT HEART CATH N/A 6/18/2020    Procedure: Right Heart Catheterization;  Surgeon: Sergey Conner MD;  Location: [facility] Cath Lab;  Service: Cardiology   ? HYSTERECTOMY  1970's   ? OOPHORECTOMY Bilateral 1970's    Family History   Problem Relation Age of Onset   ? Breast cancer Sister 47    Social History     Socioeconomic History   ? Marital status:      Spouse name: Not on file   ? Number of children: Not on file   ? Years of education: Not on file   ? Highest education level: Not on file   Occupational History   ? Not on file   Social Needs   ? Financial resource strain: Not on file   ? Food insecurity     Worry: Not on file     Inability: Not on file   ? Transportation needs     Medical: Not on file     Non-medical: Not on file   Tobacco Use   ? Smoking status: Never Smoker   ? Smokeless tobacco: Never Used   Substance and Sexual Activity   ? Alcohol use: No   ? Drug use: No   ? Sexual activity: Not on file   Lifestyle   ? Physical activity     Days per week: Not on file     Minutes per session: Not on file   ? Stress: Not  on file   Relationships   ? Social connections     Talks on phone: Not on file     Gets together: Not on file     Attends Spiritism service: Not on file     Active member of club or organization: Not on file     Attends meetings of clubs or organizations: Not on file     Relationship status: Not on file   ? Intimate partner violence     Fear of current or ex partner: Not on file     Emotionally abused: Not on file     Physically abused: Not on file     Forced sexual activity: Not on file   Other Topics Concern   ? Not on file   Social History Narrative    Spends 6 months in [place].     . No children. Family in the [place] area.     Enjoys walking- very regular with this especially in [place].     Likes reading - romantic happy endings.     Never alcohol or smoking.    Selina Pozo MD          Medications  Allergies   Current Outpatient Medications   Medication Sig Dispense Refill   ? amiodarone (PACERONE) 200 MG tablet Take 1 tablet (200 mg total) by mouth at bedtime. 90 tablet 3   ? ELIQUIS 5 mg Tab tablet TAKE 1 TABLET BY MOUTH TWICE A DAY 60 tablet 3   ? furosemide (LASIX) 20 MG tablet Take 1 tablet (20 mg total) by mouth daily. 90 tablet 0   ? lisinopriL (PRINIVIL,ZESTRIL) 5 MG tablet Take 1 tablet (5 mg total) by mouth daily. 90 tablet 3   ? metoprolol succinate (TOPROL-XL) 50 MG 24 hr tablet Take 1 tablet (50 mg total) by mouth daily. 30 tablet 6   ? multivitamin therapeutic (THERAGRAN) tablet Take 0.5 tablets by mouth daily.      ? rosuvastatin (CRESTOR) 20 MG tablet Take 20 mg by mouth at bedtime.       No current facility-administered medications for this visit.     Allergies   Allergen Reactions   ? Penicillins Hives         Lab Results    Chemistry/lipid CBC Cardiac Enzymes/BNP/TSH/INR   Lab Results   Component Value Date    CHOL 230 (H) 12/03/2020    HDL 67 12/03/2020    LDLCALC 139 (H) 12/03/2020    TRIG 118 12/03/2020    CREATININE 1.36 (H) 10/14/2020    BUN 23 10/14/2020    K 4.1 10/14/2020    NA  138 10/14/2020     10/14/2020    CO2 27 10/14/2020    Lab Results   Component Value Date    WBC 9.0 09/24/2020    HGB 13.5 09/24/2020    HCT 40.5 09/24/2020    MCV 97 09/24/2020     09/24/2020    Lab Results   Component Value Date    CKTOTAL 125 06/10/2015    TROPONINI 0.03 09/24/2020     (H) 10/14/2020    TSH 11.80 (H) 12/03/2020    INR 1.37 (H) 09/23/2020        Klaus Fitzpatrick MD

## 2021-06-30 NOTE — PROGRESS NOTES
Progress Notes by Tati Mejia PA-C at 2/11/2021  9:50 AM     Author: Tati Mejia PA-C Service: -- Author Type: Physician Assistant    Filed: 2/11/2021 10:38 AM Encounter Date: 2/11/2021 Status: Signed    : Tati Mejia PA-C (Physician Assistant)           Assessment/Recommendations   Diagnoses and all orders for this visit:    Mitral valve prolapse, severe MR - S/P mitral valve clip implantation on February 1.  Predischarge echocardiogram, with results below show only mild residual MR.  Patient feeling good.  Groin sites are healing nicely.  She can resume all activities at this time.  She was started on aspirin 81 mg daily and we will continue this indefinitely if tolerated.  She is scheduled for repeat echocardiogram on March 9 and will see me back on March 15 for her 1 month follow-up visit    She will see Dr. Kim in 6 months.    Paroxysmal atrial fibrillation (H) - started on Amiodarone and Eliquis in June and will continue these at current doses.  She remains rate controlled and has had no recent palpitations.     Chronic diastolic heart failure, NYHA class II - secondary to MVP.  Currently compensated. Continue current dose of lasix.      CAD - moderate by angiography.  Continue ASA, statin and beta-blocker     Essential hypertension - BP today controlled.  She will continue lasix 20 mg daily, lisinopril 5 mg daily and metoprolol succinate 50 mg daily.       Thank you for the opportunity to participate in the care of Kelly Robledo. It's been a pleasure working with her.  Please do not hesitate to call with any questions or concerns.       History of Present Illness/Subjective    I had the opportunity to see Kelly Robledo at the [facility] for her 1 week follow up visit after undergoing MitraClip implantation.     Patient with history of MVP and MR who was admitted to hospital in June 2020 with tachypalpitations.  She was diagnosed with atrial  fibrillation and was started on Amiodarone and Eliquis.  Coronary angiogram at that time showed moderate CAD and patient was started on beta-blocker.  She was admitted again in September with shortness of breath and edema.  Repeat ALEXANDR showed progression of her MR and she was sent for evaluation for possible MitraClip implantation.     She underwent MitraClip implantation at [facility] on February 1.  Patient did well and was sent home the next day.  Since being home, patient is feeling great.  She has had no shortness of breath or chest pain.  She is dealing with some bruising now that she is on aspirin along with Eliquis, but the bruising is improving on a daily basis.  She has no pain in her incision sites.  Patient has had no dizziness or lightheadedness.  Weight has been stable.  She has no lower extremity edema.  She also reports no palpitations    ____________________________________________________________  Pre-discharge echo from Feb 2, 2021 (echo interpretation reviewed from [facility]):  Interpretation Summary  Global and regional left ventricular function is normal with an EF of 60-65%.  Global right ventricular function is normal.  Post MitraClip placement with mild to moderate residual mitral regurgitation  present. The mean gradient across the mitral valve is 4 mmHg.  Mild to moderate tricuspid insufficiency is present.  Left to right atrial shunt seen at the atrial level (from recent septal  puncture).  The inferior vena cava was normal in size with preserved respiratory  variability.  No pericardial effusion is present.     Physical Examination Review of Systems   Vitals:    02/11/21 0930   BP: 148/72   Pulse: 80   Resp: 16     Body mass index is 18.74 kg/m .  Wt Readings from Last 3 Encounters:   02/11/21 113 lb 9.6 oz (51.5 kg)   02/04/21 115 lb 3.2 oz (52.3 kg)   01/25/21 114 lb (51.7 kg)       General Appearance:   Alert, cooperative and in no acute distress   ENT/Mouth: membranes moist, no oral  lesions or bleeding gums.      EYES:  no scleral icterus, normal conjunctivae   Neck: Supple without lymphadenopathy.  Thyroid not visualized   Chest/Lungs:   lungs are clear to auscultation, no rales or wheezing   Cardiovascular:   Regular. Normal first and second heart sounds with no murmurs, rubs, or gallops; the carotid, radial and posterior tibial pulses are intact, no edema bilaterally    Abdomen:  Soft and nontender. Bowel sounds are present in all quadrants   Extremities: no cyanosis or clubbing.  Puncture site is soft and nontender.  She does have significant bruising in perineum area and bilateral forearms   Skin: no xanthelasma, warm.    Neurologic: normal gait, normal  bilateral, no tremors   Psychiatric: Normal mood and affect    General: WNL  Eyes: WNL  Ears/Nose/Throat: WNL  Lungs: WNL  Heart: WNL  Stomach: WNL  Bladder: WNL  Muscle/Joints: WNL  Skin: WNL  Nervous System: WNL  Mental Health: WNL     Blood: WNL     Medical History  Surgical History Family History Social History   Past Medical History:   Diagnosis Date   ? Abdominal pain    ? Atrial fibrillation (H)    ? Diverticulosis    ? Moderate mitral regurgitation    ? NSVT (nonsustained ventricular tachycardia) (H) 6/17/2020   ? Palpitations     Past Surgical History:   Procedure Laterality Date   ? BREAST BIOPSY Bilateral     benign   ? CV CORONARY ANGIOGRAM N/A 6/18/2020    Procedure: Coronary Angiogram;  Surgeon: Sergey Conner MD;  Location: [facility] Cath Lab;  Service: Cardiology   ? CV CORONARY ANGIOGRAM N/A 1/13/2021    Procedure: Coronary Angiogram;  Surgeon: Klaus Fitzpatrick MD;  Location: [facility] Cardiac Cath Lab;  Service: Cardiology   ? CV LEFT HEART CATHETERIZATION WO LEFT VETRICULOGRAM Left 6/18/2020    Procedure: Left Heart Catheterization Without Left Ventriculogram;  Surgeon: Sergey Conner MD;  Location: [facility] Cath Lab;  Service: Cardiology   ? CV RIGHT HEART CATH N/A 6/18/2020    Procedure:  Right Heart Catheterization;  Surgeon: Sergey Conner MD;  Location: [facility] Cath Lab;  Service: Cardiology   ? CV RIGHT HEART CATH N/A 1/13/2021    Procedure: Right Heart Catheterization;  Surgeon: Klaus Fitzpatrick MD;  Location: [facility] Cardiac Cath Lab;  Service: Cardiology   ? HYSTERECTOMY  1970's   ? OOPHORECTOMY Bilateral 1970's    Family History   Problem Relation Age of Onset   ? Breast cancer Sister 47    Social History     Socioeconomic History   ? Marital status:      Spouse name: Not on file   ? Number of children: Not on file   ? Years of education: Not on file   ? Highest education level: Not on file   Occupational History   ? Not on file   Social Needs   ? Financial resource strain: Not on file   ? Food insecurity     Worry: Not on file     Inability: Not on file   ? Transportation needs     Medical: Not on file     Non-medical: Not on file   Tobacco Use   ? Smoking status: Never Smoker   ? Smokeless tobacco: Never Used   Substance and Sexual Activity   ? Alcohol use: No   ? Drug use: No   ? Sexual activity: Not on file   Lifestyle   ? Physical activity     Days per week: Not on file     Minutes per session: Not on file   ? Stress: Not on file   Relationships   ? Social connections     Talks on phone: Not on file     Gets together: Not on file     Attends Christianity service: Not on file     Active member of club or organization: Not on file     Attends meetings of clubs or organizations: Not on file     Relationship status: Not on file   ? Intimate partner violence     Fear of current or ex partner: Not on file     Emotionally abused: Not on file     Physically abused: Not on file     Forced sexual activity: Not on file   Other Topics Concern   ? Not on file   Social History Narrative    Spends 6 months in [place].     . No children. Family in the [place] area.     Enjoys walking- very regular with this especially in [place].     Likes reading - romantic happy endings.     Never  alcohol or smoking.    Selina Pozo MD          Medications  Allergies   Current Outpatient Medications   Medication Sig Dispense Refill   ? amiodarone (PACERONE) 200 MG tablet Take 1 tablet (200 mg total) by mouth at bedtime. 90 tablet 3   ? aspirin 81 MG EC tablet Take 81 mg by mouth.     ? ELIQUIS 5 mg Tab tablet TAKE 1 TABLET BY MOUTH TWICE A DAY 60 tablet 3   ? furosemide (LASIX) 20 MG tablet Take 1 tablet (20 mg total) by mouth daily. 90 tablet 0   ? lisinopriL (PRINIVIL,ZESTRIL) 5 MG tablet Take 1 tablet (5 mg total) by mouth daily. 90 tablet 3   ? metoprolol succinate (TOPROL-XL) 50 MG 24 hr tablet Take 1 tablet (50 mg total) by mouth daily. 30 tablet 6   ? multivitamin therapeutic (THERAGRAN) tablet Take 0.5 tablets by mouth daily.      ? rosuvastatin (CRESTOR) 20 MG tablet Take 20 mg by mouth at bedtime.       No current facility-administered medications for this visit.     Allergies   Allergen Reactions   ? Penicillins Hives         Lab Results    Chemistry/lipid CBC Cardiac Enzymes/BNP/TSH/INR   Lab Results   Component Value Date    CHOL 230 (H) 12/03/2020    HDL 67 12/03/2020    LDLCALC 139 (H) 12/03/2020    TRIG 118 12/03/2020    CREATININE 1.36 (H) 01/25/2021    BUN 21 01/25/2021    K 3.5 01/25/2021     01/25/2021     01/25/2021    CO2 31 01/25/2021    Lab Results   Component Value Date    WBC 6.3 01/25/2021    HGB 12.7 01/25/2021    HCT 40.2 01/25/2021     (H) 01/25/2021     01/25/2021    Lab Results   Component Value Date    CKTOTAL 125 06/10/2015    TROPONINI 0.03 09/24/2020     (H) 01/25/2021    TSH 11.80 (H) 12/03/2020    INR 1.22 (H) 01/25/2021        This note has been dictated using voice recognition software. Any grammatical or context distortions are unintentional and inherent to the software.

## 2021-06-30 NOTE — PROGRESS NOTES
Progress Notes by Tati Mejia PA-C at 3/11/2021  1:30 PM     Author: Tati Mejia PA-C Service: -- Author Type: Physician Assistant    Filed: 3/11/2021  2:03 PM Encounter Date: 3/11/2021 Status: Signed    : Tati Mejia PA-C (Physician Assistant)           Assessment/Recommendations   Diagnoses and all orders for this visit:    Severe mitral regurgitation secondary to MVP - she is now 1 month S/P mitral valve clip implantation and doing well with significant improvement in functional status.  Her breathing is much improved.  She is walking 2 miles per day.  1 month echocardiogram, with results below, shows stable residual MR with mean gradient 4 mmHg.  Patient to continue aspirin 81 mg daily indefinitely.  She will be seen by Dr. Kim in 6 months and will be seen by our team for her 1 year follow-up visit in February 2022.    Labs done today are still pending, but will be reviewed when results become available.     She will be leaving to go to [place] soon, but knows to contact us with questions or concerns.  She will need lifelong antibiotics prior to any dental cleaning/work and I have sent Rx to her pharmacy so it'll be readily available.  She should avoid any dental visits for 6 months, unless its an emergency    Essential hypertension -blood pressure high today.  Readings always high when she is in the clinic.  She says her home readings are more in the 130 range.  Patient should continue taking furosemide 20 mg daily, lisinopril 5 mg daily and metoprolol succinate 50 mg daily    Paroxysmal atrial fibrillation -currently in normal sinus rhythm on amiodarone    Chronic diastolic heart failure, NYHA class II -currently compensated with no lower extremity edema and no shortness of breath.    Thank you for the opportunity to participate in the care of Kelly Robledo. It's been a pleasure working with her.  Please do not hesitate to call with any questions or concerns.        History of Present Illness/Subjective    I had the opportunity to see Kelly Robledo at the [facility] for her 1 month follow-up visit after MitraClip implantation.    Patient with history of MVP and MR who was admitted to hospital in June 2020 with tachypalpitations.  She was diagnosed with atrial fibrillation and was started on Amiodarone and Eliquis.  Coronary angiogram at that time showed moderate CAD and patient was started on beta-blocker.  She was admitted again in September with shortness of breath and edema.  Repeat ALEXANDR showed progression of her MR and she was sent for evaluation for possible MitraClip implantation.      She underwent MitraClip implantation at [facility] on February 1.  Patient did well and was sent home the next day.  Since being home, patient is feeling great.    She is walking about 2 miles per day.  She has had no dizziness or lightheadedness.  Weight has been stable and she has no lower extremity edema.  She reports no palpitations.  For the last 2 to 3 days she has had some swelling in a tooth.  There is no pain, redness or bleeding from the area, but she is worried that if it doesn't clear up she may need to see a dentist earlier than 6 months.     Kelly Robledo denies exertional chest discomfort, palpitations, shortness of breath at rest, PND, orthopnea, lightheadedness, dizziness, pre-syncope or syncope.  Kelly Robledo also denies any recent weight loss, changes in appetite, nausea or vomiting.   ____________________________________________________________  1-month echo from 3/9/2021:    Left ventricle ejection fraction is The calculated left ventricular ejection fraction is 60% without wall motion abnormality.    Normal right ventricular size and systolic function.    MitraClip present on the mitral valve. There is mild to moderate calcific stenosis with moderate regurgitation.    Mild to moderate tricuspid regurgitation with moderate pulmonary  hypertension.    When compared to the previous study dated 12/3/2020, there is now a MitraClip on the mitral valve. Mild to moderate stenosis is present. Degree of mitral insufficiency has diminished.     Physical Examination Review of Systems   Vitals:    03/11/21 1324   BP: 154/84   Pulse: 80   Resp: 16     Body mass index is 18.27 kg/m .  Wt Readings from Last 3 Encounters:   03/11/21 109 lb 12.8 oz (49.8 kg)   03/09/21 112 lb (50.8 kg)   03/01/21 112 lb 1.6 oz (50.8 kg)       General Appearance:   Alert, cooperative and in no acute distress   ENT/Mouth: membranes moist, no oral lesions or bleeding gums.      EYES:  no scleral icterus, normal conjunctivae   Neck: Supple without lymphadenopathy.  Thyroid not visualized   Chest/Lungs:   lungs are clear to auscultation, no rales or wheezing   Cardiovascular:   Regular. Normal first and second heart sounds with no murmurs, rubs, or gallops; the carotid, radial and posterior tibial pulses are intact, no edema bilaterally    Abdomen:  Soft and nontender. Bowel sounds are present in all quadrants   Extremities: no cyanosis or clubbing.     Skin: no xanthelasma, warm.    Neurologic: normal gait, normal  bilateral, no tremors   Psychiatric: Normal mood and affect    General: WNL  Eyes: WNL  Ears/Nose/Throat: WNL  Lungs: WNL  Heart: WNL  Stomach: WNL  Bladder: WNL  Muscle/Joints: WNL  Skin: WNL  Nervous System: WNL  Mental Health: WNL     Blood: WNL     Medical History  Surgical History Family History Social History   Past Medical History:   Diagnosis Date   ? Abdominal pain    ? Atrial fibrillation (H)    ? Diverticulosis    ? Moderate mitral regurgitation    ? NSVT (nonsustained ventricular tachycardia) (H) 6/17/2020   ? Palpitations     Past Surgical History:   Procedure Laterality Date   ? BREAST BIOPSY Bilateral     benign   ? CV CORONARY ANGIOGRAM N/A 6/18/2020    Procedure: Coronary Angiogram;  Surgeon: Sergey Conner MD;  Location: [facility]  Lab;  Service: Cardiology   ? CV CORONARY ANGIOGRAM N/A 1/13/2021    Procedure: Coronary Angiogram;  Surgeon: Klaus Fitzpatrick MD;  Location: [facility] Cardiac Cath Lab;  Service: Cardiology   ? CV LEFT HEART CATHETERIZATION WO LEFT VETRICULOGRAM Left 6/18/2020    Procedure: Left Heart Catheterization Without Left Ventriculogram;  Surgeon: Sergey Conner MD;  Location: [facility] Cath Lab;  Service: Cardiology   ? CV RIGHT HEART CATH N/A 6/18/2020    Procedure: Right Heart Catheterization;  Surgeon: Sergey Conner MD;  Location: [facility] Cath Lab;  Service: Cardiology   ? CV RIGHT HEART CATH N/A 1/13/2021    Procedure: Right Heart Catheterization;  Surgeon: Klaus Fitzpatrick MD;  Location: [facility] Cardiac Cath Lab;  Service: Cardiology   ? HYSTERECTOMY  1970's   ? OOPHORECTOMY Bilateral 1970's    Family History   Problem Relation Age of Onset   ? Breast cancer Sister 47    Social History     Socioeconomic History   ? Marital status:      Spouse name: Not on file   ? Number of children: Not on file   ? Years of education: Not on file   ? Highest education level: Not on file   Occupational History   ? Not on file   Social Needs   ? Financial resource strain: Not on file   ? Food insecurity     Worry: Not on file     Inability: Not on file   ? Transportation needs     Medical: Not on file     Non-medical: Not on file   Tobacco Use   ? Smoking status: Never Smoker   ? Smokeless tobacco: Never Used   Substance and Sexual Activity   ? Alcohol use: No   ? Drug use: No   ? Sexual activity: Not on file   Lifestyle   ? Physical activity     Days per week: Not on file     Minutes per session: Not on file   ? Stress: Not on file   Relationships   ? Social connections     Talks on phone: Not on file     Gets together: Not on file     Attends Yazidi service: Not on file     Active member of club or organization: Not on file     Attends meetings of clubs or organizations: Not on file      Relationship status: Not on file   ? Intimate partner violence     Fear of current or ex partner: Not on file     Emotionally abused: Not on file     Physically abused: Not on file     Forced sexual activity: Not on file   Other Topics Concern   ? Not on file   Social History Narrative    Spends 6 months in [place].     . No children. Family in the [place] area.     Enjoys walking- very regular with this especially in [place].     Likes reading - romantic happy endings.     Never alcohol or smoking.    Selina Pozo MD          Medications  Allergies   Current Outpatient Medications   Medication Sig Dispense Refill   ? amiodarone (PACERONE) 200 MG tablet Take 1 tablet (200 mg total) by mouth at bedtime. 90 tablet 3   ? aspirin 81 MG EC tablet Take 81 mg by mouth.     ? ELIQUIS 5 mg Tab tablet TAKE 1 TABLET BY MOUTH TWICE A DAY 60 tablet 3   ? furosemide (LASIX) 20 MG tablet TAKE 1 TABLET BY MOUTH EVERY DAY 90 tablet 3   ? lisinopriL (PRINIVIL,ZESTRIL) 5 MG tablet Take 1 tablet (5 mg total) by mouth daily. 90 tablet 3   ? metoprolol succinate (TOPROL-XL) 50 MG 24 hr tablet Take 1 tablet (50 mg total) by mouth daily. 30 tablet 6   ? multivitamin therapeutic (THERAGRAN) tablet Take 0.5 tablets by mouth daily.      ? rosuvastatin (CRESTOR) 20 MG tablet Take 1 tablet (20 mg total) by mouth at bedtime. 90 tablet 3   ? clindamycin (CLEOCIN) 300 MG capsule Take 2 capsules (600 mg total) by mouth once as needed (30-60 minutes prior to any dental visit). 4 capsule 3     No current facility-administered medications for this visit.     Allergies   Allergen Reactions   ? Penicillins Hives         Lab Results    Chemistry/lipid CBC Cardiac Enzymes/BNP/TSH/INR   Lab Results   Component Value Date    CHOL 230 (H) 12/03/2020    HDL 67 12/03/2020    LDLCALC 139 (H) 12/03/2020    TRIG 118 12/03/2020    CREATININE 1.36 (H) 01/25/2021    BUN 21 01/25/2021    K 3.5 01/25/2021     01/25/2021     01/25/2021    CO2 31  01/25/2021    Lab Results   Component Value Date    WBC 6.3 01/25/2021    HGB 12.7 01/25/2021    HCT 40.2 01/25/2021     (H) 01/25/2021     01/25/2021    Lab Results   Component Value Date    CKTOTAL 125 06/10/2015    TROPONINI 0.03 09/24/2020     (H) 01/25/2021    TSH 10.49 (H) 03/05/2021    INR 1.22 (H) 01/25/2021        33 total minutes were spent preparing for this visit, with the patient and note writing.     This note has been dictated using voice recognition software. Any grammatical or context distortions are unintentional and inherent to the software.

## 2021-06-30 NOTE — PROGRESS NOTES
Progress Notes by Tati Mejia PA-C at 1/25/2021  2:10 PM     Author: Tati Mejia PA-C Service: -- Author Type: Physician Assistant    Filed: 1/26/2021  8:55 AM Encounter Date: 1/25/2021 Status: Signed    : Tati Mejia PA-C (Physician Assistant)           Assessment/Recommendations   Problem List Items Addressed This Visit     Coronary artery disease involving native coronary artery of native heart without angina pectoris (Chronic)    Essential hypertension (Chronic)    Nonrheumatic mitral valve regurgitation - Primary    Paroxysmal atrial fibrillation (H)          1.  Severe mitral regurgitation: with history of mitral valve prolapse per chart review. Recently this has been causing tachypalpitations and acute heart failure exacerbation.  She was evaluated by a multidisciplinary team and it has been determined that she is a better candidate for transcatheter mitral valve repair with MitraClip.       Details of the procedure were discussed with the patient and there is understanding of the risks and benefits.   All questions were answered   Consents were signed and witnessed by me.  She has no prior history of trouble with anesthesia.  Labs today reveal Hgb 12.7, normal WBC, electrolytes show hypokalemia with K+ 3.5 and creat 1.36 (stable from baseline)     Kelly GTZ Veknat will report Monday, Feb 1 at [facility] for the procedure and all instructions regarding times and medications were given by MitraClip coordinator RN.       2. Hypertension - BP today controlled.  She will continue lasix 20 mg daily and metoprolol succinate 50 mg daily.     3. Paroxysmal atrial fibrillation - started on Amiodarone and Eliquis in June.  Rate control is better now.  She continues on Eliquis for stroke prophylaxis.  This will be held 5 days prior to the procedure.     4. Chronic diastolic heart failure, NYHA class II - secondary to MVP.  Currently compensated. Continue current dose of lasix.      5. CAD - moderate by angiography.  Continue ASA, statin and beta-blocker       History of Present Illness/Subjective    Kelly Robledo is a 75 y.o. female who comes in today for history and physical prior to MitraClip procedure on February 1.   Her  accompanies her to the visit today.     Patient with history of MVP and MR who was admitted to hospital in June 2020 with tachypalpitations.  She was diagnosed with atrial fibrillation and was started on Amiodarone and Eliquis.  Coronary angiogram at that time showed moderate CAD and patient was started on beta-blocker.  She was admitted again in September with shortness of breath and edema.  Repeat ALEXANDR showed progression of her MR and she was sent for evaluation for possible MitraClip implantation.     Kelly Robledo denies chest discomfort, palpitations, shortness of breath, paroxysmal nocturnal dyspnea, orthopnea, lightheadedness, dizziness, pre-syncope, or syncope.  Kelly Robledo also denies any weight loss, changes in appetite, nausea or vomiting.     Medical, surgical, family, social history, and medications were reviewed and updated as necessary.    ECG (personally reviewed): 1/26/2021 shows sinus rhythm with 1st degree AV block    ECHOCARDIOGRAM done on December 3, 2020:    Mitral Valve: The following structural abnormalities were observed: non-specific thickening. Mildly decreased anterior leaflet mobility. Moderately decreased posterior leaflet mobility. Severe mitral regurgitation. The jet is posterior directed and is eccentric.    Left atrial volume is severely increased.    Left ventricle ejection fraction is normal. The calculated left ventricular ejection fraction is 70%.    Moderate tricuspid valve regurgitation. Moderate pulmonary hypertension present. The estimated systolic pulmonary artery pressure is 68 mmhg.    Normal right ventricular size. TAPSE is abnormal, which is consistent with abnormal right ventricular systolic  function.    When compared to the previous study dated 6/19/2020, pulmonary hypertension is present.    CATH done on January 13, 2021:  Coronary geography today showed:     Left main with 30% ostial narrowing  LAD with mild to moderate diffuse disease, and a 50% stenosis in the midportion, that appears unchanged from her prior angiogram  Ramus with a 50% proximal to mid stenosis and mild disease distally  LCx is a small vessel with no significant obtuse marginals noted  RCA is a large caliber, dominant vessel supplying the inferior and posterior walls.  There is 40% mid to distal focal stenosis, also unchanged from prior.        Right heart catheterization showed:  ? PA 50/16  (36)  ? RA mean  7  ? PCW  16 /43  (26) v waves of 45 mmHg  ? AO  164/62  (107)  ? RV  57/-6,  6     LVEDP 15 mmHg       Physical Examination Review of Systems   Vitals:    01/25/21 1350   BP: 138/70   Pulse: 76   Resp: 16     Body mass index is 18.68 kg/m .  Wt Readings from Last 3 Encounters:   01/25/21 114 lb (51.7 kg)   01/13/21 111 lb (50.3 kg)   12/16/20 112 lb 8 oz (51 kg)       General Appearance:   Alert, cooperative and in no acute distress   ENT/Mouth: membranes moist, no oral lesions or bleeding gums.      EYES:  no scleral icterus, normal conjunctivae   Neck: no carotid bruits.  Thyroid not visualized   Chest/Lungs:   lungs are clear to auscultation, no rales or wheezing   Cardiovascular:   Regular. Normal first and second heart sounds with 2/6 systolic murmur.  No rubs or gallops; the carotid, radial and posterior tibial pulses are intact, no edema bilaterally    Abdomen:  Soft and nontender. Bowel sounds are present in all quadrants   Extremities: no cyanosis or clubbing   Skin: no xanthelasma, warm.    Neurologic: normal gait, normal  bilateral, no tremors   Psychiatric: Normal mood and affect    General: WNL  Eyes: WNL  Ears/Nose/Throat: WNL  Lungs: WNL  Heart: WNL  Stomach: WNL  Bladder: WNL  Muscle/Joints: WNL  Skin:  WNL  Nervous System: WNL  Mental Health: WNL     Blood: WNL     Medical History  Surgical History Family History Social History   Past Medical History:   Diagnosis Date   ? Abdominal pain    ? Atrial fibrillation (H)    ? Diverticulosis    ? Moderate mitral regurgitation    ? NSVT (nonsustained ventricular tachycardia) (H) 6/17/2020   ? Palpitations     Past Surgical History:   Procedure Laterality Date   ? BREAST BIOPSY Bilateral     benign   ? CV CORONARY ANGIOGRAM N/A 6/18/2020    Procedure: Coronary Angiogram;  Surgeon: Sergey Conner MD;  Location: [facility] Cath Lab;  Service: Cardiology   ? CV CORONARY ANGIOGRAM N/A 1/13/2021    Procedure: Coronary Angiogram;  Surgeon: Klaus Fitzpatrick MD;  Location: [facility] Cardiac Cath Lab;  Service: Cardiology   ? CV LEFT HEART CATHETERIZATION WO LEFT VETRICULOGRAM Left 6/18/2020    Procedure: Left Heart Catheterization Without Left Ventriculogram;  Surgeon: Sergey Conner MD;  Location: [facility] Cath Lab;  Service: Cardiology   ? CV RIGHT HEART CATH N/A 6/18/2020    Procedure: Right Heart Catheterization;  Surgeon: Sergey Conner MD;  Location: [facility] Cath Lab;  Service: Cardiology   ? CV RIGHT HEART CATH N/A 1/13/2021    Procedure: Right Heart Catheterization;  Surgeon: Klaus Fitzpatrick MD;  Location: [facility] Cardiac Cath Lab;  Service: Cardiology   ? HYSTERECTOMY  1970's   ? OOPHORECTOMY Bilateral 1970's    Family History   Problem Relation Age of Onset   ? Breast cancer Sister 47    Social History     Socioeconomic History   ? Marital status:      Spouse name: Not on file   ? Number of children: Not on file   ? Years of education: Not on file   ? Highest education level: Not on file   Occupational History   ? Not on file   Social Needs   ? Financial resource strain: Not on file   ? Food insecurity     Worry: Not on file     Inability: Not on file   ? Transportation needs     Medical: Not on file     Non-medical: Not on  file   Tobacco Use   ? Smoking status: Never Smoker   ? Smokeless tobacco: Never Used   Substance and Sexual Activity   ? Alcohol use: No   ? Drug use: No   ? Sexual activity: Not on file   Lifestyle   ? Physical activity     Days per week: Not on file     Minutes per session: Not on file   ? Stress: Not on file   Relationships   ? Social connections     Talks on phone: Not on file     Gets together: Not on file     Attends Worship service: Not on file     Active member of club or organization: Not on file     Attends meetings of clubs or organizations: Not on file     Relationship status: Not on file   ? Intimate partner violence     Fear of current or ex partner: Not on file     Emotionally abused: Not on file     Physically abused: Not on file     Forced sexual activity: Not on file   Other Topics Concern   ? Not on file   Social History Narrative    Spends 6 months in [place].     . No children. Family in the [place] area.     Enjoys walking- very regular with this especially in [place].     Likes reading - romantic happy endings.     Never alcohol or smoking.    Selina Pozo MD          Medications  Allergies   Current Outpatient Medications   Medication Sig Dispense Refill   ? amiodarone (PACERONE) 200 MG tablet Take 1 tablet (200 mg total) by mouth at bedtime. 90 tablet 3   ? ELIQUIS 5 mg Tab tablet TAKE 1 TABLET BY MOUTH TWICE A DAY 60 tablet 3   ? furosemide (LASIX) 20 MG tablet Take 1 tablet (20 mg total) by mouth daily. 90 tablet 0   ? lisinopriL (PRINIVIL,ZESTRIL) 5 MG tablet Take 1 tablet (5 mg total) by mouth daily. 90 tablet 3   ? metoprolol succinate (TOPROL-XL) 50 MG 24 hr tablet Take 1 tablet (50 mg total) by mouth daily. 30 tablet 6   ? multivitamin therapeutic (THERAGRAN) tablet Take 0.5 tablets by mouth daily.      ? rosuvastatin (CRESTOR) 20 MG tablet Take 20 mg by mouth at bedtime.       No current facility-administered medications for this visit.     Allergies   Allergen Reactions   ?  Penicillins Hives         Lab Results    Chemistry/lipid CBC Cardiac Enzymes/BNP/TSH/INR   Lab Results   Component Value Date    CHOL 230 (H) 12/03/2020    HDL 67 12/03/2020    LDLCALC 139 (H) 12/03/2020    TRIG 118 12/03/2020    CREATININE 1.36 (H) 01/25/2021    BUN 21 01/25/2021    K 3.5 01/25/2021     01/25/2021     01/25/2021    CO2 31 01/25/2021    Lab Results   Component Value Date    WBC 6.3 01/25/2021    HGB 12.7 01/25/2021    HCT 40.2 01/25/2021     (H) 01/25/2021     01/25/2021    Lab Results   Component Value Date    CKTOTAL 125 06/10/2015    TROPONINI 0.03 09/24/2020     (H) 01/25/2021    TSH 11.80 (H) 12/03/2020    INR 1.22 (H) 01/25/2021          This note has been dictated using voice recognition software. Any grammatical or context distortions are unintentional and inherent to the software.

## 2021-07-03 NOTE — ADDENDUM NOTE
Addendum Note by Selina Pozo MD at 8/18/2020  2:40 PM     Author: Selina Pozo MD Service: -- Author Type: Physician    Filed: 8/19/2020  2:30 PM Encounter Date: 8/18/2020 Status: Signed    : Selina Pozo MD (Physician)    Addended by: SELINA POZO on: 8/19/2020 02:30 PM        Modules accepted: Orders

## 2021-07-04 NOTE — PROGRESS NOTES
Progress Notes by Dayne Kim MD at 6/1/2021 11:30 AM     Author: Dayne Kim MD Service: -- Author Type: Physician    Filed: 6/1/2021 12:39 PM Encounter Date: 6/1/2021 Status: Signed    : Dayne Kim MD (Physician)             [facility] Progress Note    Assessment:  1.  Mitral valve prolapse with severe mitral regurgitation status post mitral valve clip implantation February 1, 2021.  Patient feeling very well.  Most recent echocardiogram demonstrates a stable echocardiographic findings.  I will correspond with the team about whether she needs to be on both aspirin and Eliquis.  She is on Eliquis because of paroxysmal atrial fibrillation.    2.  History of paroxysmal atrial fibrillation.  Started on amiodarone and Eliquis in June and continues with this combination of medications.  She has not had any significant dizziness or lightheadedness or palpitations.  She does note some bruising.  Potential option for watchman device which can be further discussed.    3.  Chronic diastolic heart failure.  She appears well compensated.  She would like to try to cut back on the furosemide and therefore we will change the furosemide 20 mg every other day asking her to monitor for fluid retention and shortness of breath as well as her weights and update me.    4.  Essential hypertension.  Blood pressure elevated upon arrival.  She states at home her blood pressures have been under good control repeat blood pressure during my examination of 136/80.  I did ask her to monitor and report her blood pressure.      Plan: 1.  Holter monitor.  Follow-up in 6 to 8 weeks with blood pressure monitoring.    1. PVC's (premature ventricular contractions)  Holter monitor - 24 hour   2. CAD (coronary artery disease)  Lipid Profile    AST    ALT   3. S/P mitral valve clip implantation     4. Paroxysmal atrial fibrillation (H)     5. Nonrheumatic mitral valve regurgitation     6. Coronary artery  disease involving native coronary artery of native heart without angina pectoris           An After Visit Summary was printed and given to the patient.    Subjective:    Kelly Robledo is a 75 y.o. female who returned for a planned  follow up visit.Patient with history of MVP and MR who was admitted to hospital in June 2020 with tachypalpitations.  She was diagnosed with atrial fibrillation and was started on Amiodarone and Eliquis.  Coronary angiogram at that time showed moderate CAD and patient was started on beta-blocker.  She was admitted again in September with shortness of breath and edema.  Repeat ALEXANDR showed progression of her MR and she was sent for evaluation for possible MitraClip implantation.      She underwent MitraClip implantation at [facility] on February 1.  Patient did well and was sent home the next day.  She continues to report she feels very well.  She states that she has not reporting any shortness of breath and has been active and has not noted any dizziness or lightheadedness.  He does report some increased urinary frequency and wonders whether she can cut back on the furosemide.  Most recent echocardiogram demonstrated preserved left ventricular function and mild to moderate mitral stenosis moderate reported mitral regurgitation.    Review of Systems:   General: WNL  Eyes: WNL  Ears/Nose/Throat: WNL  Lungs: WNL  Heart: WNL  Stomach: WNL  Bladder: WNL  Muscle/Joints: WNL  Skin: WNL  Nervous System: WNL  Mental Health: WNL     Blood: Easy Bruising      Problem List:    Patient Active Problem List   Diagnosis   ? Hyperlipidemia   ? Irritable bowel syndrome   ? Osteoporosis Senile   ? Mitral valve prolapse   ? Nonrheumatic mitral valve regurgitation   ? Coronary artery disease involving native coronary artery of native heart without angina pectoris   ? Adjustment disorder with anxious mood   ? Anxiety   ? Tricuspid valve regurgitation, nonrheumatic   ? Essential hypertension   ? Paroxysmal atrial  fibrillation (H)   ? Stage 3a chronic kidney disease   ? Subclinical hypothyroidism   ? CAD (coronary artery disease)   ? S/P mitral valve clip implantation       Social History     Socioeconomic History   ? Marital status:      Spouse name: Not on file   ? Number of children: Not on file   ? Years of education: Not on file   ? Highest education level: Not on file   Occupational History   ? Not on file   Social Needs   ? Financial resource strain: Not on file   ? Food insecurity     Worry: Not on file     Inability: Not on file   ? Transportation needs     Medical: Not on file     Non-medical: Not on file   Tobacco Use   ? Smoking status: Never Smoker   ? Smokeless tobacco: Never Used   Substance and Sexual Activity   ? Alcohol use: No   ? Drug use: No   ? Sexual activity: Not on file   Lifestyle   ? Physical activity     Days per week: Not on file     Minutes per session: Not on file   ? Stress: Not on file   Relationships   ? Social connections     Talks on phone: Not on file     Gets together: Not on file     Attends [employer] service: Not on file     Active member of club or organization: Not on file     Attends meetings of clubs or organizations: Not on file     Relationship status: Not on file   ? Intimate partner violence     Fear of current or ex partner: Not on file     Emotionally abused: Not on file     Physically abused: Not on file     Forced sexual activity: Not on file   Other Topics Concern   ? Not on file   Social History Narrative    Spends 6 months in [place].     . No children. Family in the [place] area.     Enjoys walking- very regular with this especially in [place].     Likes reading - romantic happy endings.     Never alcohol or smoking.    Selina Pozo MD       Family History   Problem Relation Age of Onset   ? Breast cancer Sister 47       Current Outpatient Medications   Medication Sig Dispense Refill   ? amiodarone (PACERONE) 200 MG tablet Take 1 tablet (200 mg total) by mouth  at bedtime. 90 tablet 3   ? aspirin 81 MG EC tablet Take 81 mg by mouth.     ? clindamycin (CLEOCIN) 300 MG capsule Take 2 capsules (600 mg total) by mouth once as needed (30-60 minutes prior to any dental visit). 4 capsule 3   ? ELIQUIS 5 mg Tab tablet TAKE 1 TABLET BY MOUTH TWICE A DAY 60 tablet 3   ? furosemide (LASIX) 20 MG tablet TAKE 1 TABLET BY MOUTH EVERY DAY 90 tablet 3   ? lisinopriL (PRINIVIL,ZESTRIL) 5 MG tablet Take 1 tablet (5 mg total) by mouth daily. 90 tablet 3   ? metoprolol succinate (TOPROL-XL) 50 MG 24 hr tablet Take 1 tablet (50 mg total) by mouth daily. 30 tablet 6   ? multivitamin therapeutic (THERAGRAN) tablet Take 0.5 tablets by mouth daily.      ? rosuvastatin (CRESTOR) 20 MG tablet Take 1 tablet (20 mg total) by mouth at bedtime. 90 tablet 3     No current facility-administered medications for this visit.        Objective:     /76 (Patient Site: Right Arm, Patient Position: Sitting, Cuff Size: Adult Regular)   Pulse 76   Resp 16   Wt 116 lb 14.4 oz (53 kg)   BMI 19.45 kg/m    116 lb 14.4 oz (53 kg)   [unfilled]  Wt Readings from Last 3 Encounters:   06/01/21 116 lb 14.4 oz (53 kg)   05/20/21 116 lb 9.6 oz (52.9 kg)   03/11/21 109 lb 12.8 oz (49.8 kg)       Physical Exam:    GENERAL APPEARANCE: alert, no apparent distress  HEENT: no scleral icterus or xanthelasma  NECK: jugular venous pressure within normal limits  CHEST: symmetric, the lungs are clear to auscultation  CARDIOVASCULAR: regular rhythm with 2/6 systolic murmur with S4; no carotid bruits  Abdomen: No Organomegaly, masses, bruits, or tenderness. Bowels sounds are present      EXTREMITIES: no cyanosis, clubbing or edema    Cardiac Testing:  Conclusion       PATIENT ACTIVATED ECG MONITOR     TEST INDICATION:  Evaluate palpitations.     IMPRESSION:  1.  A 14-day monitor was done from 12/03/2020 through 12/16/2020.  2.  Baseline tracing showed a sinus rhythm with a fairly short ME interval; the  average heart rate is  62 beats per minute, ranged between 50 to 119 beats per  minute.  3.  Patient had no symptomatic events.  4.  Quite a bit of artifact was present that makes interpretation challenging.  5.  Atrial ectopy is rare and no atrial tachycardia or atrial fibrillation  identified.  6.  Ventricular ectopy is rare and no complex ventricular ectopy identified.  7.  Episodes of junctional rhythm seem isorhythmic to sinus and cause a  pseudo-preexcitation pattern.  I do not believe there actually is preexcitation,  just this aberrant conduction pattern.  Junctional rhythm conducts at 65 beats per  minute and is seen on 2 occasions- and appears to be asymptomatic.  No atrial  fibrillation identified.     DISCUSSION:    1.  Technically challenging tracing with quite a bit of artifact.  2.  Rare noncomplex ectopy identified with no atrial tachycardia or atrial  fibrillation; a couple short salvos of idioventricular rhythm or junctional rhklythm that is  isorhythmic   with sinus rhythm; with  fusion pattern; but  of no apparent clinical significance -- at a modest rate= 65 beats  per minute.        JAQUI BURRIS MD     Panel 1    Provider Role   Klaus Fitzpatrick MD Primary    Procedure Laterality Anesthesia   Coronary Angiogram N/A Conscious Sedation (RN)   Right Heart Catheterization N/A Conscious Sedation (RN)         Indications    Coronary artery disease involving native coronary artery of native heart, angina presence unspecified [I25.10 (ICD-10-CM)]   Nonrheumatic mitral valve regurgitation [I34.0 (ICD-10-CM)]    Procedure Status    Procedure scheduled as Elective (Outpatient).         Conclusion    76 yo F with recent diagnosis of moderate to severe mitral regurgitation, causing increasing shortness of breath, as well as a recent hospitalization with congestive heart failure.  Being evaluated for transcatheter mitral valve repair using MitraClip, and here for preprocedure coronary angiography.     Coronary geography today  showed:     Left main with 30% ostial narrowing  LAD with mild to moderate diffuse disease, and a 50% stenosis in the midportion, that appears unchanged from her prior angiogram  Ramus with a 50% proximal to mid stenosis and mild disease distally  LCx is a small vessel with no significant obtuse marginals noted  RCA is a large caliber, dominant vessel supplying the inferior and posterior walls.  There is 40% mid to distal focal stenosis, also unchanged from prior.        Right heart catheterization showed:  ? PA 50/16  (36)  ? RA mean  7  ? PCW  16 /43  (26) v waves of 45 mmHg  ? AO  164/62  (107)  ? RV  57/-6,  6     LVEDP 15 mmHg     Given that her symptoms have progressed over the past 6 months, with no change in her coronary anatomy, and clear worsening of her mitral regurgitation, she will benefit from mitral valve repair with MitraClip.  Her hemodynamics today also confirmed the presence of severe mitral regurgitation, with large V waves noted on her pulmonary wedge tracing.     25-JAN-2021 13:54:48 M [address] ROUTINE RETRIEVAL  Sinus rhythm with 1st degree A-V block  Nonspecific ST abnormality  Abnormal ECG  When compared with ECG of 13-JAN-2021 07:58,  RI interval has increased  Confirmed by JOHANNE QUIROZ, MALIKA LOC:KENN ([id number]) on 1/25/2021 4:03:48 PM  25mm/s 10mm/mV 150Hz 9.0.10 12SL 239 CHARLES: 2  Referred by: LILY ROJAS Confirmed By: MALIKA LOC:KENN WHITING MD      Lab Results:    Lab Results   Component Value Date     03/11/2021    K 4.0 03/11/2021     03/11/2021    CO2 29 03/11/2021    BUN 23 03/11/2021    CREATININE 1.44 (H) 03/11/2021    CALCIUM 9.2 03/11/2021     Lab Results   Component Value Date    CHOL 230 (H) 12/03/2020    TRIG 118 12/03/2020    HDL 67 12/03/2020    LDLDIRECT 131 (H) 06/05/2013     BNP (pg/mL)   Date Value   01/25/2021 388 (H)   10/14/2020 344 (H)   09/23/2020 624 (H)     Creatinine (mg/dL)   Date Value   03/11/2021 1.44 (H)   01/25/2021 1.36 (H)   01/13/2021 1.44  (H)   10/14/2020 1.36 (H)     LDL Calculated (mg/dL)   Date Value   12/03/2020 139 (H)   11/25/2019 112   12/27/2018 112     Lab Results   Component Value Date    WBC 6.6 03/11/2021    HGB 12.8 03/11/2021    HCT 39.8 03/11/2021    MCV 99 03/11/2021     03/11/2021         This note has been dictated using voice recognition software. Any grammatical or context distortions are unintentional and inherent to the software.

## 2021-07-13 ENCOUNTER — RECORDS - HEALTHEAST (OUTPATIENT)
Dept: ADMINISTRATIVE | Facility: CLINIC | Age: 76
End: 2021-07-13

## 2021-07-13 ENCOUNTER — AMBULATORY - HEALTHEAST (OUTPATIENT)
Dept: CARDIOLOGY | Facility: CLINIC | Age: 76
End: 2021-07-13

## 2021-07-13 DIAGNOSIS — Z79.899 LONG TERM USE OF DRUG: ICD-10-CM

## 2021-07-14 PROBLEM — I50.32 CHRONIC HEART FAILURE WITH PRESERVED EJECTION FRACTION (H): Status: RESOLVED | Noted: 2020-10-14 | Resolved: 2021-02-04

## 2021-07-14 PROBLEM — I34.0 MITRAL REGURGITATION: Status: RESOLVED | Noted: 2021-01-04 | Resolved: 2021-02-04

## 2021-07-21 ENCOUNTER — RECORDS - HEALTHEAST (OUTPATIENT)
Dept: ADMINISTRATIVE | Facility: CLINIC | Age: 76
End: 2021-07-21

## 2021-07-22 ENCOUNTER — RECORDS - HEALTHEAST (OUTPATIENT)
Dept: INTERNAL MEDICINE | Facility: CLINIC | Age: 76
End: 2021-07-22

## 2021-07-22 DIAGNOSIS — Z12.31 OTHER SCREENING MAMMOGRAM: ICD-10-CM

## 2021-08-03 DIAGNOSIS — I49.9 CARDIAC ARRHYTHMIA, UNSPECIFIED: ICD-10-CM

## 2021-08-03 PROBLEM — I47.29 NSVT (NONSUSTAINED VENTRICULAR TACHYCARDIA) (H): Status: RESOLVED | Noted: 2020-06-17 | Resolved: 2020-08-25

## 2021-08-06 RX ORDER — METOPROLOL SUCCINATE 50 MG/1
TABLET, EXTENDED RELEASE ORAL
Qty: 180 TABLET | Refills: 2 | Status: SHIPPED | OUTPATIENT
Start: 2021-08-06 | End: 2022-08-10

## 2021-08-06 NOTE — TELEPHONE ENCOUNTER
"metoprolol succinate (TOPROL-XL) 50 MG 24 hr tablet 30 tablet 6 7/2/2020  No   Sig - Route: Take 1 tablet (50 mg total) by mouth daily. - Oral   Sent to pharmacy as: metoprolol succinate ER 50 mg tablet,extended release 24 hr (TOPROL-XL)   E-Prescribing Status: Receipt confirmed by pharmacy (7/2/2020 10:14 AM CDT)       Last Written Prescription Date:  7/2/20  Last Fill Quantity: 30,  # refills: 6   Last office visit provider:  5/20/21     Requested Prescriptions   Pending Prescriptions Disp Refills     metoprolol succinate ER (TOPROL-XL) 50 MG 24 hr tablet [Pharmacy Med Name: METOPROLOL SUCC ER 50 MG TAB] 180 tablet 3     Sig: TAKE 2 TABLETS (100 MG TOTAL) BY MOUTH DAILY. OK TO TAKE 50MG IN AM AND 50MG IN PM       Beta-Blockers Protocol Failed - 8/3/2021  8:50 AM        Failed - Blood pressure under 140/90 in past 12 months     BP Readings from Last 3 Encounters:   07/09/21 (!) 156/78   06/01/21 (!) 160/76   05/20/21 118/88                 Passed - Patient is age 6 or older        Passed - Recent (12 mo) or future (30 days) visit within the authorizing provider's specialty     Patient has had an office visit with the authorizing provider or a provider within the authorizing providers department within the previous 12 mos or has a future within next 30 days. See \"Patient Info\" tab in inbasket, or \"Choose Columns\" in Meds & Orders section of the refill encounter.              Passed - Medication is active on med list             Raghavendra Augustin RN 08/06/21 11:32 AM  "

## 2021-08-15 ENCOUNTER — HEALTH MAINTENANCE LETTER (OUTPATIENT)
Age: 76
End: 2021-08-15

## 2021-08-16 ENCOUNTER — LAB (OUTPATIENT)
Dept: CARDIOLOGY | Facility: CLINIC | Age: 76
End: 2021-08-16
Payer: COMMERCIAL

## 2021-08-16 DIAGNOSIS — E78.5 HYPERLIPIDEMIA LDL GOAL <70: ICD-10-CM

## 2021-08-16 LAB
ALBUMIN SERPL-MCNC: 3.9 G/DL (ref 3.5–5)
ALP SERPL-CCNC: 72 U/L (ref 45–120)
ALT SERPL W P-5'-P-CCNC: 42 U/L (ref 0–45)
ANION GAP SERPL CALCULATED.3IONS-SCNC: 12 MMOL/L (ref 5–18)
AST SERPL W P-5'-P-CCNC: 34 U/L (ref 0–40)
BILIRUB SERPL-MCNC: 1 MG/DL (ref 0–1)
BUN SERPL-MCNC: 17 MG/DL (ref 8–28)
CALCIUM SERPL-MCNC: 9.5 MG/DL (ref 8.5–10.5)
CHLORIDE BLD-SCNC: 105 MMOL/L (ref 98–107)
CHOLEST SERPL-MCNC: 256 MG/DL
CO2 SERPL-SCNC: 26 MMOL/L (ref 22–31)
CREAT SERPL-MCNC: 1.29 MG/DL (ref 0.6–1.1)
FASTING STATUS PATIENT QL REPORTED: YES
GFR SERPL CREATININE-BSD FRML MDRD: 40 ML/MIN/1.73M2
GLUCOSE BLD-MCNC: 99 MG/DL (ref 70–125)
HDLC SERPL-MCNC: 70 MG/DL
LDLC SERPL CALC-MCNC: 161 MG/DL
POTASSIUM BLD-SCNC: 4.8 MMOL/L (ref 3.5–5)
PROT SERPL-MCNC: 7.1 G/DL (ref 6–8)
SODIUM SERPL-SCNC: 143 MMOL/L (ref 136–145)
TRIGL SERPL-MCNC: 125 MG/DL
TSH SERPL DL<=0.005 MIU/L-ACNC: 10.73 UIU/ML (ref 0.3–5)

## 2021-08-16 PROCEDURE — 84443 ASSAY THYROID STIM HORMONE: CPT | Performed by: INTERNAL MEDICINE

## 2021-08-16 PROCEDURE — 80061 LIPID PANEL: CPT | Performed by: INTERNAL MEDICINE

## 2021-08-16 PROCEDURE — 80053 COMPREHEN METABOLIC PANEL: CPT | Performed by: INTERNAL MEDICINE

## 2021-08-16 PROCEDURE — 36415 COLL VENOUS BLD VENIPUNCTURE: CPT | Performed by: INTERNAL MEDICINE

## 2021-08-30 ENCOUNTER — TELEPHONE (OUTPATIENT)
Dept: FAMILY MEDICINE | Facility: CLINIC | Age: 76
End: 2021-08-30

## 2021-08-30 DIAGNOSIS — I10 ESSENTIAL HYPERTENSION: Primary | ICD-10-CM

## 2021-08-30 RX ORDER — AMIODARONE HYDROCHLORIDE 200 MG/1
200 TABLET ORAL EVERY EVENING
Qty: 90 TABLET | Refills: 1 | Status: SHIPPED | OUTPATIENT
Start: 2021-08-30 | End: 2022-01-26

## 2021-08-30 NOTE — TELEPHONE ENCOUNTER
8-30-21  Reason for Call:medication refill:    Do you use a [facility]?  [facility]    Name of the medication requested: amiodarone (PACERONE) 200 MG tablet    Other request: none    Can we leave a detailed message on this number? YES    Phone number patient can be reached at: Home number on file [phone] (home)    Best Time: anytime    Call taken on 8/30/2021 at 12:29 PM by Edita Gant

## 2021-09-27 ENCOUNTER — IMMUNIZATION (OUTPATIENT)
Dept: NURSING | Facility: CLINIC | Age: 76
End: 2021-09-27
Payer: COMMERCIAL

## 2021-09-27 PROCEDURE — 0003A PR COVID VAC PFIZER DIL RECON 30 MCG/0.3 ML IM: CPT | Performed by: FAMILY MEDICINE

## 2021-09-27 PROCEDURE — 91300 PR COVID VAC PFIZER DIL RECON 30 MCG/0.3 ML IM: CPT | Performed by: FAMILY MEDICINE

## 2021-09-29 ENCOUNTER — TELEPHONE (OUTPATIENT)
Dept: CARDIOLOGY | Facility: CLINIC | Age: 76
End: 2021-09-29

## 2021-09-29 NOTE — TELEPHONE ENCOUNTER
----- Message from Dayne Kim MD sent at 9/27/2021  9:27 PM CDT -----  If she is taking the cholesterol meds regularly then we should consider repatha or praluent mdg

## 2021-09-30 ENCOUNTER — TELEPHONE (OUTPATIENT)
Dept: CARDIOLOGY | Facility: CLINIC | Age: 76
End: 2021-09-30

## 2021-09-30 NOTE — TELEPHONE ENCOUNTER
Pt will have new lipid profile done later in Oct. She will consider injectable chol med. Will update Dr. Kim.

## 2021-10-11 ENCOUNTER — HEALTH MAINTENANCE LETTER (OUTPATIENT)
Age: 76
End: 2021-10-11

## 2021-10-25 ENCOUNTER — HOSPITAL ENCOUNTER (OUTPATIENT)
Dept: MAMMOGRAPHY | Facility: CLINIC | Age: 76
Discharge: HOME | End: 2021-10-25
Attending: FAMILY MEDICINE | Admitting: FAMILY MEDICINE
Payer: COMMERCIAL

## 2021-10-25 DIAGNOSIS — Z12.31 SCREENING MAMMOGRAM, ENCOUNTER FOR: ICD-10-CM

## 2021-10-25 PROCEDURE — 77067 SCR MAMMO BI INCL CAD: CPT

## 2021-11-02 DIAGNOSIS — I48.0 PAROXYSMAL ATRIAL FIBRILLATION (H): Primary | ICD-10-CM

## 2021-11-11 DIAGNOSIS — I48.0 PAROXYSMAL ATRIAL FIBRILLATION (H): Primary | ICD-10-CM

## 2021-11-27 ENCOUNTER — HOSPITAL ENCOUNTER (OUTPATIENT)
Facility: CLINIC | Age: 76
Setting detail: OBSERVATION
Discharge: HOME | End: 2021-11-28
Attending: EMERGENCY MEDICINE | Admitting: EMERGENCY MEDICINE
Payer: COMMERCIAL

## 2021-11-27 ENCOUNTER — APPOINTMENT (OUTPATIENT)
Dept: RADIOLOGY | Facility: CLINIC | Age: 76
End: 2021-11-27
Attending: EMERGENCY MEDICINE
Payer: COMMERCIAL

## 2021-11-27 DIAGNOSIS — R07.9 CHEST PAIN, UNSPECIFIED TYPE: ICD-10-CM

## 2021-11-27 LAB
ALBUMIN SERPL-MCNC: 3.9 G/DL (ref 3.5–5)
ALP SERPL-CCNC: 81 U/L (ref 45–120)
ALT SERPL W P-5'-P-CCNC: 57 U/L (ref 0–45)
ANION GAP SERPL CALCULATED.3IONS-SCNC: 12 MMOL/L (ref 5–18)
AST SERPL W P-5'-P-CCNC: 39 U/L (ref 0–40)
ATRIAL RATE - MUSE: 72 BPM
BASOPHILS # BLD AUTO: 0 10E3/UL (ref 0–0.2)
BASOPHILS NFR BLD AUTO: 1 %
BILIRUB SERPL-MCNC: 0.8 MG/DL (ref 0–1)
BUN SERPL-MCNC: 17 MG/DL (ref 8–28)
CALCIUM SERPL-MCNC: 9.2 MG/DL (ref 8.5–10.5)
CHLORIDE BLD-SCNC: 105 MMOL/L (ref 98–107)
CO2 SERPL-SCNC: 22 MMOL/L (ref 22–31)
CREAT SERPL-MCNC: 1.3 MG/DL (ref 0.6–1.1)
DIASTOLIC BLOOD PRESSURE - MUSE: 80 MMHG
EOSINOPHIL # BLD AUTO: 0.3 10E3/UL (ref 0–0.7)
EOSINOPHIL NFR BLD AUTO: 4 %
ERYTHROCYTE [DISTWIDTH] IN BLOOD BY AUTOMATED COUNT: 13.6 % (ref 10–15)
GFR SERPL CREATININE-BSD FRML MDRD: 39 ML/MIN/1.73M2
GLUCOSE BLD-MCNC: 192 MG/DL (ref 70–125)
HCT VFR BLD AUTO: 41.6 % (ref 35–47)
HGB BLD-MCNC: 13.2 G/DL (ref 11.7–15.7)
HOLD SPECIMEN: NORMAL
IMM GRANULOCYTES # BLD: 0 10E3/UL
IMM GRANULOCYTES NFR BLD: 0 %
INTERPRETATION ECG - MUSE: NORMAL
LYMPHOCYTES # BLD AUTO: 1.4 10E3/UL (ref 0.8–5.3)
LYMPHOCYTES NFR BLD AUTO: 19 %
MCH RBC QN AUTO: 32 PG (ref 26.5–33)
MCHC RBC AUTO-ENTMCNC: 31.7 G/DL (ref 31.5–36.5)
MCV RBC AUTO: 101 FL (ref 78–100)
MONOCYTES # BLD AUTO: 0.7 10E3/UL (ref 0–1.3)
MONOCYTES NFR BLD AUTO: 10 %
NEUTROPHILS # BLD AUTO: 5 10E3/UL (ref 1.6–8.3)
NEUTROPHILS NFR BLD AUTO: 66 %
NRBC # BLD AUTO: 0 10E3/UL
NRBC BLD AUTO-RTO: 0 /100
P AXIS - MUSE: 62 DEGREES
PLATELET # BLD AUTO: 198 10E3/UL (ref 150–450)
POTASSIUM BLD-SCNC: 3.6 MMOL/L (ref 3.5–5)
PR INTERVAL - MUSE: 132 MS
PROT SERPL-MCNC: 7.3 G/DL (ref 6–8)
QRS DURATION - MUSE: 104 MS
QT - MUSE: 432 MS
QTC - MUSE: 473 MS
R AXIS - MUSE: 65 DEGREES
RBC # BLD AUTO: 4.13 10E6/UL (ref 3.8–5.2)
SARS-COV-2 RNA RESP QL NAA+PROBE: NEGATIVE
SODIUM SERPL-SCNC: 139 MMOL/L (ref 136–145)
SYSTOLIC BLOOD PRESSURE - MUSE: 171 MMHG
T AXIS - MUSE: 76 DEGREES
TROPONIN I SERPL-MCNC: <0.01 NG/ML (ref 0–0.29)
TROPONIN T BLD-MCNC: 0 UG/L
VENTRICULAR RATE- MUSE: 72 BPM
WBC # BLD AUTO: 7.5 10E3/UL (ref 4–11)

## 2021-11-27 PROCEDURE — 85025 COMPLETE CBC W/AUTO DIFF WBC: CPT | Performed by: EMERGENCY MEDICINE

## 2021-11-27 PROCEDURE — G0378 HOSPITAL OBSERVATION PER HR: HCPCS

## 2021-11-27 PROCEDURE — 82040 ASSAY OF SERUM ALBUMIN: CPT | Performed by: EMERGENCY MEDICINE

## 2021-11-27 PROCEDURE — 250N000013 HC RX MED GY IP 250 OP 250 PS 637: Performed by: INTERNAL MEDICINE

## 2021-11-27 PROCEDURE — 99285 EMERGENCY DEPT VISIT HI MDM: CPT | Mod: 25

## 2021-11-27 PROCEDURE — 87635 SARS-COV-2 COVID-19 AMP PRB: CPT | Performed by: EMERGENCY MEDICINE

## 2021-11-27 PROCEDURE — 84484 ASSAY OF TROPONIN QUANT: CPT | Performed by: INTERNAL MEDICINE

## 2021-11-27 PROCEDURE — 250N000009 HC RX 250: Performed by: INTERNAL MEDICINE

## 2021-11-27 PROCEDURE — 93005 ELECTROCARDIOGRAM TRACING: CPT | Performed by: EMERGENCY MEDICINE

## 2021-11-27 PROCEDURE — C9803 HOPD COVID-19 SPEC COLLECT: HCPCS

## 2021-11-27 PROCEDURE — 36415 COLL VENOUS BLD VENIPUNCTURE: CPT | Performed by: EMERGENCY MEDICINE

## 2021-11-27 PROCEDURE — 71045 X-RAY EXAM CHEST 1 VIEW: CPT

## 2021-11-27 PROCEDURE — 84484 ASSAY OF TROPONIN QUANT: CPT | Mod: 91 | Performed by: INTERNAL MEDICINE

## 2021-11-27 PROCEDURE — 36415 COLL VENOUS BLD VENIPUNCTURE: CPT | Performed by: INTERNAL MEDICINE

## 2021-11-27 PROCEDURE — 99220 PR INITIAL OBSERVATION CARE,LEVEL III: CPT | Performed by: INTERNAL MEDICINE

## 2021-11-27 PROCEDURE — 84484 ASSAY OF TROPONIN QUANT: CPT | Mod: 91 | Performed by: EMERGENCY MEDICINE

## 2021-11-27 PROCEDURE — 250N000013 HC RX MED GY IP 250 OP 250 PS 637: Performed by: EMERGENCY MEDICINE

## 2021-11-27 RX ORDER — ASPIRIN 81 MG/1
162 TABLET, CHEWABLE ORAL ONCE
Status: COMPLETED | OUTPATIENT
Start: 2021-11-27 | End: 2021-11-27

## 2021-11-27 RX ORDER — ACETAMINOPHEN 325 MG/1
650 TABLET ORAL EVERY 6 HOURS PRN
Status: DISCONTINUED | OUTPATIENT
Start: 2021-11-27 | End: 2021-11-28 | Stop reason: HOSPADM

## 2021-11-27 RX ORDER — PANTOPRAZOLE SODIUM 20 MG/1
40 TABLET, DELAYED RELEASE ORAL
Status: DISCONTINUED | OUTPATIENT
Start: 2021-11-27 | End: 2021-11-28 | Stop reason: HOSPADM

## 2021-11-27 RX ORDER — EZETIMIBE 10 MG/1
10 TABLET ORAL DAILY
Status: DISCONTINUED | OUTPATIENT
Start: 2021-11-27 | End: 2021-11-28 | Stop reason: HOSPADM

## 2021-11-27 RX ORDER — MAGNESIUM HYDROXIDE/ALUMINUM HYDROXICE/SIMETHICONE 120; 1200; 1200 MG/30ML; MG/30ML; MG/30ML
30 SUSPENSION ORAL EVERY 4 HOURS PRN
Status: DISCONTINUED | OUTPATIENT
Start: 2021-11-27 | End: 2021-11-28 | Stop reason: HOSPADM

## 2021-11-27 RX ORDER — NITROGLYCERIN 0.4 MG/1
0.4 TABLET SUBLINGUAL EVERY 5 MIN PRN
Status: DISCONTINUED | OUTPATIENT
Start: 2021-11-27 | End: 2021-11-28 | Stop reason: HOSPADM

## 2021-11-27 RX ORDER — ASPIRIN 81 MG/1
162 TABLET, CHEWABLE ORAL ONCE
Status: DISCONTINUED | OUTPATIENT
Start: 2021-11-27 | End: 2021-11-27

## 2021-11-27 RX ORDER — ROSUVASTATIN CALCIUM 10 MG/1
40 TABLET, COATED ORAL DAILY
Status: DISCONTINUED | OUTPATIENT
Start: 2021-11-27 | End: 2021-11-28 | Stop reason: HOSPADM

## 2021-11-27 RX ORDER — MULTIVITAMIN,THERAPEUTIC
1 TABLET ORAL DAILY
COMMUNITY
End: 2023-10-06

## 2021-11-27 RX ORDER — ASPIRIN 81 MG/1
81 TABLET ORAL DAILY
Status: DISCONTINUED | OUTPATIENT
Start: 2021-11-28 | End: 2021-11-28 | Stop reason: HOSPADM

## 2021-11-27 RX ORDER — ACETAMINOPHEN 650 MG/1
650 SUPPOSITORY RECTAL EVERY 6 HOURS PRN
Status: DISCONTINUED | OUTPATIENT
Start: 2021-11-27 | End: 2021-11-28 | Stop reason: HOSPADM

## 2021-11-27 RX ORDER — AMIODARONE HYDROCHLORIDE 200 MG/1
200 TABLET ORAL EVERY EVENING
Status: DISCONTINUED | OUTPATIENT
Start: 2021-11-27 | End: 2021-11-28 | Stop reason: HOSPADM

## 2021-11-27 RX ORDER — LISINOPRIL 5 MG/1
5 TABLET ORAL EVERY 24 HOURS
Status: DISCONTINUED | OUTPATIENT
Start: 2021-11-27 | End: 2021-11-28 | Stop reason: HOSPADM

## 2021-11-27 RX ADMIN — EZETIMIBE 10 MG: 10 TABLET ORAL at 19:44

## 2021-11-27 RX ADMIN — LIDOCAINE HYDROCHLORIDE 30 ML: 20 SOLUTION TOPICAL at 09:15

## 2021-11-27 RX ADMIN — AMIODARONE HYDROCHLORIDE 200 MG: 200 TABLET ORAL at 19:44

## 2021-11-27 RX ADMIN — APIXABAN 5 MG: 5 TABLET, FILM COATED ORAL at 19:44

## 2021-11-27 RX ADMIN — ROSUVASTATIN CALCIUM 40 MG: 10 TABLET, FILM COATED ORAL at 12:29

## 2021-11-27 RX ADMIN — PANTOPRAZOLE SODIUM 40 MG: 20 TABLET, DELAYED RELEASE ORAL at 09:14

## 2021-11-27 RX ADMIN — LISINOPRIL 5 MG: 5 TABLET ORAL at 12:29

## 2021-11-27 RX ADMIN — ASPIRIN 81 MG CHEWABLE TABLET 162 MG: 81 TABLET CHEWABLE at 03:47

## 2021-11-27 RX ADMIN — APIXABAN 5 MG: 5 TABLET, FILM COATED ORAL at 12:29

## 2021-11-27 ASSESSMENT — ENCOUNTER SYMPTOMS
SHORTNESS OF BREATH: 0
FEVER: 0
NAUSEA: 0
CHILLS: 0
COUGH: 1

## 2021-11-27 ASSESSMENT — ACTIVITIES OF DAILY LIVING (ADL): DEPENDENT_IADLS:: INDEPENDENT

## 2021-11-27 NOTE — ED PROVIDER NOTES
EMERGENCY DEPARTMENT ENCOUNTER      NAME: Kelly Robledo  AGE: 76 year old female  YOB: 1945  MRN: [id number]  EVALUATION DATE & TIME: No admission date for patient encounter.    PCP: Selina Pozo    ED PROVIDER: Garland Khan M.D.      Chief Complaint   Patient presents with     Chest Pain         FINAL IMPRESSION:  1. Chest pain, unspecified type          ED COURSE & MEDICAL DECISION MAKING:    Pertinent Labs & Imaging studies reviewed. (See chart for details)  76 year old female presents to the Emergency Department for evaluation of chest pain.  Had chest pain that started tonight.  Woke her from sleep.  Was worse with exertion or swallowing.  Initial EKG shows some ST depression in the lateral leads.  Question ST changes in aVR.  Did repeat this EKG and this actually improved.  There is questionable delta wave for WPW type pattern.  Unclear if she has had this before.  Patient's troponin is negative.  Labs otherwise unchanged.  Creatinine is 1.3.  Chest x-ray is clear.  Feeling better after aspirin and nitro.  Given her significant cardiac history and EKG changes were brought in for observation rule out.  Discussed with the hospitalist who accepted admission    3:35 AM I met with the patient to gather history and to perform my initial exam. I discussed the plan for care while in the Emergency Department. PPE: Eye shield, surgical mask, N95 mask, and gloves.   5:01 AM Paged hospitalist.   5:15 AM Spoke with Dr. Odonnell, hospitalist, who agrees to accept patient for admission.     At the conclusion of the encounter I discussed the results of all of the tests and the disposition. The questions were answered. The patient or family acknowledged understanding and was agreeable with the care plan.       MEDICATIONS GIVEN IN THE EMERGENCY:  Medications   nitroGLYcerin (NITROSTAT) sublingual tablet 0.4 mg (0.4 mg Sublingual Not Given 11/27/21 0406)   aspirin (ASA) chewable tablet 162 mg  (162 mg Oral Given 11/27/21 0347)       NEW PRESCRIPTIONS STARTED AT TODAY'S ER VISIT  New Prescriptions    No medications on file          =================================================================    HPI    Patient information was obtained from: Patient     Use of : N/A      Kelly Robledo is a 76 year old female with a pertinent history of NSVT, atrial fibrillation, hyperlipidemia, and s/p mitral valve clip implantation who presents to this ED by walk in escorted by spouse for evaluation of chest pain.     Patient presents with mid sternal chest pain that began at 0100 this morning. Pain is worse with swallowing. Denies shortness of breath or nausea. Patient states pain has improved since onset, but that it is still there. Patient has history of mitral prolapse and mitral valve clip implantation (02/01/2021). Patient is anticoagulated on Eliquis. She notes an allergy to penicillins. Patient reports she has been coughing intermittently for the past couple of days. Patient denies fever, chills, or any additional complaints at this time.       REVIEW OF SYSTEMS   Review of Systems   Constitutional: Negative for chills and fever.   Respiratory: Positive for cough. Negative for shortness of breath.    Cardiovascular: Positive for chest pain.   Gastrointestinal: Negative for nausea.   All other systems reviewed and are negative.       PAST MEDICAL HISTORY:  Past Medical History:   Diagnosis Date     Abdominal pain      Atrial fibrillation (H)      Diverticulosis      Moderate mitral regurgitation      NSVT (nonsustained ventricular tachycardia) (H) 6/17/2020     Palpitations        PAST SURGICAL HISTORY:  Past Surgical History:   Procedure Laterality Date     BIOPSY BREAST Bilateral     benign     CV CORONARY ANGIOGRAM N/A 6/18/2020    Procedure: Coronary Angiogram;  Surgeon: Sergey Conner MD;  Location: [facility] Cath Lab;  Service: Cardiology     CV CORONARY ANGIOGRAM N/A 1/13/2021     Procedure: Coronary Angiogram;  Surgeon: Klaus Fitzpatrick MD;  Location: [facility] Cardiac Cath Lab;  Service: Cardiology     CV LEFT HEART CATHETERIZATION WITHOUT LEFT VENTRICULOGRAM Left 6/18/2020    Procedure: Left Heart Catheterization Without Left Ventriculogram;  Surgeon: Sergey Conner MD;  Location: [facility] Cath Lab;  Service: Cardiology     CV MITRACLIP N/A 2/1/2021    Procedure: CV MITRAL CLIP;  Surgeon: Klaus Fitzpatrick MD;  Location: [facility] CARDIAC CATH LAB     CV RIGHT HEART CATHETERIZATION N/A 6/18/2020    Procedure: Right Heart Catheterization;  Surgeon: Sergey Conner MD;  Location: [facility] Cath Lab;  Service: Cardiology     CV RIGHT HEART CATHETERIZATION N/A 1/13/2021    Procedure: Right Heart Catheterization;  Surgeon: Klaus Fitzpatrick MD;  Location: [facility] Cardiac Cath Lab;  Service: Cardiology     HYSTERECTOMY  1970's     OOPHORECTOMY Bilateral 1970's           CURRENT MEDICATIONS:    Current Facility-Administered Medications   Medication     nitroGLYcerin (NITROSTAT) sublingual tablet 0.4 mg     Current Outpatient Medications   Medication     amiodarone (PACERONE) 200 MG tablet     apixaban ANTICOAGULANT (ELIQUIS ANTICOAGULANT) 5 MG tablet     aspirin (ASA) 81 MG EC tablet     ezetimibe (ZETIA) 10 MG tablet     furosemide (LASIX) 20 MG tablet     lisinopril (ZESTRIL) 5 MG tablet     metoprolol succinate ER (TOPROL-XL) 50 MG 24 hr tablet     Multiple Vitamins-Minerals (ONCOVITE) TABS     rosuvastatin (CRESTOR) 40 MG tablet         ALLERGIES:  Allergies   Allergen Reactions     Penicillins Hives       FAMILY HISTORY:  Family History   Problem Relation Age of Onset     Breast Cancer Sister 47.00       SOCIAL HISTORY:   Social History     Socioeconomic History     Marital status:      Spouse name: Not on file     Number of children: Not on file     Years of education: Not on file     Highest education level: Not on file   Occupational History     Not on file    Tobacco Use     Smoking status: Never Smoker     Smokeless tobacco: Never Used   Substance and Sexual Activity     Alcohol use: No     Drug use: No     Sexual activity: Not on file   Other Topics Concern     Parent/sibling w/ CABG, MI or angioplasty before 65F 55M? Not Asked   Social History Narrative    Spends 6 months in [place].   . No children. Family in the [place] area.   Enjoys walking- very regular with this especially in [place].   Likes reading - romantic happy endings.   Never alcohol or smoking.  Selina Pozo MD       Social Determinants of Health     Financial Resource Strain: Not on file   Food Insecurity: Not on file   Transportation Needs: Not on file   Physical Activity: Not on file   Stress: Not on file   Social Connections: Not on file   Intimate Partner Violence: Not on file   Housing Stability: Not on file       VITALS:  BP (!) 162/78   Pulse 69   Temp 97.9  F (36.6  C) (Oral)   Resp 18   Wt 54 kg (119 lb)   SpO2 98%   BMI 19.80 kg/m      PHYSICAL EXAM    Physical Exam  Constitutional:       General: She is not in acute distress.     Appearance: She is not diaphoretic.   HENT:      Head: Atraumatic.      Mouth/Throat:      Pharynx: No oropharyngeal exudate.   Eyes:      General: No scleral icterus.     Pupils: Pupils are equal, round, and reactive to light.   Cardiovascular:      Heart sounds: Murmur heard.       Pulmonary:      Effort: No respiratory distress.      Breath sounds: Normal breath sounds.   Abdominal:      Palpations: Abdomen is soft.      Tenderness: There is no abdominal tenderness. There is no guarding or rebound.   Musculoskeletal:         General: No tenderness.   Skin:     General: Skin is warm.      Findings: No rash.   Neurological:      General: No focal deficit present.      Mental Status: She is alert.           LAB:  All pertinent labs reviewed and interpreted.  Labs Ordered and Resulted from Time of ED Arrival to Time of ED Departure   COMPREHENSIVE  METABOLIC PANEL - Abnormal       Result Value    Sodium 139      Potassium 3.6      Chloride 105      Carbon Dioxide (CO2) 22      Anion Gap 12      Urea Nitrogen 17      Creatinine 1.30 (*)     Calcium 9.2      Glucose 192 (*)     Alkaline Phosphatase 81      AST 39      ALT 57 (*)     Protein Total 7.3      Albumin 3.9      Bilirubin Total 0.8      GFR Estimate 39 (*)    CBC WITH PLATELETS AND DIFFERENTIAL - Abnormal    WBC Count 7.5      RBC Count 4.13      Hemoglobin 13.2      Hematocrit 41.6       (*)     MCH 32.0      MCHC 31.7      RDW 13.6      Platelet Count 198      % Neutrophils 66      % Lymphocytes 19      % Monocytes 10      % Eosinophils 4      % Basophils 1      % Immature Granulocytes 0      NRBCs per 100 WBC 0      Absolute Neutrophils 5.0      Absolute Lymphocytes 1.4      Absolute Monocytes 0.7      Absolute Eosinophils 0.3      Absolute Basophils 0.0      Absolute Immature Granulocytes 0.0      Absolute NRBCs 0.0     TROPONIN I - Normal    Troponin I <0.01     COVID-19 VIRUS (CORONAVIRUS) BY PCR - Normal    SARS CoV2 PCR Negative         RADIOLOGY:  Reviewed all pertinent imaging. Please see official radiology report.  XR Chest Port 1 View   Final Result   IMPRESSION: Cardiac silhouette within normal limits for AP technique. Clear lungs. No pneumothorax.          EK  Performed at: 557  Impression: Sinus rhythm first-degree AV block.  There is some ST depression in the lateral leads.  Questional may be minimal ST elevation in aVR.  Meet STEMI criteria.  When compared to previous dated 21 this ST depression is worse.  Sinus rhythm ventricular at 81.  MN normal 282.  QRS 88.  QTc 440    Performed at: 422  Impression: Sinus rhythm with delta wave concern for possible WPW.  When compared to previous earlier tonight ST depression is improved  Sinus rhythm ventricular rate of 72.  .  .  QTc 4 7    I have independently reviewed and interpreted the EKG(s) documented  above.        I, Alexander García, am serving as a scribe to document services personally performed by Dr. Garland Khan, based on my observation and the provider's statements to me. I, Garland Khan MD attest that Alexander García is acting in a scribe capacity, has observed my performance of the services and has documented them in accordance with my direction.    Garland Khan M.D.  Emergency Medicine  [facility] EMERGENCY ROOM  [address] [address]  [phone]  Dept: [phone]     Garland Khan MD  11/27/21 0559

## 2021-11-27 NOTE — ED NOTES
Pt presents w/ c/o chest pain that resolved after short time in ED. Pt GCS 15 through staty.  Ambulatory, walked and toileted self w/ need for assistance.

## 2021-11-27 NOTE — H&P
Admission History & Physical  Kelly Robledo, 1945, [id number]    [facility]  Selina Pozo, [phone]    Assessment and Plan:  Chest discomfort  Pain with swallowing  Mostly suggestive of GI etiology GERD versus gastritis  Give her a dose of GI cocktail  Start her on omeprazole  Rule out cardiac causes  EKG with ST depressions, WPW pattern  Check serial troponins  Request cardiology input    History of mitral regurgitation status post mitral clipping  History of paroxysmal atrial fibrillation  History of nonobstructive CAD had angiogram 1/21  Home medications needs to be reviewed  DVT prophylaxis: Ambulation  Code: Full okay  Expected length of stay : anticipate hospitalization less than 2 days.     Chief Complaint: Chest discomfort    HPI:     Kelly Robledo is a 76 year old old female past medical history significant for paroxysmal atrial fibrillation, severe mitral regurgitation status post mitral clipping 2/21, NSVT presented to the emergency room with complaints of chest pain mostly with drinking liquids.  It started last night.  She describes this as mostly chest discomfort.  He denies any heaviness or pain.  No associated nausea vomiting.  No radiation of pain.  Denies any exertional chest pain.  No shortness of breath, leg swelling. she has been burping a lot and thinks it might be heartburn.  No abdominal pain, diarrhea.  No urinary complaints.  Pain with swallowing resolved now.  He denies any previous history of stomach ulcers.  Does not take antacids.     EKG showed ST depressions in inferior, anterolateral leads, with LVH, most recent EKG there was question of WPW pattern.  Initial troponin was negative.  She was given and admitted.    Medical History  Past Medical History:   Diagnosis Date     Abdominal pain      Atrial fibrillation (H)      Diverticulosis      Moderate mitral regurgitation      NSVT (nonsustained ventricular tachycardia) (H) 6/17/2020      Palpitations       Patient Active Problem List    Diagnosis Date Noted     Chest pain, unspecified type 11/27/2021     Priority: Medium     Mitral regurgitation 02/01/2021     Priority: Medium     Adjustment disorder with anxious mood 01/29/2021     Priority: Medium     Irritable bowel syndrome 01/29/2021     Priority: Medium     Created by Conversion       Tricuspid valve regurgitation, nonrheumatic 01/29/2021     Priority: Medium     Severe mitral regurgitation 01/18/2021     Priority: Medium     Added automatically from request for surgery [id number]       Subclinical hypothyroidism 12/16/2020     Priority: Medium     Risk/benefit conversation 12/16/2020; see note. Not on tx       Acute heart failure with preserved ejection fraction (H) 10/14/2020     Priority: Medium     9/20/2020 hospital admission with pulmonary edema       Stage 3a chronic kidney disease (H) 10/14/2020     Priority: Medium     Anxiety 09/25/2020     Priority: Medium     Coronary arteriosclerosis 09/25/2020     Priority: Medium     Added automatically from request for surgery [id number]       Hypertensive disorder 09/25/2020     Priority: Medium     Osteoporosis 09/25/2020     Priority: Medium     Mitral valve prolapse 09/25/2020     Priority: Medium     Paroxysmal atrial fibrillation (H) 07/02/2020     Priority: Medium     Hyperlipidemia 07/05/2012     Priority: Medium     Created by Conversion       Nonrheumatic mitral valve regurgitation 07/05/2012     Priority: Medium     Added automatically from request for surgery [id number]  Added automatically from request for surgery [id number]  a. Moderate  on echo June 17, 2011.  b. Ejection fraction 60%, normal left ventricular size, normal pulmonary  artery pressure by echo June 17, 2011.  -- No significant change by echo 2012, 2013, 2014, 2015, 2017, 2019          Surgical History  She  has a past surgical history that includes MitraClip (N/A, 2/1/2021); Biopsy breast (Bilateral); Hysterectomy (1970's);  Oophorectomy (Bilateral, 1970's); Cv Coronary Angiogram (N/A, 6/18/2020); Cv Left Heart Catheterization Without Left Ventriculogram (Left, 6/18/2020); Cv Right Heart Catheterization (N/A, 6/18/2020); Cv Coronary Angiogram (N/A, 1/13/2021); and Cv Right Heart Catheterization (N/A, 1/13/2021).     Past Surgical History:   Procedure Laterality Date     BIOPSY BREAST Bilateral     benign     CV CORONARY ANGIOGRAM N/A 6/18/2020    Procedure: Coronary Angiogram;  Surgeon: Sergey Conner MD;  Location: [facility] Cath Lab;  Service: Cardiology     CV CORONARY ANGIOGRAM N/A 1/13/2021    Procedure: Coronary Angiogram;  Surgeon: Klaus Fitzpatrick MD;  Location: [facility] Cardiac Cath Lab;  Service: Cardiology     CV LEFT HEART CATHETERIZATION WITHOUT LEFT VENTRICULOGRAM Left 6/18/2020    Procedure: Left Heart Catheterization Without Left Ventriculogram;  Surgeon: Sergey Conner MD;  Location: [facility] Cath Lab;  Service: Cardiology     CV MITRACLIP N/A 2/1/2021    Procedure: CV MITRAL CLIP;  Surgeon: Klaus Fitzpatrick MD;  Location: [facility] CARDIAC CATH LAB     CV RIGHT HEART CATHETERIZATION N/A 6/18/2020    Procedure: Right Heart Catheterization;  Surgeon: Sergey Conner MD;  Location: [facility] Cath Lab;  Service: Cardiology     CV RIGHT HEART CATHETERIZATION N/A 1/13/2021    Procedure: Right Heart Catheterization;  Surgeon: Klaus Fitzpatrick MD;  Location: [facility] Cardiac Cath Lab;  Service: Cardiology     HYSTERECTOMY  1970's     OOPHORECTOMY Bilateral 1970's       Allergies  Allergies   Allergen Reactions     Penicillins Hives       Prior to Admission Medications   (Not in a hospital admission)      Social History  Reviewed, and she  reports that she has never smoked. She has never used smokeless tobacco. She reports that she does not drink alcohol and does not use drugs.  Social History     Tobacco Use     Smoking status: Never Smoker     Smokeless tobacco: Never Used   Substance Use  "Topics     Alcohol use: No       Family History  Reviewed, and I have reviewed this patient's family history and updated it with pertinent information if needed.  Family History   Problem Relation Age of Onset     Breast Cancer Sister 47.00   No premature CAD in her family       Review Of Systems  Complete As per admission HPI, all other systems reviewed and negative.     Physical Exam:  Vital signs:  Temp: 97.9  F (36.6  C) Temp src: Oral BP: (!) 159/76 Pulse: 68   Resp: 16 SpO2: 98 % O2 Device: None (Room air)     Weight: 54 kg (119 lb)  Estimated body mass index is 19.8 kg/m  as calculated from the following:    Height as of 7/9/21: 1.651 m (5' 5\").    Weight as of this encounter: 54 kg (119 lb).    General Appearance:  Awake Alert, orientedx3, not in any apparent distress   Head:  Normocephalic, without obvious abnormality   Eyes:  PERRL, conjunctiva/corneas clear   Throat: Oral mucosa moist   Neck: Supple,  no JVD   Lungs:   Clear to auscultation bilaterally, respirations unlabored   Chest Wall:  No tenderness   Heart:  Regular rate and rhythm, S1, S2 normal, systolic murmur   Abdomen:   Soft, non-tender, bowel sounds present,  no guarding, rigidity    Extremities:  no edema, no joint swelling   Skin: Skin color, texture, turgor normal, no rashes or lesions   Neurologic: Alert and oriented X 3, no focal deficits     Results:  Labs Ordered and Resulted from Time of ED Arrival to Time of ED Departure   COMPREHENSIVE METABOLIC PANEL - Abnormal       Result Value    Sodium 139      Potassium 3.6      Chloride 105      Carbon Dioxide (CO2) 22      Anion Gap 12      Urea Nitrogen 17      Creatinine 1.30 (*)     Calcium 9.2      Glucose 192 (*)     Alkaline Phosphatase 81      AST 39      ALT 57 (*)     Protein Total 7.3      Albumin 3.9      Bilirubin Total 0.8      GFR Estimate 39 (*)    CBC WITH PLATELETS AND DIFFERENTIAL - Abnormal    WBC Count 7.5      RBC Count 4.13      Hemoglobin 13.2      Hematocrit 41.6      "  (*)     MCH 32.0      MCHC 31.7      RDW 13.6      Platelet Count 198      % Neutrophils 66      % Lymphocytes 19      % Monocytes 10      % Eosinophils 4      % Basophils 1      % Immature Granulocytes 0      NRBCs per 100 WBC 0      Absolute Neutrophils 5.0      Absolute Lymphocytes 1.4      Absolute Monocytes 0.7      Absolute Eosinophils 0.3      Absolute Basophils 0.0      Absolute Immature Granulocytes 0.0      Absolute NRBCs 0.0     TROPONIN I - Normal    Troponin I <0.01     COVID-19 VIRUS (CORONAVIRUS) BY PCR - Normal    SARS CoV2 PCR Negative     ISTAT TROPONIN POCT - Normal    TROPPC POCT 0.00     TROPONIN I   TROPONIN I      EKG:  EKG: Sinus rhythm, ST depressions in inferior, anterolateral leads criteria for LVH, repeat EKG there is question of WPW pattern.    Imaging:   XR Chest Port 1 View    Result Date: 11/27/2021  EXAM: XR CHEST PORT 1 VIEW LOCATION: [facility] DATE/TIME: 11/27/2021 4:34 AM INDICATION: chest pain COMPARISON: 02/02/2021     IMPRESSION: Cardiac silhouette within normal limits for AP technique. Clear lungs. No pneumothorax.    Echocardiogram 3/21 showed EF of 60%, mitral clip in place, mild to moderate calcific stenosis with moderate MR, mild to moderate TR with moderate pulmonary hypertension.  Degree of mitral insufficiency has diminished.    Coronary angiogram 1/13/2021  Left main with 30% ostial narrowing  LAD with mild to moderate diffuse disease, and a 50% stenosis in the midportion, that appears unchanged from her prior angiogram  Ramus with a 50% proximal to mid stenosis and mild disease distally  LCx is a small vessel with no significant obtuse marginals noted  RCA is a large caliber, dominant vessel supplying the inferior and posterior walls.  There is 40% mid to distal focal stenosis, also unchanged from prior.    Babita Shaffer M.D  [facility] Service  Internal Medicine    11/27/2021  9:02 AM

## 2021-11-27 NOTE — ED NOTES
Checked on patient. Resting comfortably. Denies chest pain at this time. Medicated as ordered. Lab here for blood draw.

## 2021-11-27 NOTE — PHARMACY-ADMISSION MEDICATION HISTORY
Pharmacy Note - Admission Medication History    Pertinent Provider Information: Spoke with patient who is a good historian, was reading off a home medication list.     Patient states that she thinks one of her AM medications is causing her to feel dizzy. Concern for falls while on Eliquis. She reports that she takes her amiodarone in the AM instead of PM as it is prescribed. Patient may benefit from discussion on amiodarone use.     Patient also reports that her current dose of metoprolol succ is 50 mg  every evening. Most recent fill in [facility] (8/6/21) shows that patient's dose is 100 mg every day OR 50 mg BID. Patient states that she is not aware that her dose changed recently, but states that she would not change the dose unless instructed to by her prescriber.    ______________________________________________________________________    Prior To Admission (PTA) med list completed and updated in EMR.       PTA Med List   Medication Sig Last Dose     amiodarone (PACERONE) 200 MG tablet Take 1 tablet (200 mg) by mouth every evening (Patient taking differently: Take 200 mg by mouth every morning ) 11/26/2021 at AM, pt reports dizzy in AM     apixaban ANTICOAGULANT (ELIQUIS ANTICOAGULANT) 5 MG tablet Take 1 tablet (5 mg) by mouth 2 times daily 11/26/2021 at PM      ezetimibe (ZETIA) 10 MG tablet Take 1 tablet (10 mg) by mouth daily 11/26/2021 at PM     lisinopril (ZESTRIL) 5 MG tablet Take 5 mg by mouth every 24 hours 11/26/2021 at PM     metoprolol succinate ER (TOPROL-XL) 50 MG 24 hr tablet TAKE 2 TABLETS (100 MG TOTAL) BY MOUTH DAILY. OK TO TAKE 50MG IN AM AND 50MG IN PM (Patient taking differently: Take 50 mg by mouth every evening ) 11/26/2021 at PM     multivitamin, therapeutic (THERA-VIT) TABS tablet Take 1 tablet by mouth daily 11/26/2021 at AM     rosuvastatin (CRESTOR) 40 MG tablet Take 40 mg by mouth daily  11/26/2021 at AM       Information source(s): Patient, CareEverywhere/SureScripts and home med  list  Method of interview communication: in-person    Summary of Changes to PTA Med List  New: none  Discontinued: none  Changed: metoprolol and amiodarone-- clarify appropriate dose/use    Patient was asked about OTC/herbal products specifically.  PTA med list reflects this.    In the past week, patient estimated taking medication this percent of the time:  greater than 90%.    Allergies were reviewed, assessed, and updated with the patient.      Patient does not use any multi-dose medications prior to admission.    The information provided in this note is only as accurate as the sources available at the time of the update(s).    Thank you for the opportunity to participate in the care of this patient.    Lili Caballero  11/27/2021 9:25 AM

## 2021-11-27 NOTE — CONSULTS
Care Management Initial Consult    General Information  Assessment completed with: Patient,Spouse or significant other,    Type of CM/SW Visit: Initial Assessment    Primary Care Provider verified and updated as needed: Yes   Readmission within the last 30 days: no previous admission in last 30 days      Reason for Consult: discharge planning  Advance Care Planning: Advance Care Planning Reviewed: no concerns identified  Pt refused materials.     Communication Assessment  Patient's communication style: spoken language (English or Bilingual)    Hearing Difficulty or Deaf: no   Wear Glasses or Blind: yes    Cognitive  Cognitive/Neuro/Behavioral: orientation        Orientation: oriented x 4             Living Environment:   People in home: spouse     Current living Arrangements: house ([facility].)      Able to return to prior arrangements: yes     Family/Social Support:  Care provided by: self,spouse/significant other  Provides care for: no one  Marital Status:             Description of Support System: Supportive,Involved    Support Assessment: Adequate family and caregiver support,Adequate social supports    Current Resources:   Patient receiving home care services: No     Community Resources: None  Equipment currently used at home: none  Supplies currently used at home: None    Employment/Financial:  Employment Status: retired        Financial Concerns: No concerns identified   Referral to Financial Counselor: No     Lifestyle & Psychosocial Needs:  Social Determinants of Health     Tobacco Use: Low Risk      Smoking Tobacco Use: Never Smoker     Smokeless Tobacco Use: Never Used   Alcohol Use: Not on file   Financial Resource Strain: Not on file   Food Insecurity: Not on file   Transportation Needs: Not on file   Physical Activity: Not on file   Stress: Not on file   Social Connections: Not on file   Intimate Partner Violence: Not on file   Depression: Not at risk     PHQ-2 Score: 0   Housing  "Stability: Not on file     Functional Status:  Prior to admission patient needed assistance:   Dependent ADLs:: Independent  Dependent IADLs:: Independent  Assesssment of Functional Status: Not at  functional baseline    Mental Health Status:  Mental Health Status: No Current Concerns       Chemical Dependency Status:  Chemical Dependency Status: No Current Concerns           Values/Beliefs:  Spiritual, Cultural Beliefs, Catholic Practices, Values that affect care: no             Additional Information:  Writer met with pt and her  to introduce Care Management services(CM) and obtain a discharge assessment. MALLORY info provided.    See assessment info from ED Physician Garland Khan - \"Pertinent Labs & Imaging studies reviewed. (See chart for details) 76 year old female presents to the Emergency Department for evaluation of chest pain.  Had chest pain that started tonight.  Woke her from sleep.  Was worse with exertion or swallowing.  Initial EKG shows some ST depression in the lateral leads.  Question ST changes in aVR.  Did repeat this EKG and this actually improved.  There is questionable delta wave for WPW type pattern.  Unclear if she has had this before.  Patient's troponin is negative.  Labs otherwise unchanged.  Creatinine is 1.3.  Chest x-ray is clear.  Feeling better after aspirin and nitro.  Given her significant cardiac history and EKG changes were brought in for observation rule out.  Discussed with the hospitalist who accepted admission\"    Pt states being [place] . She also stated being totally independent with I/ADLs. Pt denied the need for CM assistance.  stated he will provide transport home at time of discharge.     CM to follow for discharge needs as they arise.    Selwyn Villalta RN        "

## 2021-11-27 NOTE — ED NOTES
Met patient. In no apparent distress. Reports chest discomfort when up. Unchanged. VSS. Up to the bathroom independently and back to cart safely. Diet ordered. No other needs at this time. Call light within reach. Awaiting admission.

## 2021-11-27 NOTE — ED TRIAGE NOTES
Patient presents with mid to left side chest pain that began around 0100 this morning. States it feels worse when swallowing. Denies shortness of breath, nausea. Hx mitral valve prolapse, HTN.

## 2021-11-28 VITALS
SYSTOLIC BLOOD PRESSURE: 162 MMHG | DIASTOLIC BLOOD PRESSURE: 98 MMHG | TEMPERATURE: 97.8 F | HEART RATE: 77 BPM | RESPIRATION RATE: 18 BRPM | WEIGHT: 119 LBS | OXYGEN SATURATION: 98 % | BODY MASS INDEX: 19.8 KG/M2

## 2021-11-28 PROCEDURE — 250N000013 HC RX MED GY IP 250 OP 250 PS 637: Performed by: INTERNAL MEDICINE

## 2021-11-28 PROCEDURE — G0378 HOSPITAL OBSERVATION PER HR: HCPCS

## 2021-11-28 PROCEDURE — 99217 PR OBSERVATION CARE DISCHARGE: CPT | Performed by: INTERNAL MEDICINE

## 2021-11-28 RX ORDER — PANTOPRAZOLE SODIUM 40 MG/1
40 TABLET, DELAYED RELEASE ORAL DAILY
Qty: 30 TABLET | Refills: 0 | Status: SHIPPED | OUTPATIENT
Start: 2021-11-28 | End: 2022-01-18

## 2021-11-28 RX ADMIN — ROSUVASTATIN CALCIUM 40 MG: 10 TABLET, FILM COATED ORAL at 07:58

## 2021-11-28 RX ADMIN — PANTOPRAZOLE SODIUM 40 MG: 20 TABLET, DELAYED RELEASE ORAL at 06:35

## 2021-11-28 RX ADMIN — APIXABAN 5 MG: 5 TABLET, FILM COATED ORAL at 07:57

## 2021-11-28 NOTE — DISCHARGE INSTRUCTIONS
Uncertain Causes of Chest Pain    Chest pain can happen for a number of reasons. Sometimes the cause can't be determined. If your condition does not seem serious, and your pain does not appear to be coming from your heart, your healthcare provider may recommend watching it closely. Sometimes the signs of a serious problem take more time to appear. Many problems not related to your heart can cause chest pain. These include:    Musculoskeletal. Costochondritis is an inflammation of the tissues around the ribs that can occur from trauma or overuse injuries, or a strain of the muscles of the chest wall    Respiratory. Pneumonia, collapsed lung (pneumothorax), or inflammation of the lining of the chest and lungs (pleurisy)    Gastrointestinal. Esophageal reflux, heartburn, ulcers, or gallbladder disease    Anxiety and panic disorders    Nerve compression and inflammation    Rare miscellaneous problems such as aortic aneurysm (a swelling of the large artery coming out of the heart) or pulmonary embolism (a blood clot in the lungs)  Home care  After your visit, follow these recommendations:    Rest today and avoid strenuous activity.    Take any prescribed medicine as directed.    Be aware of any recurrent chest pain and notice any changes  Follow-up care  Follow up with your healthcare provider if you do not start to feel better within 24 hours, or as advised.  Call 911  Call 911 if any of these occur:    A change in the type of pain: if it feels different, becomes more severe, lasts longer, or begins to spread into your shoulder, arm, neck, jaw or back    Shortness of breath or increased pain with breathing    Weakness, dizziness, or fainting    Rapid heart beat    Crushing sensation in your chest  When to seek medical advice  Call your healthcare provider right away if any of the following occur:    Cough with dark colored sputum (phlegm) or blood    Fever of 100.4 F (38 C) or higher, or as directed by your healthcare  provider    Swelling, pain or redness in one leg  [employer] last reviewed this educational content on 5/1/2018 2000-2021 [employer]. All rights reserved. This information is not intended as a substitute for professional medical care. Always follow your healthcare professional's instructions.          Noncardiac Chest Pain    Based on your visit today, the healthcare provider doesn t know what is causing your chest pain. In most cases, people who come to the emergency room with chest pain don t have a problem with their heart. Instead, the pain is caused by other conditions. It's important for the healthcare team to be sure you are not having a life-threatening cause for chest pain such as:     Heart attack    Blood clot in the lungs    Collapsed lung    Ruptured esophagus    Tearing of the aorta  Once these major causes have been ruled out, you may have further evaluation for nonheart causes of chest pain. These may be problems with the lungs, muscles, bones, digestive tract, nerves, or mental health. They include:     Inflammation around the lungs (pleurisy)    Collapsed lung (pneumothorax)    Fluid around the lungs (pleural effusion)    Lung cancer (a rare cause of chest pain)    Inflamed cartilage between the ribs (costochondritis)    Fibromyalgia    Rheumatoid arthritis    Chest wall strain    Reflux    Stomach ulcer    Spasms of the esophagus    Gall stones    Gallbladder inflammation    Panic or anxiety attacks    Emotional distress  Your condition doesn t seem serious. And your pain doesn t seem to be coming from your heart. But sometimes the signs of a serious problem take more time to appear. Watch for the warning signs listed below.   Home care  Follow these guidelines when caring for yourself at home:     Rest today and don't do any strenuous activity.    Take any prescribed medicine as directed.  Follow-up care  Follow up with your healthcare provider, or as advised, if you don t start  to feel better in 24 hours.   Call 911  Call 911 if any of these occur:     A change in the type of pain: if it feels different, becomes more severe, lasts longer, or begins to spread into your shoulder, arm, neck, jaw or back    Shortness of breath or increased pain with breathing    Weakness, dizziness, or fainting    Rapid heart beat    Crushing sensation in your chest  When to seek medical advice  Call your healthcare provider right away if any of these occur:     Cough with dark colored sputum (phlegm) or blood    Fever of 100.4 F (38 C) or higher, or as directed by your healthcare provider    Swelling, pain or redness in one leg  [employer] last reviewed this educational content on 6/1/2019 2000-2021 [employer]. All rights reserved. This information is not intended as a substitute for professional medical care. Always follow your healthcare professional's instructions.          Uncertain Causes of Chest Pain    Chest pain can happen for a number of reasons. Sometimes the cause can't be determined. If your condition does not seem serious, and your pain does not appear to be coming from your heart, your healthcare provider may recommend watching it closely. Sometimes the signs of a serious problem take more time to appear. Many problems not related to your heart can cause chest pain. These include:    Musculoskeletal. Costochondritis is an inflammation of the tissues around the ribs that can occur from trauma or overuse injuries, or a strain of the muscles of the chest wall    Respiratory. Pneumonia, collapsed lung (pneumothorax), or inflammation of the lining of the chest and lungs (pleurisy)    Gastrointestinal. Esophageal reflux, heartburn, ulcers, or gallbladder disease    Anxiety and panic disorders    Nerve compression and inflammation    Rare miscellaneous problems such as aortic aneurysm (a swelling of the large artery coming out of the heart) or pulmonary embolism (a blood clot in the  lungs)  Home care  After your visit, follow these recommendations:    Rest today and avoid strenuous activity.    Take any prescribed medicine as directed.    Be aware of any recurrent chest pain and notice any changes  Follow-up care  Follow up with your healthcare provider if you do not start to feel better within 24 hours, or as advised.  Call 911  Call 911 if any of these occur:    A change in the type of pain: if it feels different, becomes more severe, lasts longer, or begins to spread into your shoulder, arm, neck, jaw or back    Shortness of breath or increased pain with breathing    Weakness, dizziness, or fainting    Rapid heart beat    Crushing sensation in your chest  When to seek medical advice  Call your healthcare provider right away if any of the following occur:    Cough with dark colored sputum (phlegm) or blood    Fever of 100.4 F (38 C) or higher, or as directed by your healthcare provider    Swelling, pain or redness in one leg  [place] last reviewed this educational content on 5/1/2018 2000-2021 [employer]. All rights reserved. This information is not intended as a substitute for professional medical care. Always follow your healthcare professional's instructions.

## 2021-11-29 LAB
ATRIAL RATE - MUSE: 81 BPM
DIASTOLIC BLOOD PRESSURE - MUSE: 95 MMHG
INTERPRETATION ECG - MUSE: NORMAL
P AXIS - MUSE: 80 DEGREES
PR INTERVAL - MUSE: 282 MS
QRS DURATION - MUSE: 88 MS
QT - MUSE: 384 MS
QTC - MUSE: 446 MS
R AXIS - MUSE: 79 DEGREES
SYSTOLIC BLOOD PRESSURE - MUSE: 202 MMHG
T AXIS - MUSE: 62 DEGREES
VENTRICULAR RATE- MUSE: 81 BPM

## 2021-12-10 ENCOUNTER — OFFICE VISIT (OUTPATIENT)
Dept: FAMILY MEDICINE | Facility: CLINIC | Age: 76
End: 2021-12-10
Payer: COMMERCIAL

## 2021-12-10 VITALS
HEART RATE: 72 BPM | SYSTOLIC BLOOD PRESSURE: 162 MMHG | WEIGHT: 119 LBS | BODY MASS INDEX: 20.32 KG/M2 | HEIGHT: 64 IN | DIASTOLIC BLOOD PRESSURE: 84 MMHG

## 2021-12-10 DIAGNOSIS — M81.0 OSTEOPOROSIS, UNSPECIFIED OSTEOPOROSIS TYPE, UNSPECIFIED PATHOLOGICAL FRACTURE PRESENCE: ICD-10-CM

## 2021-12-10 DIAGNOSIS — Z78.0 POSTMENOPAUSAL STATUS: ICD-10-CM

## 2021-12-10 DIAGNOSIS — Z00.00 MEDICARE ANNUAL WELLNESS VISIT, SUBSEQUENT: Primary | ICD-10-CM

## 2021-12-10 DIAGNOSIS — Z98.890 S/P MITRAL VALVE CLIP IMPLANTATION: ICD-10-CM

## 2021-12-10 DIAGNOSIS — I10 ESSENTIAL HYPERTENSION: ICD-10-CM

## 2021-12-10 DIAGNOSIS — I42.8 NON-ISCHEMIC CARDIOMYOPATHY (H): ICD-10-CM

## 2021-12-10 DIAGNOSIS — N18.31 STAGE 3A CHRONIC KIDNEY DISEASE (H): ICD-10-CM

## 2021-12-10 DIAGNOSIS — I25.10 CORONARY ARTERY DISEASE INVOLVING NATIVE CORONARY ARTERY OF NATIVE HEART WITHOUT ANGINA PECTORIS: ICD-10-CM

## 2021-12-10 DIAGNOSIS — R31.0 GROSS HEMATURIA: ICD-10-CM

## 2021-12-10 DIAGNOSIS — I48.0 PAROXYSMAL ATRIAL FIBRILLATION (H): ICD-10-CM

## 2021-12-10 DIAGNOSIS — Z95.818 S/P MITRAL VALVE CLIP IMPLANTATION: ICD-10-CM

## 2021-12-10 DIAGNOSIS — I34.0 NONRHEUMATIC MITRAL VALVE REGURGITATION: ICD-10-CM

## 2021-12-10 LAB
ALBUMIN UR-MCNC: NEGATIVE MG/DL
ANION GAP SERPL CALCULATED.3IONS-SCNC: 12 MMOL/L (ref 5–18)
APPEARANCE UR: CLEAR
BILIRUB UR QL STRIP: NEGATIVE
BUN SERPL-MCNC: 16 MG/DL (ref 8–28)
CALCIUM SERPL-MCNC: 9.8 MG/DL (ref 8.5–10.5)
CHLORIDE BLD-SCNC: 102 MMOL/L (ref 98–107)
CO2 SERPL-SCNC: 27 MMOL/L (ref 22–31)
COLOR UR AUTO: YELLOW
CREAT SERPL-MCNC: 1.17 MG/DL (ref 0.6–1.1)
CREAT UR-MCNC: 78 MG/DL
GFR SERPL CREATININE-BSD FRML MDRD: 45 ML/MIN/1.73M2
GLUCOSE BLD-MCNC: 102 MG/DL (ref 70–125)
GLUCOSE UR STRIP-MCNC: NEGATIVE MG/DL
HGB UR QL STRIP: ABNORMAL
KETONES UR STRIP-MCNC: NEGATIVE MG/DL
LEUKOCYTE ESTERASE UR QL STRIP: NEGATIVE
MICROALBUMIN UR-MCNC: 6.06 MG/DL (ref 0–1.99)
MICROALBUMIN/CREAT UR: 77.7 MG/G CR
NITRATE UR QL: NEGATIVE
PH UR STRIP: 7 [PH] (ref 5–8)
POTASSIUM BLD-SCNC: 4.2 MMOL/L (ref 3.5–5)
RBC #/AREA URNS AUTO: ABNORMAL /HPF
SODIUM SERPL-SCNC: 141 MMOL/L (ref 136–145)
SP GR UR STRIP: 1.02 (ref 1–1.03)
SQUAMOUS #/AREA URNS AUTO: ABNORMAL /LPF
T3 SERPL-MCNC: 68 NG/DL (ref 60–181)
T4 FREE SERPL-MCNC: 1.21 NG/DL (ref 0.7–1.8)
TSH SERPL DL<=0.005 MIU/L-ACNC: 7.9 UIU/ML (ref 0.3–5)
UROBILINOGEN UR STRIP-ACNC: 0.2 E.U./DL
WBC #/AREA URNS AUTO: ABNORMAL /HPF

## 2021-12-10 PROCEDURE — 80048 BASIC METABOLIC PNL TOTAL CA: CPT | Performed by: FAMILY MEDICINE

## 2021-12-10 PROCEDURE — 82043 UR ALBUMIN QUANTITATIVE: CPT

## 2021-12-10 PROCEDURE — 99214 OFFICE O/P EST MOD 30 MIN: CPT | Mod: 25 | Performed by: FAMILY MEDICINE

## 2021-12-10 PROCEDURE — 84439 ASSAY OF FREE THYROXINE: CPT | Performed by: FAMILY MEDICINE

## 2021-12-10 PROCEDURE — 81001 URINALYSIS AUTO W/SCOPE: CPT

## 2021-12-10 PROCEDURE — 84443 ASSAY THYROID STIM HORMONE: CPT | Performed by: FAMILY MEDICINE

## 2021-12-10 PROCEDURE — 84480 ASSAY TRIIODOTHYRONINE (T3): CPT | Performed by: FAMILY MEDICINE

## 2021-12-10 PROCEDURE — 99397 PER PM REEVAL EST PAT 65+ YR: CPT | Performed by: FAMILY MEDICINE

## 2021-12-10 PROCEDURE — 36415 COLL VENOUS BLD VENIPUNCTURE: CPT | Performed by: FAMILY MEDICINE

## 2021-12-10 RX ORDER — CLINDAMYCIN HCL 300 MG
CAPSULE ORAL
COMMUNITY
Start: 2021-03-11 | End: 2022-06-15

## 2021-12-10 RX ORDER — PANTOPRAZOLE SODIUM 40 MG/1
40 TABLET, DELAYED RELEASE ORAL DAILY
Qty: 30 TABLET | Refills: 0 | Status: CANCELLED | OUTPATIENT
Start: 2021-12-10

## 2021-12-10 RX ORDER — AMIODARONE HYDROCHLORIDE 200 MG/1
200 TABLET ORAL EVERY MORNING
Qty: 90 TABLET | Refills: 3 | Status: CANCELLED | OUTPATIENT
Start: 2021-12-10

## 2021-12-10 RX ORDER — ROSUVASTATIN CALCIUM 40 MG/1
40 TABLET, COATED ORAL DAILY
Qty: 90 TABLET | Refills: 3 | Status: SHIPPED | OUTPATIENT
Start: 2021-12-10 | End: 2022-12-18

## 2021-12-10 RX ORDER — LISINOPRIL 5 MG/1
5 TABLET ORAL EVERY 24 HOURS
Qty: 90 TABLET | Refills: 3 | Status: SHIPPED | OUTPATIENT
Start: 2021-12-10 | End: 2022-12-19

## 2021-12-10 ASSESSMENT — ACTIVITIES OF DAILY LIVING (ADL): CURRENT_FUNCTION: NO ASSISTANCE NEEDED

## 2021-12-10 ASSESSMENT — MIFFLIN-ST. JEOR: SCORE: 996.84

## 2021-12-10 NOTE — PROGRESS NOTES
"SUBJECTIVE:   Kelly Robledo is a 76 year old female who presents for Preventive Visit.      Patient has been advised of split billing requirements and indicates understanding: Yes   Are you in the first 12 months of your Medicare coverage?  No    Healthy Habits:     In general, how would you rate your overall health?  Good    Frequency of exercise:  4-5 days/week    Duration of exercise:  30-45 minutes    Do you usually eat at least 4 servings of fruit and vegetables a day, include whole grains    & fiber and avoid regularly eating high fat or \"junk\" foods?  No    Taking medications regularly:  Yes    Medication side effects:  Significant flushing    Ability to successfully perform activities of daily living:  No assistance needed    Home Safety:  No safety concerns identified    Hearing Impairment:  No hearing concerns    In the past 6 months, have you been bothered by leaking of urine? Yes    In general, how would you rate your overall mental or emotional health?  Excellent      PHQ-2 Total Score: 0    Chief Complaint   Patient presents with     Wellness Visit     fasting, med refills     Today she failed her MiniCog- abnormal clock (all numbers on the right half of the [place]- on a redo she was able to get the numbers fully around the clock but still abnormal). Remembered 1 word.   She denies memory concerns though in conversation today I can tell a few instances in which she has some short-term memory recall as she is not able to articulate exact blood pressures and gives a general answer about them being less than 140/90.  I have asked her to bring blood pressure data for review.    Has had her COVID vaccines x 3    Went to her sister's for Thanksgiving. She brought the pies.   Her  drives.   She takes care of all the shopping, cooking, cleaning. Her  does the finances.     Recent ER visit with admission overnight on 11/27-11/28 for evaluation of chest pain that woke her from sleep and work-up " "there was reassuring. They sent her home with 1 month trial of pantoprazole.   The pain has resolved but she feels like she still burps a lot.     Hypertension: Home BPs 130s/75s.  Continues with lisinopril 5 mg and metoprolol 50 mg which she takes twice daily.  White Coat HTN \"always high at the doctor\"    She feels like her balance is off especially with getting up and down.  We talked about the options for physical therapy versus yoga/charles chi and she is going to look into the latter.    Feels like it's related to all her meds over the years. Likely the metoprolol which she takes as 50mg XR twice daily.    Patient has history of mitral prolapse and mitral valve clip implantation (02/01/2021). Patient is anticoagulated on Eliquis.    Wt Readings from Last 3 Encounters:   12/10/21 54 kg (119 lb)   11/27/21 54 kg (119 lb)   07/09/21 53.5 kg (118 lb)         Do you feel safe in your environment? Yes    Have you ever done Advance Care Planning? (For example, a Health Directive, POLST, or a discussion with a medical provider or your loved ones about your wishes): Yes, patient states has an Advance Care Planning document and will bring a copy to the clinic.       Fall risk  Fallen 2 or more times in the past year?: No  Any fall with injury in the past year?: No    Cognitive Screening   1) Repeat 3 items (Leader, Season, Table)    2) Clock draw: ABNORMAL (see HPI and we will scan to chart)  3) 3 item recall: Recalls 1 object   Results: ABNORMAL clock, 1-2 items recalled: PROBABLE COGNITIVE IMPAIRMENT, **INFORM PROVIDER**    Mini-CogTM Copyright S [employer]. Licensed by the author for use in [facility]; reprinted with permission (brent@.[facility]). All rights reserved.      Reviewed and updated as needed this visit by clinical staff  Tobacco  Allergies  Meds  Problems  Med Hx  Surg Hx  Fam Hx         Reviewed and updated as needed this visit by Provider  Tobacco  Allergies  Meds  Problems  Med Hx  Surg Hx " " Fam Hx        Social History     Tobacco Use     Smoking status: Never Smoker     Smokeless tobacco: Never Used   Substance Use Topics     Alcohol use: No     If you drink alcohol do you typically have >3 drinks per day or >7 drinks per week? No    Alcohol Use 12/10/2021   Prescreen: >3 drinks/day or >7 drinks/week? Not Applicable   Prescreen: >3 drinks/day or >7 drinks/week? -   No flowsheet data found.    Current providers sharing in care for this patient include:   Patient Care Team:  Selina Pozo MD as PCP - General (Family Medicine)  Selina Pozo MD as Assigned PCP  Dayne Kim MD as Assigned Heart and Vascular Provider    The following health maintenance items are reviewed in Epic and correct as of today:  Health Maintenance Due   Topic Date Due     HF ACTION PLAN  Never done     MICROALBUMIN  Never done     ANNUAL REVIEW OF HM ORDERS  Never done     HEPATITIS C SCREENING  Never done     ZOSTER IMMUNIZATION (1 of 2) Never done     FALL RISK ASSESSMENT  12/16/2021     Labs reviewed in EPIC    Pertinent mammograms are reviewed under the imaging tab.    Review of Systems  Constitutional, HEENT, cardiovascular, pulmonary, gi and gu systems are negative, except as otherwise noted.    OBJECTIVE:   BP (!) 162/84   Pulse 72   Ht 1.613 m (5' 3.5\")   Wt 54 kg (119 lb)   LMP  (LMP Unknown)   BMI 20.75 kg/m   Estimated body mass index is 20.75 kg/m  as calculated from the following:    Height as of this encounter: 1.613 m (5' 3.5\").    Weight as of this encounter: 54 kg (119 lb).  Physical Exam  GENERAL: healthy, alert and no distress  NECK: no adenopathy, no asymmetry, masses, or scars and thyroid normal to palpation  RESP: lungs clear to auscultation - no rales, rhonchi or wheezes  CV: regular rate and rhythm, normal S1 S2, no S3 or S4, no murmur, click or rub, no peripheral edema and peripheral pulses strong  ABDOMEN: soft, nontender, no hepatosplenomegaly, no masses and bowel " sounds normal  MS: no gross musculoskeletal defects noted, no edema; normal gait      Diagnostic Test Results:  Labs reviewed in Epic    ASSESSMENT / PLAN:   Kelly was seen today for wellness visit.    Diagnoses and all orders for this visit:    Medicare annual wellness visit, subsequent  - Home safety information was reviewed if pertinent for falls prevention: regular checkups with vision and hearing, mindful medication use rufina with OTCs, using any assisted walking devices, adequate shoes and feet wear, avoiding loose rugs, safety additions to the home if needed, keeping the body in good shape with regular exercise especially walking, and limiting alcohol intake.  - Social history was reviewed  - Patient is independent with activities of daily living  - We reviewed active symptoms and discussed management  - We reviewed list of healthcare providers for this patient.  - We also reviewed PHQ 9  -     ZOSTER VACCINE RECOMBINANT ADJUVANTED IM NJX; Standing    Essential hypertension  BP's well controlled by home readings per pt though I  Question accuracy of this memory/recall and encouraged her to bring some numbers for our review. Continue meds as scheduled  -     lisinopril (ZESTRIL) 5 MG tablet; Take 1 tablet (5 mg) by mouth every 24 hours  -     Basic metabolic panel  (Ca, Cl, CO2, Creat, Gluc, K, Na, BUN); Future  -     Albumin Random Urine Quantitative with Creat Ratio; Future    Gross hematuria noted 8/2020, will recheck today  -     UA with Microscopic - lab collect; Future    Postmenopausal status  -     DX Hip/Pelvis/Spine; Future    Osteoporosis, unspecified osteoporosis type, unspecified pathological fracture presence  Her last DEXA was 7/27/2018 when she was on a Fosamax holiday, showed she had osteopenia.  She has previously completed several years of Fosamax, most recently from July 2018 to November 2019.  She declines IV medication.  She is also had hormone replacement therapy in the past. She  declines going back to the osteoporosis clinic at this time.    - DEXA ordered  - Encourage calcium, vitamin D and weightbearing activity    Nonrheumatic severe mitral valve regurgitation  s/p Mitral clip x1, residual mild-mod MR  Moderate TR  s/p clip x1, MR reduced to trace. Post op TTE with mild-moderate MR, mean gradient of 4mmHg. Labs reviewed; not needing any additional today  - Apixaban 5mg bid + aspirin 81mg   - follow up with [facility] valve clinic & Dr. Kim (encouraged pt to schedule her 6mo follow up that would be due next month)     Paroxysmal atrial fibrillation   Followed by cardiology  - amiodarone 200 at bedtime. Monitoring labs today per Dr. Kim (LFTs reviewed/normal on 11/27/2021); PFTs ordered. Does have subclinical hypothyroidism as noted below.   - metoprolol succ 50mg AM and PM   - eliquis 5mg bid     HFpEF   - lasix 20mg every day  - lisinopril 5mg every day     Moderate non-obstructive CAD  - rosuvstatin 20mg every day  - aspirin 81mg daily   - lipids today    Stage 3A CKD  BMP reviewed;stable    Subclinical hypothyroidism  subclinical hypothyroidism based on previously noted/elevated TSH and normal T4; starting in June 2020 PRIOR to initiation of amiodarone, though possibly impacted by this medication given flucuations. Her cardiologist Dr. Kim and I had discussed her options and we both feel she should not yet be started on T4 supplementation. I spent time again today to review the dx, options for tx when TSH is >10 in her age, and our concerns. Kelly was made aware of our recommendations to not treat this given her risk factors of CAD, tendency for tachyarrhythemia at baseline; increasing risk of angina or arrhythmia further if on thyroid replacment. She is comfortable with the plan and appreciated the conversation today. We will continue to monitor per cardiology med monitoring.     Patient has been advised of split billing requirements and indicates understanding:  "Yes  COUNSELING:  Reviewed preventive health counseling, as reflected in patient instructions    Estimated body mass index is 20.75 kg/m  as calculated from the following:    Height as of this encounter: 1.613 m (5' 3.5\").    Weight as of this encounter: 54 kg (119 lb).    Weight management plan: Discussed healthy diet and exercise guidelines    She reports that she has never smoked. She has never used smokeless tobacco.      Appropriate preventive services were discussed with this patient, including applicable screening as appropriate for cardiovascular disease, diabetes, osteopenia/osteoporosis, and glaucoma.  As appropriate for age/gender, discussed screening for colorectal cancer, prostate cancer, breast cancer, and cervical cancer. Checklist reviewing preventive services available has been given to the patient.    Reviewed patients plan of care and provided an AVS. The Basic Care Plan (routine screening as documented in Health Maintenance) for Kelly meets the Care Plan requirement. This Care Plan has been established and reviewed with the Patient.    Counseling Resources:  ATP IV Guidelines  Pooled Cohorts Equation Calculator  Breast Cancer Risk Calculator  Breast Cancer: Medication to Reduce Risk  FRAX Risk Assessment  ICSI Preventive Guidelines  Dietary Guidelines for Americans, 2010  USDA's MyPlate  ASA Prophylaxis  Lung CA Screening    Selina Pozo MD  [facility]    Identified Health Risks:    The patient was counseled and encouraged to consider modifying their diet and eating habits. She was provided with information on recommended healthy diet options.  Information on urinary incontinence and treatment options given to patient.  "

## 2021-12-10 NOTE — PATIENT INSTRUCTIONS
Goal Blood pressures should be less than 140/90 (130s/70s is great if that's what you tend to see)    Please call Dr. Kim's office to schedule follow up and the amiodarone refill     There is a new shingles vaccine that is 97% effective. It is the Shingrix vaccine and is recommended for those 50 and older. We recommend the vaccine even if you have had shingles or the older vaccine against shingles- Zostavax. Insurance coverage for the vaccine varies. I recommend you check with your insurance to verify if, when and where it is covered. The vaccine is covered by most commercial insurance but Medicare does not cover the vaccine. It may be covered by Medicare Part D (your drug/pharmacy plan) and sometimes it is covered only at a pharmacy instead of here in the clinic. If it is covered in the clinic, you may schedule a nurse visit anytime to get the first dose of the vaccine. The second dose is recommended two months after the first and should be gotten by 6 months after the first.   About 10% of people will get a sore, red reaction at the site of the injection. Some people may also feel a little sick or nauseated after the injection. This may happen with either the first and/or second vaccine.    Patient Education   Personalized Prevention Plan  You are due for the preventive services outlined below.  Your care team is available to assist you in scheduling these services.  If you have already completed any of these items, please share that information with your care team to update in your medical record.  Health Maintenance Due   Topic Date Due     Heart Failure Action Plan  Never done     Kidney Microalbumin Urine Test  Never done     ANNUAL REVIEW OF HM ORDERS  Never done     Hepatitis C Screening  Never done     Zoster (Shingles) Vaccine (1 of 2) Never done     FALL RISK ASSESSMENT  12/16/2021       Understanding USDA MyPlate  The USDA has guidelines to help you make healthy food choices. These are called MyPlate.  MyPlate shows the food groups that make up healthy meals using the image of a place setting. Before you eat, think about the healthiest choices for what to put on your plate or in your cup or bowl. To learn more about building a healthy plate, visit www.choosemyplate.gov.    The food groups    Fruits. Any fruit or 100% fruit juice counts as part of the Fruit Group. Fruits may be fresh, canned, frozen, or dried, and may be whole, cut-up, or pureed. Make 1/2 of your plate fruits and vegetables.    Vegetables. Any vegetable or 100% vegetable juice counts as a member of the Vegetable Group. Vegetables may be fresh, frozen, canned, or dried. They can be served raw or cooked and may be whole, cut-up, or mashed. Make 1/2 of your plate fruits and vegetables.    Grains. All foods made from grains are part of the Grains Group. These include wheat, rice, oats, cornmeal, and barley. Grains are often used to make foods such as bread, pasta, oatmeal, cereal, tortillas, and grits. Grains should be no more than 1/4 of your plate. At least half of your grains should be whole grains.    Protein. This group includes meat, poultry, seafood, beans and peas, eggs, processed soy products (such as tofu), nuts (including nut butters), and seeds. Make protein choices no more than 1/4 of your plate. Meat and poultry choices should be lean or low fat.    Dairy. The Dairy Group includes all fluid milk products and foods made from milk that contain calcium, such as yogurt and cheese. (Foods that have little calcium, such as cream, butter, and cream cheese, are not part of this group.) Most dairy choices should be low-fat or fat-free.    Oils. Oils aren't a food group, but they do contain essential nutrients. However it's important to watch your intake of oils. These are fats that are liquid at room temperature. They include canola, corn, olive, soybean, vegetable, and sunflower oil. Foods that are mainly oil include mayonnaise, certain salad  dressings, and soft margarines. You likely already get your daily oil allowance from the foods you eat.  Things to limit  Eating healthy also means limiting these things in your diet:       Salt (sodium). Many processed foods have a lot of sodium. To keep sodium intake down, eat fresh vegetables, meats, poultry, and seafood when possible. Purchase low-sodium, reduced-sodium, or no-salt-added food products at the store. And don't add salt to your meals at home. Instead, season them with herbs and spices such as dill, oregano, cumin, and paprika. Or try adding flavor with lemon or lime zest and juice.    Saturated fat. Saturated fats are most often found in animal products such as beef, pork, and chicken. They are often solid at room temperature, such as butter. To reduce your saturated fat intake, choose leaner cuts of meat and poultry. And try healthier cooking methods such as grilling, broiling, roasting, or baking. For a simple lower-fat swap, use plain nonfat yogurt instead of mayonnaise when making potato salad or macaroni salad.    Added sugars. These are sugars added to foods. They are in foods such as ice cream, candy, soda, fruit drinks, sports drinks, energy drinks, cookies, pastries, jams, and syrups. Cut down on added sugars by sharing sweet treats with a family member or friend. You can also choose fruit for dessert, and drink water or other unsweetened beverages.     [email or web address] last reviewed this educational content on 6/1/2020 2000-2021 [employer]. All rights reserved. This information is not intended as a substitute for professional medical care. Always follow your healthcare professional's instructions.          Urinary Incontinence, Female (Adult)   Urinary incontinence means loss of bladder control. This problem affects many women, especially as they get older. If you have incontinence, you may be embarrassed to ask for help. But know that this problem can be treated.   Types of  Incontinence  There are different types of incontinence. Two of the main types are described here. You can have more than one type.     Stress incontinence. With this type, urine leaks when pressure (stress) is put on the bladder. This may happen when you cough, sneeze, or laugh. Stress incontinence most often occurs because the pelvic floor muscles that support the bladder and urethra are weak. This can happen after pregnancy and vaginal childbirth or a hysterectomy. It can also be due to excess body weight or hormone changes.    Urge incontinence (also called overactive bladder). With this type, a sudden urge to urinate is felt often. This may happen even though there may not be much urine in the bladder. The need to urinate often during the night is common. Urge incontinence most often occurs because of bladder spasms. This may be due to bladder irritation or infection. Damage to bladder nerves or pelvic muscles, constipation, and certain medicines can also lead to urge incontinence.  Treatment depends on the cause. Further evaluation is needed to find the type you have. This will likely include an exam and certain tests. Based on the results, you and your healthcare provider can then plan treatment. Until a diagnosis is made, the home care tips below can help ease symptoms.   Home care    Do pelvic floor muscle exercises, if they are prescribed. The pelvic floor muscles help support the bladder and urethra. Many women find that their symptoms improve when doing special exercises that strengthen these muscles. To do the exercises, contract the muscles you would use to stop your stream of urine. But do this when you re not urinating. Hold for 10 seconds, then relax. Repeat 10 to 20 times in a row, at least 3 times a day. Your healthcare provider may give you other instructions for how to do the exercises and how often.    Keep a bladder diary. This helps track how often and how much you urinate over a set period  of time. Bring this diary with you to your next visit with the provider. The information can help your provider learn more about your bladder problem.    Lose weight, if advised to by your provider. Extra weight puts pressure on the bladder. Your provider can help you create a weight-loss plan that s right for you. This may include exercising more and making certain diet changes.    Don't have foods and drinks that may irritate the bladder. These can include alcohol and caffeinated drinks.    Quit smoking. Smoking and other tobacco use can lead to a long-term (chronic) cough that strains the pelvic floor muscles. Smoking may also damage the bladder and urethra. Talk with your provider about treatments or methods you can use to quit smoking.    If drinking large amounts of fluid makes you have symptoms, you may be advised to limit your fluid intake. You may also be advised to drink most of your fluids during the day and to limit fluids at night.    If you re worried about urine leakage or accidents, you may wear absorbent pads to catch urine. Change the pads often. This helps reduce discomfort. It may also reduce the risk of skin or bladder infections.    Follow-up care  Follow up with your healthcare provider, or as directed. It may take some to find the right treatment for your problem. But healthy lifestyle changes can be made right away. These include such things as exercising on a regular basis, eating a healthy diet, losing weight (if needed), and quitting smoking. Your treatment plan may include special therapies or medicines. Certain procedures or surgery may also be options. Talk about any questions you have with your provider.   When to seek medical advice  Call the healthcare provider right away if any of these occur:    Fever of 100.4 F (38 C) or higher, or as directed by your provider    Bladder pain or fullness    Belly swelling    Nausea or vomiting    Back pain    Weakness, dizziness, or  mimi Avila last reviewed this educational content on 1/1/2020 2000-2021 [employer]. All rights reserved. This information is not intended as a substitute for professional medical care. Always follow your healthcare professional's instructions.

## 2021-12-27 ENCOUNTER — ANCILLARY PROCEDURE (OUTPATIENT)
Dept: BONE DENSITY | Facility: CLINIC | Age: 76
End: 2021-12-27
Attending: FAMILY MEDICINE
Payer: COMMERCIAL

## 2021-12-27 DIAGNOSIS — Z78.0 POSTMENOPAUSAL STATUS: ICD-10-CM

## 2021-12-27 PROCEDURE — 77080 DXA BONE DENSITY AXIAL: CPT | Mod: TC | Performed by: RADIOLOGY

## 2022-01-12 VITALS — BODY MASS INDEX: 19.73 KG/M2 | WEIGHT: 118.4 LBS | HEIGHT: 65 IN

## 2022-01-18 ENCOUNTER — OFFICE VISIT (OUTPATIENT)
Dept: CARDIOLOGY | Facility: CLINIC | Age: 77
End: 2022-01-18
Payer: COMMERCIAL

## 2022-01-18 VITALS
WEIGHT: 116.9 LBS | DIASTOLIC BLOOD PRESSURE: 76 MMHG | HEART RATE: 76 BPM | SYSTOLIC BLOOD PRESSURE: 160 MMHG | BODY MASS INDEX: 19.45 KG/M2 | RESPIRATION RATE: 16 BRPM

## 2022-01-18 VITALS
WEIGHT: 113.6 LBS | WEIGHT: 109.8 LBS | BODY MASS INDEX: 18.27 KG/M2 | BODY MASS INDEX: 18.93 KG/M2 | SYSTOLIC BLOOD PRESSURE: 158 MMHG | HEART RATE: 80 BPM | HEART RATE: 80 BPM | DIASTOLIC BLOOD PRESSURE: 84 MMHG | RESPIRATION RATE: 16 BRPM | SYSTOLIC BLOOD PRESSURE: 148 MMHG | WEIGHT: 112.1 LBS | SYSTOLIC BLOOD PRESSURE: 154 MMHG | RESPIRATION RATE: 16 BRPM | HEIGHT: 65 IN | DIASTOLIC BLOOD PRESSURE: 90 MMHG | RESPIRATION RATE: 16 BRPM | BODY MASS INDEX: 18.68 KG/M2 | HEART RATE: 80 BPM | HEIGHT: 65 IN | DIASTOLIC BLOOD PRESSURE: 72 MMHG

## 2022-01-18 VITALS
HEART RATE: 72 BPM | WEIGHT: 112.5 LBS | SYSTOLIC BLOOD PRESSURE: 116 MMHG | DIASTOLIC BLOOD PRESSURE: 70 MMHG | BODY MASS INDEX: 18.72 KG/M2

## 2022-01-18 VITALS
WEIGHT: 121 LBS | HEART RATE: 72 BPM | SYSTOLIC BLOOD PRESSURE: 174 MMHG | DIASTOLIC BLOOD PRESSURE: 88 MMHG | HEIGHT: 66 IN | BODY MASS INDEX: 19.44 KG/M2 | RESPIRATION RATE: 16 BRPM

## 2022-01-18 VITALS
DIASTOLIC BLOOD PRESSURE: 78 MMHG | SYSTOLIC BLOOD PRESSURE: 140 MMHG | HEART RATE: 68 BPM | WEIGHT: 107 LBS | OXYGEN SATURATION: 96 % | BODY MASS INDEX: 17.81 KG/M2

## 2022-01-18 VITALS
BODY MASS INDEX: 18.66 KG/M2 | RESPIRATION RATE: 16 BRPM | WEIGHT: 112 LBS | HEART RATE: 68 BPM | HEIGHT: 65 IN | SYSTOLIC BLOOD PRESSURE: 152 MMHG | DIASTOLIC BLOOD PRESSURE: 80 MMHG

## 2022-01-18 VITALS
DIASTOLIC BLOOD PRESSURE: 70 MMHG | BODY MASS INDEX: 18.32 KG/M2 | HEART RATE: 76 BPM | HEIGHT: 66 IN | SYSTOLIC BLOOD PRESSURE: 138 MMHG | WEIGHT: 114 LBS | RESPIRATION RATE: 16 BRPM

## 2022-01-18 VITALS
SYSTOLIC BLOOD PRESSURE: 122 MMHG | WEIGHT: 115.2 LBS | DIASTOLIC BLOOD PRESSURE: 72 MMHG | HEART RATE: 76 BPM | BODY MASS INDEX: 19.17 KG/M2

## 2022-01-18 VITALS
BODY MASS INDEX: 18.89 KG/M2 | OXYGEN SATURATION: 97 % | SYSTOLIC BLOOD PRESSURE: 152 MMHG | HEART RATE: 89 BPM | WEIGHT: 113.5 LBS | DIASTOLIC BLOOD PRESSURE: 88 MMHG

## 2022-01-18 VITALS — BODY MASS INDEX: 17.84 KG/M2 | HEIGHT: 66 IN | WEIGHT: 111 LBS

## 2022-01-18 VITALS
DIASTOLIC BLOOD PRESSURE: 88 MMHG | WEIGHT: 116.6 LBS | BODY MASS INDEX: 19.4 KG/M2 | HEART RATE: 72 BPM | SYSTOLIC BLOOD PRESSURE: 118 MMHG

## 2022-01-18 VITALS — BODY MASS INDEX: 19.73 KG/M2 | HEIGHT: 65 IN | WEIGHT: 118.4 LBS

## 2022-01-18 VITALS
HEART RATE: 80 BPM | WEIGHT: 114.4 LBS | RESPIRATION RATE: 12 BRPM | SYSTOLIC BLOOD PRESSURE: 150 MMHG | BODY MASS INDEX: 19.06 KG/M2 | HEIGHT: 65 IN | DIASTOLIC BLOOD PRESSURE: 80 MMHG

## 2022-01-18 VITALS
HEART RATE: 78 BPM | SYSTOLIC BLOOD PRESSURE: 136 MMHG | DIASTOLIC BLOOD PRESSURE: 83 MMHG | BODY MASS INDEX: 19.14 KG/M2 | WEIGHT: 115 LBS | OXYGEN SATURATION: 98 %

## 2022-01-18 VITALS
DIASTOLIC BLOOD PRESSURE: 76 MMHG | HEART RATE: 68 BPM | HEIGHT: 65 IN | BODY MASS INDEX: 18.64 KG/M2 | OXYGEN SATURATION: 96 % | WEIGHT: 111.9 LBS | SYSTOLIC BLOOD PRESSURE: 120 MMHG

## 2022-01-18 DIAGNOSIS — Z95.818 S/P MITRAL VALVE CLIP IMPLANTATION: Primary | ICD-10-CM

## 2022-01-18 DIAGNOSIS — I25.10 CORONARY ARTERY DISEASE DUE TO LIPID RICH PLAQUE: ICD-10-CM

## 2022-01-18 DIAGNOSIS — I25.83 CORONARY ARTERY DISEASE DUE TO LIPID RICH PLAQUE: ICD-10-CM

## 2022-01-18 DIAGNOSIS — Z98.890 S/P MITRAL VALVE CLIP IMPLANTATION: Primary | ICD-10-CM

## 2022-01-18 PROCEDURE — 99214 OFFICE O/P EST MOD 30 MIN: CPT | Performed by: INTERNAL MEDICINE

## 2022-01-18 ASSESSMENT — MIFFLIN-ST. JEOR: SCORE: 1037.66

## 2022-01-18 NOTE — PATIENT INSTRUCTIONS
We are going to plan a heart ultrasound and blood work and I will be in touch with results.Please continue to monitor your blood pressure with the goal being 135/85 or less average.My nurse is Anamaria and her number is [phone].Call with any heart related questions.

## 2022-01-18 NOTE — PROGRESS NOTES
HEART CARE ENCOUNTER CONSULTATON NOTE      [facility]  [phone]      Assessment/Recommendations   Assessment/Plan:  1.  Mitral valve prolapse with severe mitral regurgitation status post mitral valve clip implantation February 2021.  She continues to feel well.  Echocardiogram from March 2021 revealed normal systolic function, mild to moderate mitral stenosis and moderate mitral regurgitation with mild to moderate tricuspid regurgitation with some pulmonary hypertension.  Estimate of RV systolic pressure 45 mmHg plus right atrial pressure.  We are going to plan a repeat echocardiogram to follow-up on the mitral regurgitation.  She is aware of the importance of endocarditis prophylaxis.    2.  Heart failure with preserved ejection fraction.  She appears well compensated.  She is not describing symptoms of orthopnea, PND or fluid retention and we will continue to monitor.  3.  Paroxysmal atrial fibrillation maintaining sinus rhythm.  Holter monitor from June 2021 demonstrated sinus rhythm with short ME interval.  EKG from an admission in November 2021 is also reviewed.  She remains on amiodarone and is aware of the potential side effects of amiodarone.    4.  Moderate nonobstructive coronary artery disease.  No complaints of chest discomfort since admission to the hospital with atypical chest discomfort at which time troponins were negative.  We will continue to monitor.  Suboptimal lipids prompting addition of Zetia to rosuvastatin within the past few months with plans to recheck lipid AST and ALT.  Ideally would like to see LDL closer to 70.    5.  Hypertension.  Blood pressure is elevated here in the office.  She however brings in notable blood pressure readings at all appear reasonable in the 1 20-1 30 systolic range over 70-80 diastolic range.  She historically has had elevated blood pressures in the physician's office.  I did ask her to recheck her blood pressure this afternoon as  well.    Plan echocardiogram  Lipid, AST and ALT fasting  Follow-up 6 months.  She was given our telephone number and asked to call with any symptoms or concerns       History of Present Illness/Subjective    HPI: Kelly Robledo is a 76 year old female who is seen in follow-up.  She reports that she has been feeling well.  She specifically denies chest pain, shortness of breath, dizziness or lightheadedness or difficulties with her combination of medications.  I reviewed the notes from the hospitalization a few months ago when she was admitted with atypical chest discomfort that sounded most consistent with gastrointestinal etiology.  She states that this symptom has resolved.  She reports that she exercises most days of the week walking 3 miles indoors.  She is not describing orthopnea or PND.  She has a history of mitral valve prolapse and significant mitral regurgitation admitted to the hospital June 2020 initially with tachypalpitations.  She was diagnosed with atrial fibrillation with rapid ventricular response and started on amiodarone and Eliquis.  Coronary angiogram at that time showed moderate nonobstructive coronary artery disease and she has been on a beta-blocker as well.  Follow-up transesophageal echocardiogram showed progression of her mitral regurgitation and subsequently underwent mitral valve clip February 2021.  Echocardiogram from March 2021 is reviewed with plans to do follow-up echocardiogram.  She is not aware of any significant palpitations as well.    Recent Echocardiogram Results:      Echocardiogram Complete: Patient Communication    Add Comments  Seen     Results  Echocardiogram Complete (Order [id number])      External Result Report    External Result Report      Contains abnormal data Echocardiogram Complete  Order: [id number]   Status: Final result       Visible to patient: Yes (seen)       Next appt: 01/18/2022 at 10:20 AM in Cardiology (Dayne Kim MD)       Dx: Severe mitral  regurgitation       0 Result Notes      Details    Reading Physician Reading Date Result Priority   Keila Espinoza MD  [phone] 3/9/2021 Routine   Provider, Historical 3/9/2021      Narrative & Impression    Left ventricle ejection fraction is The calculated left ventricular ejection fraction is 60% without wall motion abnormality.    Normal right ventricular size and systolic function.    MitraClip present on the mitral valve. There is mild to moderate calcific stenosis with moderate regurgitation.    Mild to moderate tricuspid regurgitation with moderate pulmonary hypertension.    When compared to the previous study dated 12/3/2020, there is now a MitraClip on the mitral valve. Mild to moderate stenosis is present. Degree of mitral insufficiency has diminished             Recent Coronary Angiogram Results:    Kelly Robledo  Coronary Angiogram  Order# [id number]  Reading physician: Klaus Fitzpatrick MD Ordering physician: Klaus Fitzpatrick MD Study date: 01/13/2021       Patient Information    Name MRN Description   Kelly Robledo [id number] 75 year old female     Indications    CAD (coronary artery disease) [I25.10 (ICD-10-CM)]   Mitral regurgitation [I34.0 (ICD-10-CM)]         Conclusion    74 yo F with recent diagnosis of moderate to severe mitral regurgitation, causing increasing shortness of breath, as well as a recent hospitalization with congestive heart failure.  Being evaluated for transcatheter mitral valve repair using MitraClip,   and here for preprocedure coronary angiography.     Coronary geography today showed:     Left main with 30% ostial narrowing  LAD with mild to moderate diffuse disease, and a 50% stenosis in the midportion, that appears unchanged from her prior angiogram  Ramus with a 50% proximal to mid stenosis and mild disease distally  LCx is a small vessel with no significant obtuse marginals noted  RCA is a large caliber, dominant vessel supplying the inferior and posterior walls.   There is 40% mid to distal focal stenosis, also unchanged from prior.        Right heart catheterization showed:    PA 50/16  (36)    RA mean  7    PCW  16 /43  (26) v waves of 45 mmHg    AO  164/62  (107)    RV  57/-6,  6     LVEDP 15 mmHg     Given that her symptoms have progressed over the past 6 months, with no change in her coronary anatomy, and clear worsening of her mitral regurgitation, she will benefit from mitral valve repair with MitraClip.  Her hemodynamics today also confirmed the presence of severe mitral regurgitation, with large V waves noted on her pulmonary wedge tracing.      DISCUSSION:    1.  Technically challenging tracing with quite a bit of artifact.  2.  Rare noncomplex ectopy identified with no atrial tachycardia or atrial  fibrillation; a couple short salvos of idioventricular rhythm or junctional rhklythm that is  isorhythmic   with sinus rhythm; with  fusion pattern; but  of no apparent clinical significance -- at a modest rate= 65 beats  per minute.        BETO hou  D 2020 15:51:52  T 2020 17:21:22  R 2020 17:54:21  [id number]      Holter Monitor 24 hour Adult Pediatric  Order# [id number]  Reading physicians: Provider, Historical; Beto Tolbert MD Ordering physician: Dayne Kim MD Study date: 2021       Patient Information    Patient Name   Kelly Robledo MRN   [id number] Legal Sex   Female              Age   1945 (76 year old)     Vitals    Height Weight BMI (Calculated) BSA (Calculated - sq m)           Indications    PVC's (premature ventricular contractions) [I49.3 (ICD-10-CM)]     ECG Link     Holter Monitor Scan - Scan on 6/15/21 12:00 AM by HISTORICAL PROVIDER       Scans on Order [id number]    Scan on 6/15/2021 by Historical Provider          Conclusion    [facility]     HOLTER REPORT     Results:    Indication for study: Evaluate for PVCs    A 24-hour Holter monitor is applied 2021 with atrial fibrillation  Eryn 15, 2021    Baseline tracing showed sinus rhythm with a rather short TN interval there appears to be an increase in intrinsicoid deflection; but  I do not believe this represents preexcitation-prior 12 lead ECG do not show pre-excitation/WPW    Average heart rate is 62 bpm with heart rate ranging 55-75 bpm    No bradycardia or pauses or AV block    Rare ventricular ectopy with 21 single PVCs-no complex or repetitive forms    Rare supraventricular ectopy with 9 PACs, no complex or repetitive forms    Patient activity diary was reviewed no symptoms noted    Discussion    Normal Holter monitor with just a few single noncomplex ectopic beats        Comment: The recording was for 23 hrs and 59 min.  The recording quality was adequate.      EKG November 27 sinus rhythm, TN interval, nonspecific ST changes, compared to 2/2021 the ST-T changes are more noted.     Physical Examination  Review of Systems   Vitals: 174/88, weight 121 pounds and a heart rate of 72 and regular.  Wt Readings from Last 3 Encounters:   12/10/21 54 kg (119 lb)   11/27/21 54 kg (119 lb)   07/09/21 53.5 kg (118 lb)       General Appearance:   no distress, normal body habitus   ENT/Mouth: membranes moist, no oral lesions or bleeding gums.      EYES:  no scleral icterus, normal conjunctivae   Neck: no carotid bruits or thyromegaly   Chest/Lungs:   lungs are clear to auscultation, no rales or wheezing, , equal chest wall expansion    Cardiovascular:   Regular. Normal first and second heart sounds with 2/6 systolic murmur, 2/6 diastolic murmur; the carotid, radial and posterior tibial pulses are intact, Jugular venous pressure within normal limits, no significant edema bilaterally    Abdomen:  no organomegaly, masses, bruits, or tenderness; bowel sounds are present   Extremities: no cyanosis or clubbing   Skin: no xanthelasma, warm.    Neurologic: normal  bilateral, no tremors     Psychiatric: alert and oriented x3, calm        Please refer above  for cardiac ROS details.        Medical History  Surgical History Family History Social History   Past Medical History:   Diagnosis Date     Abdominal pain      Atrial fibrillation (H)      Diverticulosis      Moderate mitral regurgitation      NSVT (nonsustained ventricular tachycardia) (H) 6/17/2020     Palpitations      Past Surgical History:   Procedure Laterality Date     BIOPSY BREAST Bilateral     benign     CV CORONARY ANGIOGRAM N/A 6/18/2020    Procedure: Coronary Angiogram;  Surgeon: Sergey Conner MD;  Location: [facility] Cath Lab;  Service: Cardiology     CV CORONARY ANGIOGRAM N/A 1/13/2021    Procedure: Coronary Angiogram;  Surgeon: Klaus Fitzpatrick MD;  Location: [facility] Cardiac Cath Lab;  Service: Cardiology     CV LEFT HEART CATHETERIZATION WITHOUT LEFT VENTRICULOGRAM Left 6/18/2020    Procedure: Left Heart Catheterization Without Left Ventriculogram;  Surgeon: Sergey Conner MD;  Location: [facility] Cath Lab;  Service: Cardiology     CV MITRACLIP N/A 2/1/2021    Procedure: CV MITRAL CLIP;  Surgeon: Klaus Fitzpatrick MD;  Location: [facility] CARDIAC CATH LAB     CV RIGHT HEART CATHETERIZATION N/A 6/18/2020    Procedure: Right Heart Catheterization;  Surgeon: Sergey Conner MD;  Location: [facility] Cath Lab;  Service: Cardiology     CV RIGHT HEART CATHETERIZATION N/A 1/13/2021    Procedure: Right Heart Catheterization;  Surgeon: Klaus Fitzpatrick MD;  Location: [facility] Cardiac Cath Lab;  Service: Cardiology     HYSTERECTOMY  1970's     OOPHORECTOMY Bilateral 1970's     Family History   Problem Relation Age of Onset     Breast Cancer Sister 47.00        Social History     Socioeconomic History     Marital status:      Spouse name: Not on file     Number of children: Not on file     Years of education: Not on file     Highest education level: Not on file   Occupational History     Not on file   Tobacco Use     Smoking status: Never Smoker     Smokeless tobacco:  Never Used   Substance and Sexual Activity     Alcohol use: No     Drug use: No     Sexual activity: Not on file   Other Topics Concern     Parent/sibling w/ CABG, MI or angioplasty before 65F 55M? Not Asked   Social History Narrative    Spends 6 months in [place].   . No children. Family in the [place] area.   Enjoys walking- very regular with this especially in [place].   Likes reading - romantic happy endings.   Never alcohol or smoking.  Selina Pozo MD       Social Determinants of Health     Financial Resource Strain: Not on file   Food Insecurity: Not on file   Transportation Needs: Not on file   Physical Activity: Not on file   Stress: Not on file   Social Connections: Not on file   Intimate Partner Violence: Not on file   Housing Stability: Not on file           Medications  Allergies   Current Outpatient Medications   Medication Sig Dispense Refill     amiodarone (PACERONE) 200 MG tablet Take 1 tablet (200 mg) by mouth every evening (Patient taking differently: Take 200 mg by mouth every morning ) 90 tablet 1     apixaban ANTICOAGULANT (ELIQUIS ANTICOAGULANT) 5 MG tablet Take 1 tablet (5 mg) by mouth 2 times daily 180 tablet 1     clindamycin (CLEOCIN) 300 MG capsule TAKE 2 CAPSULES (600 MG TOTAL) BY MOUTH ONCE AS NEEDED (30 60 MINUTES PRIOR TO ANY DENTAL VISIT).       ezetimibe (ZETIA) 10 MG tablet Take 1 tablet (10 mg) by mouth daily 90 tablet 1     lisinopril (ZESTRIL) 5 MG tablet Take 1 tablet (5 mg) by mouth every 24 hours 90 tablet 3     metoprolol succinate ER (TOPROL-XL) 50 MG 24 hr tablet TAKE 2 TABLETS (100 MG TOTAL) BY MOUTH DAILY. OK TO TAKE 50MG IN AM AND 50MG IN PM (Patient taking differently: Take 50 mg by mouth every evening ) 180 tablet 2     multivitamin, therapeutic (THERA-VIT) TABS tablet Take 1 tablet by mouth daily       pantoprazole (PROTONIX) 40 MG EC tablet Take 1 tablet (40 mg) by mouth daily 30 tablet 0     rosuvastatin (CRESTOR) 40 MG tablet Take 1 tablet (40 mg) by mouth  daily 90 tablet 3       Allergies   Allergen Reactions     Penicillins Hives          Lab Results    Chemistry/lipid CBC Cardiac Enzymes/BNP/TSH/INR   Recent Labs   Lab Test 08/16/21  0816   CHOL 256*   HDL 70   *   TRIG 125     Recent Labs   Lab Test 08/16/21  0816 06/11/21  0746 12/03/20  0817   * 141* 139*     Recent Labs   Lab Test 12/10/21  1124      POTASSIUM 4.2   CHLORIDE 102   CO2 27      BUN 16   CR 1.17*   GFRESTIMATED 45*   SHADI 9.8     Recent Labs   Lab Test 12/10/21  1124 11/27/21  0331 08/16/21  0816   CR 1.17* 1.30* 1.29*     No results for input(s): A1C in the last 30322 hours.       Recent Labs   Lab Test 11/27/21  0331   WBC 7.5   HGB 13.2   HCT 41.6   *        Recent Labs   Lab Test 11/27/21  0331 03/11/21  1333 02/02/21  0541   HGB 13.2 12.8 11.5*    Recent Labs   Lab Test 11/27/21  1234 11/27/21  0843 11/27/21  0331   TROPONINI <0.01 <0.01 <0.01     Recent Labs   Lab Test 01/25/21  1355 10/14/20  1319 09/23/20  1920   * 344* 624*     Recent Labs   Lab Test 12/10/21  1124   TSH 7.90*     Recent Labs   Lab Test 01/25/21  1355 09/23/20  1920   INR 1.22* 1.37*        Dayne Kim MD

## 2022-01-18 NOTE — LETTER
1/18/2022    Selina Pozo MD  [address] [facility] [address]    RE: Kelly Robledo       Dear Colleague,     I had the pleasure of seeing Kelly ULISSES Robledo in the [facility].    HEART CARE ENCOUNTER CONSULTATON NOTE      [facility]  [phone]      Assessment/Recommendations   Assessment/Plan:  1.  Mitral valve prolapse with severe mitral regurgitation status post mitral valve clip implantation February 2021.  She continues to feel well.  Echocardiogram from March 2021 revealed normal systolic function, mild to moderate mitral stenosis and moderate mitral regurgitation with mild to moderate tricuspid regurgitation with some pulmonary hypertension.  Estimate of RV systolic pressure 45 mmHg plus right atrial pressure.  We are going to plan a repeat echocardiogram to follow-up on the mitral regurgitation.  She is aware of the importance of endocarditis prophylaxis.    2.  Heart failure with preserved ejection fraction.  She appears well compensated.  She is not describing symptoms of orthopnea, PND or fluid retention and we will continue to monitor.  3.  Paroxysmal atrial fibrillation maintaining sinus rhythm.  Holter monitor from June 2021 demonstrated sinus rhythm with short SC interval.  EKG from an admission in November 2021 is also reviewed.  She remains on amiodarone and is aware of the potential side effects of amiodarone.    4.  Moderate nonobstructive coronary artery disease.  No complaints of chest discomfort since admission to the hospital with atypical chest discomfort at which time troponins were negative.  We will continue to monitor.  Suboptimal lipids prompting addition of Zetia to rosuvastatin within the past few months with plans to recheck lipid AST and ALT.  Ideally would like to see LDL closer to 70.    5.  Hypertension.  Blood pressure is elevated here in the office.  She however brings in notable blood pressure readings at all appear  reasonable in the 1 20-1 30 systolic range over 70-80 diastolic range.  She historically has had elevated blood pressures in the physician's office.  I did ask her to recheck her blood pressure this afternoon as well.    Plan echocardiogram  Lipid, AST and ALT fasting  Follow-up 6 months.  She was given our telephone number and asked to call with any symptoms or concerns       History of Present Illness/Subjective    HPI: Kelly Robledo is a 76 year old female who is seen in follow-up.  She reports that she has been feeling well.  She specifically denies chest pain, shortness of breath, dizziness or lightheadedness or difficulties with her combination of medications.  I reviewed the notes from the hospitalization a few months ago when she was admitted with atypical chest discomfort that sounded most consistent with gastrointestinal etiology.  She states that this symptom has resolved.  She reports that she exercises most days of the week walking 3 miles indoors.  She is not describing orthopnea or PND.  She has a history of mitral valve prolapse and significant mitral regurgitation admitted to the hospital June 2020 initially with tachypalpitations.  She was diagnosed with atrial fibrillation with rapid ventricular response and started on amiodarone and Eliquis.  Coronary angiogram at that time showed moderate nonobstructive coronary artery disease and she has been on a beta-blocker as well.  Follow-up transesophageal echocardiogram showed progression of her mitral regurgitation and subsequently underwent mitral valve clip February 2021.  Echocardiogram from March 2021 is reviewed with plans to do follow-up echocardiogram.  She is not aware of any significant palpitations as well.    Recent Echocardiogram Results:      Echocardiogram Complete: Patient Communication    Add Comments  Seen     Results  Echocardiogram Complete (Order [id number])      External Result Report    External Result Report      Contains  abnormal data Echocardiogram Complete  Order: [id number]   Status: Final result       Visible to patient: Yes (seen)       Next appt: 01/18/2022 at 10:20 AM in Cardiology (Dayne Kim MD)       Dx: Severe mitral regurgitation       0 Result Notes      Details    Reading Physician Reading Date Result Priority   Keila Espinoza MD  [phone] 3/9/2021 Routine   Provider, Historical 3/9/2021      Narrative & Impression    Left ventricle ejection fraction is The calculated left ventricular ejection fraction is 60% without wall motion abnormality.    Normal right ventricular size and systolic function.    MitraClip present on the mitral valve. There is mild to moderate calcific stenosis with moderate regurgitation.    Mild to moderate tricuspid regurgitation with moderate pulmonary hypertension.    When compared to the previous study dated 12/3/2020, there is now a MitraClip on the mitral valve. Mild to moderate stenosis is present. Degree of mitral insufficiency has diminished             Recent Coronary Angiogram Results:    Kelly Robledo  Coronary Angiogram  Order# [id number]  Reading physician: Klaus Fitzpatrick MD Ordering physician: Klaus Fitzpatrick MD Study date: 01/13/2021       Patient Information    Name MRN Description   Kelly Robledo [id number] 75 year old female     Indications    CAD (coronary artery disease) [I25.10 (ICD-10-CM)]   Mitral regurgitation [I34.0 (ICD-10-CM)]         Conclusion    74 yo F with recent diagnosis of moderate to severe mitral regurgitation, causing increasing shortness of breath, as well as a recent hospitalization with congestive heart failure.  Being evaluated for transcatheter mitral valve repair using MitraClip,   and here for preprocedure coronary angiography.     Coronary geography today showed:     Left main with 30% ostial narrowing  LAD with mild to moderate diffuse disease, and a 50% stenosis in the midportion, that appears unchanged from her prior angiogram  Ramus  with a 50% proximal to mid stenosis and mild disease distally  LCx is a small vessel with no significant obtuse marginals noted  RCA is a large caliber, dominant vessel supplying the inferior and posterior walls.  There is 40% mid to distal focal stenosis, also unchanged from prior.        Right heart catheterization showed:    PA 50/16  (36)    RA mean  7    PCW  16 /43  (26) v waves of 45 mmHg    AO  164/62  (107)    RV  57/-6,  6     LVEDP 15 mmHg     Given that her symptoms have progressed over the past 6 months, with no change in her coronary anatomy, and clear worsening of her mitral regurgitation, she will benefit from mitral valve repair with MitraClip.  Her hemodynamics today also confirmed the presence of severe mitral regurgitation, with large V waves noted on her pulmonary wedge tracing.      DISCUSSION:    1.  Technically challenging tracing with quite a bit of artifact.  2.  Rare noncomplex ectopy identified with no atrial tachycardia or atrial  fibrillation; a couple short salvos of idioventricular rhythm or junctional rhklythm that is  isorhythmic   with sinus rhythm; with  fusion pattern; but  of no apparent clinical significance -- at a modest rate= 65 beats  per minute.        BETO hou  D 2020 15:51:52  T 2020 17:21:22  R 2020 17:54:21  [id number]      Holter Monitor 24 hour Adult Pediatric  Order# [id number]  Reading physicians: Provider, Historical; Beto Tolbert MD Ordering physician: Dayne Kim MD Study date: 2021       Patient Information    Patient Name   Kelly Robledo MRN   [id number] Legal Sex   Female              Age   1945 (76 year old)     Vitals    Height Weight BMI (Calculated) BSA (Calculated - sq m)           Indications    PVC's (premature ventricular contractions) [I49.3 (ICD-10-CM)]     ECG Link     Holter Monitor Scan - Scan on 6/15/21 12:00 AM by HISTORICAL PROVIDER       Scans on Order [id number]    Scan on 6/15/2021  by Historical Provider          Conclusion    [facility]     HOLTER REPORT     Results:    Indication for study: Evaluate for PVCs    A 24-hour Holter monitor is applied 11/2021 with atrial fibrillation Eryn 15, 2021    Baseline tracing showed sinus rhythm with a rather short AR interval there appears to be an increase in intrinsicoid deflection; but  I do not believe this represents preexcitation-prior 12 lead ECG do not show pre-excitation/WPW    Average heart rate is 62 bpm with heart rate ranging 55-75 bpm    No bradycardia or pauses or AV block    Rare ventricular ectopy with 21 single PVCs-no complex or repetitive forms    Rare supraventricular ectopy with 9 PACs, no complex or repetitive forms    Patient activity diary was reviewed no symptoms noted    Discussion    Normal Holter monitor with just a few single noncomplex ectopic beats        Comment: The recording was for 23 hrs and 59 min.  The recording quality was adequate.      EKG November 27 sinus rhythm, AR interval, nonspecific ST changes, compared to 2/2021 the ST-T changes are more noted.     Physical Examination  Review of Systems   Vitals: 174/88, weight 121 pounds and a heart rate of 72 and regular.  Wt Readings from Last 3 Encounters:   12/10/21 54 kg (119 lb)   11/27/21 54 kg (119 lb)   07/09/21 53.5 kg (118 lb)       General Appearance:   no distress, normal body habitus   ENT/Mouth: membranes moist, no oral lesions or bleeding gums.      EYES:  no scleral icterus, normal conjunctivae   Neck: no carotid bruits or thyromegaly   Chest/Lungs:   lungs are clear to auscultation, no rales or wheezing, , equal chest wall expansion    Cardiovascular:   Regular. Normal first and second heart sounds with 2/6 systolic murmur, 2/6 diastolic murmur; the carotid, radial and posterior tibial pulses are intact, Jugular venous pressure within normal limits, no significant edema bilaterally    Abdomen:  no organomegaly, masses, bruits, or tenderness;  bowel sounds are present   Extremities: no cyanosis or clubbing   Skin: no xanthelasma, warm.    Neurologic: normal  bilateral, no tremors     Psychiatric: alert and oriented x3, calm        Please refer above for cardiac ROS details.        Medical History  Surgical History Family History Social History   Past Medical History:   Diagnosis Date     Abdominal pain      Atrial fibrillation (H)      Diverticulosis      Moderate mitral regurgitation      NSVT (nonsustained ventricular tachycardia) (H) 6/17/2020     Palpitations      Past Surgical History:   Procedure Laterality Date     BIOPSY BREAST Bilateral     benign     CV CORONARY ANGIOGRAM N/A 6/18/2020    Procedure: Coronary Angiogram;  Surgeon: Sergey Conner MD;  Location: [facility] Cath Lab;  Service: Cardiology     CV CORONARY ANGIOGRAM N/A 1/13/2021    Procedure: Coronary Angiogram;  Surgeon: Klaus Fitzpatrick MD;  Location: [facility] Cardiac Cath Lab;  Service: Cardiology     CV LEFT HEART CATHETERIZATION WITHOUT LEFT VENTRICULOGRAM Left 6/18/2020    Procedure: Left Heart Catheterization Without Left Ventriculogram;  Surgeon: Sergey Conner MD;  Location: [facility] Cath Lab;  Service: Cardiology     CV MITRACLIP N/A 2/1/2021    Procedure: CV MITRAL CLIP;  Surgeon: Klaus Fitzpatrick MD;  Location: [facility] CARDIAC CATH LAB     CV RIGHT HEART CATHETERIZATION N/A 6/18/2020    Procedure: Right Heart Catheterization;  Surgeon: Sergey Conner MD;  Location: [facility] Cath Lab;  Service: Cardiology     CV RIGHT HEART CATHETERIZATION N/A 1/13/2021    Procedure: Right Heart Catheterization;  Surgeon: Klaus Fitzpatrick MD;  Location: [facility] Cardiac Cath Lab;  Service: Cardiology     HYSTERECTOMY  1970's     OOPHORECTOMY Bilateral 1970's     Family History   Problem Relation Age of Onset     Breast Cancer Sister 47.00        Social History     Socioeconomic History     Marital status:      Spouse name: Not on file      Number of children: Not on file     Years of education: Not on file     Highest education level: Not on file   Occupational History     Not on file   Tobacco Use     Smoking status: Never Smoker     Smokeless tobacco: Never Used   Substance and Sexual Activity     Alcohol use: No     Drug use: No     Sexual activity: Not on file   Other Topics Concern     Parent/sibling w/ CABG, MI or angioplasty before 65F 55M? Not Asked   Social History Narrative    Spends 6 months in [place].   . No children. Family in the [place] area.   Enjoys walking- very regular with this especially in [place].   Likes reading - romantic happy endings.   Never alcohol or smoking.  Selina Pozo MD       Social Determinants of Health     Financial Resource Strain: Not on file   Food Insecurity: Not on file   Transportation Needs: Not on file   Physical Activity: Not on file   Stress: Not on file   Social Connections: Not on file   Intimate Partner Violence: Not on file   Housing Stability: Not on file           Medications  Allergies   Current Outpatient Medications   Medication Sig Dispense Refill     amiodarone (PACERONE) 200 MG tablet Take 1 tablet (200 mg) by mouth every evening (Patient taking differently: Take 200 mg by mouth every morning ) 90 tablet 1     apixaban ANTICOAGULANT (ELIQUIS ANTICOAGULANT) 5 MG tablet Take 1 tablet (5 mg) by mouth 2 times daily 180 tablet 1     clindamycin (CLEOCIN) 300 MG capsule TAKE 2 CAPSULES (600 MG TOTAL) BY MOUTH ONCE AS NEEDED (30 60 MINUTES PRIOR TO ANY DENTAL VISIT).       ezetimibe (ZETIA) 10 MG tablet Take 1 tablet (10 mg) by mouth daily 90 tablet 1     lisinopril (ZESTRIL) 5 MG tablet Take 1 tablet (5 mg) by mouth every 24 hours 90 tablet 3     metoprolol succinate ER (TOPROL-XL) 50 MG 24 hr tablet TAKE 2 TABLETS (100 MG TOTAL) BY MOUTH DAILY. OK TO TAKE 50MG IN AM AND 50MG IN PM (Patient taking differently: Take 50 mg by mouth every evening ) 180 tablet 2     multivitamin, therapeutic  (THERA-VIT) TABS tablet Take 1 tablet by mouth daily       pantoprazole (PROTONIX) 40 MG EC tablet Take 1 tablet (40 mg) by mouth daily 30 tablet 0     rosuvastatin (CRESTOR) 40 MG tablet Take 1 tablet (40 mg) by mouth daily 90 tablet 3       Allergies   Allergen Reactions     Penicillins Hives          Lab Results    Chemistry/lipid CBC Cardiac Enzymes/BNP/TSH/INR   Recent Labs   Lab Test 08/16/21  0816   CHOL 256*   HDL 70   *   TRIG 125     Recent Labs   Lab Test 08/16/21  0816 06/11/21  0746 12/03/20  0817   * 141* 139*     Recent Labs   Lab Test 12/10/21  1124      POTASSIUM 4.2   CHLORIDE 102   CO2 27      BUN 16   CR 1.17*   GFRESTIMATED 45*   SHADI 9.8     Recent Labs   Lab Test 12/10/21  1124 11/27/21  0331 08/16/21  0816   CR 1.17* 1.30* 1.29*     No results for input(s): A1C in the last 42748 hours.       Recent Labs   Lab Test 11/27/21  0331   WBC 7.5   HGB 13.2   HCT 41.6   *        Recent Labs   Lab Test 11/27/21  0331 03/11/21  1333 02/02/21  0541   HGB 13.2 12.8 11.5*    Recent Labs   Lab Test 11/27/21  1234 11/27/21  0843 11/27/21  0331   TROPONINI <0.01 <0.01 <0.01     Recent Labs   Lab Test 01/25/21  1355 10/14/20  1319 09/23/20  1920   * 344* 624*     Recent Labs   Lab Test 12/10/21  1124   TSH 7.90*     Recent Labs   Lab Test 01/25/21  1355 09/23/20  1920   INR 1.22* 1.37*          Dayne Kim MD   [facility]

## 2022-01-24 DIAGNOSIS — I10 ESSENTIAL HYPERTENSION: ICD-10-CM

## 2022-01-24 DIAGNOSIS — I48.0 PAROXYSMAL ATRIAL FIBRILLATION (H): Primary | ICD-10-CM

## 2022-01-25 ENCOUNTER — HOSPITAL ENCOUNTER (OUTPATIENT)
Dept: CARDIOLOGY | Facility: CLINIC | Age: 77
Discharge: HOME | End: 2022-01-25
Attending: INTERNAL MEDICINE | Admitting: INTERNAL MEDICINE
Payer: COMMERCIAL

## 2022-01-25 ENCOUNTER — LAB (OUTPATIENT)
Dept: CARDIOLOGY | Facility: CLINIC | Age: 77
End: 2022-01-25
Payer: COMMERCIAL

## 2022-01-25 DIAGNOSIS — Z95.818 S/P MITRAL VALVE CLIP IMPLANTATION: ICD-10-CM

## 2022-01-25 DIAGNOSIS — Z98.890 S/P MITRAL VALVE CLIP IMPLANTATION: ICD-10-CM

## 2022-01-25 DIAGNOSIS — I25.10 CORONARY ARTERY DISEASE DUE TO LIPID RICH PLAQUE: ICD-10-CM

## 2022-01-25 DIAGNOSIS — I25.83 CORONARY ARTERY DISEASE DUE TO LIPID RICH PLAQUE: ICD-10-CM

## 2022-01-25 LAB
ALT SERPL W P-5'-P-CCNC: 38 U/L (ref 0–45)
AST SERPL W P-5'-P-CCNC: 25 U/L (ref 0–40)
CHOLEST SERPL-MCNC: 186 MG/DL
FASTING STATUS PATIENT QL REPORTED: YES
HDLC SERPL-MCNC: 66 MG/DL
LDLC SERPL CALC-MCNC: 98 MG/DL
LVEF ECHO: NORMAL
TRIGL SERPL-MCNC: 111 MG/DL

## 2022-01-25 PROCEDURE — 84450 TRANSFERASE (AST) (SGOT): CPT

## 2022-01-25 PROCEDURE — 80061 LIPID PANEL: CPT

## 2022-01-25 PROCEDURE — 93306 TTE W/DOPPLER COMPLETE: CPT | Mod: 26 | Performed by: INTERNAL MEDICINE

## 2022-01-25 PROCEDURE — 93306 TTE W/DOPPLER COMPLETE: CPT

## 2022-01-25 PROCEDURE — 36415 COLL VENOUS BLD VENIPUNCTURE: CPT

## 2022-01-25 PROCEDURE — 84460 ALANINE AMINO (ALT) (SGPT): CPT

## 2022-01-26 RX ORDER — AMIODARONE HYDROCHLORIDE 200 MG/1
200 TABLET ORAL EVERY MORNING
Qty: 90 TABLET | Refills: 1 | Status: SHIPPED | OUTPATIENT
Start: 2022-01-26 | End: 2022-07-25

## 2022-01-26 NOTE — TELEPHONE ENCOUNTER
Routing refill request to provider for review/approval because:  Drug not on the [facility] refill protocol     Last Written Prescription Date:  8/30/2021  Last Fill Quantity: 90,  # refills: 1   Last office visit provider:  12/10/2021     Requested Prescriptions   Pending Prescriptions Disp Refills     amiodarone (PACERONE) 200 MG tablet [Pharmacy Med Name: AMIODARONE  MG TABLET] 90 tablet 1     Sig: TAKE 1 TABLET (200 MG) BY MOUTH EVERY EVENING       There is no refill protocol information for this order          Rebecca Rousseau RN 01/25/22 6:47 PM

## 2022-01-31 DIAGNOSIS — E78.5 HYPERLIPIDEMIA: Primary | ICD-10-CM

## 2022-01-31 NOTE — PROGRESS NOTES
FLP / ALT / AST ordered.  -rah    ===View-only below this line===  ----- Message -----  From: Dayne Kim MD  Sent: 1/28/2022  12:40 PM CST  To: Anamaria Patel RN    Can you please arrange a fasting lipid, AST and ALT in 2 months.  Patient discovered that inadvertently she was only taking 20 mg of rosuvastatin instead of the recommended 40 mg and she has now corrected this.mdg

## 2022-02-08 ENCOUNTER — HOSPITAL ENCOUNTER (OUTPATIENT)
Dept: RESPIRATORY THERAPY | Facility: CLINIC | Age: 77
Discharge: HOME | End: 2022-02-08
Attending: FAMILY MEDICINE | Admitting: FAMILY MEDICINE
Payer: COMMERCIAL

## 2022-02-08 DIAGNOSIS — I48.0 PAROXYSMAL ATRIAL FIBRILLATION (H): ICD-10-CM

## 2022-02-08 LAB
DLCOCOR-%PRED-PRE: 85 %
DLCOCOR-PRE: 16.69 ML/MIN/MMHG
DLCOUNC-%PRED-PRE: 81 %
DLCOUNC-PRE: 16.04 ML/MIN/MMHG
DLCOUNC-PRED: 19.57 ML/MIN/MMHG
ERV-%PRED-PRE: 39 %
ERV-PRE: 0.35 L
ERV-PRED: 0.89 L
EXPTIME-PRE: 7.17 SEC
FEF2575-%PRED-POST: 55 %
FEF2575-%PRED-PRE: 51 %
FEF2575-POST: 0.92 L/SEC
FEF2575-PRE: 0.85 L/SEC
FEF2575-PRED: 1.65 L/SEC
FEFMAX-%PRED-PRE: 73 %
FEFMAX-PRE: 3.79 L/SEC
FEFMAX-PRED: 5.13 L/SEC
FEV1-%PRED-PRE: 65 %
FEV1-PRE: 1.34 L
FEV1FEV6-PRE: 70 %
FEV1FEV6-PRED: 78 %
FEV1FVC-PRE: 70 %
FEV1FVC-PRED: 77 %
FEV1SVC-PRE: 73 %
FEV1SVC-PRED: 68 %
FIFMAX-PRE: 1.6 L/SEC
FRCPLETH-%PRED-PRE: 105 %
FRCPLETH-PRE: 2.93 L
FRCPLETH-PRED: 2.78 L
FVC-%PRED-PRE: 71 %
FVC-PRE: 1.93 L
FVC-PRED: 2.69 L
HGB BLD-MCNC: 12.2 G/DL (ref 11.7–15.7)
IC-%PRED-PRE: 69 %
IC-PRE: 1.48 L
IC-PRED: 2.13 L
RVPLETH-%PRED-PRE: 115 %
RVPLETH-PRE: 2.58 L
RVPLETH-PRED: 2.24 L
TLCPLETH-%PRED-PRE: 86 %
TLCPLETH-PRE: 4.42 L
TLCPLETH-PRED: 5.11 L
VA-%PRED-PRE: 85 %
VA-PRE: 4.11 L
VC-%PRED-PRE: 60 %
VC-PRE: 1.84 L
VC-PRED: 3.02 L

## 2022-02-08 PROCEDURE — 94640 AIRWAY INHALATION TREATMENT: CPT

## 2022-02-08 PROCEDURE — 94060 EVALUATION OF WHEEZING: CPT

## 2022-02-08 PROCEDURE — 36415 COLL VENOUS BLD VENIPUNCTURE: CPT | Performed by: FAMILY MEDICINE

## 2022-02-08 PROCEDURE — 85018 HEMOGLOBIN: CPT | Performed by: FAMILY MEDICINE

## 2022-02-08 PROCEDURE — 999N000157 HC STATISTIC RCP TIME EA 10 MIN

## 2022-02-08 PROCEDURE — 94729 DIFFUSING CAPACITY: CPT

## 2022-02-08 PROCEDURE — 250N000009 HC RX 250: Performed by: FAMILY MEDICINE

## 2022-02-08 PROCEDURE — 94726 PLETHYSMOGRAPHY LUNG VOLUMES: CPT

## 2022-02-08 RX ORDER — ALBUTEROL SULFATE 0.83 MG/ML
2.5 SOLUTION RESPIRATORY (INHALATION) ONCE
Status: COMPLETED | OUTPATIENT
Start: 2022-02-08 | End: 2022-02-08

## 2022-02-08 RX ADMIN — ALBUTEROL SULFATE 2.5 MG: 2.5 SOLUTION RESPIRATORY (INHALATION) at 08:54

## 2022-02-08 NOTE — PROGRESS NOTES
RESPIRATORY CARE NOTE     Patient Name: Kelly Robledo  Today's Date: 2/8/2022     Complete PFT done. Pt performed tests with good effort. Test results meet ATS criteria. Albuterol neb given.Results scanned into epic. Pt left in no distress.       Ursula Gutierrez, RT

## 2022-02-10 ENCOUNTER — TELEPHONE (OUTPATIENT)
Dept: FAMILY MEDICINE | Facility: CLINIC | Age: 77
End: 2022-02-10
Payer: COMMERCIAL

## 2022-02-16 DIAGNOSIS — E78.5 HYPERLIPIDEMIA: ICD-10-CM

## 2022-02-16 DIAGNOSIS — I25.10 CORONARY ARTERIOSCLEROSIS: ICD-10-CM

## 2022-02-16 RX ORDER — EZETIMIBE 10 MG/1
10 TABLET ORAL DAILY
Qty: 90 TABLET | Refills: 1 | Status: SHIPPED | OUTPATIENT
Start: 2022-02-16 | End: 2022-08-09

## 2022-02-20 DIAGNOSIS — I25.10 ATHEROSCLEROTIC HEART DISEASE OF NATIVE CORONARY ARTERY WITHOUT ANGINA PECTORIS: ICD-10-CM

## 2022-02-21 NOTE — TELEPHONE ENCOUNTER
"Routing refill request to provider for review/approval because:  Dosage does not match    Last Written Prescription Date:  12/10/2021 (40 mg)  Last Fill Quantity: 90,  # refills: 3   Last office visit provider:  12/10/2021 Dr. Pozo     Requested Prescriptions   Pending Prescriptions Disp Refills     rosuvastatin (CRESTOR) 20 MG tablet [Pharmacy Med Name: ROSUVASTATIN CALCIUM 20 MG TAB] 90 tablet 3     Sig: TAKE 1 TABLET BY MOUTH EVERYDAY AT BEDTIME       Statins Protocol Passed - 2/20/2022  8:00 AM        Passed - LDL on file in past 12 months     Recent Labs   Lab Test 01/25/22  0836   LDL 98             Passed - No abnormal creatine kinase in past 12 months     No lab results found.             Passed - Recent (12 mo) or future (30 days) visit within the authorizing provider's specialty     Patient has had an office visit with the authorizing provider or a provider within the authorizing providers department within the previous 12 mos or has a future within next 30 days. See \"Patient Info\" tab in inbasket, or \"Choose Columns\" in Meds & Orders section of the refill encounter.              Passed - Medication is active on med list        Passed - Patient is age 18 or older        Passed - No active pregnancy on record        Passed - No positive pregnancy test in past 12 months             Roula Pearson RN 02/21/22 3:31 PM  "

## 2022-02-22 RX ORDER — ROSUVASTATIN CALCIUM 20 MG/1
TABLET, COATED ORAL
Qty: 90 TABLET | Refills: 3 | Status: SHIPPED | OUTPATIENT
Start: 2022-02-22 | End: 2022-08-11

## 2022-03-14 ENCOUNTER — TELEPHONE (OUTPATIENT)
Dept: CARDIOLOGY | Facility: CLINIC | Age: 77
End: 2022-03-14
Payer: COMMERCIAL

## 2022-03-14 NOTE — TELEPHONE ENCOUNTER
Pt LVM - hasn't heard from Pulm yet to schedule appt.    LVM for pt stating phone # to lung clinic, asked for call back with any other questions.  -ramez

## 2022-03-16 DIAGNOSIS — R06.09 DOE (DYSPNEA ON EXERTION): Primary | ICD-10-CM

## 2022-04-07 ENCOUNTER — IMMUNIZATION (OUTPATIENT)
Dept: NURSING | Facility: CLINIC | Age: 77
End: 2022-04-07
Payer: COMMERCIAL

## 2022-04-07 PROCEDURE — 91305 COVID-19,PF,PFIZER (12+ YRS): CPT

## 2022-04-07 PROCEDURE — 0054A COVID-19,PF,PFIZER (12+ YRS): CPT

## 2022-04-29 ENCOUNTER — OFFICE VISIT (OUTPATIENT)
Dept: FAMILY MEDICINE | Facility: CLINIC | Age: 77
End: 2022-04-29
Payer: COMMERCIAL

## 2022-04-29 VITALS
SYSTOLIC BLOOD PRESSURE: 137 MMHG | DIASTOLIC BLOOD PRESSURE: 80 MMHG | WEIGHT: 122 LBS | BODY MASS INDEX: 19.99 KG/M2 | HEART RATE: 68 BPM | OXYGEN SATURATION: 96 %

## 2022-04-29 DIAGNOSIS — N18.31 STAGE 3A CHRONIC KIDNEY DISEASE (H): ICD-10-CM

## 2022-04-29 DIAGNOSIS — M54.50 ACUTE BILATERAL LOW BACK PAIN WITHOUT SCIATICA: Primary | ICD-10-CM

## 2022-04-29 DIAGNOSIS — I42.8 NON-ISCHEMIC CARDIOMYOPATHY (H): ICD-10-CM

## 2022-04-29 DIAGNOSIS — I48.0 PAROXYSMAL ATRIAL FIBRILLATION (H): ICD-10-CM

## 2022-04-29 PROCEDURE — 99214 OFFICE O/P EST MOD 30 MIN: CPT | Performed by: FAMILY MEDICINE

## 2022-04-29 NOTE — PROGRESS NOTES
Assessment/plan   Kelly Robledo is a 76 year old female who is established to my practice.    Kelly was seen today for back pain.    Diagnoses and all orders for this visit:    Acute bilateral low back pain without sciatica  Suspect mild muscular strain after cautiously walking on the ice without any falling or trauma.  Given absence of red flag symptoms and signs and total resolution as of today no imaging is needed. Not likely a kidney stone based on hx/exam.   -Discussed option for physical therapy should symptoms recur or worsen and she is agreeable to this.  - discussed heat/stretching as needed if recurs    Essential hypertension  BP's well controlled by home readings     Nonrheumatic severe mitral valve regurgitation  s/p Mitral clip x1, residual mild-mod MR  Moderate TR  s/p clip x1, MR reduced to trace. Post op TTE with mild-moderate MR, mean gradient of 4mmHg. Labs reviewed; not needing any additional today  - Apixaban 5mg bid + aspirin 81mg   - follow up with [facility] valve clinic & Dr. Kim (encouraged pt to schedule her 6mo follow up that would be due next month)     Paroxysmal atrial fibrillation   Followed by cardiology  - amiodarone 200 at bedtime.  PFTs ordered. Does have subclinical hypothyroidism as noted below.   - metoprolol succ 50mg AM and PM   - eliquis 5mg bid     Non-ischemic cardiomyopathy (H)  HFpEF   - lasix 20mg every day  - lisinopril 5mg every day     Moderate non-obstructive CAD  - rosuvstatin 20mg every day  - aspirin 81mg daily   - lipids today    Stage 3A CKD  BMP reviewed;stable    Subclinical hypothyroidism  subclinical hypothyroidism based on previously noted/elevated TSH and normal T4; starting in June 2020 PRIOR to initiation of amiodarone, though possibly impacted by this medication given flucuations. Her cardiologist Dr. Kim and I had discussed her options and we both feel she should not yet be started on T4 supplementation. I spent time again today to review the  dx, options for tx when TSH is >10 in her age, and our concerns. Kelly was made aware of our recommendations to not treat this given her risk factors of CAD, tendency for tachyarrhythemia at baseline; increasing risk of angina or arrhythmia further if on thyroid replacment. She is comfortable with the plan and appreciated the conversation today. We will continue to monitor per cardiology med monitoring.  - TSH recently checked in Dec; defer today       Follow up: Return in about 6 months (around 10/29/2022) for Recheck.    33 minutes spent on the date of the encounter doing chart review, patient visit and documentation.      Selina Pozo MD    Subjective:      HPI: Kelly Robledo is a 76 year old female who is here for:    Chief Complaint   Patient presents with     Back Pain     Lower Back Pain       Low back pain: she gets a very low back pain- today it is actually better  It all started after she was walking carefully on ice abotu 3 weeks ago and she tried to walk faster   No falls    Worse when bending down and coming upright again  Improves with heat      Review of Systems:  No saddle anesthesia, bowel or urinary incontinence. No weakness or numbness.     12 point comprehensive review of systems was negative except as noted and HPI     Social History:  Social History     Social History Narrative    Spends 6 months in [place].   . No children. Family in the [place] area.   Enjoys walking- very regular with this especially in [place].   Likes reading - romantic happy endings.   Never alcohol or smoking.  Selina Pozo MD         Medical History:  Patient Active Problem List   Diagnosis     Severe mitral regurgitation     Acute heart failure with preserved ejection fraction (H)     Adjustment disorder with anxious mood     Anxiety     Coronary arteriosclerosis     Hypertensive disorder     Hyperlipidemia     Irritable bowel syndrome     Osteoporosis     Paroxysmal atrial fibrillation (H)     Stage 3a  chronic kidney disease (H)     Subclinical hypothyroidism     Nonrheumatic mitral valve regurgitation     Mitral valve prolapse     Tricuspid valve regurgitation, nonrheumatic     Mitral regurgitation     Chest pain, unspecified type     Past Medical History:   Diagnosis Date     Abdominal pain      Atrial fibrillation (H)      Diverticulosis      Moderate mitral regurgitation      NSVT (nonsustained ventricular tachycardia) (H) 6/17/2020     Palpitations      Past Surgical History:   Procedure Laterality Date     BIOPSY BREAST Bilateral     benign     CV CORONARY ANGIOGRAM N/A 6/18/2020    Procedure: Coronary Angiogram;  Surgeon: Sergey Conner MD;  Location: [facility] Cath Lab;  Service: Cardiology     CV CORONARY ANGIOGRAM N/A 1/13/2021    Procedure: Coronary Angiogram;  Surgeon: Klaus Fitzpatrick MD;  Location: [facility] Cardiac Cath Lab;  Service: Cardiology     CV LEFT HEART CATHETERIZATION WITHOUT LEFT VENTRICULOGRAM Left 6/18/2020    Procedure: Left Heart Catheterization Without Left Ventriculogram;  Surgeon: Sergey Conner MD;  Location: [facility] Cath Lab;  Service: Cardiology     CV RIGHT HEART CATHETERIZATION N/A 6/18/2020    Procedure: Right Heart Catheterization;  Surgeon: Sergey Conner MD;  Location: [facility] Cath Lab;  Service: Cardiology     CV RIGHT HEART CATHETERIZATION N/A 1/13/2021    Procedure: Right Heart Catheterization;  Surgeon: Klaus Fitzpatrick MD;  Location: [facility] Cardiac Cath Lab;  Service: Cardiology     HYSTERECTOMY  1970's     OOPHORECTOMY Bilateral 1970's     TRANSCATHETER MITRAL VALVE REPAIR N/A 2/1/2021    Procedure: CV MITRAL CLIP;  Surgeon: Klaus Fitzpatrick MD;  Location: [facility] CARDIAC CATH LAB     Penicillins  Family History   Problem Relation Age of Onset     Breast Cancer Sister 47.00       Medications:  Current Outpatient Medications   Medication     amiodarone (PACERONE) 200 MG tablet     ELIQUIS ANTICOAGULANT 5 MG tablet      ezetimibe (ZETIA) 10 MG tablet     lisinopril (ZESTRIL) 5 MG tablet     metoprolol succinate ER (TOPROL-XL) 50 MG 24 hr tablet     multivitamin, therapeutic (THERA-VIT) TABS tablet     rosuvastatin (CRESTOR) 20 MG tablet     rosuvastatin (CRESTOR) 40 MG tablet     clindamycin (CLEOCIN) 300 MG capsule     No current facility-administered medications for this visit.         Imaging & Labs reviewed this visit: none      Objective:      Vitals:    04/29/22 1402   BP: 137/80   Pulse: 68   SpO2: 96%   Weight: 55.3 kg (122 lb)       Physical Exam:     General: Alert, no acute distress.   HEENT: normocephalic conjunctivae are clear   Neck: supple without adenopathy or thyromegaly.  Lungs: Good aeration bilaterally. Clear to auscultation without wheezes, rales or rhonci.   Heart: regular rate and rhythm, normal S1 and S2, no murmurs  Abdomen: soft and nontender  Skin: clear without rash or lesions  Neuro: alert, interactive moving all extremities equally, normal muscle tone in all 4 extremities. Negative seated straight leg raise  Back: no CTLS spinal tenderness; normal flexion/extension of the back.  No paraspinal lumbar tenderness.  No SI joint tenderness to palpation.    Selina Pozo MD

## 2022-05-08 PROBLEM — Z95.818 S/P MITRAL VALVE CLIP IMPLANTATION: Status: ACTIVE | Noted: 2021-02-02

## 2022-05-08 PROBLEM — Z98.890 S/P MITRAL VALVE CLIP IMPLANTATION: Status: ACTIVE | Noted: 2021-02-02

## 2022-05-09 NOTE — PROGRESS NOTES
"Pulmonary Clinic Outpatient Consultation  5/13/2022     Assessment and Plan:   #. Long-term amiodarone use. Normal PFT in 02/2022. At this point in time, I see no clinical evidence suggestive of amiodarone pulmonary toxicity. This is her baseline evaluation.       Reasonable to continue with annual PFT -- can be re-ordered by her cardiologist (I ordered the next one for May 2023); patient would rather not have to see an additional specialist unless it is absolutely necessary, so we can just continue to provide reports/interpretations of the testing for her cardiologist's review    Will get a baseline CT chest (non-contrast) -- no indication to repeat annually unless she has significant worsening in her respiratory status    RTC PRN.    Kamila Ramos MD  Pulmonary and Critical Care   ______________________________________________________________________________    CC: evaluation of lung function while on amiodarone    HPI:   Kelly Robledo is a 76 year old never smoker with history of diastolic heart failure, CAD, paroxysmal atrial fibrillation on long-term amiodarone use, severe mitral regurgitation, presenting today for evaluation of her PFTs in setting of her long-term amiodarone use.  Her PFT from earlier this year is normal without any restriction or any reduction in her diffusion capacity.    Reports no baseline respiratory issues. Has been doing well on her amiodarone & has been getting treatment for at least the last year. No current issues w/ cough. No issues w/ her vision.     ROS: 10 point ROS reviewed and noted to be negative w/ exceptions as detailed in the HPI.    Physical Exam:  BP (!) 152/83   Pulse 68   Ht 1.64 m (5' 4.57\")   Wt 54.9 kg (121 lb)   LMP  (LMP Unknown)   SpO2 97%   BMI 20.41 kg/m    Gen: alert, oriented, no distress  HEENT: masked, PERRL; no stridor  CV: RRR, no M/G/R  Resp: equal bilateral air entry, breath sounds clear throughout, no focal crackles or wheezes; able to converse " in full sentences w/ no respiratory distress  Abd: deferred  Skin: no apparent rashes  Ext: no cyanosis, no clubbing, no BLE edema  Neuro: alert, nonfocal    Labs: personally reviewed in the EMR.  Imaging studies: personally reviewed and interpreted.   PFT's (2/8/2022): Normal spirometry without clinically significant bronchodilator response, normal lung volumes, normal diffusion capacity.      PMH:  Patient Active Problem List    Diagnosis Date Noted     Chest pain, unspecified type 11/27/2021     Priority: Medium     Mitral regurgitation 02/01/2021     Priority: Medium     Adjustment disorder with anxious mood 01/29/2021     Priority: Medium     Irritable bowel syndrome 01/29/2021     Priority: Medium     Created by Conversion       Tricuspid valve regurgitation, nonrheumatic 01/29/2021     Priority: Medium     Severe mitral regurgitation 01/18/2021     Priority: Medium     Added automatically from request for surgery [id number]       Subclinical hypothyroidism 12/16/2020     Priority: Medium     Risk/benefit conversation 12/16/2020; see note. Not on tx       Acute heart failure with preserved ejection fraction (H) 10/14/2020     Priority: Medium     9/20/2020 hospital admission with pulmonary edema       Stage 3a chronic kidney disease (H) 10/14/2020     Priority: Medium     Anxiety 09/25/2020     Priority: Medium     Coronary arteriosclerosis 09/25/2020     Priority: Medium     Added automatically from request for surgery [id number]       Hypertensive disorder 09/25/2020     Priority: Medium     Osteoporosis 09/25/2020     Priority: Medium     Mitral valve prolapse 09/25/2020     Priority: Medium     Paroxysmal atrial fibrillation (H) 07/02/2020     Priority: Medium     Hyperlipidemia 07/05/2012     Priority: Medium     Created by Conversion       Nonrheumatic mitral valve regurgitation 07/05/2012     Priority: Medium     Added automatically from request for surgery [id number]  Added automatically from request for surgery  [id number]  a. Moderate  on echo June 17, 2011.  b. Ejection fraction 60%, normal left ventricular size, normal pulmonary  artery pressure by echo June 17, 2011.  -- No significant change by echo 2012, 2013, 2014, 2015, 2017, 2019         PSH:  Past Surgical History:   Procedure Laterality Date     BIOPSY BREAST Bilateral     benign     CV CORONARY ANGIOGRAM N/A 6/18/2020    Procedure: Coronary Angiogram;  Surgeon: Sergey Conner MD;  Location: [facility] Cath Lab;  Service: Cardiology     CV CORONARY ANGIOGRAM N/A 1/13/2021    Procedure: Coronary Angiogram;  Surgeon: Klaus Fitzpatrick MD;  Location: [facility] Cardiac Cath Lab;  Service: Cardiology     CV LEFT HEART CATHETERIZATION WITHOUT LEFT VENTRICULOGRAM Left 6/18/2020    Procedure: Left Heart Catheterization Without Left Ventriculogram;  Surgeon: Sergey Conner MD;  Location: [facility] Cath Lab;  Service: Cardiology     CV RIGHT HEART CATHETERIZATION N/A 6/18/2020    Procedure: Right Heart Catheterization;  Surgeon: Sergey Conner MD;  Location: [facility] Cath Lab;  Service: Cardiology     CV RIGHT HEART CATHETERIZATION N/A 1/13/2021    Procedure: Right Heart Catheterization;  Surgeon: Klaus Fitzpatrick MD;  Location: [facility] Cardiac Cath Lab;  Service: Cardiology     HYSTERECTOMY  1970's     OOPHORECTOMY Bilateral 1970's     TRANSCATHETER MITRAL VALVE REPAIR N/A 2/1/2021    Procedure: CV MITRAL CLIP;  Surgeon: Klaus Fitzpatrick MD;  Location: [facility] CARDIAC CATH LAB       Family HX: family history includes Breast Cancer (age of onset: 47.00) in her sister.    Social Hx:  reports that she has never smoked. She has never used smokeless tobacco. She reports that she does not drink alcohol and does not use drugs.     Current Meds:  Current Outpatient Medications   Medication Sig Dispense Refill     amiodarone (PACERONE) 200 MG tablet Take 1 tablet (200 mg) by mouth every morning 90 tablet 1     clindamycin (CLEOCIN) 300 MG capsule  TAKE 2 CAPSULES (600 MG TOTAL) BY MOUTH ONCE AS NEEDED (30 60 MINUTES PRIOR TO ANY DENTAL VISIT). (Patient not taking: Reported on 4/29/2022)       ELIQUIS ANTICOAGULANT 5 MG tablet TAKE 1 TABLET BY MOUTH TWICE A  tablet 1     ezetimibe (ZETIA) 10 MG tablet Take 1 tablet (10 mg) by mouth daily 90 tablet 1     lisinopril (ZESTRIL) 5 MG tablet Take 1 tablet (5 mg) by mouth every 24 hours 90 tablet 3     metoprolol succinate ER (TOPROL-XL) 50 MG 24 hr tablet TAKE 2 TABLETS (100 MG TOTAL) BY MOUTH DAILY. OK TO TAKE 50MG IN AM AND 50MG IN PM (Patient taking differently: Take 50 mg by mouth every evening) 180 tablet 2     multivitamin, therapeutic (THERA-VIT) TABS tablet Take 1 tablet by mouth daily       rosuvastatin (CRESTOR) 20 MG tablet TAKE 1 TABLET BY MOUTH EVERYDAY AT BEDTIME 90 tablet 3     rosuvastatin (CRESTOR) 40 MG tablet Take 1 tablet (40 mg) by mouth daily 90 tablet 3       Allergies:  Allergies   Allergen Reactions     Penicillins Hives

## 2022-05-13 ENCOUNTER — OFFICE VISIT (OUTPATIENT)
Dept: PULMONOLOGY | Facility: OTHER | Age: 77
End: 2022-05-13
Payer: COMMERCIAL

## 2022-05-13 VITALS
DIASTOLIC BLOOD PRESSURE: 83 MMHG | HEART RATE: 68 BPM | HEIGHT: 65 IN | BODY MASS INDEX: 20.16 KG/M2 | OXYGEN SATURATION: 97 % | WEIGHT: 121 LBS | SYSTOLIC BLOOD PRESSURE: 152 MMHG

## 2022-05-13 DIAGNOSIS — Z79.899 LONG TERM USE OF DRUG: Primary | ICD-10-CM

## 2022-05-13 PROCEDURE — 99203 OFFICE O/P NEW LOW 30 MIN: CPT | Performed by: INTERNAL MEDICINE

## 2022-05-13 NOTE — PATIENT INSTRUCTIONS
Plan:  Your lung function is absolutely normal. It is a good idea to monitor your annual lung function while you remain on amiodarone. You can get this test done without having to follow up with a pulmonologist -- Dr. Kim will get your results and can re-consult us if it is needed  I want to get a baseline CT scan of your lungs -- IF you have any issues in the future, we can get another CT scan and compare it to the baseline to see if amiodarone may be causing any issues.     If you have any questions or concerns, please, call our clinic at [phone].

## 2022-06-04 ENCOUNTER — TELEPHONE ENCOUNTER (OUTPATIENT)
Dept: URBAN - METROPOLITAN AREA CLINIC 68 | Facility: CLINIC | Age: 77
End: 2022-06-04

## 2022-06-05 ENCOUNTER — TELEPHONE ENCOUNTER (OUTPATIENT)
Dept: URBAN - METROPOLITAN AREA CLINIC 68 | Facility: CLINIC | Age: 77
End: 2022-06-05

## 2022-06-05 RX ORDER — LANSOPRAZOLE 30 MG/1
CAPSULE, DELAYED RELEASE ORAL DAILY
Qty: 90 | Refills: 90 | Status: ACTIVE | COMMUNITY
Start: 2012-04-16

## 2022-06-05 RX ORDER — RABEPRAZOLE SODIUM 20 MG/1
TABLET, DELAYED RELEASE ORAL DAILY
Qty: 90 | Refills: 0 | Status: ACTIVE | COMMUNITY
Start: 2012-03-14

## 2022-06-05 RX ORDER — ASPIRIN 81 MG/1
ASPIRIN EC LOW STRENGTH( 81MG ORAL  DAILY ) ACTIVE -HX ENTRY TABLET ORAL DAILY
Status: ACTIVE | COMMUNITY
Start: 2012-03-08

## 2022-06-05 RX ORDER — PINDOLOL 5 MG/1
PINDOLOL( 5MG ORAL  TWO TIMES DAILY ) ACTIVE -HX ENTRY TABLET ORAL
Status: ACTIVE | COMMUNITY
Start: 2012-03-08

## 2022-06-10 ENCOUNTER — HOSPITAL ENCOUNTER (OUTPATIENT)
Dept: CT IMAGING | Facility: CLINIC | Age: 77
Discharge: HOME | End: 2022-06-10
Attending: INTERNAL MEDICINE | Admitting: INTERNAL MEDICINE
Payer: COMMERCIAL

## 2022-06-10 DIAGNOSIS — Z79.899 LONG TERM USE OF DRUG: ICD-10-CM

## 2022-06-10 PROCEDURE — 71250 CT THORAX DX C-: CPT

## 2022-06-15 DIAGNOSIS — Z95.818 S/P MITRAL VALVE CLIP IMPLANTATION: Primary | ICD-10-CM

## 2022-06-15 DIAGNOSIS — Z98.890 S/P MITRAL VALVE CLIP IMPLANTATION: Primary | ICD-10-CM

## 2022-06-16 RX ORDER — CLINDAMYCIN HCL 300 MG
CAPSULE ORAL
Qty: 4 CAPSULE | Refills: 1 | Status: SHIPPED | OUTPATIENT
Start: 2022-06-16 | End: 2024-04-30

## 2022-06-25 ENCOUNTER — TELEPHONE ENCOUNTER (OUTPATIENT)
Age: 77
End: 2022-06-25

## 2022-06-25 RX ORDER — LANSOPRAZOLE 15 MG/1
CAPSULE, DELAYED RELEASE ORAL DAILY
Qty: 90 | Refills: 90 | OUTPATIENT
Start: 2012-03-16 | End: 2012-04-16

## 2022-06-26 ENCOUNTER — TELEPHONE ENCOUNTER (OUTPATIENT)
Age: 77
End: 2022-06-26

## 2022-06-26 RX ORDER — LANSOPRAZOLE 30 MG/1
CAPSULE, DELAYED RELEASE ORAL DAILY
Qty: 90 | Refills: 90 | Status: ACTIVE | COMMUNITY
Start: 2012-04-16

## 2022-06-26 RX ORDER — ASPIRIN 81 MG/1
ASPIRIN EC LOW STRENGTH( 81MG ORAL  DAILY ) ACTIVE -HX ENTRY TABLET ORAL DAILY
Status: ACTIVE | COMMUNITY
Start: 2012-03-08

## 2022-06-26 RX ORDER — PINDOLOL 5 MG/1
PINDOLOL( 5MG ORAL  TWO TIMES DAILY ) ACTIVE -HX ENTRY TABLET ORAL
Status: ACTIVE | COMMUNITY
Start: 2012-03-08

## 2022-06-26 RX ORDER — RABEPRAZOLE SODIUM 20 MG/1
TABLET, DELAYED RELEASE ORAL DAILY
Qty: 90 | Refills: 0 | Status: ACTIVE | COMMUNITY
Start: 2012-03-14

## 2022-07-25 DIAGNOSIS — I48.0 PAROXYSMAL ATRIAL FIBRILLATION (H): ICD-10-CM

## 2022-07-25 RX ORDER — AMIODARONE HYDROCHLORIDE 200 MG/1
200 TABLET ORAL EVERY MORNING
Qty: 90 TABLET | Refills: 1 | Status: SHIPPED | OUTPATIENT
Start: 2022-07-25 | End: 2022-09-02

## 2022-07-25 NOTE — TELEPHONE ENCOUNTER
Routing refill request to provider for review/approval because:  Drug not on the [facility] refill protocol     Last Written Prescription Date:  1/26/22  Last Fill Quantity: 90,  # refills: 1   Last office visit provider:  4/29/22     Requested Prescriptions   Pending Prescriptions Disp Refills     amiodarone (PACERONE) 200 MG tablet [Pharmacy Med Name: AMIODARONE  MG TABLET] 90 tablet 1     Sig: TAKE 1 TABLET (200 MG) BY MOUTH EVERY MORNING       There is no refill protocol information for this order          Raghavendra Augustin RN 07/25/22 7:44 AM

## 2022-07-29 ENCOUNTER — LAB (OUTPATIENT)
Dept: LAB | Facility: CLINIC | Age: 77
End: 2022-07-29
Payer: COMMERCIAL

## 2022-07-29 DIAGNOSIS — E78.5 HYPERLIPIDEMIA: ICD-10-CM

## 2022-07-29 DIAGNOSIS — I48.0 PAROXYSMAL ATRIAL FIBRILLATION (H): Primary | ICD-10-CM

## 2022-07-29 LAB
ALT SERPL W P-5'-P-CCNC: 85 U/L (ref 10–35)
AST SERPL W P-5'-P-CCNC: 55 U/L (ref 10–35)
CHOLEST SERPL-MCNC: 179 MG/DL
HDLC SERPL-MCNC: 52 MG/DL
LDLC SERPL CALC-MCNC: 105 MG/DL
NONHDLC SERPL-MCNC: 127 MG/DL
TRIGL SERPL-MCNC: 112 MG/DL
TSH SERPL DL<=0.005 MIU/L-ACNC: 10.8 UIU/ML (ref 0.3–4.2)

## 2022-07-29 PROCEDURE — 84450 TRANSFERASE (AST) (SGOT): CPT

## 2022-07-29 PROCEDURE — 84443 ASSAY THYROID STIM HORMONE: CPT

## 2022-07-29 PROCEDURE — 99000 SPECIMEN HANDLING OFFICE-LAB: CPT

## 2022-07-29 PROCEDURE — 84460 ALANINE AMINO (ALT) (SGPT): CPT

## 2022-07-29 PROCEDURE — 80061 LIPID PANEL: CPT

## 2022-07-29 PROCEDURE — 36415 COLL VENOUS BLD VENIPUNCTURE: CPT

## 2022-07-29 PROCEDURE — 80151 DRUG ASSAY AMIODARONE: CPT | Mod: 90

## 2022-07-31 LAB
AMIODARONE SERPL-MCNC: 1.9 UG/ML
DESETHYLAMIODARONE SERPL-MCNC: 1.4 UG/ML

## 2022-08-08 ENCOUNTER — TELEPHONE (OUTPATIENT)
Dept: CARDIOLOGY | Facility: CLINIC | Age: 77
End: 2022-08-08

## 2022-08-08 NOTE — TELEPHONE ENCOUNTER
Recommendations discussed with pt.  Pt said she is seeing Dr Kim Thursday 8/11/22 and will discuss with him then.  haylee

## 2022-08-08 NOTE — TELEPHONE ENCOUNTER
----- Message from Dayne Kim MD sent at 8/5/2022  4:22 PM CDT -----  Can we please discussed with her decreasing amiodarone to 100 mg daily and monitoring for any rhythm issues.  Thank you.  MELODIE Kim  ----- Message -----  From: Joseline Harrell APRN CNP  Sent: 8/1/2022   4:44 PM CDT  To: Anamaria Patel RN, DENVER Kraus CNP, #    Reviewed in Art's absence.  Amio level is 1.9, which is in the upper range, so decreasing amiodarone to 100 mg daily sounds very reasonable.    Thanks,  Joseline  ----- Message -----  From: Selina Pozo MD  Sent: 8/1/2022   1:02 PM CDT  To: Anamaria Patel RN, DENVER Kraus CNP, #    Agree the TSH has been in that range in the past. If the amiodarone dose decrease is possible (pending her responses)  that might be a good step here.   Thanks,   Selina Pozo      ----- Message -----  From: Dayne Kim MD  Sent: 7/31/2022   5:22 PM CDT  To: Anamaria Patel RN, Jocelyn Kaur, DENVER CNP, #    Cholesterol numbers and liver tests reviewed amio level and tsh ordered by art, tsh is mildly increased not too different from previous, I copied Dr Pozo for hr opinion.cholesterol numbers are stable, mildly above ideal goal, I wrote asking her to confirm that she has been taking 20mg of rosuvastatin as well as the zetia.Liver tests are also elevated.Id like to decrease the amiodarone dose to 100mg daily from 200mg daily. I sent her a my chart note confirming she hasnt been aware of any irregular heart rhythm, pending her response will make a plan for med changes and follow up blood work  mdg

## 2022-08-09 DIAGNOSIS — I49.9 CARDIAC ARRHYTHMIA, UNSPECIFIED: ICD-10-CM

## 2022-08-09 DIAGNOSIS — E78.5 HYPERLIPIDEMIA: ICD-10-CM

## 2022-08-09 DIAGNOSIS — I25.10 CORONARY ARTERIOSCLEROSIS: ICD-10-CM

## 2022-08-09 RX ORDER — EZETIMIBE 10 MG/1
10 TABLET ORAL DAILY
Qty: 90 TABLET | Refills: 1 | Status: SHIPPED | OUTPATIENT
Start: 2022-08-09 | End: 2022-12-19

## 2022-08-10 RX ORDER — METOPROLOL SUCCINATE 50 MG/1
TABLET, EXTENDED RELEASE ORAL
Qty: 180 TABLET | Refills: 2 | Status: SHIPPED | OUTPATIENT
Start: 2022-08-10 | End: 2022-08-11

## 2022-08-11 ENCOUNTER — OFFICE VISIT (OUTPATIENT)
Dept: CARDIOLOGY | Facility: CLINIC | Age: 77
End: 2022-08-11
Attending: INTERNAL MEDICINE
Payer: COMMERCIAL

## 2022-08-11 VITALS
BODY MASS INDEX: 19.66 KG/M2 | SYSTOLIC BLOOD PRESSURE: 172 MMHG | WEIGHT: 118 LBS | RESPIRATION RATE: 16 BRPM | HEART RATE: 65 BPM | HEIGHT: 65 IN | DIASTOLIC BLOOD PRESSURE: 80 MMHG

## 2022-08-11 DIAGNOSIS — Z98.890 S/P MITRAL VALVE CLIP IMPLANTATION: ICD-10-CM

## 2022-08-11 DIAGNOSIS — I25.83 CORONARY ARTERY DISEASE DUE TO LIPID RICH PLAQUE: ICD-10-CM

## 2022-08-11 DIAGNOSIS — Z95.818 S/P MITRAL VALVE CLIP IMPLANTATION: ICD-10-CM

## 2022-08-11 DIAGNOSIS — I27.20 PULMONARY HYPERTENSION (H): Primary | ICD-10-CM

## 2022-08-11 DIAGNOSIS — I48.0 PAF (PAROXYSMAL ATRIAL FIBRILLATION) (H): ICD-10-CM

## 2022-08-11 DIAGNOSIS — I25.10 CORONARY ARTERY DISEASE DUE TO LIPID RICH PLAQUE: ICD-10-CM

## 2022-08-11 LAB
ALT SERPL W P-5'-P-CCNC: 74 U/L (ref 0–45)
AST SERPL W P-5'-P-CCNC: 51 U/L (ref 0–40)

## 2022-08-11 PROCEDURE — 84460 ALANINE AMINO (ALT) (SGPT): CPT | Performed by: INTERNAL MEDICINE

## 2022-08-11 PROCEDURE — 84450 TRANSFERASE (AST) (SGOT): CPT | Performed by: INTERNAL MEDICINE

## 2022-08-11 PROCEDURE — 99214 OFFICE O/P EST MOD 30 MIN: CPT | Performed by: INTERNAL MEDICINE

## 2022-08-11 PROCEDURE — 36415 COLL VENOUS BLD VENIPUNCTURE: CPT | Performed by: INTERNAL MEDICINE

## 2022-08-11 RX ORDER — METOPROLOL SUCCINATE 50 MG/1
50 TABLET, EXTENDED RELEASE ORAL 2 TIMES DAILY
Qty: 180 TABLET | Refills: 3 | Status: SHIPPED | OUTPATIENT
Start: 2022-08-11 | End: 2022-11-16

## 2022-08-11 NOTE — PROGRESS NOTES
HEART CARE ENCOUNTER CONSULTATON NOTE      [facility]  [phone]      Assessment/Recommendations   Assessment/Plan:  1.  Mitral valve prolapse with severe mitral regurgitation.  Status post mitral valve clip implantation  February 2021.  She continues to feel well.  Echocardiogram most recently from January shows normal systolic function, moderate mitral regurgitation, moderate tricuspid regurgitation with estimate of RV systolic pressure elevated at 57 mmHg plus right atrial pressure.  She was seen in pulmonary clinic with recommendations for annual pulmonary function tests and a baseline CT.  No indication for amiodarone toxicity.    2.  Paroxysmal atrial fibrillation very symptomatic.  She has been on amiodarone.  Recent liver function tests are elevated and amiodarone level is 1.9.  In addition the TSH is elevated.  I discussed with her cutting the amiodarone 200 mg daily and monitoring for rapid or increased pulse.  She will document her blood pressure and pulse for me over the next 4 to 6 weeks.  We will plan to recheck AST and ALT today.  Likely will consider a repeat Holter monitor in 4 to 6 weeks pending her report as to how she is doing on the lower dose amiodarone.  Holter monitor June 2021 showed sinus rhythm with short NM interval.  No significant ectopy.    3.  Hypertension.  Blood pressure trends higher in the office typically her blood pressures have been in the 130s over 70s to 80s.  After she rested for 10 minutes during our visit her blood pressure was improved.  Asked her to continue to monitor her blood pressure.    4.  Dyslipidemia with moderate nonobstructive coronary artery disease.  Her lipids at the end of July were not at goal her cholesterol was 179, triglycerides 112 with an LDL of 105.  Ideally would like to see it at least less than 100.  I did not make changes in her lipid management because of elevated liver function test.  We will plan to recheck AST and ALT  today.    Plan AST and ALT today  Decrease amiodarone to 100 mg daily with an update on blood pressure pulse and any symptoms  Follow-up 3 months  Echocardiogram  3 consider repeat Holter monitor in approximately 4 to 6 weeks pending her updated return phone call.       History of Present Illness/Subjective    HPI: Antione Mishra is a 76 year old female who is seen in follow-up.  We reviewed recent laboratory studies were liver function tests were elevated and the amiodarone level was upper limits of normal.  After discussing with my colleagues we are going to cut the amiodarone to 100 mg daily.  I discussed this with her today.  She is not experiencing any chest discomfort, shortness of breath, dizziness or lightheadedness.  She does endorse some issues related to balance but primarily when she just stands up.  She is not had any falling episodes.  She does report that her hair is thinning.  We again reviewed the potential side effects of amiodarone and the reasons why she is on this medication as well as her other medications.    She has a history of mitral valve prolapse with significant mitral regurgitation admitted to hospital in 2020 with tachypalpitations.  She was diagnosed with atrial fibrillation with rapid ventricular response and started on amiodarone and Eliquis.  Coronary angiogram at that time showed moderate nonobstructive disease and she has been on beta-blocker.  Follow-up transesophageal echocardiogram showed progression of her mitral regurgitation and she has subsequently underwent mitral valve clip therapy .  She states that she continues to feel much improved.        Recent Echocardiogram Results:  . [place] [address]     Name: ANTIONE MISHRA  MRN: [id number]  : 1945  Study Date: 2022 09:23 AM  Age: 76 yrs  Gender: Female  Patient Location: [facility]  Reason For Study: S/P mitral valve clip implantation, S/P mitral valve  clip  implan  Ordering Physician: MART MITCHELL  Referring Physician: MART MITCHELL  Performed By: ELSIE     BSA: 1.6 m2  Height: 66 in  Weight: 121 lb  HR: 69  BP: 187/91 mmHg  ______________________________________________________________________________  Procedure  Complete Echo Adult.  ______________________________________________________________________________  Interpretation Summary     1. The left ventricle is normal in size. Left ventricular function is  normal.The ejection fraction is 60-65%. There is normal left ventricular wall  thickness. No regional wall motion abnormalities noted.  2. Normal right ventricle size and systolic function.  3. MitraClip is present with stable bileaflet insertion. There is no  significant mitral valve stenosis across MitraClip. There is moderate (+2)  residual mitral regurgitation after MitraClip.  4. There is moderate (2+) tricuspid regurgitation. The right ventricular  systolic pressure is approximated at 57.4mmHg plus the right atrial pressure.  Severe (>55mmHg) pulmonary hypertension is present.  IVC diameter and respiratory changes fall into an intermediate range  suggesting an RA pressure of 8 mmHg.  ______________________________________________________________________________  Left Ventricle  The left ventricle is normal in size. Left ventricular function is normal.The  ejection fraction is 60-65%. There is normal left ventricular wall thickness.  Left ventricular diastolic function is normal. No regional wall motion  abnormalities noted.     Right Ventricle  Normal right ventricle size and systolic function.     Atria  The left atrium is severely dilated. The right atrium is moderately dilated.  There is no color Doppler evidence of an atrial shunt.     Mitral Valve  MitraClip is present with stable bileaflet insertion. There is no significant  mitral valve stenosis across MitraClip. There is moderate (+2) residual mitral  regurgitation after MitraClip.      Tricuspid Valve  There is moderate (2+) tricuspid regurgitation. Severe (>55mmHg) pulmonary  hypertension is present. IVC diameter and respiratory changes fall into an  intermediate range suggesting an RA pressure of 8 mmHg. The right ventricular  systolic pressure is approximated at 57.4mmHg plus the right atrial pressure.     Aortic Valve  There is moderate trileaflet aortic sclerosis. There is trace aortic  regurgitation. No aortic stenosis is present.     Pulmonic Valve  The pulmonic valve is not well seen, but is grossly normal. This degree of  valvular regurgitation is within normal limits.     Vessels  The aorta root is normal. Normal size ascending aorta. IVC diameter and  respiratory changes fall into an intermediate range suggesting an RA pressure  of 8 mmHg.     Pericardium  There is no pericardial effusion.     Scan on 6/15/2021 by Historical Provider        [facility]     HOLTER REPORT     Results:    Indication for study: Evaluate for PVCs    A 24-hour Holter monitor is applied 11/2021 with atrial fibrillation Eryn 15, 2021    Baseline tracing showed sinus rhythm with a rather short NH interval there appears to be an increase in intrinsicoid deflection; but  I do not believe this represents preexcitation-prior 12 lead ECG do not show pre-excitation/WPW    Average heart rate is 62 bpm with heart rate ranging 55-75 bpm    No bradycardia or pauses or AV block    Rare ventricular ectopy with 21 single PVCs-no complex or repetitive forms    Rare supraventricular ectopy with 9 PACs, no complex or repetitive forms    Patient activity diary was reviewed no symptoms noted    Discussion    Normal Holter monitor with just a few single noncomplex ectopic beats        Comment: The recording was for 23 hrs and 59 min.  The recording quality was adequate.     Severe mitral regurgitation [I34.0 (ICD-10-CM)]   Acute on chronic diastolic congestive heart failure (H) [I50.33 (ICD-10-CM)]  "        Pre Procedure Diagnosis    valuvlar diseaseother         Conclusion       Procedure: Transcatheter mitral valve repair w/ MitraClip     Indication: Severe symptomatic MR with the patient felt to be an appropriate candidate for transcatheter MV repair after a thorough multidisciplinary approach, including a visit with a surgeon.     Operators:   Interventional Cardiology: Klaus Fitzpatrick MD; Charan Aquino MD,   Echocardiography: Transesophageal     Approach: R transfemoral     Anesthesia: General     Procedure Details:  Informed consent obtained before the patient was brought to the procedure room.  Appropriate timeout was performed before the procedure was started.     Access was obtained in the R femoral vein, and then in the Lt femoral artery and vein using the modified Seldinger technique.  The right femoral venootomy was then \"preclosed\" with two Perclose devices deployed at 90  to each other.       After appropriate anticoagulation, the intraatrial septum was crossed using SL0 sheath and [place] needle. Using a pigtail catherter, Safari wire was positioned in BRYAN PV and MitraClip delivery system was delivered.      Using ALEXANDR and angiography for guidance, an NTW MitraClip was placed in the A2P2 positions w/ MR reduction from 3+ to 1+.     Post deployment the patient had good hemodynamics, with trace MR, and a LA pressure of 15, V waves decreased from 30 to 17mmHg     The delivery system and sheath were then removed, with successful hemostasis of the venotomy by deployment of the previously placed Perclose sutures.     The left femoral venous sheath was removed and successful hemostasis using Angio-Seal device, and the arterial sheath was removed with manual pressure applied.     Conclusions:  1. Severe symptomatic mitral regurgitation status post successful transcatheter mitral valve repair w/ a MitraClip x1, delivered via the right transfemoral approach.     2.  Trace mitral insufficiency post " deployment with a mean gradient of 5mmHg.     Postprocedure patient status:      Recent Coronary Angiogram Results:  74 yo F with recent diagnosis of moderate to severe mitral regurgitation, causing increasing shortness of breath, as well as a recent hospitalization with congestive heart failure.  Being evaluated for transcatheter mitral valve repair using MitraClip,   and here for preprocedure coronary angiography.     Coronary geography today showed:     Left main with 30% ostial narrowing  LAD with mild to moderate diffuse disease, and a 50% stenosis in the midportion, that appears unchanged from her prior angiogram  Ramus with a 50% proximal to mid stenosis and mild disease distally  LCx is a small vessel with no significant obtuse marginals noted  RCA is a large caliber, dominant vessel supplying the inferior and posterior walls.  There is 40% mid to distal focal stenosis, also unchanged from prior.        Right heart catheterization showed:    PA 50/16  (36)    RA mean  7    PCW  16 /43  (26) v waves of 45 mmHg    AO  164/62  (107)    RV  57/-6,  6     LVEDP 15 mmHg     Given that her symptoms have progressed over the past 6 months, with no change in her coronary anatomy, and clear worsening of her mitral regurgitation, she will benefit from mitral valve repair with MitraClip.  Her hemodynamics today also confirmed the presence of severe mitral regurgitation, with large V waves noted on her pulmonary wedge tracing.         Physical Examination  Review of Systems   Vitals: 172/80 by nursing, 130/78 during my examination, heart rate of 65 and regular, weight 118 pounds.  Wt Readings from Last 3 Encounters:   05/13/22 54.9 kg (121 lb)   04/29/22 55.3 kg (122 lb)   01/18/22 54.9 kg (121 lb)       General Appearance:   no distress, normal body habitus   ENT/Mouth:  Wearing a facemask   EYES:  no scleral icterus, normal conjunctivae   Neck: no carotid bruits or thyromegaly   Chest/Lungs:   lungs are clear to  auscultation, no rales or wheezing, , equal chest wall expansion    Cardiovascular:   Regular. Normal first and second heart sounds with 2/6  murmur, rubs, or gallops; the carotid, radial and posterior tibial pulses are intact, Jugular venous pressure within normal limits, no edema bilaterally    Abdomen:  no organomegaly, masses, bruits, or tenderness; bowel sounds are present   Extremities: no cyanosis or clubbing   Skin: no xanthelasma, warm.    Neurologic: , no tremors     Psychiatric: alert and oriented x3, calm        Please refer above for cardiac ROS details.        Medical History  Surgical History Family History Social History   Past Medical History:   Diagnosis Date     Abdominal pain      Atrial fibrillation (H)      Diverticulosis      Moderate mitral regurgitation      NSVT (nonsustained ventricular tachycardia) (H) 6/17/2020     Palpitations      Past Surgical History:   Procedure Laterality Date     BIOPSY BREAST Bilateral     benign     CV CORONARY ANGIOGRAM N/A 6/18/2020    Procedure: Coronary Angiogram;  Surgeon: Sergey Conner MD;  Location: [facility];  Service: Cardiology     CV CORONARY ANGIOGRAM N/A 1/13/2021    Procedure: Coronary Angiogram;  Surgeon: Klaus Fitzpatrick MD;  Location: [facility] Lab;  Service: Cardiology     CV LEFT HEART CATHETERIZATION WITHOUT LEFT VENTRICULOGRAM Left 6/18/2020    Procedure: Left Heart Catheterization Without Left Ventriculogram;  Surgeon: Sergey Conner MD;  Location: [facility];  Service: Cardiology     CV RIGHT HEART CATHETERIZATION N/A 6/18/2020    Procedure: Right Heart Catheterization;  Surgeon: Sergey Conner MD;  Location: [facility];  Service: Cardiology     CV RIGHT HEART CATHETERIZATION N/A 1/13/2021    Procedure: Right Heart Catheterization;  Surgeon: Klaus Fitzpatrick MD;  Location: [facility] Cardiac Cath Lab;  Service: Cardiology     HYSTERECTOMY  1970's     OOPHORECTOMY  Bilateral 1970's     TRANSCATHETER MITRAL VALVE REPAIR N/A 2/1/2021    Procedure: CV MITRAL CLIP;  Surgeon: Klaus Fitzpatrick MD;  Location:  HEART CARDIAC CATH LAB     Family History   Problem Relation Age of Onset     Breast Cancer Sister 47.00        Social History     Socioeconomic History     Marital status:      Spouse name: Not on file     Number of children: Not on file     Years of education: Not on file     Highest education level: Not on file   Occupational History     Not on file   Tobacco Use     Smoking status: Never Smoker     Smokeless tobacco: Never Used   Substance and Sexual Activity     Alcohol use: No     Drug use: No     Sexual activity: Not on file   Other Topics Concern     Parent/sibling w/ CABG, MI or angioplasty before 65F 55M? Not Asked   Social History Narrative    Spends 6 months in [place].   . No children. Family in the [place] area.   Enjoys walking- very regular with this especially in [place].   Likes reading - romantic happy endings.   Never alcohol or smoking.  Selina Pozo MD       Social Determinants of Health     Financial Resource Strain: Not on file   Food Insecurity: Not on file   Transportation Needs: Not on file   Physical Activity: Not on file   Stress: Not on file   Social Connections: Not on file   Intimate Partner Violence: Not on file   Housing Stability: Not on file           Medications  Allergies   Current Outpatient Medications   Medication Sig Dispense Refill     metoprolol succinate ER (TOPROL XL) 50 MG 24 hr tablet TAKE 2 TABLETS (100 MG TOTAL) BY MOUTH DAILY. OK TO TAKE 50MG IN AM AND 50MG IN  tablet 2     amiodarone (PACERONE) 200 MG tablet TAKE 1 TABLET (200 MG) BY MOUTH EVERY MORNING 90 tablet 1     clindamycin (CLEOCIN) 300 MG capsule Take 2 capsules (600 mg total) by mouth once as needed (30-60 minutes prior to any dental visit. Save remaining for next visit. 4 capsule 1     ELIQUIS ANTICOAGULANT 5 MG tablet TAKE 1 TABLET BY MOUTH TWICE  A  tablet 1     ezetimibe (ZETIA) 10 MG tablet TAKE 1 TABLET (10 MG) BY MOUTH DAILY. 90 tablet 1     lisinopril (ZESTRIL) 5 MG tablet Take 1 tablet (5 mg) by mouth every 24 hours 90 tablet 3     multivitamin, therapeutic (THERA-VIT) TABS tablet Take 1 tablet by mouth daily       rosuvastatin (CRESTOR) 20 MG tablet TAKE 1 TABLET BY MOUTH EVERYDAY AT BEDTIME 90 tablet 3     rosuvastatin (CRESTOR) 40 MG tablet Take 1 tablet (40 mg) by mouth daily 90 tablet 3       Allergies   Allergen Reactions     Penicillins Hives          Lab Results    Chemistry/lipid CBC Cardiac Enzymes/BNP/TSH/INR   Recent Labs   Lab Test 07/29/22  0735   CHOL 179   HDL 52   *   TRIG 112     Recent Labs   Lab Test 07/29/22  0735 01/25/22  0836 08/16/21  0816   * 98 161*     Recent Labs   Lab Test 12/10/21  1124      POTASSIUM 4.2   CHLORIDE 102   CO2 27      BUN 16   CR 1.17*   GFRESTIMATED 45*   SHADI 9.8     Recent Labs   Lab Test 12/10/21  1124 11/27/21  0331 08/16/21  0816   CR 1.17* 1.30* 1.29*     No results for input(s): A1C in the last [id number] hours.       Recent Labs   Lab Test 02/08/22  0834 11/27/21  0331   WBC  --  7.5   HGB 12.2 13.2   HCT  --  41.6   MCV  --  101*   PLT  --  198     Recent Labs   Lab Test 02/08/22  0834 11/27/21  0331 03/11/21  1333   HGB 12.2 13.2 12.8    Recent Labs   Lab Test 11/27/21  1234 11/27/21  0843 11/27/21  0331   TROPONINI <0.01 <0.01 <0.01     Recent Labs   Lab Test 01/25/21  1355 10/14/20  1319 09/23/20  1920   * 344* 624*     Recent Labs   Lab Test 07/29/22  0735   TSH 10.80*     Recent Labs   Lab Test 01/25/21  1355 09/23/20  1920   INR 1.22* 1.37*        Dayne Kim MD

## 2022-08-11 NOTE — LETTER
8/11/2022    Selina Pozo MD  [address] [facility] [address]    RE: Kelly Robledo       Dear Colleague,     I had the pleasure of seeing Kelly ULISSES Robledo in the [facility].    HEART CARE ENCOUNTER CONSULTATON NOTE      [facility]  [phone]      Assessment/Recommendations   Assessment/Plan:  1.  Mitral valve prolapse with severe mitral regurgitation.  Status post mitral valve clip implantation fibber 2021.  She continues to feel well.  Echocardiogram most recently from January shows normal systolic function, moderate mitral regurgitation, moderate tricuspid regurgitation with estimate of RV systolic pressure elevated at 57 mmHg plus right atrial pressure.  She was seen in pulmonary clinic with recommendations for annual pulmonary function tests and a baseline CT.  No indication for amiodarone toxicity.    2.  Paroxysmal atrial fibrillation very symptomatic.  She has been on amiodarone.  Recent function tests are elevated and amiodarone level is 1.9.  In addition the TSH is elevated.  I discussed with her cutting the amiodarone 200 mg daily and monitoring for rapid or increased pulse.  She will document her blood pressure and pulse for me over the next 4 to 6 weeks.  We will plan to recheck AST and ALT today.  Likely will consider a repeat Holter monitor in 4 to 6 weeks pending her report as to how she is doing on the lower dose amiodarone.  Holter monitor June 2011 showed sinus rhythm with short MI interval.  No significant ectopy.    3.  Hypertension.  Blood pressure trends higher in the office typically her blood pressures have been in the 130s over 70s to 80s.  After she rested for 10 minutes during our visit her blood pressure was improved.  Asked her to continue to monitor her blood pressure.    4.  Dyslipidemia with moderate nonobstructive coronary artery disease.  Her lipids at the end of July were not at goal her cholesterol was 179, triglycerides  112 with an LDL of 105.  Ideally would like to see it at least less than 100.  I did not make changes in her lipid management because of elevated liver function test.  We will plan to recheck AST and ALT today.    Plan AST and ALT today  Decrease amiodarone to 100 mg daily with an update on blood pressure pulse and any symptoms  Follow-up 3 months  Echocardiogram  3 consider repeat Holter monitor in approximately 4 to 6 weeks pending her updated return phone call.       History of Present Illness/Subjective    HPI: Antione Mishra is a 76 year old female who is seen in follow-up.  We reviewed recent laboratory studies were liver function tests were elevated and the amiodarone level was upper limits of normal.  After discussing with my colleagues we are going to cut the amiodarone to 100 mg daily.  I discussed this with her today.  She is not experiencing any chest discomfort, shortness of breath, dizziness or lightheadedness.  She does endorse some issues related to balance but primarily when she just stands up.  She is not had any falling episodes.  She does report that her hair is thinning.  We again reviewed the potential side effects of amiodarone and the reasons why she is on this medication as well as her other medications.    She has a history of mitral valve prolapse with significant mitral regurgitation admitted to hospital in June 2020 with tachypalpitations.  She was diagnosed with atrial fibrillation with rapid ventricular response and started on amiodarone and Eliquis.  Coronary angiogram at that time showed moderate nonobstructive disease and she has been on beta-blocker.  Follow-up transesophageal echocardiogram showed progression of her mitral vegetation and she has subsequently underwent mitral valve clip therapy 2021.  She states that she continues to feel much improved.        Recent Echocardiogram Results:  . [place] [id number]     Name: ANTIONE MISHRA  MRN:  [id number]  : 1945  Study Date: 2022 09:23 AM  Age: 76 yrs  Gender: Female  Patient Location: [facility]  Reason For Study: S/P mitral valve clip implantation, S/P mitral valve clip  implan  Ordering Physician: MART MITCHELL  Referring Physician: MART MITCHELL  Performed By: ELSIE     BSA: 1.6 m2  Height: 66 in  Weight: 121 lb  HR: 69  BP: 187/91 mmHg  ______________________________________________________________________________  Procedure  Complete Echo Adult.  ______________________________________________________________________________  Interpretation Summary     1. The left ventricle is normal in size. Left ventricular function is  normal.The ejection fraction is 60-65%. There is normal left ventricular wall  thickness. No regional wall motion abnormalities noted.  2. Normal right ventricle size and systolic function.  3. MitraClip is present with stable bileaflet insertion. There is no  significant mitral valve stenosis across MitraClip. There is moderate (+2)  residual mitral regurgitation after MitraClip.  4. There is moderate (2+) tricuspid regurgitation. The right ventricular  systolic pressure is approximated at 57.4mmHg plus the right atrial pressure.  Severe (>55mmHg) pulmonary hypertension is present.  IVC diameter and respiratory changes fall into an intermediate range  suggesting an RA pressure of 8 mmHg.  ______________________________________________________________________________  Left Ventricle  The left ventricle is normal in size. Left ventricular function is normal.The  ejection fraction is 60-65%. There is normal left ventricular wall thickness.  Left ventricular diastolic function is normal. No regional wall motion  abnormalities noted.     Right Ventricle  Normal right ventricle size and systolic function.     Atria  The left atrium is severely dilated. The right atrium is moderately dilated.  There is no color Doppler evidence of an atrial shunt.     Mitral Valve  MitraClip  is present with stable bileaflet insertion. There is no significant  mitral valve stenosis across MitraClip. There is moderate (+2) residual mitral  regurgitation after MitraClip.     Tricuspid Valve  There is moderate (2+) tricuspid regurgitation. Severe (>55mmHg) pulmonary  hypertension is present. IVC diameter and respiratory changes fall into an  intermediate range suggesting an RA pressure of 8 mmHg. The right ventricular  systolic pressure is approximated at 57.4mmHg plus the right atrial pressure.     Aortic Valve  There is moderate trileaflet aortic sclerosis. There is trace aortic  regurgitation. No aortic stenosis is present.     Pulmonic Valve  The pulmonic valve is not well seen, but is grossly normal. This degree of  valvular regurgitation is within normal limits.     Vessels  The aorta root is normal. Normal size ascending aorta. IVC diameter and  respiratory changes fall into an intermediate range suggesting an RA pressure  of 8 mmHg.     Pericardium  There is no pericardial effusion.     Scan on 6/15/2021 by Historical Provider        [facility]     HOLTER REPORT     Results:    Indication for study: Evaluate for PVCs    A 24-hour Holter monitor is applied 11/2021 with atrial fibrillation Eryn 15, 2021    Baseline tracing showed sinus rhythm with a rather short NJ interval there appears to be an increase in intrinsicoid deflection; but  I do not believe this represents preexcitation-prior 12 lead ECG do not show pre-excitation/WPW    Average heart rate is 62 bpm with heart rate ranging 55-75 bpm    No bradycardia or pauses or AV block    Rare ventricular ectopy with 21 single PVCs-no complex or repetitive forms    Rare supraventricular ectopy with 9 PACs, no complex or repetitive forms    Patient activity diary was reviewed no symptoms noted    Discussion    Normal Holter monitor with just a few single noncomplex ectopic beats        Comment: The recording was for 23 hrs and  "59 min.  The recording quality was adequate.     Severe mitral regurgitation [I34.0 (ICD-10-CM)]   Acute on chronic diastolic congestive heart failure (H) [I50.33 (ICD-10-CM)]         Pre Procedure Diagnosis    valuvlar diseaseother         Conclusion       Procedure: Transcatheter mitral valve repair w/ MitraClip     Indication: Severe symptomatic MR with the patient felt to be an appropriate candidate for transcatheter MV repair after a thorough multidisciplinary approach, including a visit with a surgeon.     Operators:   Interventional Cardiology: Klaus Fitzpatrick MD; Charan Aquino MD,   Echocardiography: Transesophageal     Approach: R transfemoral     Anesthesia: General     Procedure Details:  Informed consent obtained before the patient was brought to the procedure room.  Appropriate timeout was performed before the procedure was started.     Access was obtained in the R femoral vein, and then in the Lt femoral artery and vein using the modified Seldinger technique.  The right femoral venootomy was then \"preclosed\" with two Perclose devices deployed at 90  to each other.       After appropriate anticoagulation, the intraatrial septum was crossed using SL0 sheath and [place] needle. Using a pigtail catherter, Safari wire was positioned in BRYAN PV and MitraClip delivery system was delivered.      Using ALEXANDR and angiography for guidance, an NTW MitraClip was placed in the A2P2 positions w/ MR reduction from 3+ to 1+.     Post deployment the patient had good hemodynamics, with trace MR, and a LA pressure of 15, V waves decreased from 30 to 17mmHg     The delivery system and sheath were then removed, with successful hemostasis of the venotomy by deployment of the previously placed Perclose sutures.     The left femoral venous sheath was removed and successful hemostasis using Angio-Seal device, and the arterial sheath was removed with manual pressure applied.     Conclusions:  1. Severe symptomatic mitral " regurgitation status post successful transcatheter mitral valve repair w/ a MitraClip x1, delivered via the right transfemoral approach.     2.  Trace mitral insufficiency post deployment with a mean gradient of 5mmHg.     Postprocedure patient status:      Recent Coronary Angiogram Results:  74 yo F with recent diagnosis of moderate to severe mitral regurgitation, causing increasing shortness of breath, as well as a recent hospitalization with congestive heart failure.  Being evaluated for transcatheter mitral valve repair using MitraClip,   and here for preprocedure coronary angiography.     Coronary geography today showed:     Left main with 30% ostial narrowing  LAD with mild to moderate diffuse disease, and a 50% stenosis in the midportion, that appears unchanged from her prior angiogram  Ramus with a 50% proximal to mid stenosis and mild disease distally  LCx is a small vessel with no significant obtuse marginals noted  RCA is a large caliber, dominant vessel supplying the inferior and posterior walls.  There is 40% mid to distal focal stenosis, also unchanged from prior.        Right heart catheterization showed:    PA 50/16  (36)    RA mean  7    PCW  16 /43  (26) v waves of 45 mmHg    AO  164/62  (107)    RV  57/-6,  6     LVEDP 15 mmHg     Given that her symptoms have progressed over the past 6 months, with no change in her coronary anatomy, and clear worsening of her mitral regurgitation, she will benefit from mitral valve repair with MitraClip.  Her hemodynamics today also confirmed the presence of severe mitral regurgitation, with large V waves noted on her pulmonary wedge tracing.         Physical Examination  Review of Systems   Vitals: 172/80 by nursing, 130/78 during my examination, heart rate of 65 and regular, weight 118 pounds.  Wt Readings from Last 3 Encounters:   05/13/22 54.9 kg (121 lb)   04/29/22 55.3 kg (122 lb)   01/18/22 54.9 kg (121 lb)       General Appearance:   no distress, normal  body habitus   ENT/Mouth:  Wearing a facemask   EYES:  no scleral icterus, normal conjunctivae   Neck: no carotid bruits or thyromegaly   Chest/Lungs:   lungs are clear to auscultation, no rales or wheezing, , equal chest wall expansion    Cardiovascular:   Regular. Normal first and second heart sounds with 2/6  murmur, rubs, or gallops; the carotid, radial and posterior tibial pulses are intact, Jugular venous pressure within normal limits, no edema bilaterally    Abdomen:  no organomegaly, masses, bruits, or tenderness; bowel sounds are present   Extremities: no cyanosis or clubbing   Skin: no xanthelasma, warm.    Neurologic: , no tremors     Psychiatric: alert and oriented x3, calm        Please refer above for cardiac ROS details.        Medical History  Surgical History Family History Social History   Past Medical History:   Diagnosis Date     Abdominal pain      Atrial fibrillation (H)      Diverticulosis      Moderate mitral regurgitation      NSVT (nonsustained ventricular tachycardia) (H) 6/17/2020     Palpitations      Past Surgical History:   Procedure Laterality Date     BIOPSY BREAST Bilateral     benign     CV CORONARY ANGIOGRAM N/A 6/18/2020    Procedure: Coronary Angiogram;  Surgeon: Sergey Conner MD;  Location: [facility] Cath Lab;  Service: Cardiology     CV CORONARY ANGIOGRAM N/A 1/13/2021    Procedure: Coronary Angiogram;  Surgeon: Klaus Fitzpatrick MD;  Location: [facility] Cardiac Cath Lab;  Service: Cardiology     CV LEFT HEART CATHETERIZATION WITHOUT LEFT VENTRICULOGRAM Left 6/18/2020    Procedure: Left Heart Catheterization Without Left Ventriculogram;  Surgeon: Sergey Conner MD;  Location: [facility] Cath Lab;  Service: Cardiology     CV RIGHT HEART CATHETERIZATION N/A 6/18/2020    Procedure: Right Heart Catheterization;  Surgeon: Sergey Conner MD;  Location: [facility];  Service: Cardiology     CV RIGHT HEART CATHETERIZATION N/A 1/13/2021     Procedure: Right Heart Catheterization;  Surgeon: Klaus Fitzpatrick MD;  Location: [facility] Cardiac Cath Lab;  Service: Cardiology     HYSTERECTOMY  1970's     OOPHORECTOMY Bilateral 1970's     TRANSCATHETER MITRAL VALVE REPAIR N/A 2/1/2021    Procedure: CV MITRAL CLIP;  Surgeon: Klaus Fitzpatrick MD;  Location: [facility] CARDIAC CATH LAB     Family History   Problem Relation Age of Onset     Breast Cancer Sister 47.00        Social History     Socioeconomic History     Marital status:      Spouse name: Not on file     Number of children: Not on file     Years of education: Not on file     Highest education level: Not on file   Occupational History     Not on file   Tobacco Use     Smoking status: Never Smoker     Smokeless tobacco: Never Used   Substance and Sexual Activity     Alcohol use: No     Drug use: No     Sexual activity: Not on file   Other Topics Concern     Parent/sibling w/ CABG, MI or angioplasty before 65F 55M? Not Asked   Social History Narrative    Spends 6 months in [place].   . No children. Family in the [place] area.   Enjoys walking- very regular with this especially in [place].   Likes reading - romantic happy endings.   Never alcohol or smoking.  Selina Pozo MD       Social Determinants of Health     Financial Resource Strain: Not on file   Food Insecurity: Not on file   Transportation Needs: Not on file   Physical Activity: Not on file   Stress: Not on file   Social Connections: Not on file   Intimate Partner Violence: Not on file   Housing Stability: Not on file           Medications  Allergies   Current Outpatient Medications   Medication Sig Dispense Refill     metoprolol succinate ER (TOPROL XL) 50 MG 24 hr tablet TAKE 2 TABLETS (100 MG TOTAL) BY MOUTH DAILY. OK TO TAKE 50MG IN AM AND 50MG IN  tablet 2     amiodarone (PACERONE) 200 MG tablet TAKE 1 TABLET (200 MG) BY MOUTH EVERY MORNING 90 tablet 1     clindamycin (CLEOCIN) 300 MG capsule Take 2 capsules (600 mg total) by  mouth once as needed (30-60 minutes prior to any dental visit. Save remaining for next visit. 4 capsule 1     ELIQUIS ANTICOAGULANT 5 MG tablet TAKE 1 TABLET BY MOUTH TWICE A  tablet 1     ezetimibe (ZETIA) 10 MG tablet TAKE 1 TABLET (10 MG) BY MOUTH DAILY. 90 tablet 1     lisinopril (ZESTRIL) 5 MG tablet Take 1 tablet (5 mg) by mouth every 24 hours 90 tablet 3     multivitamin, therapeutic (THERA-VIT) TABS tablet Take 1 tablet by mouth daily       rosuvastatin (CRESTOR) 20 MG tablet TAKE 1 TABLET BY MOUTH EVERYDAY AT BEDTIME 90 tablet 3     rosuvastatin (CRESTOR) 40 MG tablet Take 1 tablet (40 mg) by mouth daily 90 tablet 3       Allergies   Allergen Reactions     Penicillins Hives          Lab Results    Chemistry/lipid CBC Cardiac Enzymes/BNP/TSH/INR   Recent Labs   Lab Test 07/29/22  0735   CHOL 179   HDL 52   *   TRIG 112     Recent Labs   Lab Test 07/29/22  0735 01/25/22  0836 08/16/21  0816   * 98 161*     Recent Labs   Lab Test 12/10/21  1124      POTASSIUM 4.2   CHLORIDE 102   CO2 27      BUN 16   CR 1.17*   GFRESTIMATED 45*   SHADI 9.8     Recent Labs   Lab Test 12/10/21  1124 11/27/21  0331 08/16/21  0816   CR 1.17* 1.30* 1.29*     No results for input(s): A1C in the last 10633 hours.       Recent Labs   Lab Test 02/08/22  0834 11/27/21  0331   WBC  --  7.5   HGB 12.2 13.2   HCT  --  41.6   MCV  --  101*   PLT  --  198     Recent Labs   Lab Test 02/08/22  0834 11/27/21  0331 03/11/21  1333   HGB 12.2 13.2 12.8    Recent Labs   Lab Test 11/27/21  1234 11/27/21  0843 11/27/21  0331   TROPONINI <0.01 <0.01 <0.01     Recent Labs   Lab Test 01/25/21  1355 10/14/20  1319 09/23/20  1920   * 344* 624*     Recent Labs   Lab Test 07/29/22  0735   TSH 10.80*     Recent Labs   Lab Test 01/25/21  1355 09/23/20  1920   INR 1.22* 1.37*        Dayne Kim MD                      Thank you for allowing me to participate in the care of your patient.      Sincerely,     Dayne GRAHAM  MD Julio     [facility]  cc:   Dayne Kim MD  [address]  [address]  [place] [address]

## 2022-08-11 NOTE — PATIENT INSTRUCTIONS
Please decrease the amiodarone to 100mg daily or 1/2 tablet of the 200mg dose each day.Please monitor for any racing heart beat or increased skipped beats.Please call with 8 to10 resting blood pressure and pulse readings over the next 3 to 4 weeks. We are going to plan a repeat ultrasound and at some point in the next few months Id like to recheck the heart monitor.Please call Anamaria with any medication issues or other questions.

## 2022-09-02 DIAGNOSIS — I48.0 PAROXYSMAL ATRIAL FIBRILLATION (H): ICD-10-CM

## 2022-09-02 NOTE — TELEPHONE ENCOUNTER
Per cardiology note dated 8/11/22 Patient was to decrease amiodarone to 100mg daily. Pended new order. Request was received from pharmacy that patient was asking for new updated script.   Julianne You LPN

## 2022-09-07 DIAGNOSIS — I48.0 PAROXYSMAL ATRIAL FIBRILLATION (H): ICD-10-CM

## 2022-09-07 RX ORDER — AMIODARONE HYDROCHLORIDE 100 MG/1
100 TABLET ORAL EVERY MORNING
Qty: 90 TABLET | Refills: 1 | Status: SHIPPED | OUTPATIENT
Start: 2022-09-07 | End: 2022-09-08

## 2022-09-08 NOTE — TELEPHONE ENCOUNTER
Anamaria, I'm not familiar with why the pharmacy is indicating her amiodarone is not on formulary? Would your team be able to look into this?

## 2022-09-12 ENCOUNTER — TELEPHONE (OUTPATIENT)
Dept: CARDIOLOGY | Facility: CLINIC | Age: 77
End: 2022-09-12

## 2022-09-12 NOTE — TELEPHONE ENCOUNTER
Pt LVM requesting 100 mg tablet of amiodarone so she doesn't have to cut the 200 mg tablet.      Reviewed chart - PCP sent in 100 mg tablets on 9/7/22.  -lucas

## 2022-09-24 ENCOUNTER — HEALTH MAINTENANCE LETTER (OUTPATIENT)
Age: 77
End: 2022-09-24

## 2022-10-03 ENCOUNTER — HOSPITAL ENCOUNTER (OUTPATIENT)
Dept: CARDIOLOGY | Facility: CLINIC | Age: 77
Discharge: HOME | End: 2022-10-03
Attending: INTERNAL MEDICINE | Admitting: INTERNAL MEDICINE
Payer: COMMERCIAL

## 2022-10-03 ENCOUNTER — ALLIED HEALTH/NURSE VISIT (OUTPATIENT)
Dept: FAMILY MEDICINE | Facility: CLINIC | Age: 77
End: 2022-10-03
Payer: COMMERCIAL

## 2022-10-03 DIAGNOSIS — Z23 NEED FOR INFLUENZA VACCINATION: Primary | ICD-10-CM

## 2022-10-03 DIAGNOSIS — I27.20 PULMONARY HYPERTENSION (H): ICD-10-CM

## 2022-10-03 DIAGNOSIS — Z98.890 S/P MITRAL VALVE CLIP IMPLANTATION: ICD-10-CM

## 2022-10-03 DIAGNOSIS — Z95.818 S/P MITRAL VALVE CLIP IMPLANTATION: ICD-10-CM

## 2022-10-03 LAB — LVEF ECHO: NORMAL

## 2022-10-03 PROCEDURE — 99207 PR NO CHARGE NURSE ONLY: CPT

## 2022-10-03 PROCEDURE — 90662 IIV NO PRSV INCREASED AG IM: CPT

## 2022-10-03 PROCEDURE — G0008 ADMIN INFLUENZA VIRUS VAC: HCPCS

## 2022-10-03 PROCEDURE — 0124A COVID-19,PF,PFIZER BOOSTER BIVALENT: CPT

## 2022-10-03 PROCEDURE — 91312 COVID-19,PF,PFIZER BOOSTER BIVALENT: CPT

## 2022-10-03 PROCEDURE — 93306 TTE W/DOPPLER COMPLETE: CPT | Mod: 26 | Performed by: INTERNAL MEDICINE

## 2022-10-03 PROCEDURE — 93306 TTE W/DOPPLER COMPLETE: CPT

## 2022-10-12 RX ORDER — AMIODARONE HYDROCHLORIDE 200 MG/1
100 TABLET ORAL DAILY
Qty: 45 TABLET | Refills: 3 | Status: SHIPPED | OUTPATIENT
Start: 2022-10-12 | End: 2022-12-19

## 2022-10-28 ENCOUNTER — HOSPITAL ENCOUNTER (OUTPATIENT)
Dept: MAMMOGRAPHY | Facility: CLINIC | Age: 77
Discharge: HOME | End: 2022-10-28
Attending: FAMILY MEDICINE | Admitting: FAMILY MEDICINE
Payer: COMMERCIAL

## 2022-10-28 DIAGNOSIS — Z12.31 VISIT FOR SCREENING MAMMOGRAM: ICD-10-CM

## 2022-10-28 PROCEDURE — 77067 SCR MAMMO BI INCL CAD: CPT

## 2022-11-01 NOTE — ANESTHESIA CARE TRANSFER NOTE
Patient: Kelly Robledo    Procedure(s):  CV MITRAL CLIP    Diagnosis: valuvlar diseaseother  Diagnosis Additional Information: No value filed.    Anesthesia Type:   General     Note:    Oropharynx: oropharynx clear of all foreign objects  Level of Consciousness: awake  Oxygen Supplementation: nasal cannula  Level of Supplemental Oxygen: 3 LPM  Independent Airway: airway patency satisfactory and stable  Dentition: dentition unchanged  Vital Signs Stable: post-procedure vital signs reviewed and stable  Report to RN Given: handoff report given  Patient transferred to: PACU    Handoff Report: Identifed the Patient, Identified the Reponsible Provider, Reviewed the pertinent medical history, Discussed the surgical course, Reviewed Intra-OP anesthesia mangement and issues during anesthesia, Set expectations for post-procedure period and Allowed opportunity for questions and acknowledgement of understanding      Vitals: (Last set prior to Anesthesia Care Transfer)  CRNA VITALS  2/1/2021 0955 - 2/1/2021 1034      2/1/2021             Pulse:  66    SpO2:  100 %        Electronically Signed By: DENVER Quezada CRNA  February 1, 2021  10:34 AM   No

## 2022-11-04 DIAGNOSIS — I48.0 PAROXYSMAL ATRIAL FIBRILLATION (H): ICD-10-CM

## 2022-11-04 RX ORDER — APIXABAN 5 MG/1
TABLET, FILM COATED ORAL
Qty: 180 TABLET | Refills: 1 | Status: SHIPPED | OUTPATIENT
Start: 2022-11-04 | End: 2023-05-02

## 2022-11-10 ENCOUNTER — OFFICE VISIT (OUTPATIENT)
Dept: CARDIOLOGY | Facility: CLINIC | Age: 77
End: 2022-11-10
Payer: COMMERCIAL

## 2022-11-10 ENCOUNTER — HOSPITAL ENCOUNTER (OUTPATIENT)
Dept: CARDIOLOGY | Facility: CLINIC | Age: 77
Discharge: HOME | End: 2022-11-10
Attending: INTERNAL MEDICINE | Admitting: INTERNAL MEDICINE
Payer: COMMERCIAL

## 2022-11-10 VITALS
SYSTOLIC BLOOD PRESSURE: 170 MMHG | OXYGEN SATURATION: 96 % | RESPIRATION RATE: 16 BRPM | DIASTOLIC BLOOD PRESSURE: 88 MMHG | HEART RATE: 69 BPM | BODY MASS INDEX: 20.14 KG/M2 | WEIGHT: 121 LBS

## 2022-11-10 DIAGNOSIS — I48.0 PAF (PAROXYSMAL ATRIAL FIBRILLATION) (H): Primary | ICD-10-CM

## 2022-11-10 DIAGNOSIS — I25.10 CORONARY ARTERY DISEASE DUE TO LIPID RICH PLAQUE: ICD-10-CM

## 2022-11-10 DIAGNOSIS — I25.83 CORONARY ARTERY DISEASE DUE TO LIPID RICH PLAQUE: ICD-10-CM

## 2022-11-10 DIAGNOSIS — Z95.818 S/P MITRAL VALVE CLIP IMPLANTATION: ICD-10-CM

## 2022-11-10 DIAGNOSIS — I48.0 PAF (PAROXYSMAL ATRIAL FIBRILLATION) (H): ICD-10-CM

## 2022-11-10 DIAGNOSIS — Z98.890 S/P MITRAL VALVE CLIP IMPLANTATION: ICD-10-CM

## 2022-11-10 LAB
ALBUMIN SERPL-MCNC: 3.9 G/DL (ref 3.5–5)
ALP SERPL-CCNC: 63 U/L (ref 45–120)
ALT SERPL W P-5'-P-CCNC: 55 U/L (ref 0–45)
ANION GAP SERPL CALCULATED.3IONS-SCNC: 10 MMOL/L (ref 5–18)
AST SERPL W P-5'-P-CCNC: 42 U/L (ref 0–40)
BILIRUB SERPL-MCNC: 0.8 MG/DL (ref 0–1)
BUN SERPL-MCNC: 23 MG/DL (ref 8–28)
CALCIUM SERPL-MCNC: 9.2 MG/DL (ref 8.5–10.5)
CHLORIDE BLD-SCNC: 103 MMOL/L (ref 98–107)
CO2 SERPL-SCNC: 27 MMOL/L (ref 22–31)
CREAT SERPL-MCNC: 1.39 MG/DL (ref 0.6–1.1)
GFR SERPL CREATININE-BSD FRML MDRD: 39 ML/MIN/1.73M2
GLUCOSE BLD-MCNC: 86 MG/DL (ref 70–125)
LDLC SERPL DIRECT ASSAY-MCNC: 103 MG/DL
POTASSIUM BLD-SCNC: 4 MMOL/L (ref 3.5–5)
PROT SERPL-MCNC: 7.4 G/DL (ref 6–8)
SODIUM SERPL-SCNC: 140 MMOL/L (ref 136–145)
TSH SERPL DL<=0.005 MIU/L-ACNC: 9.55 UIU/ML (ref 0.3–5)

## 2022-11-10 PROCEDURE — 99000 SPECIMEN HANDLING OFFICE-LAB: CPT | Performed by: INTERNAL MEDICINE

## 2022-11-10 PROCEDURE — 93226 XTRNL ECG REC<48 HR SCAN A/R: CPT

## 2022-11-10 PROCEDURE — 93227 XTRNL ECG REC<48 HR R&I: CPT | Performed by: INTERNAL MEDICINE

## 2022-11-10 PROCEDURE — 99214 OFFICE O/P EST MOD 30 MIN: CPT | Mod: 25 | Performed by: INTERNAL MEDICINE

## 2022-11-10 PROCEDURE — 83721 ASSAY OF BLOOD LIPOPROTEIN: CPT | Performed by: INTERNAL MEDICINE

## 2022-11-10 PROCEDURE — 84443 ASSAY THYROID STIM HORMONE: CPT | Performed by: INTERNAL MEDICINE

## 2022-11-10 PROCEDURE — 80151 DRUG ASSAY AMIODARONE: CPT | Mod: 90 | Performed by: INTERNAL MEDICINE

## 2022-11-10 PROCEDURE — 36415 COLL VENOUS BLD VENIPUNCTURE: CPT | Performed by: INTERNAL MEDICINE

## 2022-11-10 PROCEDURE — 80053 COMPREHEN METABOLIC PANEL: CPT | Performed by: INTERNAL MEDICINE

## 2022-11-10 NOTE — PATIENT INSTRUCTIONS
We are going to plan blood work and a 24 hour monitor and I will be in touch with results.Please send 6 to 8 blood pressure readings in the next few weeks.You might want to check with your insurance why they wont cover the 100mg pill but will cover 1/2 of the 200mg dose.My nurse is Anamaria and her number is [phone]

## 2022-11-10 NOTE — LETTER
11/10/2022    Selina Pozo MD  [address] [facility] [address]    RE: McFarlan ULISSES Robledo       Dear Colleague,     I had the pleasure of seeing Kelly GTZ Venkat in the [facility].    HEART CARE ENCOUNTER CONSULTATON NOTE      [facility]  [phone]      Assessment/Recommendations   Assessment/Plan:  1.  Mitral valve prolapse with severe mitral regurgitation.  Status post mitral valve clip in February 2021.  She continues to feel well without complaints of dyspnea or exercise intolerance.  Echocardiogram recently completed in October reveals normal ejection fraction of 60 to 65%, normal right ventricular size and systolic function, biatrial enlargement, moderate to moderately severe 2-3+ mitral regurgitation with mild mitral stenosis.  Estimate of RV systolic pressure was mildly lower at 48 mmHg plus right atrial pressure.  She does follow endocarditis prophylaxis guidelines and they will update me with any change in symptoms.    2.  Paroxysmal atrial fibrillation that was very symptomatic.  She has been on amiodarone.  During our last visit a few months ago I decreased amiodarone to 100 mg daily.  She is tolerating this dose and has had no reoccurrence of atrial fibrillation.  Plan to follow-up on the mildly elevated TSH and liver function tests with a plan for repeat Holter monitor.    3.  Moderate nonobstructive coronary artery disease based on coronary angiogram from January 2021.  She is on a combination of rosuvastatin and Zetia.  Her most recent LDL cholesterol is above ideal goal at 105.  Plan to repeat direct LDL and may need to discuss alternative treatment strategies but wanted to make adjustments in the amiodarone before additional medication changes.    4.  Hypertension.  Blood pressure is frequently elevated in the office but she checks her blood pressure at home and indicates that her blood pressures are typically 120-130/70-80.  She will we document  this for us and update us with some blood pressure readings.    Plan laboratory studies as outlined today.  24-hour Holter monitor on the lower dose of amiodarone  Blood pressure monitoring and follow-up report  Follow-up 3 to 4 months.         History of Present Illness/Subjective    HPI: Antione Mishra is a 76 year old female who is seeen in follow up.She has a history of mitral valve prolapse with significant mitral regurgitation admitted to hospital in 2020 with tachypalpitations.  She was diagnosed with atrial fibrillation with rapid ventricular response and started on amiodarone and Eliquis.  Coronary angiogram at that time showed moderate nonobstructive disease and she has been on beta-blocker.  Follow-up transesophageal echocardiogram showed progression of her mitral regurgitation and she has subsequently underwent mitral valve clip procedure in .  During our last visit we cut back on the amiodarone as her amiodarone levels were at the upper limits of normal at 1.9, her TSH was elevated and she has some persistent elevation in liver function tests which were mildly improved.  I have also reviewed notes from  from pulmonary who recommended yearly pulmonary function test.  She reports that she is feeling very well.  She denies chest pain, shortness of breath, exercise intolerance, palpitation.  She has been walking regularly upwards of 1 hour without difficulty.    Recent Echocardiogram Results:  Name: ANTIONE MISHRA  MRN: [id number]  : 1945  Study Date: 10/03/2022 10:55 AM  Age: 76 yrs  Gender: Female  Patient Location: [facility]  Reason For Study: S/P mitral valve clip implantation, S/P mitral valve clip  implan  Ordering Physician: MART MITCHELL  Referring Physician: MART MITCHELL  Performed By: DEL     BSA: 1.6 m2  Height: 65 in  Weight: 118 lb  HR: 63  BP: 172/80 mmHg  ______________________________________________________________________________  Procedure  Complete Echo  Adult.  ______________________________________________________________________________  Interpretation Summary     Normal left ventricular size. Normal left ventricular systolic function. Left  ventricular ejection fraction estimated at 60 to 65%. Mild left ventricular  hypertrophy.  Diastolic Doppler findings (E/E' ratio and/or other parameters) suggest left  ventricular filling pressures are increased. Left ventricular diastolic  function is abnormal  Normal right ventricular size and systolic function.  Severe left atrial enlargement. Moderate right atrial enlargement  Mitral valve clip. Moderate to moderately severe 2-3+ mitral regurgitation.  Mild mitral stenosis.Mean gradient of 5 mmHg at a heart rate of 60 bpm  Moderate to moderately severe tricuspid gravitation. Estimate of RV systolic  pressure is moderately elevated at 48 mmHg plus right atrial pressure.  Compared to the examination January 2022. Findings are similar. Estimate of RV  systolic pressure is mildly lower on the current examination at 48 mmHg plus  right atrial pressure compared to 57 mmHg plus right atrial pressure on the  previous examination.  ______________________________________________________________________________  I      WMSI = 1.00     % Normal = 100     X - Cannot   0 -                      (2) - Mildly 2 -          Segments  Size  Interpret    Hyperkinetic 1 - Normal  Hypokinetic  Hypokinetic  1-2     small                                                     7 -          3-5      moderate  3 - Akinetic 4 -          5 -         6 - Akinetic Dyskinetic   6-14    large               Dyskinetic   Aneurysmal  w/scar       w/scar       15-16   diffuse     Left Ventricle  The left ventricle is normal in size. There is borderline concentric left  ventricular hypertrophy. Left ventricular systolic function is normal.  Diastolic Doppler findings (E/E' ratio and/or other parameters) suggest left  ventricular filling pressures are increased.  Left ventricular diastolic  function is abnormal. The visual ejection fraction is 60-65%. No regional wall  motion abnormalities noted.     Right Ventricle  Normal right ventricle size and systolic function. TAPSE is normal, which is  consistent with normal right ventricular systolic function.     Atria  The left atrium is severely dilated. The right atrium is moderately dilated.     Mitral Valve  MitraClip is present with stable bileaflet insertion. 2 jets of mitral  regurgitation observed. There is moderate to mod-severe (2-3+) mitral  regurgitation. There is mild mitral stenosis.     Tricuspid Valve  The right ventricular systolic pressure is elevated at 48.3 mmHg. Moderate  (46-55mmHg) pulmonary hypertension is present. There is moderate to mod-severe  (2-3+) tricuspid regurgitation.     Aortic Valve  The aortic valve is trileaflet. No hemodynamically significant valvular aortic  stenosis.     Pulmonic Valve  The pulmonic valve is not well seen, but is grossly normal. There is trace  pulmonic valvular regurgitation.     Vessels  The aorta root is normal. Normal size ascending aorta. IVC diameter and  respiratory changes fall into an intermediate range suggesting an RA pressure  of 8 mmHg.     Pericardium  There is no pericardial effusion.    Recent Coronary Angiogram Results:         Physical Examination  Review of Systems   Vitals: 170/88, repeat 160/80, weight 121 pounds, heart rate 69 and regular  Wt Readings from Last 3 Encounters:   08/11/22 53.5 kg (118 lb)   05/13/22 54.9 kg (121 lb)   04/29/22 55.3 kg (122 lb)       General Appearance:   no distress, normal body habitus   ENT/Mouth:  Wearing a facemask   EYES:  no scleral icterus, normal conjunctivae   Neck: no carotid bruits or thyromegaly   Chest/Lungs:   lungs are clear to auscultation, no rales or wheezing,  equal chest wall expansion    Cardiovascular:   Regular. Normal first and second heart sounds with 2/6 systolic murmur, rubs, or gallops; the  carotid, radial and posterior tibial pulses are intact, Jugular venous pressure within normal limits, no edema bilaterally    Abdomen:  no , bruits, or tenderness; bowel sounds are present   Extremities: no cyanosis or clubbing   Skin: no xanthelasma, warm.    Neurologic: , no tremors     Psychiatric: alert and oriented x3, calm        Please refer above for cardiac ROS details.        Medical History  Surgical History Family History Social History   Past Medical History:   Diagnosis Date     Abdominal pain      Atrial fibrillation (H)      Diverticulosis      Moderate mitral regurgitation      NSVT (nonsustained ventricular tachycardia) (H) 6/17/2020     Palpitations      Past Surgical History:   Procedure Laterality Date     BIOPSY BREAST Bilateral     benign     CV CORONARY ANGIOGRAM N/A 6/18/2020    Procedure: Coronary Angiogram;  Surgeon: Sergey Conner MD;  Location: [facility] Cath Lab;  Service: Cardiology     CV CORONARY ANGIOGRAM N/A 1/13/2021    Procedure: Coronary Angiogram;  Surgeon: Klaus Fitzpatrick MD;  Location: [facility] Cardiac Cath Lab;  Service: Cardiology     CV LEFT HEART CATHETERIZATION WITHOUT LEFT VENTRICULOGRAM Left 6/18/2020    Procedure: Left Heart Catheterization Without Left Ventriculogram;  Surgeon: Sergey Conner MD;  Location: [facility] Cath Lab;  Service: Cardiology     CV RIGHT HEART CATHETERIZATION N/A 6/18/2020    Procedure: Right Heart Catheterization;  Surgeon: Sergey Conner MD;  Location: [facility] Cath Lab;  Service: Cardiology     CV RIGHT HEART CATHETERIZATION N/A 1/13/2021    Procedure: Right Heart Catheterization;  Surgeon: Klaus Fitzpatrick MD;  Location: [facility] Cardiac Cath Lab;  Service: Cardiology     HYSTERECTOMY  1970's     OOPHORECTOMY Bilateral 1970's     TRANSCATHETER MITRAL VALVE REPAIR N/A 2/1/2021    Procedure: CV MITRAL CLIP;  Surgeon: Klaus Fitzpatrick MD;  Location: [facility] CARDIAC CATH LAB     Family History   Problem  Relation Age of Onset     Breast Cancer Sister 47.00        Social History     Socioeconomic History     Marital status:      Spouse name: Not on file     Number of children: Not on file     Years of education: Not on file     Highest education level: Not on file   Occupational History     Not on file   Tobacco Use     Smoking status: Never     Smokeless tobacco: Never   Substance and Sexual Activity     Alcohol use: No     Drug use: No     Sexual activity: Not on file   Other Topics Concern     Parent/sibling w/ CABG, MI or angioplasty before 65F 55M? Not Asked   Social History Narrative    Spends 6 months in [place].   . No children. Family in the [place] area.   Enjoys walking- very regular with this especially in [place].   Likes reading - romantic happy endings.   Never alcohol or smoking.  Selina Pozo MD       Social Determinants of Health     Financial Resource Strain: Not on file   Food Insecurity: Not on file   Transportation Needs: Not on file   Physical Activity: Not on file   Stress: Not on file   Social Connections: Not on file   Intimate Partner Violence: Not on file   Housing Stability: Not on file           Medications  Allergies   Current Outpatient Medications   Medication Sig Dispense Refill     amiodarone (PACERONE) 200 MG tablet Take 0.5 tablets (100 mg) by mouth daily 45 tablet 3     clindamycin (CLEOCIN) 300 MG capsule Take 2 capsules (600 mg total) by mouth once as needed (30-60 minutes prior to any dental visit. Save remaining for next visit. 4 capsule 1     ELIQUIS ANTICOAGULANT 5 MG tablet TAKE 1 TABLET BY MOUTH TWICE A  tablet 1     ezetimibe (ZETIA) 10 MG tablet TAKE 1 TABLET (10 MG) BY MOUTH DAILY. 90 tablet 1     lisinopril (ZESTRIL) 5 MG tablet Take 1 tablet (5 mg) by mouth every 24 hours 90 tablet 3     metoprolol succinate ER (TOPROL XL) 50 MG 24 hr tablet Take 1 tablet (50 mg) by mouth 2 times daily 180 tablet 3     multivitamin, therapeutic (THERA-VIT) TABS  tablet Take 1 tablet by mouth daily       rosuvastatin (CRESTOR) 40 MG tablet Take 1 tablet (40 mg) by mouth daily 90 tablet 3       Allergies   Allergen Reactions     Penicillins Hives          Lab Results    Chemistry/lipid CBC Cardiac Enzymes/BNP/TSH/INR   Recent Labs   Lab Test 07/29/22  0735   CHOL 179   HDL 52   *   TRIG 112     Recent Labs   Lab Test 07/29/22  0735 01/25/22  0836 08/16/21  0816   * 98 161*     Recent Labs   Lab Test 12/10/21  1124      POTASSIUM 4.2   CHLORIDE 102   CO2 27      BUN 16   CR 1.17*   GFRESTIMATED 45*   SHADI 9.8     Recent Labs   Lab Test 12/10/21  1124 11/27/21  0331 08/16/21  0816   CR 1.17* 1.30* 1.29*     No results for input(s): A1C in the last 52859 hours.       Recent Labs   Lab Test 02/08/22  0834 11/27/21  0331   WBC  --  7.5   HGB 12.2 13.2   HCT  --  41.6   MCV  --  101*   PLT  --  198     Recent Labs   Lab Test 02/08/22  0834 11/27/21  0331 03/11/21  1333   HGB 12.2 13.2 12.8    Recent Labs   Lab Test 11/27/21  1234 11/27/21  0843 11/27/21  0331   TROPONINI <0.01 <0.01 <0.01     Recent Labs   Lab Test 01/25/21  1355 10/14/20  1319 09/23/20  1920   * 344* 624*     Recent Labs   Lab Test 07/29/22  0735   TSH 10.80*     Recent Labs   Lab Test 01/25/21  1355 09/23/20  1920   INR 1.22* 1.37*        Dayne Kim MD                      Thank you for allowing me to participate in the care of your patient.      Sincerely,     Dayne Kim MD     [facility]  cc:   Dayne Kim MD  [address]  [address]  [place] [address]

## 2022-11-10 NOTE — PROGRESS NOTES
HEART CARE ENCOUNTER CONSULTATON NOTE      [facility]  [phone]      Assessment/Recommendations   Assessment/Plan:  1.  Mitral valve prolapse with severe mitral regurgitation.  Status post mitral valve clip in February 2021.  She continues to feel well without complaints of dyspnea or exercise intolerance.  Echocardiogram recently completed in October reveals normal ejection fraction of 60 to 65%, normal right ventricular size and systolic function, biatrial enlargement, moderate to moderately severe 2-3+ mitral regurgitation with mild mitral stenosis.  Estimate of RV systolic pressure was mildly lower at 48 mmHg plus right atrial pressure.  She does follow endocarditis prophylaxis guidelines and they will update me with any change in symptoms.    2.  Paroxysmal atrial fibrillation that was very symptomatic.  She has been on amiodarone.  During our last visit a few months ago I decreased amiodarone to 100 mg daily.  She is tolerating this dose and has had no reoccurrence of atrial fibrillation.  Plan to follow-up on the mildly elevated TSH and liver function tests with a plan for repeat Holter monitor.    3.  Moderate nonobstructive coronary artery disease based on coronary angiogram from January 2021.  She is on a combination of rosuvastatin and Zetia.  Her most recent LDL cholesterol is above ideal goal at 105.  Plan to repeat direct LDL and may need to discuss alternative treatment strategies but wanted to make adjustments in the amiodarone before additional medication changes.    4.  Hypertension.  Blood pressure is frequently elevated in the office but she checks her blood pressure at home and indicates that her blood pressures are typically 120-130/70-80.  She will we document this for us and update us with some blood pressure readings.    Plan laboratory studies as outlined today.  24-hour Holter monitor on the lower dose of amiodarone  Blood pressure monitoring and follow-up  report  Follow-up 3 to 4 months.         History of Present Illness/Subjective    HPI: Antione Mishra is a 76 year old female who is seeen in follow up.She has a history of mitral valve prolapse with significant mitral regurgitation admitted to hospital in 2020 with tachypalpitations.  She was diagnosed with atrial fibrillation with rapid ventricular response and started on amiodarone and Eliquis.  Coronary angiogram at that time showed moderate nonobstructive disease and she has been on beta-blocker.  Follow-up transesophageal echocardiogram showed progression of her mitral regurgitation and she has subsequently underwent mitral valve clip procedure in .  During our last visit we cut back on the amiodarone as her amiodarone levels were at the upper limits of normal at 1.9, her TSH was elevated and she has some persistent elevation in liver function tests which were mildly improved.  I have also reviewed notes from  from pulmonary who recommended yearly pulmonary function test.  She reports that she is feeling very well.  She denies chest pain, shortness of breath, exercise intolerance, palpitation.  She has been walking regularly upwards of 1 hour without difficulty.    Recent Echocardiogram Results:  Name: ANTIONE MISHRA  MRN: [id number]  : 1945  Study Date: 10/03/2022 10:55 AM  Age: 76 yrs  Gender: Female  Patient Location: [facility]  Reason For Study: S/P mitral valve clip implantation, S/P mitral valve clip  implan  Ordering Physician: MART MITCHELL  Referring Physician: MART MITCHELL  Performed By:      BSA: 1.6 m2  Height: 65 in  Weight: 118 lb  HR: 63  BP: 172/80 mmHg  ______________________________________________________________________________  Procedure  Complete Echo Adult.  ______________________________________________________________________________  Interpretation Summary     Normal left ventricular size. Normal left ventricular systolic function. Left  ventricular  ejection fraction estimated at 60 to 65%. Mild left ventricular  hypertrophy.  Diastolic Doppler findings (E/E' ratio and/or other parameters) suggest left  ventricular filling pressures are increased. Left ventricular diastolic  function is abnormal  Normal right ventricular size and systolic function.  Severe left atrial enlargement. Moderate right atrial enlargement  Mitral valve clip. Moderate to moderately severe 2-3+ mitral regurgitation.  Mild mitral stenosis.Mean gradient of 5 mmHg at a heart rate of 60 bpm  Moderate to moderately severe tricuspid gravitation. Estimate of RV systolic  pressure is moderately elevated at 48 mmHg plus right atrial pressure.  Compared to the examination January 2022. Findings are similar. Estimate of RV  systolic pressure is mildly lower on the current examination at 48 mmHg plus  right atrial pressure compared to 57 mmHg plus right atrial pressure on the  previous examination.  ______________________________________________________________________________  I      WMSI = 1.00     % Normal = 100     X - Cannot   0 -                      (2) - Mildly 2 -          Segments  Size  Interpret    Hyperkinetic 1 - Normal  Hypokinetic  Hypokinetic  1-2     small                                                     7 -          3-5      moderate  3 - Akinetic 4 -          5 -         6 - Akinetic Dyskinetic   6-14    large               Dyskinetic   Aneurysmal  w/scar       w/scar       15-16   diffuse     Left Ventricle  The left ventricle is normal in size. There is borderline concentric left  ventricular hypertrophy. Left ventricular systolic function is normal.  Diastolic Doppler findings (E/E' ratio and/or other parameters) suggest left  ventricular filling pressures are increased. Left ventricular diastolic  function is abnormal. The visual ejection fraction is 60-65%. No regional wall  motion abnormalities noted.     Right Ventricle  Normal right ventricle size and systolic  function. TAPSE is normal, which is  consistent with normal right ventricular systolic function.     Atria  The left atrium is severely dilated. The right atrium is moderately dilated.     Mitral Valve  MitraClip is present with stable bileaflet insertion. 2 jets of mitral  regurgitation observed. There is moderate to mod-severe (2-3+) mitral  regurgitation. There is mild mitral stenosis.     Tricuspid Valve  The right ventricular systolic pressure is elevated at 48.3 mmHg. Moderate  (46-55mmHg) pulmonary hypertension is present. There is moderate to mod-severe  (2-3+) tricuspid regurgitation.     Aortic Valve  The aortic valve is trileaflet. No hemodynamically significant valvular aortic  stenosis.     Pulmonic Valve  The pulmonic valve is not well seen, but is grossly normal. There is trace  pulmonic valvular regurgitation.     Vessels  The aorta root is normal. Normal size ascending aorta. IVC diameter and  respiratory changes fall into an intermediate range suggesting an RA pressure  of 8 mmHg.     Pericardium  There is no pericardial effusion.    Recent Coronary Angiogram Results:         Physical Examination  Review of Systems   Vitals: 170/88, repeat 160/80, weight 121 pounds, heart rate 69 and regular  Wt Readings from Last 3 Encounters:   08/11/22 53.5 kg (118 lb)   05/13/22 54.9 kg (121 lb)   04/29/22 55.3 kg (122 lb)       General Appearance:   no distress, normal body habitus   ENT/Mouth:  Wearing a facemask   EYES:  no scleral icterus, normal conjunctivae   Neck: no carotid bruits or thyromegaly   Chest/Lungs:   lungs are clear to auscultation, no rales or wheezing,  equal chest wall expansion    Cardiovascular:   Regular. Normal first and second heart sounds with 2/6 systolic murmur, rubs, or gallops; the carotid, radial and posterior tibial pulses are intact, Jugular venous pressure within normal limits, no edema bilaterally    Abdomen:  no , bruits, or tenderness; bowel sounds are present    Extremities: no cyanosis or clubbing   Skin: no xanthelasma, warm.    Neurologic: , no tremors     Psychiatric: alert and oriented x3, calm        Please refer above for cardiac ROS details.        Medical History  Surgical History Family History Social History   Past Medical History:   Diagnosis Date     Abdominal pain      Atrial fibrillation (H)      Diverticulosis      Moderate mitral regurgitation      NSVT (nonsustained ventricular tachycardia) (H) 6/17/2020     Palpitations      Past Surgical History:   Procedure Laterality Date     BIOPSY BREAST Bilateral     benign     CV CORONARY ANGIOGRAM N/A 6/18/2020    Procedure: Coronary Angiogram;  Surgeon: Sergey Conner MD;  Location: [facility];  Service: Cardiology     CV CORONARY ANGIOGRAM N/A 1/13/2021    Procedure: Coronary Angiogram;  Surgeon: Klaus Fitzpatrick MD;  Location: [facility] Cardiac Cath Lab;  Service: Cardiology     CV LEFT HEART CATHETERIZATION WITHOUT LEFT VENTRICULOGRAM Left 6/18/2020    Procedure: Left Heart Catheterization Without Left Ventriculogram;  Surgeon: Sergey Conner MD;  Location: [facility];  Service: Cardiology     CV RIGHT HEART CATHETERIZATION N/A 6/18/2020    Procedure: Right Heart Catheterization;  Surgeon: Sergey Conner MD;  Location: [facility];  Service: Cardiology     CV RIGHT HEART CATHETERIZATION N/A 1/13/2021    Procedure: Right Heart Catheterization;  Surgeon: Klaus Fitzpatrick MD;  Location: [facility] Cardiac Cath Lab;  Service: Cardiology     HYSTERECTOMY  1970's     OOPHORECTOMY Bilateral 1970's     TRANSCATHETER MITRAL VALVE REPAIR N/A 2/1/2021    Procedure: CV MITRAL CLIP;  Surgeon: Klaus Fitzpatrick MD;  Location: [facility] CARDIAC CATH LAB     Family History   Problem Relation Age of Onset     Breast Cancer Sister 47.00        Social History     Socioeconomic History     Marital status:      Spouse name: Not on file     Number of children: Not on  file     Years of education: Not on file     Highest education level: Not on file   Occupational History     Not on file   Tobacco Use     Smoking status: Never     Smokeless tobacco: Never   Substance and Sexual Activity     Alcohol use: No     Drug use: No     Sexual activity: Not on file   Other Topics Concern     Parent/sibling w/ CABG, MI or angioplasty before 65F 55M? Not Asked   Social History Narrative    Spends 6 months in [place].   . No children. Family in the [place] area.   Enjoys walking- very regular with this especially in [place].   Likes reading - romantic happy endings.   Never alcohol or smoking.  Selina Pozo MD       Social Determinants of Health     Financial Resource Strain: Not on file   Food Insecurity: Not on file   Transportation Needs: Not on file   Physical Activity: Not on file   Stress: Not on file   Social Connections: Not on file   Intimate Partner Violence: Not on file   Housing Stability: Not on file           Medications  Allergies   Current Outpatient Medications   Medication Sig Dispense Refill     amiodarone (PACERONE) 200 MG tablet Take 0.5 tablets (100 mg) by mouth daily 45 tablet 3     clindamycin (CLEOCIN) 300 MG capsule Take 2 capsules (600 mg total) by mouth once as needed (30-60 minutes prior to any dental visit. Save remaining for next visit. 4 capsule 1     ELIQUIS ANTICOAGULANT 5 MG tablet TAKE 1 TABLET BY MOUTH TWICE A  tablet 1     ezetimibe (ZETIA) 10 MG tablet TAKE 1 TABLET (10 MG) BY MOUTH DAILY. 90 tablet 1     lisinopril (ZESTRIL) 5 MG tablet Take 1 tablet (5 mg) by mouth every 24 hours 90 tablet 3     metoprolol succinate ER (TOPROL XL) 50 MG 24 hr tablet Take 1 tablet (50 mg) by mouth 2 times daily 180 tablet 3     multivitamin, therapeutic (THERA-VIT) TABS tablet Take 1 tablet by mouth daily       rosuvastatin (CRESTOR) 40 MG tablet Take 1 tablet (40 mg) by mouth daily 90 tablet 3       Allergies   Allergen Reactions     Penicillins Hives           Lab Results    Chemistry/lipid CBC Cardiac Enzymes/BNP/TSH/INR   Recent Labs   Lab Test 07/29/22  0735   CHOL 179   HDL 52   *   TRIG 112     Recent Labs   Lab Test 07/29/22  0735 01/25/22  0836 08/16/21  0816   * 98 161*     Recent Labs   Lab Test 12/10/21  1124      POTASSIUM 4.2   CHLORIDE 102   CO2 27      BUN 16   CR 1.17*   GFRESTIMATED 45*   SHADI 9.8     Recent Labs   Lab Test 12/10/21  1124 11/27/21  0331 08/16/21  0816   CR 1.17* 1.30* 1.29*     No results for input(s): A1C in the last 85384 hours.       Recent Labs   Lab Test 02/08/22  0834 11/27/21  0331   WBC  --  7.5   HGB 12.2 13.2   HCT  --  41.6   MCV  --  101*   PLT  --  198     Recent Labs   Lab Test 02/08/22  0834 11/27/21  0331 03/11/21  1333   HGB 12.2 13.2 12.8    Recent Labs   Lab Test 11/27/21  1234 11/27/21  0843 11/27/21  0331   TROPONINI <0.01 <0.01 <0.01     Recent Labs   Lab Test 01/25/21  1355 10/14/20  1319 09/23/20  1920   * 344* 624*     Recent Labs   Lab Test 07/29/22  0735   TSH 10.80*     Recent Labs   Lab Test 01/25/21  1355 09/23/20  1920   INR 1.22* 1.37*        Dayne Kim MD

## 2022-11-13 LAB
AMIODARONE SERPL-MCNC: 1.2 UG/ML
DESETHYLAMIODARONE SERPL-MCNC: 1.4 UG/ML

## 2022-11-16 DIAGNOSIS — I48.0 PAF (PAROXYSMAL ATRIAL FIBRILLATION) (H): ICD-10-CM

## 2022-11-16 RX ORDER — METOPROLOL SUCCINATE 50 MG/1
50 TABLET, EXTENDED RELEASE ORAL DAILY
Qty: 180 TABLET | Refills: 3
Start: 2022-11-16 | End: 2023-06-01

## 2022-12-06 ENCOUNTER — TELEPHONE (OUTPATIENT)
Dept: CARDIOLOGY | Facility: CLINIC | Age: 77
End: 2022-12-06

## 2022-12-06 DIAGNOSIS — I10 HYPERTENSIVE DISORDER: ICD-10-CM

## 2022-12-06 NOTE — TELEPHONE ENCOUNTER
Metoprolol succinate was decreased to 50 mg daily from twice daily on 11/16/22.  Pt reports the following blood pressures:    153/78  136/67  146/79  130/76  140/78  138/80  132/72  137/74    HR is around 76.     Pt also takes lisinopril 5 mg daily, and amiodarone 100 mg daily.    Dr Kim - any new recommendations?  -[facility]

## 2022-12-07 NOTE — TELEPHONE ENCOUNTER
LVM to call for updates.  -lucas    ===View-only below this line===  ----- Message -----  From: Dayne Kim MD  Sent: 12/7/2022   2:52 PM CST  To: Anamaria Patel RN    Numbers mostly ok, would monitor and update us goal 135-140/80-85  ty mdg

## 2022-12-18 DIAGNOSIS — I48.0 PAROXYSMAL ATRIAL FIBRILLATION (H): ICD-10-CM

## 2022-12-18 DIAGNOSIS — E78.5 HYPERLIPIDEMIA: ICD-10-CM

## 2022-12-18 DIAGNOSIS — I10 ESSENTIAL HYPERTENSION: ICD-10-CM

## 2022-12-18 DIAGNOSIS — I25.10 CORONARY ARTERY DISEASE INVOLVING NATIVE CORONARY ARTERY OF NATIVE HEART WITHOUT ANGINA PECTORIS: ICD-10-CM

## 2022-12-18 DIAGNOSIS — I25.10 CORONARY ARTERIOSCLEROSIS: ICD-10-CM

## 2022-12-18 RX ORDER — ROSUVASTATIN CALCIUM 40 MG/1
TABLET, COATED ORAL
Qty: 90 TABLET | Refills: 0 | Status: SHIPPED | OUTPATIENT
Start: 2022-12-18 | End: 2023-05-25

## 2022-12-19 RX ORDER — EZETIMIBE 10 MG/1
10 TABLET ORAL DAILY
Qty: 90 TABLET | Refills: 1 | Status: SHIPPED | OUTPATIENT
Start: 2022-12-19 | End: 2023-05-31

## 2022-12-19 RX ORDER — AMIODARONE HYDROCHLORIDE 200 MG/1
TABLET ORAL
Qty: 90 TABLET | Refills: 1 | Status: SHIPPED | OUTPATIENT
Start: 2022-12-19 | End: 2023-06-02

## 2022-12-19 RX ORDER — LISINOPRIL 5 MG/1
5 TABLET ORAL EVERY 24 HOURS
Qty: 90 TABLET | Refills: 3 | Status: SHIPPED | OUTPATIENT
Start: 2022-12-19 | End: 2023-02-17

## 2022-12-19 NOTE — TELEPHONE ENCOUNTER
"Routing refill request to provider for review/approval because:  Drug not on the [facility] refill protocol : Amiodarone  Labs out of range:  BP and Creatinine : Lisinopril    Rosuvastatin: (passed protocol)  Last Written Prescription Date:  12/20/2021  Last Fill Quantity: 90,  # refills: 3   Last office visit provider:  4/29/2022 with PCP Dr LU Pozo     Requested Prescriptions   Pending Prescriptions Disp Refills     rosuvastatin (CRESTOR) 40 MG tablet [Pharmacy Med Name: ROSUVASTATIN CALCIUM 40 MG TAB] 90 tablet 3     Sig: TAKE 1 TABLET BY MOUTH EVERY DAY       Statins Protocol Passed - 12/18/2022  6:56 AM        Passed - LDL on file in past 12 months     Recent Labs   Lab Test 11/10/22  0824   *             Passed - No abnormal creatine kinase in past 12 months     No lab results found.             Passed - Recent (12 mo) or future (30 days) visit within the authorizing provider's specialty     Patient has had an office visit with the authorizing provider or a provider within the authorizing providers department within the previous 12 mos or has a future within next 30 days. See \"Patient Info\" tab in inbasket, or \"Choose Columns\" in Meds & Orders section of the refill encounter.              Passed - Medication is active on med list        Passed - Patient is age 18 or older        Passed - No active pregnancy on record        Passed - No positive pregnancy test in past 12 months           lisinopril (ZESTRIL) 5 MG tablet [Pharmacy Med Name: LISINOPRIL 5 MG TABLET] 90 tablet 3     Sig: TAKE 1 TABLET (5 MG) BY MOUTH EVERY 24 HOURS       ACE Inhibitors (Including Combos) Protocol Failed - 12/18/2022  6:56 AM        Failed - Blood pressure under 140/90 in past 12 months     BP Readings from Last 3 Encounters:   11/10/22 (!) 170/88   08/11/22 (!) 172/80   05/13/22 (!) 152/83                 Failed - Normal serum creatinine on file in past 12 months     Recent Labs   Lab Test 11/10/22  0824   CR 1.39*       Ok to " "refill medication if creatinine is low          Passed - Recent (12 mo) or future (30 days) visit within the authorizing provider's specialty     Patient has had an office visit with the authorizing provider or a provider within the authorizing providers department within the previous 12 mos or has a future within next 30 days. See \"Patient Info\" tab in inbasket, or \"Choose Columns\" in Meds & Orders section of the refill encounter.              Passed - Medication is active on med list        Passed - Patient is age 18 or older        Passed - No active pregnancy on record        Passed - Normal serum potassium on file in past 12 months     Recent Labs   Lab Test 11/10/22  0824   POTASSIUM 4.0             Passed - No positive pregnancy test within past 12 months           amiodarone (PACERONE) 200 MG tablet [Pharmacy Med Name: AMIODARONE  MG TABLET] 90 tablet 1     Sig: TAKE 1 TABLET (200 MG) BY MOUTH EVERY MORNING       There is no refill protocol information for this order          Shalini Yip RN 12/18/22 6:31 PM  "

## 2022-12-21 DIAGNOSIS — I48.0 PAROXYSMAL ATRIAL FIBRILLATION (H): Primary | ICD-10-CM

## 2023-01-30 ENCOUNTER — HEALTH MAINTENANCE LETTER (OUTPATIENT)
Age: 78
End: 2023-01-30

## 2023-02-14 ENCOUNTER — OFFICE VISIT (OUTPATIENT)
Dept: CARDIOLOGY | Facility: CLINIC | Age: 78
End: 2023-02-14
Attending: INTERNAL MEDICINE
Payer: COMMERCIAL

## 2023-02-14 VITALS
HEART RATE: 73 BPM | WEIGHT: 120.2 LBS | RESPIRATION RATE: 16 BRPM | OXYGEN SATURATION: 97 % | DIASTOLIC BLOOD PRESSURE: 82 MMHG | HEIGHT: 64 IN | BODY MASS INDEX: 20.52 KG/M2 | SYSTOLIC BLOOD PRESSURE: 150 MMHG

## 2023-02-14 DIAGNOSIS — Z98.890 S/P MITRAL VALVE CLIP IMPLANTATION: ICD-10-CM

## 2023-02-14 DIAGNOSIS — I48.0 PAF (PAROXYSMAL ATRIAL FIBRILLATION) (H): ICD-10-CM

## 2023-02-14 DIAGNOSIS — I25.10 CORONARY ARTERY DISEASE DUE TO LIPID RICH PLAQUE: ICD-10-CM

## 2023-02-14 DIAGNOSIS — Z95.818 S/P MITRAL VALVE CLIP IMPLANTATION: ICD-10-CM

## 2023-02-14 DIAGNOSIS — I25.83 CORONARY ARTERY DISEASE DUE TO LIPID RICH PLAQUE: ICD-10-CM

## 2023-02-14 LAB
ALBUMIN SERPL-MCNC: 3.9 G/DL (ref 3.5–5)
ALP SERPL-CCNC: 63 U/L (ref 45–120)
ALT SERPL W P-5'-P-CCNC: 36 U/L (ref 0–45)
ANION GAP SERPL CALCULATED.3IONS-SCNC: 9 MMOL/L (ref 5–18)
AST SERPL W P-5'-P-CCNC: 31 U/L (ref 0–40)
BILIRUB SERPL-MCNC: 1 MG/DL (ref 0–1)
BUN SERPL-MCNC: 18 MG/DL (ref 8–28)
CALCIUM SERPL-MCNC: 9.7 MG/DL (ref 8.5–10.5)
CHLORIDE BLD-SCNC: 105 MMOL/L (ref 98–107)
CO2 SERPL-SCNC: 27 MMOL/L (ref 22–31)
CREAT SERPL-MCNC: 1.3 MG/DL (ref 0.6–1.1)
GFR SERPL CREATININE-BSD FRML MDRD: 42 ML/MIN/1.73M2
GLUCOSE BLD-MCNC: 106 MG/DL (ref 70–125)
POTASSIUM BLD-SCNC: 4.7 MMOL/L (ref 3.5–5)
PROT SERPL-MCNC: 7.5 G/DL (ref 6–8)
SODIUM SERPL-SCNC: 141 MMOL/L (ref 136–145)
TSH SERPL DL<=0.005 MIU/L-ACNC: 7.58 UIU/ML (ref 0.3–5)

## 2023-02-14 PROCEDURE — 80151 DRUG ASSAY AMIODARONE: CPT | Mod: 90 | Performed by: INTERNAL MEDICINE

## 2023-02-14 PROCEDURE — 84443 ASSAY THYROID STIM HORMONE: CPT | Performed by: INTERNAL MEDICINE

## 2023-02-14 PROCEDURE — 36415 COLL VENOUS BLD VENIPUNCTURE: CPT | Performed by: INTERNAL MEDICINE

## 2023-02-14 PROCEDURE — 80053 COMPREHEN METABOLIC PANEL: CPT | Performed by: INTERNAL MEDICINE

## 2023-02-14 PROCEDURE — 99000 SPECIMEN HANDLING OFFICE-LAB: CPT | Performed by: INTERNAL MEDICINE

## 2023-02-14 PROCEDURE — 99214 OFFICE O/P EST MOD 30 MIN: CPT | Mod: 25 | Performed by: INTERNAL MEDICINE

## 2023-02-14 NOTE — LETTER
2/14/2023    Selina Pozo MD  [address] [facility] [id number]    RE: [place] ULISSES Robledo       Dear Colleague,     I had the pleasure of seeing Kelly GTZ Venkat in the [facility].    HEART CARE ENCOUNTER CONSULTATON NOTE      [facility]  [phone]      Assessment/Recommendations   Assessment/Plan:  1.  Mitral valve prolapse with severe mitral regurgitation.  Status post mitral valve clip procedure in 2021.  He continues to feel well clinically without complaints of shortness of breath, exercise intolerance or chest discomfort.  Most recent echocardiogram is from October 2022 where ejection fraction was normal at 60 to 65%.  Left ventricular filling pressures were elevated.  Moderate to moderately severe 2-3+ mitral regurgitation with mild mitral stenosis.  Moderate to severe tricuspid regurgitation.  Estimated RV systolic pressure was 48 mmHg plus right atrial pressure.  This was mildly lower than on previous exam.    2.  Paroxysmal atrial fibrillation that was very symptomatic.  She has been on amiodarone with dose decreased to 100 mg daily.  She has had no reoccurrence of atrial fibrillation.  She endorses very brief infrequent palpitations lasting 2-3 beats and not associated with any symptoms.  Holter monitor completed in November showed normal sinus rhythm, no significant dysrhythmia.  She is on amiodarone with elevated TSH with plans for blood work today.  Previously pulmonary had recommended yearly pulmonary function test which she would be due soon.  We will need to investigate whether these have been scheduled to further discuss.    3.  Moderate nonobstructive coronary artery disease based on prior angiogram.  She is on rosuvastatin and Zetia.  We will plan to recheck lipids today.  Further recommendations pending the laboratory studies.  Her LDL has not been at ideal goal previously.    4.  Hypertension.  Blood pressure at home has been improved.  She  brings in blood pressure readings which demonstrate typically blood pressures in the 120s and 130s systolic range.  Her blood pressure has been higher approximately 1 month ago and in the interim she curtailed salt intake with some improvement in blood pressure.  Blood pressure is elevated here in the office which is not unusual for her.  We are going to plan to check chemistries and she will update me with some additional blood pressure readings with further decisions regarding up titration of lisinopril pending blood work and further blood pressure report.      Plan: Lab work today.  2.  We will need to explore follow-up pulmonary function tests.  3.  Blood pressure monitoring and report  4.  Follow-up in 6 months             History of Present Illness/Subjective    HPI: Kelly Robledo is a 77 year old female who is seen in follow up.She has a history of mitral valve prolapse with significant mitral regurgitation admitted to hospital in June 2020 with tachypalpitations.  She was diagnosed with atrial fibrillation with rapid ventricular response and started on amiodarone and Eliquis.  Coronary angiogram at that time showed moderate nonobstructive disease.  Follow-up transesophageal echocardiogram showed progression of her mitral regurgitation he underwent mitral valve clip procedure in 2021.  She continues to feel very well.  We did cut back on her amiodarone dose previously.  Her TSH has been elevated and she has been followed in conjunction with her primary care provider with plans to recheck today.  I have also reviewed notes from pulmonary who recommended yearly pulmonary function tests.  It looks like the last one was in February 2022 and not certain if this has been scheduled which we will need to investigate.    She did call a few weeks ago with some higher blood pressures which have been improved since she cut back on some salt intake which she had been eating a little bit more liberally.  She continues to  exercise regularly.  She walks outside and walks at least 30 minutes.  She has no difficulty with finding stairs or walking up hills.    Recent Echocardiogram Results:  Echocardiogram Complete  Order: [id number]   Status: Edited Result - FINAL      Visible to patient: Yes (not seen)      Next appt: 2023 at 08:50 AM in Cardiology (Dayne Kim MD)      Dx: Pulmonary hypertension (H); S/P juan...      2 Result Notes     1 Patient Communication  Details    Reading Physician Reading Date Result Priority   Dayne Kim MD  [phone] 10/3/2022      Narrative & Impression  [id number]  [id number]  [id number]  [id number]^PAULA^DAYNE^[place] [address]     Name: ANTIONE MISHRA  MRN: [id number]  : 1945  Study Date: 10/03/2022 10:55 AM  Age: 76 yrs  Gender: Female  Patient Location: [facility]  Reason For Study: S/P mitral valve clip implantation, S/P mitral valve clip  implan  Ordering Physician: DAYNE KIM  Referring Physician: DAYNE KIM  Performed By: DEL     BSA: 1.6 m2  Height: 65 in  Weight: 118 lb  HR: 63  BP: 172/80 mmHg  ______________________________________________________________________________  Procedure  Complete Echo Adult.  ______________________________________________________________________________  Interpretation Summary     Normal left ventricular size. Normal left ventricular systolic function. Left  ventricular ejection fraction estimated at 60 to 65%. Mild left ventricular  hypertrophy.  Diastolic Doppler findings (E/E' ratio and/or other parameters) suggest left  ventricular filling pressures are increased. Left ventricular diastolic  function is abnormal  Normal right ventricular size and systolic function.  Severe left atrial enlargement. Moderate right atrial enlargement  Mitral valve clip. Moderate to moderately severe 2-3+ mitral regurgitation.  Mild mitral stenosis.Mean gradient of 5 mmHg at a heart rate of 60 bpm  Moderate to  moderately severe tricuspid gravitation. Estimate of RV systolic  pressure is moderately elevated at 48 mmHg plus right atrial pressure.  Compared to the examination January 2022. Findings are similar. Estimate of RV  systolic pressure is mildly lower on the current examination at 48 mmHg plus  right atrial pressure compared to 57 mmHg plus right atrial pressure on the  previous examination.  ______________________________________________________________________________  I      WMSI = 1.00     % Normal = 100     X - Cannot   0 -                      (2) - Mildly 2 -          Segments  Size  Interpret    Hyperkinetic 1 - Normal  Hypokinetic  Hypokinetic  1-2     small                                                     7 -          3-5      moderate  3 - Akinetic 4 -          5 -         6 - Akinetic Dyskinetic   6-14    large               Dyskinetic   Aneurysmal  w/scar       w/scar       15-16   diffuse     Left Ventricle  The left ventricle is normal in size. There is borderline concentric left  ventricular hypertrophy. Left ventricular systolic function is normal.  Diastolic Doppler findings (E/E' ratio and/or other parameters) suggest left  ventricular filling pressures are increased. Left ventricular diastolic  function is abnormal. The visual ejection fraction is 60-65%. No regional wall  motion abnormalities noted.     Right Ventricle  Normal right ventricle size and systolic function. TAPSE is normal, which is  consistent with normal right ventricular systolic function.     Atria  The left atrium is severely dilated. The right atrium is moderately dilated.     Mitral Valve  MitraClip is present with stable bileaflet insertion. 2 jets of mitral  regurgitation observed. There is moderate to mod-severe (2-3+) mitral  regurgitation. There is mild mitral stenosis.     Tricuspid Valve  The right ventricular systolic pressure is elevated at 48.3 mmHg. Moderate  (46-55mmHg) pulmonary hypertension is present. There  is moderate to mod-severe  (2-3+) tricuspid regurgitation.     Aortic Valve  The aortic valve is trileaflet. No hemodynamically significant valvular aortic  stenosis.     Pulmonic Valve  The pulmonic valve is not well seen, but is grossly normal. There is trace  pulmonic valvular regurgitation.     Vessels  The aorta root is normal. Normal size ascending aorta. IVC diameter and  respiratory changes fall into an intermediate range suggesting an RA pressure  of 8 mmHg.     Pericardium  There is no pericardial effusion.            [facility]     HOLTER REPORT     Results:    Indication for study: Evaluate for PVCs    A 24-hour Holter monitor is applied 11/2021 with atrial fibrillation Eryn 15, 2021    Baseline tracing showed sinus rhythm with a rather short IN interval there appears to be an increase in intrinsicoid deflection; but  I do not believe this represents preexcitation-prior 12 lead ECG do not show pre-excitation/WPW    Average heart rate is 62 bpm with heart rate ranging 55-75 bpm    No bradycardia or pauses or AV block    Rare ventricular ectopy with 21 single PVCs-no complex or repetitive forms    Rare supraventricular ectopy with 9 PACs, no complex or repetitive forms    Patient activity diary was reviewed no symptoms noted    Discussion    Normal Holter monitor with just a few single noncomplex ectopic beats      Recent Coronary Angiogram Results:    Kelly Robledo  Coronary Angiogram  Order# [id number]  Reading physician: Klaus Fitzpatrick MD Ordering physician: Klaus Fitzpatrick MD Study date: 01/13/2021     Patient Information    Name MRN Description   Kelly Robledo [id number] 75 year old female     Indications    CAD (coronary artery disease) [I25.10 (ICD-10-CM)]   Mitral regurgitation [I34.0 (ICD-10-CM)]         Conclusion    76 yo F with recent diagnosis of moderate to severe mitral regurgitation, causing increasing shortness of breath, as well as a recent hospitalization with congestive  heart failure.  Being evaluated for transcatheter mitral valve repair using MitraClip,   and here for preprocedure coronary angiography.     Coronary geography today showed:     Left main with 30% ostial narrowing  LAD with mild to moderate diffuse disease, and a 50% stenosis in the midportion, that appears unchanged from her prior angiogram  Ramus with a 50% proximal to mid stenosis and mild disease distally  LCx is a small vessel with no significant obtuse marginals noted  RCA is a large caliber, dominant vessel supplying the inferior and posterior walls.  There is 40% mid to distal focal stenosis, also unchanged from prior.        Right heart catheterization showed:    PA 50/16  (36)    RA mean  7    PCW  16 /43  (26) v waves of 45 mmHg    AO  164/62  (107)    RV  57/-6,  6     LVEDP 15 mmHg     Given that her symptoms have progressed over the past 6 months, with no change in her coronary anatomy, and clear worsening of her mitral regurgitation, she will benefit from mitral valve repair with MitraClip.  Her hemodynamics today also confirmed the presence of severe mitral regurgitation, with large V waves noted on her pulmonary wedge tracing.       Physical Examination  Review of Systems   Vitals: 150/82, repeat similar 150/80, weight 120 pounds, rate of 73 and regular  Wt Readings from Last 3 Encounters:   11/10/22 54.9 kg (121 lb)   08/11/22 53.5 kg (118 lb)   05/13/22 54.9 kg (121 lb)       General Appearance:   no distress, normal body habitus   ENT/Mouth:  Wearing a facemask.      EYES:  no scleral icterus, normal conjunctivae   Neck: no carotid bruits or thyromegaly   Chest/Lungs:   lungs are clear to auscultation, no rales or wheezing,  equal chest wall expansion    Cardiovascular:   Regular. Normal first and second heart sounds with 1- 2/6 systolic murmur left ventricular apex, rubs, or gallops; the carotid, radial and posterior tibial pulses are intact, Jugular venous pressure within normal limits, no  edema bilaterally    Abdomen:  no , bruits, or tenderness; bowel sounds are present   Extremities: no cyanosis or clubbing   Skin: no xanthelasma, warm.    Neurologic:  no tremors     Psychiatric: alert and oriented x3, calm        Please refer above for cardiac ROS details.        Medical History  Surgical History Family History Social History   Past Medical History:   Diagnosis Date     Abdominal pain      Atrial fibrillation (H)      Diverticulosis      Moderate mitral regurgitation      NSVT (nonsustained ventricular tachycardia) 6/17/2020     Palpitations      Past Surgical History:   Procedure Laterality Date     BIOPSY BREAST Bilateral     benign     CV CORONARY ANGIOGRAM N/A 6/18/2020    Procedure: Coronary Angiogram;  Surgeon: Sergey Conner MD;  Location: [facility] Cath Lab;  Service: Cardiology     CV CORONARY ANGIOGRAM N/A 1/13/2021    Procedure: Coronary Angiogram;  Surgeon: Klaus Fitzpatrick MD;  Location: [facility] Cardiac Cath Lab;  Service: Cardiology     CV LEFT HEART CATHETERIZATION WITHOUT LEFT VENTRICULOGRAM Left 6/18/2020    Procedure: Left Heart Catheterization Without Left Ventriculogram;  Surgeon: Sergey Conner MD;  Location: [facility] Lab;  Service: Cardiology     CV RIGHT HEART CATHETERIZATION N/A 6/18/2020    Procedure: Right Heart Catheterization;  Surgeon: Sergey Conner MD;  Location: [facility] Cath Lab;  Service: Cardiology     CV RIGHT HEART CATHETERIZATION N/A 1/13/2021    Procedure: Right Heart Catheterization;  Surgeon: Klaus Fitzpatrick MD;  Location: [facility] Cardiac Cath Lab;  Service: Cardiology     HYSTERECTOMY  1970's     OOPHORECTOMY Bilateral 1970's     TRANSCATHETER MITRAL VALVE REPAIR N/A 2/1/2021    Procedure: CV MITRAL CLIP;  Surgeon: Klaus Fitzpatrick MD;  Location: [facility] CARDIAC CATH LAB     Family History   Problem Relation Age of Onset     Breast Cancer Sister 47.00        Social History     Socioeconomic History     Marital  status:      Spouse name: Not on file     Number of children: Not on file     Years of education: Not on file     Highest education level: Not on file   Occupational History     Not on file   Tobacco Use     Smoking status: Never     Smokeless tobacco: Never   Substance and Sexual Activity     Alcohol use: No     Drug use: No     Sexual activity: Not on file   Other Topics Concern     Parent/sibling w/ CABG, MI or angioplasty before 65F 55M? Not Asked   Social History Narrative    Spends 6 months in [place].   . No children. Family in the [place] area.   Enjoys walking- very regular with this especially in [place].   Likes reading - romantic happy endings.   Never alcohol or smoking.  Selina Pozo MD       Social Determinants of Health     Financial Resource Strain: Not on file   Food Insecurity: Not on file   Transportation Needs: Not on file   Physical Activity: Not on file   Stress: Not on file   Social Connections: Not on file   Intimate Partner Violence: Not on file   Housing Stability: Not on file           Medications  Allergies   Current Outpatient Medications   Medication Sig Dispense Refill     amiodarone (PACERONE) 200 MG tablet TAKE 1 TABLET (200 MG) BY MOUTH EVERY MORNING 90 tablet 1     clindamycin (CLEOCIN) 300 MG capsule Take 2 capsules (600 mg total) by mouth once as needed (30-60 minutes prior to any dental visit. Save remaining for next visit. 4 capsule 1     ELIQUIS ANTICOAGULANT 5 MG tablet TAKE 1 TABLET BY MOUTH TWICE A  tablet 1     ezetimibe (ZETIA) 10 MG tablet TAKE 1 TABLET (10 MG) BY MOUTH DAILY. 90 tablet 1     lisinopril (ZESTRIL) 5 MG tablet TAKE 1 TABLET (5 MG) BY MOUTH EVERY 24 HOURS 90 tablet 3     metoprolol succinate ER (TOPROL XL) 50 MG 24 hr tablet Take 1 tablet (50 mg) by mouth daily 180 tablet 3     multivitamin, therapeutic (THERA-VIT) TABS tablet Take 1 tablet by mouth daily       rosuvastatin (CRESTOR) 40 MG tablet TAKE 1 TABLET BY MOUTH EVERY DAY 90 tablet  0       Allergies   Allergen Reactions     Penicillins Hives          Lab Results    Chemistry/lipid CBC Cardiac Enzymes/BNP/TSH/INR   Recent Labs   Lab Test 11/10/22  0824 07/29/22  0735   CHOL  --  179   HDL  --  52   * 105*   TRIG  --  112     Recent Labs   Lab Test 11/10/22  0824 07/29/22  0735 01/25/22  0836   * 105* 98     Recent Labs   Lab Test 11/10/22  0824      POTASSIUM 4.0   CHLORIDE 103   CO2 27   GLC 86   BUN 23   CR 1.39*   GFRESTIMATED 39*   SHADI 9.2     Recent Labs   Lab Test 11/10/22  0824 12/10/21  1124 11/27/21  0331   CR 1.39* 1.17* 1.30*     No results for input(s): A1C in the last 21793 hours.       Recent Labs   Lab Test 02/08/22  0834 11/27/21  0331   WBC  --  7.5   HGB 12.2 13.2   HCT  --  41.6   MCV  --  101*   PLT  --  198     Recent Labs   Lab Test 02/08/22  0834 11/27/21  0331 03/11/21  1333   HGB 12.2 13.2 12.8    Recent Labs   Lab Test 11/27/21  1234 11/27/21  0843 11/27/21  0331   TROPONINI <0.01 <0.01 <0.01     Recent Labs   Lab Test 01/25/21  1355 10/14/20  1319 09/23/20  1920   * 344* 624*     Recent Labs   Lab Test 11/10/22  0824   TSH 9.55*     Recent Labs   Lab Test 01/25/21  1355 09/23/20  1920   INR 1.22* 1.37*        Dayne Kim MD    Thank you for allowing me to participate in the care of your patient.      Sincerely,     Dayne Kim MD     [facility]

## 2023-02-14 NOTE — PATIENT INSTRUCTIONS
Please update me with 8 to 10 additional blood pressure readings over the next month.I will be in touch with the blood work. Please try to keep the salt under control.My nurse is Anamaria and her number is [phone]

## 2023-02-14 NOTE — PROGRESS NOTES
HEART CARE ENCOUNTER CONSULTATON NOTE      [facility]  [phone]      Assessment/Recommendations   Assessment/Plan:  1.  Mitral valve prolapse with severe mitral regurgitation.  Status post mitral valve clip procedure in 2021.  He continues to feel well clinically without complaints of shortness of breath, exercise intolerance or chest discomfort.  Most recent echocardiogram is from October 2022 where ejection fraction was normal at 60 to 65%.  Left ventricular filling pressures were elevated.  Moderate to moderately severe 2-3+ mitral regurgitation with mild mitral stenosis.  Moderate to severe tricuspid regurgitation.  Estimated RV systolic pressure was 48 mmHg plus right atrial pressure.  This was mildly lower than on previous exam.    2.  Paroxysmal atrial fibrillation that was very symptomatic.  She has been on amiodarone with dose decreased to 100 mg daily.  She has had no reoccurrence of atrial fibrillation.  She endorses very brief infrequent palpitations lasting 2-3 beats and not associated with any symptoms.  Holter monitor completed in November showed normal sinus rhythm, no significant dysrhythmia.  She is on amiodarone with elevated TSH with plans for blood work today.  Previously pulmonary had recommended yearly pulmonary function test which she would be due soon.  We will need to investigate whether these have been scheduled to further discuss.    3.  Moderate nonobstructive coronary artery disease based on prior angiogram.  She is on rosuvastatin and Zetia.  We will plan to recheck lipids today.  Further recommendations pending the laboratory studies.  Her LDL has not been at ideal goal previously.    4.  Hypertension.  Blood pressure at home has been improved.  She brings in blood pressure readings which demonstrate typically blood pressures in the 120s and 130s systolic range.  Her blood pressure has been higher approximately 1 month ago and in the interim she curtailed  salt intake with some improvement in blood pressure.  Blood pressure is elevated here in the office which is not unusual for her.  We are going to plan to check chemistries and she will update me with some additional blood pressure readings with further decisions regarding up titration of lisinopril pending blood work and further blood pressure report.      Plan: Lab work today.  2.  We will need to explore follow-up pulmonary function tests.  3.  Blood pressure monitoring and report  4.  Follow-up in 6 months             History of Present Illness/Subjective    HPI: Kelly Robledo is a 77 year old female who is seen in follow up.She has a history of mitral valve prolapse with significant mitral regurgitation admitted to hospital in June 2020 with tachypalpitations.  She was diagnosed with atrial fibrillation with rapid ventricular response and started on amiodarone and Eliquis.  Coronary angiogram at that time showed moderate nonobstructive disease.  Follow-up transesophageal echocardiogram showed progression of her mitral regurgitation he underwent mitral valve clip procedure in 2021.  She continues to feel very well.  We did cut back on her amiodarone dose previously.  Her TSH has been elevated and she has been followed in conjunction with her primary care provider with plans to recheck today.  I have also reviewed notes from pulmonary who recommended yearly pulmonary function tests.  It looks like the last one was in February 2022 and not certain if this has been scheduled which we will need to investigate.    She did call a few weeks ago with some higher blood pressures which have been improved since she cut back on some salt intake which she had been eating a little bit more liberally.  She continues to exercise regularly.  She walks outside and walks at least 30 minutes.  She has no difficulty with finding stairs or walking up hills.    Recent Echocardiogram Results:  Echocardiogram Complete  Order:  [id number]   Status: Edited Result - FINAL      Visible to patient: Yes (not seen)      Next appt: 2023 at 08:50 AM in Cardiology (Dayne Kim MD)      Dx: Pulmonary hypertension (H); S/P juan...      2 Result Notes     1 Patient Communication  Details    Reading Physician Reading Date Result Priority   Dayne Kim MD  [phone] 10/3/2022      Narrative & Impression  [id number]  [id number]  [id number]  [id number]^PAULA^DAYNE^[place] [address]     Name: ANTIONE MISHRA  MRN: [id number]  : 1945  Study Date: 10/03/2022 10:55 AM  Age: 76 yrs  Gender: Female  Patient Location: [facility]  Reason For Study: S/P mitral valve clip implantation, S/P mitral valve clip  implan  Ordering Physician: DAYNE KIM  Referring Physician: DAYNE KIM  Performed By:      BSA: 1.6 m2  Height: 65 in  Weight: 118 lb  HR: 63  BP: 172/80 mmHg  ______________________________________________________________________________  Procedure  Complete Echo Adult.  ______________________________________________________________________________  Interpretation Summary     Normal left ventricular size. Normal left ventricular systolic function. Left  ventricular ejection fraction estimated at 60 to 65%. Mild left ventricular  hypertrophy.  Diastolic Doppler findings (E/E' ratio and/or other parameters) suggest left  ventricular filling pressures are increased. Left ventricular diastolic  function is abnormal  Normal right ventricular size and systolic function.  Severe left atrial enlargement. Moderate right atrial enlargement  Mitral valve clip. Moderate to moderately severe 2-3+ mitral regurgitation.  Mild mitral stenosis.Mean gradient of 5 mmHg at a heart rate of 60 bpm  Moderate to moderately severe tricuspid gravitation. Estimate of RV systolic  pressure is moderately elevated at 48 mmHg plus right atrial pressure.  Compared to the examination 2022. Findings are  similar. Estimate of RV  systolic pressure is mildly lower on the current examination at 48 mmHg plus  right atrial pressure compared to 57 mmHg plus right atrial pressure on the  previous examination.  ______________________________________________________________________________  I      WMSI = 1.00     % Normal = 100     X - Cannot   0 -                      (2) - Mildly 2 -          Segments  Size  Interpret    Hyperkinetic 1 - Normal  Hypokinetic  Hypokinetic  1-2     small                                                     7 -          3-5      moderate  3 - Akinetic 4 -          5 -         6 - Akinetic Dyskinetic   6-14    large               Dyskinetic   Aneurysmal  w/scar       w/scar       15-16   diffuse     Left Ventricle  The left ventricle is normal in size. There is borderline concentric left  ventricular hypertrophy. Left ventricular systolic function is normal.  Diastolic Doppler findings (E/E' ratio and/or other parameters) suggest left  ventricular filling pressures are increased. Left ventricular diastolic  function is abnormal. The visual ejection fraction is 60-65%. No regional wall  motion abnormalities noted.     Right Ventricle  Normal right ventricle size and systolic function. TAPSE is normal, which is  consistent with normal right ventricular systolic function.     Atria  The left atrium is severely dilated. The right atrium is moderately dilated.     Mitral Valve  MitraClip is present with stable bileaflet insertion. 2 jets of mitral  regurgitation observed. There is moderate to mod-severe (2-3+) mitral  regurgitation. There is mild mitral stenosis.     Tricuspid Valve  The right ventricular systolic pressure is elevated at 48.3 mmHg. Moderate  (46-55mmHg) pulmonary hypertension is present. There is moderate to mod-severe  (2-3+) tricuspid regurgitation.     Aortic Valve  The aortic valve is trileaflet. No hemodynamically significant valvular aortic  stenosis.     Pulmonic Valve  The  pulmonic valve is not well seen, but is grossly normal. There is trace  pulmonic valvular regurgitation.     Vessels  The aorta root is normal. Normal size ascending aorta. IVC diameter and  respiratory changes fall into an intermediate range suggesting an RA pressure  of 8 mmHg.     Pericardium  There is no pericardial effusion.            [facility]     HOLTER REPORT     Results:    Indication for study: Evaluate for PVCs    A 24-hour Holter monitor is applied 11/2021 with atrial fibrillation Eryn 15, 2021    Baseline tracing showed sinus rhythm with a rather short IA interval there appears to be an increase in intrinsicoid deflection; but  I do not believe this represents preexcitation-prior 12 lead ECG do not show pre-excitation/WPW    Average heart rate is 62 bpm with heart rate ranging 55-75 bpm    No bradycardia or pauses or AV block    Rare ventricular ectopy with 21 single PVCs-no complex or repetitive forms    Rare supraventricular ectopy with 9 PACs, no complex or repetitive forms    Patient activity diary was reviewed no symptoms noted    Discussion    Normal Holter monitor with just a few single noncomplex ectopic beats      Recent Coronary Angiogram Results:    Kelly Robledo  Coronary Angiogram  Order# [id number]  Reading physician: Klaus Fitzpatrick MD Ordering physician: Klaus Fitzpatrick MD Study date: 01/13/2021     Patient Information    Name MRN Description   Kelly Robledo [id number] 75 year old female     Indications    CAD (coronary artery disease) [I25.10 (ICD-10-CM)]   Mitral regurgitation [I34.0 (ICD-10-CM)]         Conclusion    76 yo F with recent diagnosis of moderate to severe mitral regurgitation, causing increasing shortness of breath, as well as a recent hospitalization with congestive heart failure.  Being evaluated for transcatheter mitral valve repair using MitraClip,   and here for preprocedure coronary angiography.     Coronary geography today showed:     Left main  with 30% ostial narrowing  LAD with mild to moderate diffuse disease, and a 50% stenosis in the midportion, that appears unchanged from her prior angiogram  Ramus with a 50% proximal to mid stenosis and mild disease distally  LCx is a small vessel with no significant obtuse marginals noted  RCA is a large caliber, dominant vessel supplying the inferior and posterior walls.  There is 40% mid to distal focal stenosis, also unchanged from prior.        Right heart catheterization showed:    PA 50/16  (36)    RA mean  7    PCW  16 /43  (26) v waves of 45 mmHg    AO  164/62  (107)    RV  57/-6,  6     LVEDP 15 mmHg     Given that her symptoms have progressed over the past 6 months, with no change in her coronary anatomy, and clear worsening of her mitral regurgitation, she will benefit from mitral valve repair with MitraClip.  Her hemodynamics today also confirmed the presence of severe mitral regurgitation, with large V waves noted on her pulmonary wedge tracing.       Physical Examination  Review of Systems   Vitals: 150/82, repeat similar 150/80, weight 120 pounds, rate of 73 and regular  Wt Readings from Last 3 Encounters:   11/10/22 54.9 kg (121 lb)   08/11/22 53.5 kg (118 lb)   05/13/22 54.9 kg (121 lb)       General Appearance:   no distress, normal body habitus   ENT/Mouth:  Wearing a facemask.      EYES:  no scleral icterus, normal conjunctivae   Neck: no carotid bruits or thyromegaly   Chest/Lungs:   lungs are clear to auscultation, no rales or wheezing,  equal chest wall expansion    Cardiovascular:   Regular. Normal first and second heart sounds with 1- 2/6 systolic murmur left ventricular apex, rubs, or gallops; the carotid, radial and posterior tibial pulses are intact, Jugular venous pressure within normal limits, no edema bilaterally    Abdomen:  no , bruits, or tenderness; bowel sounds are present   Extremities: no cyanosis or clubbing   Skin: no xanthelasma, warm.    Neurologic:  no tremors      Psychiatric: alert and oriented x3, calm        Please refer above for cardiac ROS details.        Medical History  Surgical History Family History Social History   Past Medical History:   Diagnosis Date     Abdominal pain      Atrial fibrillation (H)      Diverticulosis      Moderate mitral regurgitation      NSVT (nonsustained ventricular tachycardia) 6/17/2020     Palpitations      Past Surgical History:   Procedure Laterality Date     BIOPSY BREAST Bilateral     benign     CV CORONARY ANGIOGRAM N/A 6/18/2020    Procedure: Coronary Angiogram;  Surgeon: Sergey Conner MD;  Location: [facility];  Service: Cardiology     CV CORONARY ANGIOGRAM N/A 1/13/2021    Procedure: Coronary Angiogram;  Surgeon: Klaus Fitzpatrick MD;  Location: [facility] Cardiac Cath Lab;  Service: Cardiology     CV LEFT HEART CATHETERIZATION WITHOUT LEFT VENTRICULOGRAM Left 6/18/2020    Procedure: Left Heart Catheterization Without Left Ventriculogram;  Surgeon: Sergey Conner MD;  Location: [facility];  Service: Cardiology     CV RIGHT HEART CATHETERIZATION N/A 6/18/2020    Procedure: Right Heart Catheterization;  Surgeon: Sergey Conner MD;  Location: [facility];  Service: Cardiology     CV RIGHT HEART CATHETERIZATION N/A 1/13/2021    Procedure: Right Heart Catheterization;  Surgeon: Klaus Fitzpatrick MD;  Location: [facility] Cardiac Cath Lab;  Service: Cardiology     HYSTERECTOMY  1970's     OOPHORECTOMY Bilateral 1970's     TRANSCATHETER MITRAL VALVE REPAIR N/A 2/1/2021    Procedure: CV MITRAL CLIP;  Surgeon: Klaus Fitzpatrick MD;  Location: [facility] CARDIAC CATH LAB     Family History   Problem Relation Age of Onset     Breast Cancer Sister 47.00        Social History     Socioeconomic History     Marital status:      Spouse name: Not on file     Number of children: Not on file     Years of education: Not on file     Highest education level: Not on file   Occupational History      Not on file   Tobacco Use     Smoking status: Never     Smokeless tobacco: Never   Substance and Sexual Activity     Alcohol use: No     Drug use: No     Sexual activity: Not on file   Other Topics Concern     Parent/sibling w/ CABG, MI or angioplasty before 65F 55M? Not Asked   Social History Narrative    Spends 6 months in [place].   . No children. Family in the [place] area.   Enjoys walking- very regular with this especially in [place].   Likes reading - romantic happy endings.   Never alcohol or smoking.  Selina Pozo MD       Social Determinants of Health     Financial Resource Strain: Not on file   Food Insecurity: Not on file   Transportation Needs: Not on file   Physical Activity: Not on file   Stress: Not on file   Social Connections: Not on file   Intimate Partner Violence: Not on file   Housing Stability: Not on file           Medications  Allergies   Current Outpatient Medications   Medication Sig Dispense Refill     amiodarone (PACERONE) 200 MG tablet TAKE 1 TABLET (200 MG) BY MOUTH EVERY MORNING 90 tablet 1     clindamycin (CLEOCIN) 300 MG capsule Take 2 capsules (600 mg total) by mouth once as needed (30-60 minutes prior to any dental visit. Save remaining for next visit. 4 capsule 1     ELIQUIS ANTICOAGULANT 5 MG tablet TAKE 1 TABLET BY MOUTH TWICE A  tablet 1     ezetimibe (ZETIA) 10 MG tablet TAKE 1 TABLET (10 MG) BY MOUTH DAILY. 90 tablet 1     lisinopril (ZESTRIL) 5 MG tablet TAKE 1 TABLET (5 MG) BY MOUTH EVERY 24 HOURS 90 tablet 3     metoprolol succinate ER (TOPROL XL) 50 MG 24 hr tablet Take 1 tablet (50 mg) by mouth daily 180 tablet 3     multivitamin, therapeutic (THERA-VIT) TABS tablet Take 1 tablet by mouth daily       rosuvastatin (CRESTOR) 40 MG tablet TAKE 1 TABLET BY MOUTH EVERY DAY 90 tablet 0       Allergies   Allergen Reactions     Penicillins Hives          Lab Results    Chemistry/lipid CBC Cardiac Enzymes/BNP/TSH/INR   Recent Labs   Lab Test 11/10/22  9553  07/29/22  0735   CHOL  --  179   HDL  --  52   * 105*   TRIG  --  112     Recent Labs   Lab Test 11/10/22  0824 07/29/22  0735 01/25/22  0836   * 105* 98     Recent Labs   Lab Test 11/10/22  0824      POTASSIUM 4.0   CHLORIDE 103   CO2 27   GLC 86   BUN 23   CR 1.39*   GFRESTIMATED 39*   SHADI 9.2     Recent Labs   Lab Test 11/10/22  0824 12/10/21  1124 11/27/21  0331   CR 1.39* 1.17* 1.30*     No results for input(s): A1C in the last [id number] hours.       Recent Labs   Lab Test 02/08/22  0834 11/27/21  0331   WBC  --  7.5   HGB 12.2 13.2   HCT  --  41.6   MCV  --  101*   PLT  --  198     Recent Labs   Lab Test 02/08/22  0834 11/27/21  0331 03/11/21  1333   HGB 12.2 13.2 12.8    Recent Labs   Lab Test 11/27/21  1234 11/27/21  0843 11/27/21  0331   TROPONINI <0.01 <0.01 <0.01     Recent Labs   Lab Test 01/25/21  1355 10/14/20  1319 09/23/20  1920   * 344* 624*     Recent Labs   Lab Test 11/10/22  0824   TSH 9.55*     Recent Labs   Lab Test 01/25/21  1355 09/23/20  1920   INR 1.22* 1.37*        Dayne Kim MD

## 2023-02-17 ENCOUNTER — OFFICE VISIT (OUTPATIENT)
Dept: FAMILY MEDICINE | Facility: CLINIC | Age: 78
End: 2023-02-17
Payer: COMMERCIAL

## 2023-02-17 VITALS
HEART RATE: 72 BPM | DIASTOLIC BLOOD PRESSURE: 78 MMHG | HEIGHT: 64 IN | OXYGEN SATURATION: 98 % | BODY MASS INDEX: 20.32 KG/M2 | SYSTOLIC BLOOD PRESSURE: 130 MMHG | WEIGHT: 119 LBS | RESPIRATION RATE: 17 BRPM | TEMPERATURE: 97.4 F

## 2023-02-17 DIAGNOSIS — I48.0 PAROXYSMAL ATRIAL FIBRILLATION (H): ICD-10-CM

## 2023-02-17 DIAGNOSIS — I10 ESSENTIAL HYPERTENSION: ICD-10-CM

## 2023-02-17 DIAGNOSIS — I25.10 CORONARY ARTERY DISEASE INVOLVING NATIVE CORONARY ARTERY OF NATIVE HEART WITHOUT ANGINA PECTORIS: ICD-10-CM

## 2023-02-17 DIAGNOSIS — E03.8 SUBCLINICAL HYPOTHYROIDISM: ICD-10-CM

## 2023-02-17 DIAGNOSIS — Z51.81 ENCOUNTER FOR THERAPEUTIC DRUG MONITORING: ICD-10-CM

## 2023-02-17 DIAGNOSIS — Z95.818 S/P MITRAL VALVE CLIP IMPLANTATION: ICD-10-CM

## 2023-02-17 DIAGNOSIS — Z98.890 S/P MITRAL VALVE CLIP IMPLANTATION: ICD-10-CM

## 2023-02-17 DIAGNOSIS — M81.0 OSTEOPOROSIS, UNSPECIFIED OSTEOPOROSIS TYPE, UNSPECIFIED PATHOLOGICAL FRACTURE PRESENCE: ICD-10-CM

## 2023-02-17 DIAGNOSIS — Z00.00 MEDICARE ANNUAL WELLNESS VISIT, SUBSEQUENT: Primary | ICD-10-CM

## 2023-02-17 DIAGNOSIS — I42.8 NON-ISCHEMIC CARDIOMYOPATHY (H): ICD-10-CM

## 2023-02-17 DIAGNOSIS — N18.31 STAGE 3A CHRONIC KIDNEY DISEASE (H): ICD-10-CM

## 2023-02-17 DIAGNOSIS — Z78.0 POSTMENOPAUSAL STATUS: ICD-10-CM

## 2023-02-17 DIAGNOSIS — L65.9 HAIR LOSS: ICD-10-CM

## 2023-02-17 LAB
AMIODARONE SERPL-MCNC: 1.1 UG/ML
DESETHYLAMIODARONE SERPL-MCNC: 1.3 UG/ML

## 2023-02-17 PROCEDURE — 99213 OFFICE O/P EST LOW 20 MIN: CPT | Mod: 25 | Performed by: FAMILY MEDICINE

## 2023-02-17 PROCEDURE — G0439 PPPS, SUBSEQ VISIT: HCPCS | Performed by: FAMILY MEDICINE

## 2023-02-17 RX ORDER — LISINOPRIL 10 MG/1
10 TABLET ORAL EVERY 24 HOURS
Qty: 90 TABLET | Refills: 3 | Status: SHIPPED | OUTPATIENT
Start: 2023-02-17 | End: 2024-01-09

## 2023-02-17 NOTE — PROGRESS NOTES
"SUBJECTIVE:   Kelly is a 77 year old who presents for Preventive Visit.  Patient has been advised of split billing requirements and indicates understanding: Yes  Are you in the first 12 months of your Medicare coverage?  No    Healthy Habits:     In general, how would you rate your overall health?  Good    Frequency of exercise:  2-3 days/week    Do you usually eat at least 4 servings of fruit and vegetables a day, include whole grains    & fiber and avoid regularly eating high fat or \"junk\" foods?  Yes    Taking medications regularly:: Incomplete.    Medication side effects:: Incomplete.    Current function: Incomplete.    Home Safety:  No safety concerns identified (Incomplete)    Hearing impairment symptoms: Incomplete.    In the past 6 months, have you been bothered by leaking of urine? Yes (Incomplete)    PHQ-2 Total Score: 0    Chief Complaint   Patient presents with     Wellness Visit     HTN: Kelly mentioned that at her recent cardiology visit she was advised to go from 5mg to 10mg lisinopril  But didn't get the new Rx yet and also needs a lab rechecked in a week after that change. I can help her order the BMP for then. Continues on the metoprolol as well.     White Coat HTN \"always high at the doctor\" as it is today    Patient has history of mitral prolapse and mitral valve clip implantation (02/01/2021). Patient is anticoagulated on Eliquis.    Denies any shortness of breath; due for amiodorone PFTs     She states her hair is falling out.  Seems to be thinning for the past year.     She has osteoporosis and completed 2 years of alendronate (tolerated the severe nausea for that long before desiring to stop); decliens for now; last DEXA in 12/2021    Sold their home in [place] due to her husbands health.   She takes care of all the shopping, cooking, cleaning. Her  does the finances.   Social History     Social History Narrative    Spends 6 months in [place].   . No children. Family in " the [place] area.   Enjoys walking- very regular with this especially in [place].   Likes reading - romantic happy endings.   Never alcohol or smoking.  Selina Pozo MD          Fall risk  Fallen 2 or more times in the past year?: No  Any fall with injury in the past year?: No    Cognitive Screening   1) Repeat 3 items (Leader, Season, Table)    2) Clock draw: NORMAL  3) 3 item recall: Recalls 2 objects   Results: NORMAL clock, 1-2 items recalled: COGNITIVE IMPAIRMENT LESS LIKELY    Mini-CogTM Copyright S [employer]. Licensed by the author for use in [facility]; reprinted with permission (brent@[facility]). All rights reserved.      Do you have sleep apnea, excessive snoring or daytime drowsiness?: no    Reviewed and updated as needed this visit by clinical staff   Tobacco  Allergies  Meds              Reviewed and updated as needed this visit by Provider                 Social History     Tobacco Use     Smoking status: Never     Smokeless tobacco: Never   Substance Use Topics     Alcohol use: No         Alcohol Use 2/17/2023   Prescreen: >3 drinks/day or >7 drinks/week? No   Prescreen: >3 drinks/day or >7 drinks/week? -     Current providers sharing in care for this patient include:   Patient Care Team:  Selina Pozo MD as PCP - General (Family Medicine)  Selina Pozo MD as Assigned PCP  Dayne Kim MD as Assigned Heart and Vascular Provider  Kamila Ramos MD as Assigned Pulmonology Provider    The following health maintenance items are reviewed in Epic and correct as of today:  Health Maintenance   Topic Date Due     HF ACTION PLAN  Never done     ZOSTER IMMUNIZATION (1 of 2) Never done     CBC  11/27/2022     MEDICARE ANNUAL WELLNESS VISIT  12/10/2022     MICROALBUMIN  12/10/2022     HEMOGLOBIN  02/08/2023     ANNUAL REVIEW OF HM ORDERS  04/29/2023     LIPID  07/29/2023     BMP  08/14/2023     DTAP/TDAP/TD IMMUNIZATION (2 - Td or Tdap) 12/19/2023     ALT  02/14/2024      "TSH W/FREE T4 REFLEX  02/14/2024     FALL RISK ASSESSMENT  02/17/2024     ADVANCE CARE PLANNING  12/10/2026     DEXA  12/27/2036     PHQ-2 (once per calendar year)  Completed     INFLUENZA VACCINE  Completed     Pneumococcal Vaccine: 65+ Years  Completed     URINALYSIS  Completed     COVID-19 Vaccine  Completed     IPV IMMUNIZATION  Aged Out     MENINGITIS IMMUNIZATION  Aged Out     HEPATITIS C SCREENING  Discontinued     MAMMO SCREENING  Discontinued     Pertinent mammograms are reviewed under the imaging tab.    Review of Systems  Constitutional, HEENT, cardiovascular, pulmonary, gi and gu systems are negative, except as otherwise noted.    OBJECTIVE:   BP (!) 152/72   Pulse 72   Temp 97.4  F (36.3  C) (Temporal)   Resp 17   Ht 1.613 m (5' 3.5\")   Wt 54 kg (119 lb)   LMP  (LMP Unknown)   SpO2 98%   BMI 20.75 kg/m   Estimated body mass index is 20.75 kg/m  as calculated from the following:    Height as of this encounter: 1.613 m (5' 3.5\").    Weight as of this encounter: 54 kg (119 lb).  Physical Exam  GENERAL: healthy, alert and no distress  EYES: Eyes grossly normal to inspection, PERRL and conjunctivae and sclerae normal  HENT: ear canals and TM's normal, nose and mouth without ulcers or lesions  NECK: no adenopathy, no asymmetry, masses, or scars and thyroid normal to palpation  RESP: lungs clear to auscultation - no rales, rhonchi or wheezes  CV: regular rate and rhythm, normal S1 S2, no S3 or S4, no murmur, click or rub, no peripheral edema and peripheral pulses strong  ABDOMEN: soft, nontender, no hepatosplenomegaly, no masses and bowel sounds normal  MS: no gross musculoskeletal defects noted, no edema    Diagnostic Test Results:  Labs reviewed in Epic    ASSESSMENT / PLAN:   Kelly was seen today for wellness visit.    Diagnoses and all orders for this visit:    Medicare annual wellness visit, subsequent  - Home safety information was reviewed if pertinent for falls prevention: regular checkups " with vision and hearing, mindful medication use rufina with OTCs, using any assisted walking devices, adequate shoes and feet wear, avoiding loose rugs, safety additions to the home if needed, keeping the body in good shape with regular exercise especially walking, and limiting alcohol intake.  - Social history was reviewed  - Patient is independent with activities of daily living  - We reviewed active symptoms and discussed management  - We reviewed list of healthcare providers for this patient.  - We also reviewed PHQ 9    Essential hypertension  Has recently been advised by her cardiologist to increase lisinopril to 10 mg daily so I will send this prescription as well as BMP recheck in 1 week along with her additional routine labs at that time.  She does tend to have whitecoat hypertension here in the office, last home number was 130/78.  -     lisinopril (ZESTRIL) 10mg  -     Basic metabolic panel  (Ca, Cl, CO2, Creat, Gluc, K, Na, BUN); Future  -     Albumin Random Urine Quantitative with Creat Ratio; Future     Postmenopausal status  Osteoporosis, unspecified osteoporosis type, unspecified pathological fracture presence  Her last DEXA was 7/27/2018 when she was on a Fosamax holiday, showed she had osteopenia.  She has previously completed several years of Fosamax, most recently from July 2018 to November 2019.  She declines IV medication.  She is also had hormone replacement therapy in the past. She declines going back to the osteoporosis clinic at this time.    - DEXA done in 12/2021; not yet due  - Encourage calcium, vitamin D and weightbearing activity     Nonrheumatic severe mitral valve regurgitation  s/p Mitral clip x1, residual mild-mod MR  Moderate TR  s/p clip x1, MR reduced to trace. Post op TTE with mild-moderate MR, mean gradient of 4mmHg.   - Eliquis 5mg bid + aspirin 81mg   - follow up with valve clinic & Dr. Kim      Paroxysmal atrial fibrillation   Followed by cardiology  - amiodarone 200 at  bedtime. Monitoring labs today per Dr. Kim (LFTs reviewed/normal on 2/14/2023  -  PFTs ordered.  - TSH monitoring is performed.  Does have subclinical hypothyroidism as noted below.   - metoprolol succ 50mg AM and PM   - Eliquis 5mg bid       Encounter for therapeutic drug monitoring  -     General PFT Lab (Please always keep checked); Future  -     Pulmonary Function Test; Future    HFpEF   - lasix 20mg every day  - lisinopril 5mg every day     Moderate non-obstructive CAD  - rosuvstatin 20mg every day  - aspirin 81mg daily   - lipids will be rechecked (last done in November)     Stage 3A CKD  BMP reviewed; stable     Subclinical hypothyroidism  subclinical hypothyroidism based on previously noted/elevated TSH and normal T4; starting in June 2020 PRIOR to initiation of amiodarone, though possibly impacted by this medication given flucuations. Her cardiologist Dr. Kim and I had discussed her options and we both feel she should not yet be started on T4 supplementation. I spent time again today to review the dx, options for tx when TSH is >10 in her age, and our concerns. Kelly was made aware of our recommendations to not treat this given her risk factors of CAD, tendency for tachyarrhythemia at baseline; increasing risk of angina or arrhythmia further if on thyroid replacment. She is comfortable with the plan  - Most recent TSH improve to 7.58 (from 9.55)    Hair loss  May be related to the subclinical hypothyroidism as above.   -     Ferritin; Future      Patient has been advised of split billing requirements and indicates understanding: Yes      COUNSELING:  Reviewed preventive health counseling, as reflected in patient instructions        She reports that she has never smoked. She has never used smokeless tobacco.      Appropriate preventive services were discussed with this patient, including applicable screening as appropriate for cardiovascular disease, diabetes, osteopenia/osteoporosis, and glaucoma.  As  appropriate for age/gender, discussed screening for colorectal cancer, prostate cancer, breast cancer, and cervical cancer. Checklist reviewing preventive services available has been given to the patient.    Reviewed patients plan of care and provided an AVS. The Basic Care Plan (routine screening as documented in Health Maintenance) for Kelly meets the Care Plan requirement. This Care Plan has been established and reviewed with the Patient.      Selina Pozo MD  [facility]    Identified Health Risks:Information on urinary incontinence and treatment options given to patient.

## 2023-02-17 NOTE — PATIENT INSTRUCTIONS
There is a new shingles vaccine that is 97% effective. It is the Shingrix vaccine and is recommended for those 50 and older. We recommend the vaccine even if you have had shingles or the older vaccine against shingles- Zostavax. Insurance coverage for the vaccine varies. I recommend you check with your insurance to verify if, when and where it is covered. The vaccine is covered by most commercial insurance but Medicare does not cover the vaccine. It may be covered by Medicare Part D (your drug/pharmacy plan) and sometimes it is covered only at a pharmacy instead of here in the clinic. If it is covered in the clinic, you may schedule a nurse visit anytime to get the first dose of the vaccine. The second dose is recommended two months after the first and should be gotten by 6 months after the first.   About 10% of people will get a sore, red reaction at the site of the injection. Some people may also feel a little sick or nauseated after the injection. This may happen with either the first and/or second vaccine.      Patient Education   Personalized Prevention Plan  You are due for the preventive services outlined below.  Your care team is available to assist you in scheduling these services.  If you have already completed any of these items, please share that information with your care team to update in your medical record.  Health Maintenance Due   Topic Date Due    Heart Failure Action Plan  Never done    Zoster (Shingles) Vaccine (1 of 2) Never done    Complete Blood Count  11/27/2022    Annual Wellness Visit  12/10/2022    Kidney Microalbumin Urine Test  12/10/2022    Hemoglobin  02/08/2023       Urinary Incontinence, Female (Adult)   Urinary incontinence means loss of bladder control. This problem affects many women, especially as they get older. If you have incontinence, you may be embarrassed to ask for help. But know that this problem can be treated.   Types of Incontinence  There are different types of  incontinence. Two of the main types are described here. You can have more than one type.   Stress incontinence. With this type, urine leaks when pressure (stress) is put on the bladder. This may happen when you cough, sneeze, or laugh. Stress incontinence most often occurs because the pelvic floor muscles that support the bladder and urethra are weak. This can happen after pregnancy and vaginal childbirth or a hysterectomy. It can also be due to excess body weight or hormone changes.  Urge incontinence (also called overactive bladder). With this type, a sudden urge to urinate is felt often. This may happen even though there may not be much urine in the bladder. The need to urinate often during the night is common. Urge incontinence most often occurs because of bladder spasms. This may be due to bladder irritation or infection. Damage to bladder nerves or pelvic muscles, constipation, and certain medicines can also lead to urge incontinence.  Treatment depends on the cause. Further evaluation is needed to find the type you have. This will likely include an exam and certain tests. Based on the results, you and your healthcare provider can then plan treatment. Until a diagnosis is made, the home care tips below can help ease symptoms.   Home care  Do pelvic floor muscle exercises, if they are prescribed. The pelvic floor muscles help support the bladder and urethra. Many women find that their symptoms improve when doing special exercises that strengthen these muscles. To do the exercises, contract the muscles you would use to stop your stream of urine. But do this when you re not urinating. Hold for 10 seconds, then relax. Repeat 10 to 20 times in a row, at least 3 times a day. Your healthcare provider may give you other instructions for how to do the exercises and how often.  Keep a bladder diary. This helps track how often and how much you urinate over a set period of time. Bring this diary with you to your next  visit with the provider. The information can help your provider learn more about your bladder problem.  Lose weight, if advised to by your provider. Extra weight puts pressure on the bladder. Your provider can help you create a weight-loss plan that s right for you. This may include exercising more and making certain diet changes.  Don't have foods and drinks that may irritate the bladder. These can include alcohol and caffeinated drinks.  Quit smoking. Smoking and other tobacco use can lead to a long-term (chronic) cough that strains the pelvic floor muscles. Smoking may also damage the bladder and urethra. Talk with your provider about treatments or methods you can use to quit smoking.  If drinking large amounts of fluid makes you have symptoms, you may be advised to limit your fluid intake. You may also be advised to drink most of your fluids during the day and to limit fluids at night.  If you re worried about urine leakage or accidents, you may wear absorbent pads to catch urine. Change the pads often. This helps reduce discomfort. It may also reduce the risk of skin or bladder infections.    Follow-up care  Follow up with your healthcare provider, or as directed. It may take some to find the right treatment for your problem. But healthy lifestyle changes can be made right away. These include such things as exercising on a regular basis, eating a healthy diet, losing weight (if needed), and quitting smoking. Your treatment plan may include special therapies or medicines. Certain procedures or surgery may also be options. Talk about any questions you have with your provider.   When to seek medical advice  Call the healthcare provider right away if any of these occur:  Fever of 100.4 F (38 C) or higher, or as directed by your provider  Bladder pain or fullness  Belly swelling  Nausea or vomiting  Back pain  Weakness, dizziness, or fainting  [place] last reviewed this educational content on 1/1/2020 2000-2021  [employer]. All rights reserved. This information is not intended as a substitute for professional medical care. Always follow your healthcare professional's instructions.

## 2023-02-27 ENCOUNTER — LAB (OUTPATIENT)
Dept: LAB | Facility: CLINIC | Age: 78
End: 2023-02-27
Payer: COMMERCIAL

## 2023-02-27 DIAGNOSIS — N18.31 STAGE 3A CHRONIC KIDNEY DISEASE (H): ICD-10-CM

## 2023-02-27 DIAGNOSIS — L65.9 HAIR LOSS: ICD-10-CM

## 2023-02-27 DIAGNOSIS — I10 ESSENTIAL HYPERTENSION: ICD-10-CM

## 2023-02-27 DIAGNOSIS — Z00.00 MEDICARE ANNUAL WELLNESS VISIT, SUBSEQUENT: ICD-10-CM

## 2023-02-27 LAB
ANION GAP SERPL CALCULATED.3IONS-SCNC: 12 MMOL/L (ref 7–15)
BUN SERPL-MCNC: 17.8 MG/DL (ref 8–23)
CALCIUM SERPL-MCNC: 11.7 MG/DL (ref 8.8–10.2)
CHLORIDE SERPL-SCNC: 105 MMOL/L (ref 98–107)
CHOLEST SERPL-MCNC: 199 MG/DL
CREAT SERPL-MCNC: 1.33 MG/DL (ref 0.51–0.95)
CREAT UR-MCNC: 193 MG/DL
DEPRECATED HCO3 PLAS-SCNC: 25 MMOL/L (ref 22–29)
ERYTHROCYTE [DISTWIDTH] IN BLOOD BY AUTOMATED COUNT: 13.7 % (ref 10–15)
FERRITIN SERPL-MCNC: 46 NG/ML (ref 11–328)
GFR SERPL CREATININE-BSD FRML MDRD: 40 ML/MIN/1.73M2
GLUCOSE SERPL-MCNC: 102 MG/DL (ref 70–99)
HBA1C MFR BLD: 5.9 % (ref 0–5.6)
HCT VFR BLD AUTO: 37.7 % (ref 35–47)
HDLC SERPL-MCNC: 60 MG/DL
HGB BLD-MCNC: 12.4 G/DL (ref 11.7–15.7)
LDLC SERPL CALC-MCNC: 114 MG/DL
MCH RBC QN AUTO: 33.2 PG (ref 26.5–33)
MCHC RBC AUTO-ENTMCNC: 32.9 G/DL (ref 31.5–36.5)
MCV RBC AUTO: 101 FL (ref 78–100)
MICROALBUMIN UR-MCNC: 55.2 MG/L
MICROALBUMIN/CREAT UR: 28.6 MG/G CR (ref 0–25)
NONHDLC SERPL-MCNC: 139 MG/DL
PLATELET # BLD AUTO: 192 10E3/UL (ref 150–450)
POTASSIUM SERPL-SCNC: 4.3 MMOL/L (ref 3.4–5.3)
RBC # BLD AUTO: 3.73 10E6/UL (ref 3.8–5.2)
SODIUM SERPL-SCNC: 142 MMOL/L (ref 136–145)
TRIGL SERPL-MCNC: 124 MG/DL
WBC # BLD AUTO: 6.1 10E3/UL (ref 4–11)

## 2023-02-27 PROCEDURE — 82043 UR ALBUMIN QUANTITATIVE: CPT

## 2023-02-27 PROCEDURE — 36415 COLL VENOUS BLD VENIPUNCTURE: CPT

## 2023-02-27 PROCEDURE — 80048 BASIC METABOLIC PNL TOTAL CA: CPT

## 2023-02-27 PROCEDURE — 82728 ASSAY OF FERRITIN: CPT

## 2023-02-27 PROCEDURE — 82570 ASSAY OF URINE CREATININE: CPT

## 2023-02-27 PROCEDURE — 80061 LIPID PANEL: CPT

## 2023-02-27 PROCEDURE — 83036 HEMOGLOBIN GLYCOSYLATED A1C: CPT

## 2023-02-27 PROCEDURE — 85027 COMPLETE CBC AUTOMATED: CPT

## 2023-02-28 ENCOUNTER — TELEPHONE (OUTPATIENT)
Dept: CARDIOLOGY | Facility: CLINIC | Age: 78
End: 2023-02-28
Payer: COMMERCIAL

## 2023-02-28 NOTE — TELEPHONE ENCOUNTER
----- Message from Dayne Kim MD sent at 2/27/2023  8:12 PM CST -----  Needs pfts , I believe pulmonary scheduled  ty mdg

## 2023-03-15 ENCOUNTER — TELEPHONE (OUTPATIENT)
Dept: CARDIOLOGY | Facility: CLINIC | Age: 78
End: 2023-03-15
Payer: COMMERCIAL

## 2023-03-15 RX ORDER — ALBUTEROL SULFATE 0.83 MG/ML
2.5 SOLUTION RESPIRATORY (INHALATION) ONCE
Status: COMPLETED | OUTPATIENT
Start: 2023-03-16 | End: 2023-03-16

## 2023-03-16 ENCOUNTER — HOSPITAL ENCOUNTER (OUTPATIENT)
Dept: RESPIRATORY THERAPY | Facility: CLINIC | Age: 78
Discharge: HOME | End: 2023-03-16
Attending: FAMILY MEDICINE | Admitting: FAMILY MEDICINE
Payer: COMMERCIAL

## 2023-03-16 DIAGNOSIS — Z51.81 ENCOUNTER FOR THERAPEUTIC DRUG MONITORING: ICD-10-CM

## 2023-03-16 DIAGNOSIS — R06.09 DOE (DYSPNEA ON EXERTION): ICD-10-CM

## 2023-03-16 PROCEDURE — 94060 EVALUATION OF WHEEZING: CPT

## 2023-03-16 PROCEDURE — 94060 EVALUATION OF WHEEZING: CPT | Mod: 26 | Performed by: INTERNAL MEDICINE

## 2023-03-16 PROCEDURE — 999N000157 HC STATISTIC RCP TIME EA 10 MIN

## 2023-03-16 PROCEDURE — 94726 PLETHYSMOGRAPHY LUNG VOLUMES: CPT | Mod: 26 | Performed by: INTERNAL MEDICINE

## 2023-03-16 PROCEDURE — 94726 PLETHYSMOGRAPHY LUNG VOLUMES: CPT

## 2023-03-16 PROCEDURE — 250N000009 HC RX 250: Performed by: INTERNAL MEDICINE

## 2023-03-16 PROCEDURE — 94729 DIFFUSING CAPACITY: CPT

## 2023-03-16 PROCEDURE — 94729 DIFFUSING CAPACITY: CPT | Mod: 26 | Performed by: INTERNAL MEDICINE

## 2023-03-16 RX ADMIN — ALBUTEROL SULFATE 2.5 MG: 2.5 SOLUTION RESPIRATORY (INHALATION) at 07:17

## 2023-03-16 NOTE — TELEPHONE ENCOUNTER
Lisinopril increased to 10 mg daily, pt called in the following since the increase:    131/73  129/70  131/71  121/70  141/73  133/70  123/65  130/67  141/78  127/68    Dr Kim - for your review.  -[facility]

## 2023-03-17 LAB
DLCOCOR-%PRED-PRE: 74 %
DLCOCOR-PRE: 14.46 ML/MIN/MMHG
DLCOUNC-%PRED-PRE: 71 %
DLCOUNC-PRE: 13.99 ML/MIN/MMHG
DLCOUNC-PRED: 19.5 ML/MIN/MMHG
ERV-%PRED-PRE: 78 %
ERV-PRE: 0.7 L
ERV-PRED: 0.89 L
EXPTIME-PRE: 6.71 SEC
FEF2575-%PRED-POST: 72 %
FEF2575-%PRED-PRE: 61 %
FEF2575-POST: 1.17 L/SEC
FEF2575-PRE: 1 L/SEC
FEF2575-PRED: 1.62 L/SEC
FEFMAX-%PRED-PRE: 66 %
FEFMAX-PRE: 3.34 L/SEC
FEFMAX-PRED: 5.04 L/SEC
FEV1-%PRED-PRE: 71 %
FEV1-PRE: 1.45 L
FEV1FEV6-PRE: 72 %
FEV1FEV6-PRED: 78 %
FEV1FVC-PRE: 72 %
FEV1FVC-PRED: 77 %
FEV1SVC-PRE: 80 %
FEV1SVC-PRED: 67 %
FIFMAX-PRE: 2.02 L/SEC
FRCPLETH-%PRED-PRE: 111 %
FRCPLETH-PRE: 3.09 L
FRCPLETH-PRED: 2.78 L
FVC-%PRED-PRE: 75 %
FVC-PRE: 2.01 L
FVC-PRED: 2.66 L
IC-%PRED-PRE: 52 %
IC-PRE: 1.11 L
IC-PRED: 2.11 L
RVPLETH-%PRED-PRE: 106 %
RVPLETH-PRE: 2.39 L
RVPLETH-PRED: 2.25 L
TLCPLETH-%PRED-PRE: 82 %
TLCPLETH-PRE: 4.2 L
TLCPLETH-PRED: 5.11 L
VA-%PRED-PRE: 89 %
VA-PRE: 4.31 L
VC-%PRED-PRE: 60 %
VC-PRE: 1.81 L
VC-PRED: 3 L

## 2023-03-17 NOTE — TELEPHONE ENCOUNTER
Pt updated and had no questions.  -ra    ===View-only below this line===  ----- Message -----  From: Dayne Kim MD  Sent: 3/16/2023   6:07 PM CDT  To: Anamaria Patel RN    These numbers look good.  Thank you md

## 2023-03-22 DIAGNOSIS — Z79.899 AT RISK FOR AMIODARONE TOXICITY WITH LONG TERM USE: ICD-10-CM

## 2023-03-22 DIAGNOSIS — R94.2 ABNORMAL PFT: Primary | ICD-10-CM

## 2023-03-22 DIAGNOSIS — Z91.89 AT RISK FOR AMIODARONE TOXICITY WITH LONG TERM USE: ICD-10-CM

## 2023-03-27 ENCOUNTER — OFFICE VISIT (OUTPATIENT)
Dept: PULMONOLOGY | Facility: CLINIC | Age: 78
End: 2023-03-27
Payer: COMMERCIAL

## 2023-03-27 ENCOUNTER — TELEPHONE (OUTPATIENT)
Dept: CARDIOLOGY | Facility: CLINIC | Age: 78
End: 2023-03-27

## 2023-03-27 VITALS
SYSTOLIC BLOOD PRESSURE: 136 MMHG | WEIGHT: 121.2 LBS | OXYGEN SATURATION: 99 % | BODY MASS INDEX: 21.13 KG/M2 | DIASTOLIC BLOOD PRESSURE: 80 MMHG | HEART RATE: 71 BPM

## 2023-03-27 DIAGNOSIS — R94.2 ABNORMAL PFT: ICD-10-CM

## 2023-03-27 DIAGNOSIS — Z91.89 AT RISK FOR AMIODARONE TOXICITY WITH LONG TERM USE: ICD-10-CM

## 2023-03-27 DIAGNOSIS — I48.0 PAF (PAROXYSMAL ATRIAL FIBRILLATION) (H): Primary | ICD-10-CM

## 2023-03-27 DIAGNOSIS — Z79.899 AT RISK FOR AMIODARONE TOXICITY WITH LONG TERM USE: ICD-10-CM

## 2023-03-27 PROCEDURE — 99214 OFFICE O/P EST MOD 30 MIN: CPT | Performed by: INTERNAL MEDICINE

## 2023-03-27 RX ORDER — LISINOPRIL 5 MG/1
5 TABLET ORAL EVERY 24 HOURS
COMMUNITY
Start: 2023-03-18 | End: 2023-03-27

## 2023-03-27 NOTE — TELEPHONE ENCOUNTER
EP follow-up ordered.  Recommendations discussed with pt.  Pt verbalized understanding and had no questions.  Call transferred to .  -[facility]    ----- Message from Mario Alberto Gutiérrez MD sent at 3/27/2023 10:28 AM CDT -----  Regarding: RE: mutual patient  Thanks Klaus, I think that's a great plan. As of now I'm just planning for annual follow up with PFTs but let me know if I can help with something before then.  Best,  AS  ----- Message -----  From: Dayne Kim MD  Sent: 3/27/2023  10:24 AM CDT  To: Anamaria Patel RN, Mario Alberto Gutiérrez MD, #  Subject: RE: mutual patient                               Thank you for your note.  I think amiodarone toxicity is always a concern.  She is on low-dose amiodarone however with that being said I am going to request that she be seen by my EP colleagues.  We now have 2 EP colleagues that are more aggressive with respect to offering ablation for atrial fibrillation.  She was very symptomatic related to the atrial fibrillation in the past.  Anamaria can we please quest consultation with one of the EP physicians.  Request is for atrial fibrillation and question of discontinuation of amiodarone.  As far as the TSH goes it is mildly elevated and I believe that we have been just monitoring.  Selina do you have any additional thoughts.  Thanks Klaus SEAY once we have the appointment time I can write her with the recommendation.  Please let me know.  ----- Message -----  From: Mario Alberto Gutiérrez MD  Sent: 3/27/2023  10:13 AM CDT  To: Dayne Kim MD  Subject: mutual patient                                   Betito Enrique,  I saw Kelly today for annual follow up with PFTs. She's doing very well; no concerning symptoms.  TLC and Dlco have declined very slightly since last year. I believe she also has some thyroid issues, though is not on any treatment.  She is OK with annual follow up and PFT monitoring but would you be concerned about future pulmonary toxicity from the amio? I get a bit nervous  when I see the DLco start to decline like that.  Appreciate your input.  Best,  AS

## 2023-03-27 NOTE — LETTER
3/27/2023         RE: Kelly Robledo  [address] [place] [address]        Dear Colleague,    Thank you for referring your patient, Kelly Robledo, to the [facility]. Please see a copy of my visit note below.    Pulmonary Clinic Follow-up Visit    Impression: 77F with a history of atrial fibrillation on amiodarone presents for annual follow up for lung exam and PFTs to monitor for amiodarone pulmonary toxicity. She remains in excellent health and without any concerning respiratory symptoms. CT chest from June 2022 did not show any evidence of pulmonary toxicity (although this was not a high resolution scan). Recent PFTs showed normal spirometry and TLC, but slight reduction in diffusing capacity for CO.     We discussed long term risks of amiodarone toxicity. Since she is asymptomatic, it would be reasonable to continue monitoring her lung function with annual PFTs. She is comfortable with this plan. I will also plan to discuss her case with her cardiologist, Dr. Kim.    Follow up in 1 year with PFTs, as above.  All questions answered.     Mario Alberto Gutiérrez MD (Avi)  [facility] ([facility])  Clinic [phone]  Fax [phone]      CCx: amiodarone use, annual visit with PFTs    HPI: Interim history: Kelly was last seen by Dr. Ramos on 5/13/2022 and was told she could follow up as needed.  She returns to clinic after recent PFTs to go over those results.  Today, she reports she's doing very well. No dyspnea, cough, or other concerning symptoms.  She has been on amiodarone for about 3 years.    ROS:  A 12-system review was obtained and was negative with the exception of the symptoms endorsed in the history of present illness.    PMH:  Past Medical History:   Diagnosis Date     Abdominal pain      Atrial fibrillation (H)      Diverticulosis      Moderate mitral regurgitation      NSVT (nonsustained ventricular tachycardia) 6/17/2020      Palpitations        PSH:  Past Surgical History:   Procedure Laterality Date     BIOPSY BREAST Bilateral     benign     CV CORONARY ANGIOGRAM N/A 6/18/2020    Procedure: Coronary Angiogram;  Surgeon: Sergey Conner MD;  Location: [facility] Cath Lab;  Service: Cardiology     CV CORONARY ANGIOGRAM N/A 1/13/2021    Procedure: Coronary Angiogram;  Surgeon: Klaus Fitzpatrick MD;  Location: [facility] Cardiac Cath Lab;  Service: Cardiology     CV LEFT HEART CATHETERIZATION WITHOUT LEFT VENTRICULOGRAM Left 6/18/2020    Procedure: Left Heart Catheterization Without Left Ventriculogram;  Surgeon: Sergey Conner MD;  Location: [facility] Cath Lab;  Service: Cardiology     CV RIGHT HEART CATHETERIZATION N/A 6/18/2020    Procedure: Right Heart Catheterization;  Surgeon: Sergey Conner MD;  Location: [facility] Cath Lab;  Service: Cardiology     CV RIGHT HEART CATHETERIZATION N/A 1/13/2021    Procedure: Right Heart Catheterization;  Surgeon: Klaus Fitzpatrick MD;  Location: [facility] Cardiac Cath Lab;  Service: Cardiology     HYSTERECTOMY  1970's     OOPHORECTOMY Bilateral 1970's     TRANSCATHETER MITRAL VALVE REPAIR N/A 2/1/2021    Procedure: CV MITRAL CLIP;  Surgeon: Klaus Fitzpatrick MD;  Location: [facility] CARDIAC CATH LAB       Allergies:  Allergies   Allergen Reactions     Penicillins Hives       Family HX:  Family History   Problem Relation Age of Onset     Breast Cancer Sister 47.00       Social Hx:  Social History     Socioeconomic History     Marital status:      Spouse name: Not on file     Number of children: Not on file     Years of education: Not on file     Highest education level: Not on file   Occupational History     Not on file   Tobacco Use     Smoking status: Never     Smokeless tobacco: Never   Substance and Sexual Activity     Alcohol use: No     Drug use: No     Sexual activity: Not on file   Other Topics Concern     Parent/sibling w/ CABG, MI or angioplasty before 65F  55M? Not Asked   Social History Narrative    . No children. Family in the [place] area.   Enjoys walking- very regular with this especially in [place] when they used to vacation there for 6 months of the year.   Likes reading - romantic happy endings.   Never alcohol or smoking.  Selina Pozo MD       Social Determinants of Health     Financial Resource Strain: Not on file   Food Insecurity: Not on file   Transportation Needs: Not on file   Physical Activity: Not on file   Stress: Not on file   Social Connections: Not on file   Intimate Partner Violence: Not on file   Housing Stability: Not on file       Current Meds:  Current Outpatient Medications   Medication Sig Dispense Refill     amiodarone (PACERONE) 200 MG tablet TAKE 1 TABLET (200 MG) BY MOUTH EVERY MORNING 90 tablet 1     clindamycin (CLEOCIN) 300 MG capsule Take 2 capsules (600 mg total) by mouth once as needed (30-60 minutes prior to any dental visit. Save remaining for next visit. 4 capsule 1     ELIQUIS ANTICOAGULANT 5 MG tablet TAKE 1 TABLET BY MOUTH TWICE A  tablet 1     ezetimibe (ZETIA) 10 MG tablet TAKE 1 TABLET (10 MG) BY MOUTH DAILY. 90 tablet 1     lisinopril (ZESTRIL) 10 MG tablet Take 1 tablet (10 mg) by mouth every 24 hours 90 tablet 3     metoprolol succinate ER (TOPROL XL) 50 MG 24 hr tablet Take 1 tablet (50 mg) by mouth daily 180 tablet 3     multivitamin, therapeutic (THERA-VIT) TABS tablet Take 1 tablet by mouth daily       rosuvastatin (CRESTOR) 40 MG tablet TAKE 1 TABLET BY MOUTH EVERY DAY 90 tablet 0       Physical Exam:  /80 (BP Location: Right arm)   Pulse 71   Wt 55 kg (121 lb 3.2 oz)   LMP  (LMP Unknown)   SpO2 99%   BMI 21.13 kg/m    Gen: awake, alert, oriented, no distress  HEENT: nasal turbinates are unremarkable, no oropharyngeal lesions, no cervical or supraclavicular lymphadenopathy  CV: RRR, no M/G/R  Resp: CTAB, no focal crackles or wheezes  Skin: no apparent rashes  Ext: no cyanosis, clubbing or  edema  Neuro: alert, nonfocal    Labs:  reviewed    Imaging studies:  EXAM: CT CHEST W/O CONTRAST  LOCATION: [facility]  DATE/TIME: 6/10/2022 10:04 AM     INDICATION: Amiodarone toxicity monitoring. Baseline imaging.  COMPARISON: None.  TECHNIQUE: CT chest without IV contrast. Multiplanar reformats were obtained. Dose reduction techniques were used.  CONTRAST: None.     FINDINGS:   LUNGS AND PLEURA: Benign calcified granulomas right lower lobe with benign calcified right hilar and right mediastinal nodes from previous old granulomatous disease. Focal cluster of tiny nodules in the right upper lobe most likely infectious or   inflammatory in etiology given the pattern. Favor more chronic but not calcified. Lungs otherwise clear, nothing suggestive of amiodarone pulmonary toxicity related infiltrates..     MEDIASTINUM/AXILLAE: No lymphadenopathy. No thoracic aortic aneurysm.     CORONARY ARTERY CALCIFICATION: Moderate.     UPPER ABDOMEN: No significant finding.     MUSCULOSKELETAL: Unremarkable.                                                                      IMPRESSION:   1.  Nothing concerning for amiodarone pulmonary toxicity.     2.  Benign calcified granulomas right lower lobe and benign calcified hilar nodes and right mediastinal nodes.     3.  Focal tiny cluster of nodules right upper lobe most likely from prior infection or inflammatory change.    Pulmonary Function Testing  3/16/2023  FEV1 1.45L 71%  FVC 75%  No BD response  Ratio 0.72  TLC 4.2L, 82%  DLco 74% yady for hgb  Compared to prior testing from 2022, slight decline in DLco corected, improvement in spirometry and very slight decline in TLC; the latter is still considered within normal limits.    Previous testing 2022  TLC 4.42L, 86%  DLco yady 85%         Again, thank you for allowing me to participate in the care of your patient.        Sincerely,        Mario Alberto Gutiérrez MD

## 2023-03-27 NOTE — PROGRESS NOTES
Pulmonary Clinic Follow-up Visit    Impression: 77F with a history of atrial fibrillation on amiodarone presents for annual follow up for lung exam and PFTs to monitor for amiodarone pulmonary toxicity. She remains in excellent health and without any concerning respiratory symptoms. CT chest from June 2022 did not show any evidence of pulmonary toxicity (although this was not a high resolution scan). Recent PFTs showed normal spirometry and TLC, but slight reduction in diffusing capacity for CO.     We discussed long term risks of amiodarone toxicity. Since she is asymptomatic, it would be reasonable to continue monitoring her lung function with annual PFTs. She is comfortable with this plan. I will also plan to discuss her case with her cardiologist, Dr. Kim.    Follow up in 1 year with PFTs, as above.  All questions answered.     Mario Alberto Gutiérrez MD (Avi)  [facility]monary [facility] Critical [facility] ([facility])  Clinic [phone]  Fax [phone]      CCx: amiodarone use, annual visit with PFTs    HPI: Interim history: Kelly was last seen by Dr. Ramos on 5/13/2022 and was told she could follow up as needed.  She returns to clinic after recent PFTs to go over those results.  Today, she reports she's doing very well. No dyspnea, cough, or other concerning symptoms.  She has been on amiodarone for about 3 years.    ROS:  A 12-system review was obtained and was negative with the exception of the symptoms endorsed in the history of present illness.    PMH:  Past Medical History:   Diagnosis Date     Abdominal pain      Atrial fibrillation (H)      Diverticulosis      Moderate mitral regurgitation      NSVT (nonsustained ventricular tachycardia) 6/17/2020     Palpitations        PSH:  Past Surgical History:   Procedure Laterality Date     BIOPSY BREAST Bilateral     benign     CV CORONARY ANGIOGRAM N/A 6/18/2020    Procedure: Coronary Angiogram;  Surgeon: Sergey Conner MD;  Location: [facility]  Lab;  Service: Cardiology     CV CORONARY ANGIOGRAM N/A 1/13/2021    Procedure: Coronary Angiogram;  Surgeon: Klaus Fitzpatrick MD;  Location: [facility] Cardiac Cath Lab;  Service: Cardiology     CV LEFT HEART CATHETERIZATION WITHOUT LEFT VENTRICULOGRAM Left 6/18/2020    Procedure: Left Heart Catheterization Without Left Ventriculogram;  Surgeon: Sergey Conner MD;  Location: [facility] Cath Lab;  Service: Cardiology     CV RIGHT HEART CATHETERIZATION N/A 6/18/2020    Procedure: Right Heart Catheterization;  Surgeon: Sergey Conner MD;  Location: [facility] Cath Lab;  Service: Cardiology     CV RIGHT HEART CATHETERIZATION N/A 1/13/2021    Procedure: Right Heart Catheterization;  Surgeon: Klaus Fitzpatrick MD;  Location: [facility] Cardiac Cath Lab;  Service: Cardiology     HYSTERECTOMY  1970's     OOPHORECTOMY Bilateral 1970's     TRANSCATHETER MITRAL VALVE REPAIR N/A 2/1/2021    Procedure: CV MITRAL CLIP;  Surgeon: Klaus Fitzpatrick MD;  Location: [facility] CARDIAC CATH LAB       Allergies:  Allergies   Allergen Reactions     Penicillins Hives       Family HX:  Family History   Problem Relation Age of Onset     Breast Cancer Sister 47.00       Social Hx:  Social History     Socioeconomic History     Marital status:      Spouse name: Not on file     Number of children: Not on file     Years of education: Not on file     Highest education level: Not on file   Occupational History     Not on file   Tobacco Use     Smoking status: Never     Smokeless tobacco: Never   Substance and Sexual Activity     Alcohol use: No     Drug use: No     Sexual activity: Not on file   Other Topics Concern     Parent/sibling w/ CABG, MI or angioplasty before 65F 55M? Not Asked   Social History Narrative    . No children. Family in the [place] area.   Enjoys walking- very regular with this especially in [place] when they used to vacation there for 6 months of the year.   Likes reading - romantic happy endings.   Never  alcohol or smoking.  Selina Pozo MD       Social Determinants of Health     Financial Resource Strain: Not on file   Food Insecurity: Not on file   Transportation Needs: Not on file   Physical Activity: Not on file   Stress: Not on file   Social Connections: Not on file   Intimate Partner Violence: Not on file   Housing Stability: Not on file       Current Meds:  Current Outpatient Medications   Medication Sig Dispense Refill     amiodarone (PACERONE) 200 MG tablet TAKE 1 TABLET (200 MG) BY MOUTH EVERY MORNING 90 tablet 1     clindamycin (CLEOCIN) 300 MG capsule Take 2 capsules (600 mg total) by mouth once as needed (30-60 minutes prior to any dental visit. Save remaining for next visit. 4 capsule 1     ELIQUIS ANTICOAGULANT 5 MG tablet TAKE 1 TABLET BY MOUTH TWICE A  tablet 1     ezetimibe (ZETIA) 10 MG tablet TAKE 1 TABLET (10 MG) BY MOUTH DAILY. 90 tablet 1     lisinopril (ZESTRIL) 10 MG tablet Take 1 tablet (10 mg) by mouth every 24 hours 90 tablet 3     metoprolol succinate ER (TOPROL XL) 50 MG 24 hr tablet Take 1 tablet (50 mg) by mouth daily 180 tablet 3     multivitamin, therapeutic (THERA-VIT) TABS tablet Take 1 tablet by mouth daily       rosuvastatin (CRESTOR) 40 MG tablet TAKE 1 TABLET BY MOUTH EVERY DAY 90 tablet 0       Physical Exam:  /80 (BP Location: Right arm)   Pulse 71   Wt 55 kg (121 lb 3.2 oz)   LMP  (LMP Unknown)   SpO2 99%   BMI 21.13 kg/m    Gen: awake, alert, oriented, no distress  HEENT: nasal turbinates are unremarkable, no oropharyngeal lesions, no cervical or supraclavicular lymphadenopathy  CV: RRR, no M/G/R  Resp: CTAB, no focal crackles or wheezes  Skin: no apparent rashes  Ext: no cyanosis, clubbing or edema  Neuro: alert, nonfocal    Labs:  reviewed    Imaging studies:  EXAM: CT CHEST W/O CONTRAST  LOCATION: [facility]  DATE/TIME: 6/10/2022 10:04 AM     INDICATION: Amiodarone toxicity monitoring. Baseline imaging.  COMPARISON:  None.  TECHNIQUE: CT chest without IV contrast. Multiplanar reformats were obtained. Dose reduction techniques were used.  CONTRAST: None.     FINDINGS:   LUNGS AND PLEURA: Benign calcified granulomas right lower lobe with benign calcified right hilar and right mediastinal nodes from previous old granulomatous disease. Focal cluster of tiny nodules in the right upper lobe most likely infectious or   inflammatory in etiology given the pattern. Favor more chronic but not calcified. Lungs otherwise clear, nothing suggestive of amiodarone pulmonary toxicity related infiltrates..     MEDIASTINUM/AXILLAE: No lymphadenopathy. No thoracic aortic aneurysm.     CORONARY ARTERY CALCIFICATION: Moderate.     UPPER ABDOMEN: No significant finding.     MUSCULOSKELETAL: Unremarkable.                                                                      IMPRESSION:   1.  Nothing concerning for amiodarone pulmonary toxicity.     2.  Benign calcified granulomas right lower lobe and benign calcified hilar nodes and right mediastinal nodes.     3.  Focal tiny cluster of nodules right upper lobe most likely from prior infection or inflammatory change.    Pulmonary Function Testing  3/16/2023  FEV1 1.45L 71%  FVC 75%  No BD response  Ratio 0.72  TLC 4.2L, 82%  DLco 74% yady for hgb  Compared to prior testing from 2022, slight decline in DLco corected, improvement in spirometry and very slight decline in TLC; the latter is still considered within normal limits.    Previous testing 2022  TLC 4.42L, 86%  DLco yady 85%

## 2023-04-20 ENCOUNTER — OFFICE VISIT (OUTPATIENT)
Dept: CARDIOLOGY | Facility: CLINIC | Age: 78
End: 2023-04-20
Attending: INTERNAL MEDICINE
Payer: COMMERCIAL

## 2023-04-20 VITALS
DIASTOLIC BLOOD PRESSURE: 90 MMHG | OXYGEN SATURATION: 97 % | SYSTOLIC BLOOD PRESSURE: 170 MMHG | RESPIRATION RATE: 18 BRPM | BODY MASS INDEX: 21.1 KG/M2 | WEIGHT: 121 LBS | HEART RATE: 76 BPM

## 2023-04-20 DIAGNOSIS — I48.0 PAROXYSMAL ATRIAL FIBRILLATION (H): Primary | ICD-10-CM

## 2023-04-20 DIAGNOSIS — I48.0 PAF (PAROXYSMAL ATRIAL FIBRILLATION) (H): ICD-10-CM

## 2023-04-20 PROCEDURE — 99214 OFFICE O/P EST MOD 30 MIN: CPT | Performed by: INTERNAL MEDICINE

## 2023-04-20 NOTE — PATIENT INSTRUCTIONS
[facility]  Cardiac Electrophysiology  [address]  [place] [address]   Office: [phone]  Fax: [phone]       Thank you for seeing us in clinic today - it is a pleasure to be a part of your care team.  Below is a summary of our plan from today's visit.       You have atrial fibrillation, presently being managed with amiodarone, metoprolol XL and apixaban.  We reviewed physiology and management options including continuing antiarrhythmic drug therapy, catheter ablation and lifestyle modification.  We will plan for the following:  - consider options further, and let us know with any questions or decision as to how you would like to proceed.  Our office will call you in a few days to check in and address any questions.  - continue metoprolol XL 50mg daily  - continue amiodarone 100mg daily for now - note of slight lung function decrease recent PFTs  - continue apixaban 5mg twice daily     Please do not hesitate to be in touch with our office at [phone] with any questions that may arise.       Thank you for trusting us with your care,    Kathleen Hernandez MD  Clinical Cardiac Electrophysiology  [facility]  [address]  [place] [address]   Office: [phone]  Fax: [phone]                ATRIAL FIBRILLATION: Patient Information    What is atrial fibrillation?  Atrial fibrillation (AF, A-fib) is a common heart rhythm problem (arrhythmia) occurring within the upper chambers of the heart (the atria).  In normal rhythm, the upper and lower chambers of the heart are electrically driven to contract in a coordinated sequence.  In atrial fibrillation, the atria lose their ability to contract because of rapid and chaotic electrical activity.  The lower chambers of the heart (the ventricles) continue to pump blood throughout the body, though with irregular and often faster rate due to the chaotic activity within the  atria.        How do I know if I have atrial fibrillation?   Some people may feel their heart beating faster, harder, or irregularly while in atrial fibrillation.  Others may be lightheaded, fatigued, feel weak or tired or become more short of breath especially with activities.  Some patients have no symptoms at all.  Atrial fibrillation may be found due to an irregular pulse or on an electrocardiogram (ECG). Atrial fibrillation can start and stop on its own, and episodes can last from seconds to several months.      How common is atrial fibrillation?   An estimated 3-6 million people in the [place] have atrial fibrillation.  Atrial fibrillation is a common heart rhythm problem for older persons, affecting as estimated 12-15% of people over the age of 65 years of age.    What causes atrial fibrillation?   Age is the most important risk factor for atrial fibrillation.  Atrial fibrillation is more common in people with other heart disease, high blood pressure, diabetes, obesity, sleep apnea and in people who regularly consume alcohol.  Surgery, lung disease, or thyroid problems can lead to atrial fibrillation.  Atrial fibrillation has multiple possible causes, and in most cases a single cause cannot be found.  Atrial fibrillation is a progressive condition, usually starting with at an early stage with short and infrequent episodes.  In later stages of disease, more frequent and longer lasting episodes of atrial fibrillation occur, ultimately culminating in episodes which do not spontaneously terminate.  Generally, more enlargement and scarring within the upper chambers of the heart is observed as atrial fibrillation progresses from early to late-stage disease.    How is atrial fibrillation diagnosed and evaluated?    Because of its start-stop nature, atrial fibrillation can be challenging to diagnose.  Atrial fibrillation is most commonly diagnosed via cardiac rhythm recordings - either an ECG or wearable  cardiac rhythm monitor.  For patients with pacemakers, defibrillators or implantable loop recorders, atrial fibrillation may be recorded via these devices.  Recently, commercially available devices (eg. Apple Watch, Quintura device, certain FitBit devices, others) can allow patients to take 30 second cardiac rhythm recordings which may document atrial fibrillation.  Once atrial fibrillation is diagnosed, additional tests include blood tests and an echocardiogram.  The echocardiogram uses ultrasound to look at your heart to assess your cardiac function and evaluate for other heart disease.  Additional evaluation may include CT or MRI studies.    Is atrial fibrillation dangerous?   Atrial fibrillation is not usually a life-threatening arrhythmia.  The most serious consequences of atrial fibrillation including stroke and worsening of overall cardiac function.  While in atrial fibrillation, the upper cardiac chambers do not contract normally, resulting in slower blood flow and increased risk of clot formation.  If this blood clot becomes detached from the heart a stroke can occur.  Unfortunately, stroke can be the first sign of atrial fibrillation for some people.  With a stroke, you may notice abnormal sensation, weakness on one side of the body or face, changes in your vision or speech.  If you have any of these signs, you should contact EMS and be evaluated in an emergency room as soon as possible.      How is atrial fibrillation treated?     Several treatment options exist for suppressing atrial fibrillation - however, it is not an easily curable arrhythmia.  The first goal in managing atrial fibrillation is to minimize stroke risk.  The second goal is to improve symptoms associated with atrial fibrillation.  Finally, in patients with reduced cardiac function, maintaining normal rhythm can help improve cardiac function.      Blood thinners are used to reduce the risk of stroke in patients with high estimated stroke  risk related to atrial fibrillation.  For patients at higher risk of bleeding related to blood thinner use, implantable devices may be an option to reduce stroke risk without the need for long term blood thinner use.      Atrial fibrillation can be managed via two strategies: rate control and rhythm control.  In a rate control strategy, continued atrial fibrillation is accepted and medications (eg. beta-blockers or calcium channel blockers) are used to control the lower chamber rate.  In a rhythm control strategy, anti-arrhythmic medications or catheter ablation are used to maintain normal cardiac rhythm and slow disease progression by suppressing atrial fibrillation.  A procedure called a cardioversion, in which an electric shock is delivered through patches placed on the chest wall while under deep sedation, can be performed to temporarily restore normal cardiac rhythm, though does not address the chance of atrial fibrillation recurrence.  Treatments are more effective for earlier rather than later stage atrial fibrillation.  Lifestyle modifications (maintaining a healthy weight, aerobic exercise, diagnosing and treating sleep apnea, and minimizing alcohol intake) are important elements of atrial fibrillation rhythm control.     What is catheter ablation for atrial fibrillation?  Cardiac catheter ablation is a commonly performed, minimally invasive procedure performed by a cardiac electrophysiologist to treat many different cardiac rhythm abnormalities.  During catheter ablation, long, thin, flexible tubes are advanced into the heart via small sheaths inserted into the femoral veins and thermal energy (either heating or cooling) is applied within the heart to disrupt abnormal electrical activity.  Atrial fibrillation ablation is performed under general anesthesia, with procedures generally taking approximately 2-3 hours.  Patients are typically observed for 3-5 hours after the ablation, and in most cases can be  discharged home the same day.  Atrial fibrillation ablation is associated with better outcomes (mortality, cardiovascular hospitalizations, atrial arrhythmia recurrences) compared to antiarrhythmic drug therapy.  However, atrial fibrillation recurrences are not uncommon, and repeat catheter ablation may be offered.  Your electrophysiology team can review atrial fibrillation ablation, anticipated success rates, risks, and recovery expectations with you.    What are anti-arrhythmic medications?  Anti-arrhythmic medications are specialized drugs which alter cardiac electrical functioning to suppress arrhythmias.  There are several anti-arrhythmic medications available, each with its own success rate and side effects.  Some anti-arrhythmic medications are less effective though safer to use, others are more effective though have serious potential toxicities.  Atrial fibrillation recurrences are common and may require dose adjustment or change in antiarrhythmic therapy.  Your electrophysiology team will carefully consider which medication would be the best and safest for your particular case.      Can I live a normal life?    The goal of atrial fibrillation management is for patients to live normal lives without being limited by symptoms related to atrial fibrillation.    Are any additional educational resources available?  There are a number of excellent atrial fibrillation education resources available to you online.  A few options you may wish to review include:  hrsonline.org/guide-atrial-fibrillation  afibmatters.org  getsmartaboutafib.com  stopaf.com    What comes next?    Consider your management options and let us know how we can help in your decision process.  Please take medications as they have been prescribed.  You should also get any tests that may have been ordered for you.      When to Call Your Doctor or seek emergency care:  Call your doctor or seek emergency care if you have any significant changes with  the following:  Weakness  Dizziness  Fainting  Fatigue  Shortness of breath  Chest pain with increased activity  If you are concerned that your heart rate is too fast or too slow  Bleeding that does not stop in 10 minutes  Coughing or throwing up blood  Bloody diarrhea or bleeding hemorrhoids  Dark-colored urine or black stool  Allergic reactions:  Rash  Itching  Swelling  Trouble breathing or swallowing      Please call the [facility] at [phone] if you have concerns about your symptoms, your medicines, or your follow-up appointments.

## 2023-04-20 NOTE — LETTER
2023    Selina Pozo MD  [address] [place] [address]    RE: Kelly Robledo       Dear Colleague,     I had the pleasure of seeing Kelly Robledo in the [facility].     [facility]  Cardiac Electrophysiology  [address]  [place] [address]   Office: [phone]  Fax: [phone]     Cardiac Electrophysiology Consultation    Patient: Kelly Robledo   : 1945     Referring Provider: Dayne Kim MD  Primary Care Provider: Selina Pozo MD    CHIEF COMPLAINT/REASON FOR CONSULTATION  Paroxysmal atrial fibrillation    Assessment/Recommendations   Kelly Robledo is a 77 year old female with paroxysmal atrial fibrillation, moderate to severe mitral regurgitation following MitraClip  for severe mitral regurgitation, moderate nonobstructive CAD, HTN referred by Dr. Kim for consultation regarding atrial fibrillation.    Paroxysmal atrial fibrillation - symptomatic, possibly co-existing short OR, ventricular pre-excitation.  Apparent co-existing sinus node dysfunction/chronotropic insufficiency  [id number], severe left atrial dilation in the setting of longstanding mitral regurgitation  We reviewed atrial fibrillation physiology and management considerations including managing stroke risk, antiarrhythmic drug therapy, and catheter ablation. We discussed atrial fibrillation ablation procedures, anticipated success rates (lower given severe LA dilation and co-existing mitral regurgitation), the potential need for re-do ablation vs addition of anti-arrhythmic drugs, procedural risks (including groin bleeding, tamponade, phrenic or esophageal injury, stroke, pulmonary vein stenosis) and recovery expectations.  She will take some time to consider options, and will let us know with questions or decision as to how she would like to proceed  - if ablation elected upon, PVI and EPS/possible ablation in case of AVAP,  general anesthesia, hold amiodarone 7 days prior (with plan to continue for 6-12 weeks following ablation), continue apixaban  - if conversion to alternate AAD elected upon, stop amiodarone, check amiodarone level in 3-4 months, consider starting sotalol thereafter  - continue metoprolol XL 50mg daily  - continue amiodarone 100mg daily for now, ideally as a short term strategy - note of slight DLCO and TLC on recent PFTs  - continue apixaban 5mg twice daily  - we discussed the ongoing importance of lifestyle modification (maintaining a healthy weight, sleep apnea diagnosis and management, alcohol avoidance) as part of a long term strategy for atrial fibrillation management      Follow up: as above         History of Present Illness   Kelly Robledo is a 77 year old female with paroxysmal atrial fibrillation, moderate to severe mitral regurgitation following MitraClip 2021 for severe mitral regurgitation, moderate nonobstructive CAD, HTN referred by Dr. Kim for consultation regarding atrial fibrillation.    Mrs. Robledo was diagnosed with paroxysmal atrial fibrillation 6/2020 and was started on amiodarone and apixaban at that time.  She was found to have progressive mitral regurgitation and underwent MitraClip 2021.  She underwent PFTs 3/16/2023 showing slight reduction in DLCO and TLC.  She notes no known atrial fibrillation episodes.    She is active with walking - she reports no exertional limitation.s       Physical Examination  Review of Systems   VITALS: BP (!) 170/90 (BP Location: Right arm, Patient Position: Sitting, Cuff Size: Adult Regular)   Pulse 76   Resp 18   Wt 54.9 kg (121 lb)   LMP  (LMP Unknown)   SpO2 97%   BMI 21.10 kg/m    Wt Readings from Last 3 Encounters:   03/27/23 55 kg (121 lb 3.2 oz)   02/17/23 54 kg (119 lb)   02/14/23 54.5 kg (120 lb 3.2 oz)     CONSTITUTIONAL: well nourished, comfortable, no distress  EYES:  Conjunctivae pink, sclerae clear.    E/N/T:  Oral mucosa  pink  RESPIRATORY:  Respiratory effort is normal  CARDIOVASCULAR:  normal S1 and S2  GASTROINTESTINAL:  Abdomen without masses or tenderness  EXTREMITIES:  No clubbing or cyanosis.    MUSCULOSKELETAL:  Overall grossly normal muscle strength  SKIN:  Overall, skin warm and dry, no lesions.  NEURO/PSYCH:  Oriented x 3 with normal affect.   Constitutional:  No weight loss or loss of appetite    Eyes:  No difficulty with vision, no double vision, no dry eyes  ENT:  No sore throat, difficulty swallowing; changes in hearing or tinnitus  Cardiovascular: As detailed above  Respiratory:  No cough  Musculoskeletal  No joint pain, muscle aches  Neurologic:  No syncope, lightheadedness, fainting spells   Hematologic: No easy bruising, excessive bleeding tendency   Gastrointestinal:  No jaundice, abdominal pain or abdominal bloating  Genitourinary: No changes in urinary habits, no trouble urinating    Psychiatric: No anxiety or depression      Medical History  Surgical History   Past Medical History:   Diagnosis Date    Abdominal pain     Atrial fibrillation (H)     Diverticulosis     Moderate mitral regurgitation     NSVT (nonsustained ventricular tachycardia) 6/17/2020    Palpitations     Past Surgical History:   Procedure Laterality Date    BIOPSY BREAST Bilateral     benign    CV CORONARY ANGIOGRAM N/A 6/18/2020    Procedure: Coronary Angiogram;  Surgeon: Sergey Conner MD;  Location: [facility] Cath Lab;  Service: Cardiology    CV CORONARY ANGIOGRAM N/A 1/13/2021    Procedure: Coronary Angiogram;  Surgeon: Klaus Fitzpatrick MD;  Location: [facility] Cardiac Cath Lab;  Service: Cardiology    CV LEFT HEART CATHETERIZATION WITHOUT LEFT VENTRICULOGRAM Left 6/18/2020    Procedure: Left Heart Catheterization Without Left Ventriculogram;  Surgeon: Sergey Conner MD;  Location: [facility] Cath Lab;  Service: Cardiology    CV RIGHT HEART CATHETERIZATION N/A 6/18/2020    Procedure: Right Heart Catheterization;   Surgeon: Sergey Conner MD;  Location: [facility] Cath Lab;  Service: Cardiology    CV RIGHT HEART CATHETERIZATION N/A 1/13/2021    Procedure: Right Heart Catheterization;  Surgeon: Klaus Fitzpatrick MD;  Location: [facility] Cardiac Cath Lab;  Service: Cardiology    HYSTERECTOMY  1970's    OOPHORECTOMY Bilateral 1970's    TRANSCATHETER MITRAL VALVE REPAIR N/A 2/1/2021    Procedure: CV MITRAL CLIP;  Surgeon: Klaus Fitzpatrick MD;  Location: [facility] CARDIAC CATH LAB         Family History Social History   Family History   Problem Relation Age of Onset    Breast Cancer Sister 47.00        Social History     Tobacco Use    Smoking status: Never    Smokeless tobacco: Never   Substance Use Topics    Alcohol use: No    Drug use: No         Medications  Allergies     Current Outpatient Medications:     amiodarone (PACERONE) 200 MG tablet, TAKE 1 TABLET (200 MG) BY MOUTH EVERY MORNING, Disp: 90 tablet, Rfl: 1    clindamycin (CLEOCIN) 300 MG capsule, Take 2 capsules (600 mg total) by mouth once as needed (30-60 minutes prior to any dental visit. Save remaining for next visit., Disp: 4 capsule, Rfl: 1    ELIQUIS ANTICOAGULANT 5 MG tablet, TAKE 1 TABLET BY MOUTH TWICE A DAY, Disp: 180 tablet, Rfl: 1    ezetimibe (ZETIA) 10 MG tablet, TAKE 1 TABLET (10 MG) BY MOUTH DAILY., Disp: 90 tablet, Rfl: 1    lisinopril (ZESTRIL) 10 MG tablet, Take 1 tablet (10 mg) by mouth every 24 hours, Disp: 90 tablet, Rfl: 3    metoprolol succinate ER (TOPROL XL) 50 MG 24 hr tablet, Take 1 tablet (50 mg) by mouth daily, Disp: 180 tablet, Rfl: 3    multivitamin, therapeutic (THERA-VIT) TABS tablet, Take 1 tablet by mouth daily, Disp: , Rfl:     rosuvastatin (CRESTOR) 40 MG tablet, TAKE 1 TABLET BY MOUTH EVERY DAY, Disp: 90 tablet, Rfl: 0     Allergies   Allergen Reactions    Penicillins Hives          Lab Results    Chemistry CBC Cardiac Enzymes/BNP/TSH/INR   Recent Labs   Lab Test 02/27/23  0738      POTASSIUM 4.3   CHLORIDE 105   CO2  25   *   BUN 17.8   CR 1.33*   GFRESTIMATED 40*   SHADI 11.7*     Recent Labs   Lab Test 02/27/23  0738 02/14/23  0933 11/10/22  0824   CR 1.33* 1.30* 1.39*          Recent Labs   Lab Test 02/27/23  0738   WBC 6.1   HGB 12.4   HCT 37.7   *        Recent Labs   Lab Test 02/27/23  0738 02/08/22  0834 11/27/21  0331   HGB 12.4 12.2 13.2    Recent Labs   Lab Test 11/27/21  1234 11/27/21  0843 11/27/21  0331   TROPONINI <0.01 <0.01 <0.01     Recent Labs   Lab Test 01/25/21  1355 10/14/20  1319 09/23/20  1920   * 344* 624*     Recent Labs   Lab Test 02/14/23  0933   TSH 7.58*     Recent Labs   Lab Test 01/25/21  1355 09/23/20  1920   INR 1.22* 1.37*         Data Review    ECGs (tracings independently reviewed)  11/27/2021 - SR 72bpm, short WV, possible slight ventricular pre-excitation    6/17/2020 - AF, ventricular rate 122bpm, intermittent aberrancy vs pre-excitation      Holter monitoring from 11/10/2022 to 11/11/2022 (duration 24hrs) (independently reviewed)  Predominant underlying rhythm was sinus rhythm, 53 to 69bpm, average 63bpm.   No nonsustained or sustained tachyarrhythmias.   No atrial fibrillation.   There were no pauses of greater than 3 seconds.   Rare supraventricular ectopic beats (<1%).   Rare premature ventricular contractions (<1%).   Symptom triggers none    10/3/2022 TTE  Normal left ventricular size. Normal left ventricular systolic function. Left  ventricular ejection fraction estimated at 60 to 65%. Mild left ventricular  hypertrophy.  Diastolic Doppler findings (E/E' ratio and/or other parameters) suggest left  ventricular filling pressures are increased. Left ventricular diastolic  function is abnormal  Normal right ventricular size and systolic function.  Severe left atrial enlargement. Moderate right atrial enlargement  Mitral valve clip. Moderate to moderately severe 2-3+ mitral regurgitation.  Mild mitral stenosis.Mean gradient of 5 mmHg at a heart rate of 60  bpm  Moderate to moderately severe tricuspid gravitation. Estimate of RV systolic  pressure is moderately elevated at 48 mmHg plus right atrial pressure.  Compared to the examination January 2022. Findings are similar. Estimate of RV  systolic pressure is mildly lower on the current examination at 48 mmHg plus  right atrial pressure compared to 57 mmHg plus right atrial pressure on the  previous examination.    3/16/2023 PFTs  Mild diffusion impairment.   There is flattening of the inspiratory limb of the flow volume curve which could represent variable extrathoracic obstruction. Consider additional testing.        Cc: Dayne Kim MD, Mario Alberto Gutiérrez MD, Selina Pozo MD Amila Dilusha William, MD  4/20/2023  9:49 AM          Thank you for allowing me to participate in the care of your patient.      Sincerely,     Kathleen Hernandez MD     [facility]  cc:   Dayne Kim MD  [address]  [address]  [place] [address]

## 2023-04-20 NOTE — PROGRESS NOTES
[facility]  Cardiac Electrophysiology  [address]  [place] [address]   Office: [phone]  Fax: [phone]     Cardiac Electrophysiology Consultation    Patient: Kelly Robledo   : 1945     Referring Provider: Dayne Kim MD  Primary Care Provider: Selina Pozo MD    CHIEF COMPLAINT/REASON FOR CONSULTATION  Paroxysmal atrial fibrillation    Assessment/Recommendations   Kelly Robledo is a 77 year old female with paroxysmal atrial fibrillation, moderate to severe mitral regurgitation following MitraClip  for severe mitral regurgitation, moderate nonobstructive CAD, HTN referred by Dr. Kim for consultation regarding atrial fibrillation.    Paroxysmal atrial fibrillation - symptomatic, possibly co-existing short UT, ventricular pre-excitation.  Apparent co-existing sinus node dysfunction/chronotropic insufficiency  [id number], severe left atrial dilation in the setting of longstanding mitral regurgitation  We reviewed atrial fibrillation physiology and management considerations including managing stroke risk, antiarrhythmic drug therapy, and catheter ablation. We discussed atrial fibrillation ablation procedures, anticipated success rates (lower given severe LA dilation and co-existing mitral regurgitation), the potential need for re-do ablation vs addition of anti-arrhythmic drugs, procedural risks (including groin bleeding, tamponade, phrenic or esophageal injury, stroke, pulmonary vein stenosis) and recovery expectations.  She will take some time to consider options, and will let us know with questions or decision as to how she would like to proceed  - if ablation elected upon, PVI and EPS/possible ablation in case of AVAP, general anesthesia, hold amiodarone 7 days prior (with plan to continue for 6-12 weeks following ablation), continue apixaban  - if conversion to alternate AAD elected upon, stop amiodarone, check amiodarone level  in 3-4 months, consider starting sotalol thereafter  - continue metoprolol XL 50mg daily  - continue amiodarone 100mg daily for now, ideally as a short term strategy - note of slight DLCO and TLC on recent PFTs  - continue apixaban 5mg twice daily  - we discussed the ongoing importance of lifestyle modification (maintaining a healthy weight, sleep apnea diagnosis and management, alcohol avoidance) as part of a long term strategy for atrial fibrillation management      Follow up: as above         History of Present Illness   Kelly Robledo is a 77 year old female with paroxysmal atrial fibrillation, moderate to severe mitral regurgitation following MitraClip 2021 for severe mitral regurgitation, moderate nonobstructive CAD, HTN referred by Dr. Kim for consultation regarding atrial fibrillation.    Mrs. Robledo was diagnosed with paroxysmal atrial fibrillation 6/2020 and was started on amiodarone and apixaban at that time.  She was found to have progressive mitral regurgitation and underwent MitraClip 2021.  She underwent PFTs 3/16/2023 showing slight reduction in DLCO and TLC.  She notes no known atrial fibrillation episodes.    She is active with walking - she reports no exertional limitation.s       Physical Examination  Review of Systems   VITALS: BP (!) 170/90 (BP Location: Right arm, Patient Position: Sitting, Cuff Size: Adult Regular)   Pulse 76   Resp 18   Wt 54.9 kg (121 lb)   LMP  (LMP Unknown)   SpO2 97%   BMI 21.10 kg/m    Wt Readings from Last 3 Encounters:   03/27/23 55 kg (121 lb 3.2 oz)   02/17/23 54 kg (119 lb)   02/14/23 54.5 kg (120 lb 3.2 oz)     CONSTITUTIONAL: well nourished, comfortable, no distress  EYES:  Conjunctivae pink, sclerae clear.    E/N/T:  Oral mucosa pink  RESPIRATORY:  Respiratory effort is normal  CARDIOVASCULAR:  normal S1 and S2  GASTROINTESTINAL:  Abdomen without masses or tenderness  EXTREMITIES:  No clubbing or cyanosis.    MUSCULOSKELETAL:  Overall grossly normal muscle  strength  SKIN:  Overall, skin warm and dry, no lesions.  NEURO/PSYCH:  Oriented x 3 with normal affect.   Constitutional:  No weight loss or loss of appetite    Eyes:  No difficulty with vision, no double vision, no dry eyes  ENT:  No sore throat, difficulty swallowing; changes in hearing or tinnitus  Cardiovascular: As detailed above  Respiratory:  No cough  Musculoskeletal  No joint pain, muscle aches  Neurologic:  No syncope, lightheadedness, fainting spells   Hematologic: No easy bruising, excessive bleeding tendency   Gastrointestinal:  No jaundice, abdominal pain or abdominal bloating  Genitourinary: No changes in urinary habits, no trouble urinating    Psychiatric: No anxiety or depression      Medical History  Surgical History   Past Medical History:   Diagnosis Date     Abdominal pain      Atrial fibrillation (H)      Diverticulosis      Moderate mitral regurgitation      NSVT (nonsustained ventricular tachycardia) 6/17/2020     Palpitations     Past Surgical History:   Procedure Laterality Date     BIOPSY BREAST Bilateral     benign     CV CORONARY ANGIOGRAM N/A 6/18/2020    Procedure: Coronary Angiogram;  Surgeon: Sergey Conner MD;  Location: [facility];  Service: Cardiology     CV CORONARY ANGIOGRAM N/A 1/13/2021    Procedure: Coronary Angiogram;  Surgeon: Klaus Fitzpatrick MD;  Location: [facility] Cardiac Cath Lab;  Service: Cardiology     CV LEFT HEART CATHETERIZATION WITHOUT LEFT VENTRICULOGRAM Left 6/18/2020    Procedure: Left Heart Catheterization Without Left Ventriculogram;  Surgeon: Sergey Conner MD;  Location: [facility];  Service: Cardiology     CV RIGHT HEART CATHETERIZATION N/A 6/18/2020    Procedure: Right Heart Catheterization;  Surgeon: Sergey Conner MD;  Location: [facility];  Service: Cardiology     CV RIGHT HEART CATHETERIZATION N/A 1/13/2021    Procedure: Right Heart Catheterization;  Surgeon: Klaus Fitzpatrick MD;  Location:  [facility] Cardiac Cath Lab;  Service: Cardiology     HYSTERECTOMY  1970's     OOPHORECTOMY Bilateral 1970's     TRANSCATHETER MITRAL VALVE REPAIR N/A 2/1/2021    Procedure: CV MITRAL CLIP;  Surgeon: Klaus Fitzpatrick MD;  Location:  HEART CARDIAC CATH LAB         Family History Social History   Family History   Problem Relation Age of Onset     Breast Cancer Sister 47.00        Social History     Tobacco Use     Smoking status: Never     Smokeless tobacco: Never   Substance Use Topics     Alcohol use: No     Drug use: No         Medications  Allergies     Current Outpatient Medications:      amiodarone (PACERONE) 200 MG tablet, TAKE 1 TABLET (200 MG) BY MOUTH EVERY MORNING, Disp: 90 tablet, Rfl: 1     clindamycin (CLEOCIN) 300 MG capsule, Take 2 capsules (600 mg total) by mouth once as needed (30-60 minutes prior to any dental visit. Save remaining for next visit., Disp: 4 capsule, Rfl: 1     ELIQUIS ANTICOAGULANT 5 MG tablet, TAKE 1 TABLET BY MOUTH TWICE A DAY, Disp: 180 tablet, Rfl: 1     ezetimibe (ZETIA) 10 MG tablet, TAKE 1 TABLET (10 MG) BY MOUTH DAILY., Disp: 90 tablet, Rfl: 1     lisinopril (ZESTRIL) 10 MG tablet, Take 1 tablet (10 mg) by mouth every 24 hours, Disp: 90 tablet, Rfl: 3     metoprolol succinate ER (TOPROL XL) 50 MG 24 hr tablet, Take 1 tablet (50 mg) by mouth daily, Disp: 180 tablet, Rfl: 3     multivitamin, therapeutic (THERA-VIT) TABS tablet, Take 1 tablet by mouth daily, Disp: , Rfl:      rosuvastatin (CRESTOR) 40 MG tablet, TAKE 1 TABLET BY MOUTH EVERY DAY, Disp: 90 tablet, Rfl: 0     Allergies   Allergen Reactions     Penicillins Hives          Lab Results    Chemistry CBC Cardiac Enzymes/BNP/TSH/INR   Recent Labs   Lab Test 02/27/23  0738      POTASSIUM 4.3   CHLORIDE 105   CO2 25   *   BUN 17.8   CR 1.33*   GFRESTIMATED 40*   SHADI 11.7*     Recent Labs   Lab Test 02/27/23  0738 02/14/23  0933 11/10/22  0824   CR 1.33* 1.30* 1.39*          Recent Labs   Lab Test  02/27/23  0738   WBC 6.1   HGB 12.4   HCT 37.7   *        Recent Labs   Lab Test 02/27/23  0738 02/08/22  0834 11/27/21  0331   HGB 12.4 12.2 13.2    Recent Labs   Lab Test 11/27/21  1234 11/27/21  0843 11/27/21  0331   TROPONINI <0.01 <0.01 <0.01     Recent Labs   Lab Test 01/25/21  1355 10/14/20  1319 09/23/20  1920   * 344* 624*     Recent Labs   Lab Test 02/14/23  0933   TSH 7.58*     Recent Labs   Lab Test 01/25/21  1355 09/23/20  1920   INR 1.22* 1.37*         Data Review    ECGs (tracings independently reviewed)  11/27/2021 - SR 72bpm, short PA, possible slight ventricular pre-excitation    6/17/2020 - AF, ventricular rate 122bpm, intermittent aberrancy vs pre-excitation      Holter monitoring from 11/10/2022 to 11/11/2022 (duration 24hrs) (independently reviewed)  Predominant underlying rhythm was sinus rhythm, 53 to 69bpm, average 63bpm.   No nonsustained or sustained tachyarrhythmias.   No atrial fibrillation.   There were no pauses of greater than 3 seconds.   Rare supraventricular ectopic beats (<1%).   Rare premature ventricular contractions (<1%).   Symptom triggers none    10/3/2022 TTE  Normal left ventricular size. Normal left ventricular systolic function. Left  ventricular ejection fraction estimated at 60 to 65%. Mild left ventricular  hypertrophy.  Diastolic Doppler findings (E/E' ratio and/or other parameters) suggest left  ventricular filling pressures are increased. Left ventricular diastolic  function is abnormal  Normal right ventricular size and systolic function.  Severe left atrial enlargement. Moderate right atrial enlargement  Mitral valve clip. Moderate to moderately severe 2-3+ mitral regurgitation.  Mild mitral stenosis.Mean gradient of 5 mmHg at a heart rate of 60 bpm  Moderate to moderately severe tricuspid gravitation. Estimate of RV systolic  pressure is moderately elevated at 48 mmHg plus right atrial pressure.  Compared to the examination January 2022.  Findings are similar. Estimate of RV  systolic pressure is mildly lower on the current examination at 48 mmHg plus  right atrial pressure compared to 57 mmHg plus right atrial pressure on the  previous examination.    3/16/2023 PFTs  Mild diffusion impairment.   There is flattening of the inspiratory limb of the flow volume curve which could represent variable extrathoracic obstruction. Consider additional testing.        Cc: Dayne Kim MD, Mario Alberto Gutiérrez MD, Selina Pozo MD Amila Dilusha William, MD  4/20/2023  9:49 AM

## 2023-04-24 ENCOUNTER — TELEPHONE (OUTPATIENT)
Dept: FAMILY MEDICINE | Facility: CLINIC | Age: 78
End: 2023-04-24
Payer: COMMERCIAL

## 2023-04-24 NOTE — TELEPHONE ENCOUNTER
04/24/23  General Call      Reason for Call:  Clarification of health plan    What are your questions or concerns:  Pt would like Dr. Pozo to give her a call to explain what procedure cardiology would like her to have. Writer referred pt to call cardiology but pt would like to make sure that DR. Pozo is ok with everything. Pt unsure of what the procedure is. Pt did not want to schedule an appt with Dr. Pozo.    Date of last appointment with provider: 02/17/23    Could we send this information to you in [employer] or would you prefer to receive a phone call?:   Patient would prefer a phone call   Okay to leave a detailed message?: Yes at Cell number on file:    Telephone Information:   Mobile [phone]

## 2023-04-25 NOTE — TELEPHONE ENCOUNTER
04/25/23  Attempted to get pt scheduled and relayed Dr. Pozo's message. Pt stated she is getting clarity from cardiology and no longer needs the call from Dr. Pozo.  Mery

## 2023-04-25 NOTE — TELEPHONE ENCOUNTER
I will try to call her Wed 4/26 afternoon when I'm done seeing pts; otherwise she can be scheduled as a DB any time so I can do a phone visit with her as well.    Progress Note  Date:10/2/2020       Room:[facility]/  Patient Name:Patrick Crenshaw     Date of Birth:[birth year]     Age:87 y.o. This is an 47-year-old -American female resident of [place] who is on day #6 out of 10 of quarantine due to COVID positive status. She remains asymptomatic at this time. She is seen and examined at bedside this morning in no acute distress eating breakfast.  Subjective    Subjective:  Symptoms:  Resolved. No shortness of breath, chest pain, weakness or anorexia. Diet:  Poor intake. No nausea or vomiting. Activity level: Normal.    Pain:  She reports no pain. Review of Systems   Constitutional: Negative for fever. Respiratory: Negative for shortness of breath. Cardiovascular: Negative for chest pain. Gastrointestinal: Negative for anorexia, nausea and vomiting. Neurological: Negative for weakness. All other systems reviewed and are negative. Objective         Vitals Last 24 Hours:  Patient Vitals for the past 24 hrs:   Temp Pulse Resp BP SpO2   10/02/20 0745 98.6 °F (37 °C) 85 18 109/67 98 %   10/01/20 2000 (!) 96.7 °F (35.9 °C) 74 20 130/60 100 %     I/O (24Hr): Intake/Output Summary (Last 24 hours) at 10/2/2020 1237  Last data filed at 10/1/2020 1839  Gross per 24 hour   Intake 200 ml   Output    Net 200 ml     Objective:  General Appearance:  Comfortable, well-appearing, in no acute distress and not in pain. Vital signs: (most recent): Blood pressure 109/67, pulse 85, temperature 98.6 °F (37 °C), resp. rate 18, height 5' 5\" (1.651 m), weight 86.2 kg (190 lb 0.6 oz), SpO2 98 %. Vital signs are normal.  No fever. Output: Producing urine and producing stool. HEENT: Normal HEENT exam.    Lungs:  Normal effort and normal respiratory rate. Breath sounds clear to auscultation. She is not in respiratory distress. No decreased breath sounds, wheezes or rhonchi. Heart: Normal rate. Regular rhythm.   S1 normal and S2 normal.  No murmur or friction rub. Abdomen: Abdomen is soft. Hypoactive bowel sounds. There is no mass. There is no splenomegaly. There is no hepatomegaly. Extremities: (Essential tremor noted)  Pulses: Distal pulses are intact. Neurological: Patient is alert. Pupils:  Pupils are equal, round, and reactive to light. Skin:  Warm and dry. Labs/Imaging/Diagnostics    Labs:  CBC:  Recent Labs     10/02/20  0615 10/01/20  0405   WBC 8.0 8.7   RBC 3.07* 3.05*   HGB 9.5* 9.6*   HCT 31.5* 31.7*   .6* 103.9*   RDW 13.4 13.4    204     CHEMISTRIES:  Recent Labs     10/02/20  0615 10/01/20  0405 09/30/20  0348    142 143   K 4.1 4.4 3.9    114* 112*   CO2 22 18* 23   BUN 45* 52* 56*   CA 8.7 8.9 9.1   MG 1.8 2.1  --    PT/INR:No results for input(s): INR, INREXT, INREXT in the last 72 hours. No lab exists for component: PROTIME  APTT:No results for input(s): APTT in the last 72 hours. LIVER PROFILE:No results for input(s): AST, ALT in the last 72 hours. No lab exists for component: BILIDIR, BILITOT, ALKPHOS  No results found for: ALT, AST, GGT, GGTP, AP, APIT, APX, CBIL, TBIL, TBILI    Imaging Last 24 Hours:  No results found. Assessment//Plan   Principal Problem:    COVID-19 (9/27/2020)    Active Problems:    HTN (hypertension) (6/12/2009)      Dyslipidemia ()      CKD (chronic kidney disease), stage III (10/1/2020)      No new Assessment & Plan notes have been filed under this hospital service since the last note was generated. Service: Hospitalist     #1. COVID-19 Positive:  -Asymptomatic.  -Continue supportive care.  -Case management for d/c planning.      #2: HTN:  -Chronic condition, controlled.   -B/P 115/55  -On Inderal.     #3: Renal Insufficiency  -Cr level 1.90  -BUN 45  -Likely dehydration  -Add gentle hydration    -Nephro consult if not improved with gentle IV hydration   -Encourage PO intake  -BMP in a.m.      #4: Dyslipidemia.  -On statin.       Electronically signed by Anita Dumont NP on 10/2/2020 at 11:52 AM

## 2023-04-26 ENCOUNTER — TELEPHONE (OUTPATIENT)
Dept: CARDIOLOGY | Facility: CLINIC | Age: 78
End: 2023-04-26
Payer: COMMERCIAL

## 2023-04-26 NOTE — TELEPHONE ENCOUNTER
Called Kelly (7min phone call) to review Dr. Esparza notes/recs and options/risks of either route. After conversation she is feeling more optimistic to try the procedure and will await Dr. Kim's input as well (see other telephone encounter)  Thanks,   Dr. Pozo

## 2023-04-26 NOTE — TELEPHONE ENCOUNTER
Noted.  Phone call to patient, reviewed the notes below from Dr. Hernandez.  She states she understands both options but is very unsure as to what she should do.  She is wondering if Dr. Kim would be able to give her any guidance.    Will forward to Dr. Kim for review and call the patient back with any additional information.

## 2023-04-26 NOTE — TELEPHONE ENCOUNTER
----- Message from Susanne Ramos RN sent at 4/20/2023 10:59 AM CDT -----    ----- Message -----  From: Kathleen Hernandez MD  Sent: 4/20/2023  10:09 AM CDT  To: Dayne Kim MD; [facility] Ep Support Pool - [facility],    Mrs. Robledo has been managed for some time for pAF with amiodarone, and recently has been noted to have a slight decline in PFTs.  She has co-existing mod-severe mitral regurgitation after MitraClip.  Could we please call her in ~1 week to address any questions and consolidate plan between ablation vs change to alternate AAD?    If ablation elected upon, PVI and EPS/possible ablation in case of AVAP, general anesthesia, hold amiodarone 7 days prior (with plan to continue for 6-12 weeks following ablation), continue apixaban  If conversion to alternate AAD elected upon, stop amiodarone, check amiodarone level in 3-4 months, consider starting sotalol thereafter.    BARB Enrique.  Thank you!  Nini

## 2023-05-01 ENCOUNTER — DOCUMENTATION ONLY (OUTPATIENT)
Dept: CARDIOLOGY | Facility: CLINIC | Age: 78
End: 2023-05-01
Payer: COMMERCIAL

## 2023-05-01 DIAGNOSIS — I48.0 PAROXYSMAL ATRIAL FIBRILLATION (H): Primary | ICD-10-CM

## 2023-05-01 DIAGNOSIS — I48.0 PAROXYSMAL ATRIAL FIBRILLATION (H): ICD-10-CM

## 2023-05-01 NOTE — TELEPHONE ENCOUNTER
Dayne Kim MD  You; Anamaria Patel RN; Kathleen Hernandez MD 3 days ago     MG  I spoke with her and she is comfortable planning to proceed with the ablation.  Thank you Klaus

## 2023-05-01 NOTE — PROGRESS NOTES
H&P Date: Teach Date: ALEXANDR No CT/  MRI No   PVI  Order Case Req Y  Order Set [] Lab/EKG  Orders [] Letter [] F/U RN Date:  NP follow-up Date:     AC Eliquis- CONTINUE     AAD Amiodarone-Hold 7 days prior   PPI Start Protonix 40mg Daily 3 days prior, 6wk post DM No     1945  Home:[phone] (home) Cell:[phone] (mobile)  Emergency Contact: Giuseppe Robledo [phone]  PCP: Selina Pozo, [phone]    Important patient information for CSC/Cath Lab staff : None    [facility] EP Cath Lab Procedure Order   Ablation Type:  PVI- Atrial Fibrillation  Diagnosis:  AF  Ordering Provider: Dr Hernandez  Ordering Date: 5/1/2023    Scheduling Information:  Anticipated Case Duration:  Standard ( Case per day SA 2:1, DW 4:1, KA 3:1)   Scheduling Timeframe:  Next Available  Scheduling Restrictions: None  Scheduling Contact: Please contact pt to schedule, if you are unable to schedule date within the next 24 hours please contact pt to update on scheduling process  EP RN Follow Up Apt: Schedule EP RN PC visit 3-4 days s/p PVI  MD Preference: Scheduling with ordering provider  Current Device/Device Co Needed for Procedure: None NoneNone  Pre-Procedural Testing needed: None  Mapping System Required:  Carto (Dr Hernandez and Dr Pike)  ICE Needed:  Yes  Anesthesia:General Anesthesia    [facility] EP Cath Lab Prep   H&P:  Schedule H&P with EP SHITAL, RN Teach, and Labs within 30 days of PVI  Pre-op Labs: CBC, BMP, Beta HcG if appropriate, and INR if on Warfarin will be ordered AM of procedure  T&S Pre-Procedure Review: Does not need for PVI procedures  Medical Records Pertinent for Procedure:  Angiogram 1-13-21;  Nesha-clip 2-1-21, Holter/ACT 11-10-22, Echo 10-3-22 EF 60-65% and EKG 11-10-22 SR  Allergies: Reviewed allergies, no concerns regarding orders for procedure    Allergies   Allergen Reactions     Penicillins Hives       Current Outpatient Medications:      amiodarone (PACERONE) 200 MG tablet, TAKE 1 TABLET (200 MG) BY MOUTH  EVERY MORNING, Disp: 90 tablet, Rfl: 1     clindamycin (CLEOCIN) 300 MG capsule, Take 2 capsules (600 mg total) by mouth once as needed (30-60 minutes prior to any dental visit. Save remaining for next visit., Disp: 4 capsule, Rfl: 1     ELIQUIS ANTICOAGULANT 5 MG tablet, TAKE 1 TABLET BY MOUTH TWICE A DAY, Disp: 180 tablet, Rfl: 1     ezetimibe (ZETIA) 10 MG tablet, TAKE 1 TABLET (10 MG) BY MOUTH DAILY., Disp: 90 tablet, Rfl: 1     lisinopril (ZESTRIL) 10 MG tablet, Take 1 tablet (10 mg) by mouth every 24 hours, Disp: 90 tablet, Rfl: 3     metoprolol succinate ER (TOPROL XL) 50 MG 24 hr tablet, Take 1 tablet (50 mg) by mouth daily, Disp: 180 tablet, Rfl: 3     multivitamin, therapeutic (THERA-VIT) TABS tablet, Take 1 tablet by mouth daily, Disp: , Rfl:      rosuvastatin (CRESTOR) 40 MG tablet, TAKE 1 TABLET BY MOUTH EVERY DAY, Disp: 90 tablet, Rfl: 0    Documentation Date:5/1/2023 9:41 AM  Susanne Ramos RN

## 2023-05-02 RX ORDER — APIXABAN 5 MG/1
TABLET, FILM COATED ORAL
Qty: 180 TABLET | Refills: 3 | Status: SHIPPED | OUTPATIENT
Start: 2023-05-02 | End: 2023-12-15 | Stop reason: DRUGHIGH

## 2023-05-05 ENCOUNTER — TELEPHONE (OUTPATIENT)
Dept: CARDIOLOGY | Facility: CLINIC | Age: 78
End: 2023-05-05
Payer: COMMERCIAL

## 2023-05-05 NOTE — TELEPHONE ENCOUNTER
Pre PVI Medication Instructions    Reviewed via phone with pt    Pt has PVI scheduled on 7/3/23 with Dr Hernandez    Per PVI protocol and VORB from performing MD above pt is to:  Anticoagulation Instruction: Eliquis- Continue anticoagulation uninterrupted through their procedure, do not miss any doses of AC prior to procedure, importance of taking AC for stroke prevention, taking AC as prescribed, to call prior to PVI if missed a dose of AC, and if upon arrival pt reports missing a dose of AC PVI will potentially be cnx/postponed  AAD Instructions: Amiodarone- hold 7 days prior to procedure, Last dose Sunday 6/25/23  PPI Instructions Start Protonix 40mg Daily 3 days prior, 6wk post, will be reviewed with pt at time of pre procedural OV    Pre-procedural letter was sent to pt via mail, discussed importance of medication changes above and pt was provided contact for further questions or concerns about above medication changes    5/5/2023 1:38 PM  Rebecca Sims RN

## 2023-05-25 DIAGNOSIS — I25.10 CORONARY ARTERY DISEASE INVOLVING NATIVE CORONARY ARTERY OF NATIVE HEART WITHOUT ANGINA PECTORIS: ICD-10-CM

## 2023-05-25 RX ORDER — ROSUVASTATIN CALCIUM 40 MG/1
TABLET, COATED ORAL
Qty: 90 TABLET | Refills: 5 | Status: SHIPPED | OUTPATIENT
Start: 2023-05-25 | End: 2024-07-19

## 2023-05-25 NOTE — TELEPHONE ENCOUNTER
"Last Written Prescription Date:  12/18/22  Last Fill Quantity: 90,  # refills: 0   Last office visit provider:   2/17/23    Requested Prescriptions   Pending Prescriptions Disp Refills     rosuvastatin (CRESTOR) 40 MG tablet [Pharmacy Med Name: ROSUVASTATIN CALCIUM 40 MG TAB] 90 tablet 0     Sig: TAKE 1 TABLET BY MOUTH EVERY DAY       Statins Protocol Passed - 5/25/2023 12:54 AM        Passed - LDL on file in past 12 months     Recent Labs   Lab Test 02/27/23  0738   *             Passed - No abnormal creatine kinase in past 12 months     No lab results found.             Passed - Recent (12 mo) or future (30 days) visit within the authorizing provider's specialty     Patient has had an office visit with the authorizing provider or a provider within the authorizing providers department within the previous 12 mos or has a future within next 30 days. See \"Patient Info\" tab in inbasket, or \"Choose Columns\" in Meds & Orders section of the refill encounter.              Passed - Medication is active on med list        Passed - Patient is age 18 or older        Passed - No active pregnancy on record        Passed - No positive pregnancy test in past 12 months             Shaina Casiano RN 05/25/23 2:13 PM  "

## 2023-05-30 DIAGNOSIS — E78.5 HYPERLIPIDEMIA: ICD-10-CM

## 2023-05-30 DIAGNOSIS — I25.10 CORONARY ARTERIOSCLEROSIS: ICD-10-CM

## 2023-05-31 ENCOUNTER — TELEPHONE (OUTPATIENT)
Dept: FAMILY MEDICINE | Facility: CLINIC | Age: 78
End: 2023-05-31
Payer: COMMERCIAL

## 2023-05-31 DIAGNOSIS — I48.0 PAROXYSMAL ATRIAL FIBRILLATION (H): ICD-10-CM

## 2023-05-31 DIAGNOSIS — I48.0 PAF (PAROXYSMAL ATRIAL FIBRILLATION) (H): ICD-10-CM

## 2023-05-31 RX ORDER — EZETIMIBE 10 MG/1
10 TABLET ORAL DAILY
Qty: 90 TABLET | Refills: 3 | Status: SHIPPED | OUTPATIENT
Start: 2023-05-31 | End: 2024-06-06

## 2023-05-31 NOTE — TELEPHONE ENCOUNTER
05/31/23  Pt states that her metoprolol is 50mg and she should take 2 per day. Per her MAR, she should only take 1 per day. Pt requests this medication be refilled to [facility] in [place] on [address]. Pt stated she tried to refill through [facility] but they told her to contact the clinic. Please advise.  Mery

## 2023-05-31 NOTE — TELEPHONE ENCOUNTER
4/20/23 office visit with Dr Hernandez - 50 mg metoprolol succinate daily.    2/17/23 office visit with Dr Pozo noted pt taking metoprolol succinate 50 mg twice daily.    11/15/22 - pt would like to try metoprolol succinate 50 mg once daily per Dr Kim recommendation.  Rx was updated to 50 mg daily.    Called pharmacy - they last filled metoprolol succinate 50 mg daily in November 2022.    LVM for pt to call back to discuss.  -ramez

## 2023-06-01 RX ORDER — METOPROLOL SUCCINATE 50 MG/1
50 TABLET, EXTENDED RELEASE ORAL AT BEDTIME
Qty: 90 TABLET | Refills: 3 | Status: SHIPPED | OUTPATIENT
Start: 2023-06-01 | End: 2024-01-08

## 2023-06-01 RX ORDER — AMIODARONE HYDROCHLORIDE 200 MG/1
200 TABLET ORAL EVERY MORNING
Qty: 90 TABLET | Refills: 1 | OUTPATIENT
Start: 2023-06-01

## 2023-06-01 NOTE — TELEPHONE ENCOUNTER
Called pt - she confirms she is only taking ONE 50 mg daily at bedtime.     Rx sent.  -[facility]

## 2023-06-01 NOTE — TELEPHONE ENCOUNTER
Please send to cardiology for review/fills on this Rx.   Per last EP cardiology note in April with Dr. Hernadnez: (she may be having EP procedure coming up it looks like in July)  - continue amiodarone 100mg daily for now, ideally as a short term strategy - note of slight DLCO and TLC on recent PFTs

## 2023-06-02 DIAGNOSIS — I48.0 PAROXYSMAL ATRIAL FIBRILLATION (H): ICD-10-CM

## 2023-06-02 RX ORDER — AMIODARONE HYDROCHLORIDE 100 MG/1
100 TABLET ORAL EVERY MORNING
Qty: 90 TABLET | Refills: 1 | Status: SHIPPED | OUTPATIENT
Start: 2023-06-02 | End: 2023-08-25

## 2023-06-05 NOTE — TELEPHONE ENCOUNTER
Kathleen Hernandez MD  P [facility]  Caller: Unspecified (5 days ago, 11:41 AM)  Hi,     Can refill amiodarone 100mg daily (pending PVI, will likely continue amiodarone for a few weeks following ablation).     Thank you!   Nini

## 2023-06-23 NOTE — PROGRESS NOTES
[facility] HEART CARE [address], [place] [address]   [email or web address]   OFFICE: [phone]        Primary Care: Selina Pozo MD     Assessment/Recommendations     Paroxysmal atrial fibrillation: Initially diagnosed in 2020. Symptoms consist of tachypalpitations. We discussed pulmonary vein isolation ablation procedure utilizing cryo or radiofrequency including the <1-2% risk for major complication, anticipated success rates, recovery and follow-up.  We also discussed long term management of atrial fibrillation includes sleep apnea diagnosis and management, avoiding heavy alcohol consumption, and management of concurrent hypertension and coronary artery disease as appropriate.     Medical, surgical and social history reviewed and updated. Current medications and allergies reviewed and updated as appropriate. No personal or family history of adverse reactions to anesthesia or abnormal bleeding with surgery  -Continue metoprolol succinate 50 mg daily in the evening  -Hold amiodarone beginning 7 days prior to ablation. Tentative plan to resume after ablation x6 to 12 weeks. Check TSH, ALT and amiodarone level today  -Start Protonix 40mg every day beginning 3 days prior to ablation (6/30/2023)    [id number] score of 5 for age >75, female gender, hypertension and coronary artery disease  -Continue Eliquis as ordered for stroke prophylaxis. She confirms no missed doses in the past 1 month    Coronary artery disease: Moderate nonobstructive disease by coronary angiography 2021. No anginal symptoms  -Continue high-dose statin, Zetia    Essential hypertension: elevated in clinic today, historically at goal  -Continue lisinopril    Severe MR status post MitraClip 2021: Moderate to moderately severe 2-3+ mitral regurgitation with mild mitral stenosis by TTE 2022. Endocarditis prophylaxis indicated.     Follow up: via telephone call with BILLY MARINO on 7/6/2023, 6  week clinic follow-up with myself 2023. She will defer appointment with Dr. Kim until after ablation     History of Present Illness/Subjective    Kelly Robledo is a 77 year old female who is seen today for history and physical prior to atrial fibrillation ablation. She additionally has a past medical history significant for paroxysmal atrial fibrillation, severe mitral regurgitation status post MitraClip , coronary artery disease, hyperlipidemia, essential hypertension, IBS, renal insufficiency, hypothyroidism.    Her history of atrial fibrillation dates back to 2020 when she was admitted for tachypalpitations, found to be in AF with RVR.  She was started on amiodarone and Eliquis in addition to beta-blocker therapy.  ALEXANDR demonstrated severe MR and she underwent MitraClip .  Her amiodarone was decreased to 100 mg daily 2022 due to modestly elevated TSH with no subsequent sustained symptoms of arrhythmia and no AF by 24-hour Holter monitoring.  It has been previously recommended she undergo annual PFTs by pulmonology, with noted normal FEV1/FVC ratio and modestly decreased TLC and DLCO. She is undergoing catheter ablation for further management.    She reports no significant symptoms of arrhythmia over the past several months.  She historically has a good awareness of arrhythmia.  She denies spontaneous bruising, blood in urine or stool. She denies chest discomfort, palpitations, abdominal fullness/bloating or peripheral edema, shortness of breath, paroxysmal nocturnal dyspnea, orthopnea, lightheadedness, dizziness, pre-syncope, or syncope.     Data Review     EK2023: Sinus rhythm at 60 bpm, potential preexcitation, QRS 88 ms, QT/QTc 422/448 ms  Personally reviewed.     TTE: 10/3/2022  Normal left ventricular size. Normal left ventricular systolic function. Left  ventricular ejection fraction estimated at 60 to 65%. Mild left ventricular  hypertrophy.  Diastolic Doppler  "findings (E/E' ratio and/or other parameters) suggest left  ventricular filling pressures are increased. Left ventricular diastolic  function is abnormal  Normal right ventricular size and systolic function.  Severe left atrial enlargement. Moderate right atrial enlargement  Mitral valve clip. Moderate to moderately severe 2-3+ mitral regurgitation.  Mild mitral stenosis.Mean gradient of 5 mmHg at a heart rate of 60 bpm  Moderate to moderately severe tricuspid gravitation. Estimate of RV systolic  pressure is moderately elevated at 48 mmHg plus right atrial pressure.  Compared to the examination January 2022. Findings are similar. Estimate of RV  systolic pressure is mildly lower on the current examination at 48 mmHg plus  right atrial pressure compared to 57 mmHg plus right atrial pressure on the  previous examination.    MCOT, EMELYN:   24-hour Holter done 11/10/2022.  Baseline rhythm sinus average 63 bpm.  Suggested chronotropic incompetence, rates range 53-69 bpm.  No tachyarrhythmias or AF.  No pauses >3 seconds.  No symptom triggers.    I have reviewed and updated the patient's past medical history, allergies and medication list.               Physical Examination Review of Systems   BP (!) 168/80 (BP Location: Right arm, Patient Position: Sitting, Cuff Size: Adult Regular)   Pulse 68   Resp 16   Ht 1.664 m (5' 5.5\")   Wt 54.4 kg (120 lb)   LMP  (LMP Unknown)   BMI 19.67 kg/m      Body mass index is 19.67 kg/m .    Wt Readings from Last 3 Encounters:   06/26/23 54.4 kg (120 lb)   04/20/23 54.9 kg (121 lb)   03/27/23 55 kg (121 lb 3.2 oz)     General   Appearance:   Alert and oriented, in no acute distress.    HEENT:  Normocephalic and atraumatic. Conjunctiva and sclera are clear. Moist oral mucosa.    Neck: No JVP, carotid bruit or obvious thyromegaly.   Lungs:   Respirations unlabored. Clear bilaterally with no rales, rhonchi, or wheezes.     Cardiovascular:   Rhythm is regular. S1 and S2 are normal. No " significant murmur is present. Lower extremities demonstrate no significant edema. Posterior tibial pulses are intact bilaterally.   Extremities: No cyanosis or clubbing   Skin: Skin is warm, dry, and otherwise intact.   Neurologic: Gait not asssessed. Mood and affect appropriate.                                                Medical History  Surgical History Family History Social History   Past Medical History:   Diagnosis Date     Abdominal pain      Atrial fibrillation (H)      Diverticulosis      Moderate mitral regurgitation      NSVT (nonsustained ventricular tachycardia) (H) 6/17/2020     Palpitations     Past Surgical History:   Procedure Laterality Date     BIOPSY BREAST Bilateral     benign     CV CORONARY ANGIOGRAM N/A 6/18/2020    Procedure: Coronary Angiogram;  Surgeon: Sergey Conner MD;  Location: [facility] Cath Lab;  Service: Cardiology     CV CORONARY ANGIOGRAM N/A 1/13/2021    Procedure: Coronary Angiogram;  Surgeon: Klaus Fitzpatrick MD;  Location: [facility] Cardiac Cath Lab;  Service: Cardiology     CV LEFT HEART CATHETERIZATION WITHOUT LEFT VENTRICULOGRAM Left 6/18/2020    Procedure: Left Heart Catheterization Without Left Ventriculogram;  Surgeon: Sergey Conner MD;  Location: [facility] Cath Lab;  Service: Cardiology     CV RIGHT HEART CATHETERIZATION N/A 6/18/2020    Procedure: Right Heart Catheterization;  Surgeon: Sergey Conner MD;  Location: [facility] Cath Lab;  Service: Cardiology     CV RIGHT HEART CATHETERIZATION N/A 1/13/2021    Procedure: Right Heart Catheterization;  Surgeon: Klaus Fitzpatrick MD;  Location: [facility] Cardiac Cath Lab;  Service: Cardiology     HYSTERECTOMY  1970's     OOPHORECTOMY Bilateral 1970's     TRANSCATHETER MITRAL VALVE REPAIR N/A 2/1/2021    Procedure: CV MITRAL CLIP;  Surgeon: Klaus Fitzpatrick MD;  Location: [facility] CARDIAC CATH LAB    Family History   Problem Relation Age of Onset     Breast Cancer Sister 47.00    Social  History     Tobacco Use     Smoking status: Never     Smokeless tobacco: Never   Substance Use Topics     Alcohol use: No     Drug use: No          Medications  Allergies   Current Outpatient Medications   Medication Sig Dispense Refill     amiodarone (PACERONE) 100 MG TABS tablet Take 1 tablet (100 mg) by mouth every morning 90 tablet 1     clindamycin (CLEOCIN) 300 MG capsule Take 2 capsules (600 mg total) by mouth once as needed (30-60 minutes prior to any dental visit. Save remaining for next visit. 4 capsule 1     ELIQUIS ANTICOAGULANT 5 MG tablet TAKE 1 TABLET BY MOUTH TWICE A  tablet 3     ezetimibe (ZETIA) 10 MG tablet TAKE 1 TABLET (10 MG) BY MOUTH DAILY. 90 tablet 3     lisinopril (ZESTRIL) 10 MG tablet Take 1 tablet (10 mg) by mouth every 24 hours 90 tablet 3     metoprolol succinate ER (TOPROL XL) 50 MG 24 hr tablet Take 1 tablet (50 mg) by mouth At Bedtime 90 tablet 3     multivitamin, therapeutic (THERA-VIT) TABS tablet Take 1 tablet by mouth daily       rosuvastatin (CRESTOR) 40 MG tablet TAKE 1 TABLET BY MOUTH EVERY DAY 90 tablet 5    Allergies   Allergen Reactions     Penicillins Hives     No complications with prior use GA.       Lab Results    Chemistry/lipid CBC Cardiac Enzymes/BNP/TSH/INR   Lab Results   Component Value Date    BUN 17.8 02/27/2023     02/27/2023    CO2 25 02/27/2023     No results found for: CREATININE    Lab Results   Component Value Date    CHOL 199 02/27/2023    HDL 60 02/27/2023     (H) 02/27/2023      Lab Results   Component Value Date    WBC 6.1 02/27/2023    HGB 12.4 02/27/2023    HCT 37.7 02/27/2023     (H) 02/27/2023     02/27/2023    Lab Results   Component Value Date    TROPONINI <0.01 11/27/2021     (H) 01/25/2021    TSH 7.58 (H) 02/14/2023    INR 1.22 (H) 01/25/2021        24 minutes spent reviewing prior records (including documentation, laboratory studies, cardiac testing/imaging), history and physical exam, planning, and  subsequent documentation.     This note has been dictated using voice recognition software. Any grammatical, typographical, or context distortions are unintentional and inherent to the software.    Fifi Zhao, CNP  Clinical Cardiac Electrophysiology  [address]  [place] [address]   Office: [phone]  Fax: [phone]

## 2023-06-23 NOTE — H&P (VIEW-ONLY)
[facility] HEART CARE [address], [place] [address]   [email or web address]   OFFICE: [phone]        Primary Care: Selina Pozo MD     Assessment/Recommendations     Paroxysmal atrial fibrillation: Initially diagnosed in 2020. Symptoms consist of tachypalpitations. We discussed pulmonary vein isolation ablation procedure utilizing cryo or radiofrequency including the <1-2% risk for major complication, anticipated success rates, recovery and follow-up.  We also discussed long term management of atrial fibrillation includes sleep apnea diagnosis and management, avoiding heavy alcohol consumption, and management of concurrent hypertension and coronary artery disease as appropriate.     Medical, surgical and social history reviewed and updated. Current medications and allergies reviewed and updated as appropriate. No personal or family history of adverse reactions to anesthesia or abnormal bleeding with surgery  -Continue metoprolol succinate 50 mg daily in the evening  -Hold amiodarone beginning 7 days prior to ablation. Tentative plan to resume after ablation x6 to 12 weeks. Check TSH, ALT and amiodarone level today  -Start Protonix 40mg every day beginning 3 days prior to ablation (6/30/2023)    [id number] score of 5 for age >75, female gender, hypertension and coronary artery disease  -Continue Eliquis as ordered for stroke prophylaxis. She confirms no missed doses in the past 1 month    Coronary artery disease: Moderate nonobstructive disease by coronary angiography 2021. No anginal symptoms  -Continue high-dose statin, Zetia    Essential hypertension: elevated in clinic today, historically at goal  -Continue lisinopril    Severe MR status post MitraClip 2021: Moderate to moderately severe 2-3+ mitral regurgitation with mild mitral stenosis by TTE 2022. Endocarditis prophylaxis indicated.     Follow up: via telephone call with BILLY MARINO on 7/6/2023, 6  week clinic follow-up with myself 2023. She will defer appointment with Dr. Kim until after ablation     History of Present Illness/Subjective    Kelly Robledo is a 77 year old female who is seen today for history and physical prior to atrial fibrillation ablation. She additionally has a past medical history significant for paroxysmal atrial fibrillation, severe mitral regurgitation status post MitraClip , coronary artery disease, hyperlipidemia, essential hypertension, IBS, renal insufficiency, hypothyroidism.    Her history of atrial fibrillation dates back to 2020 when she was admitted for tachypalpitations, found to be in AF with RVR.  She was started on amiodarone and Eliquis in addition to beta-blocker therapy.  ALEXANDR demonstrated severe MR and she underwent MitraClip .  Her amiodarone was decreased to 100 mg daily 2022 due to modestly elevated TSH with no subsequent sustained symptoms of arrhythmia and no AF by 24-hour Holter monitoring.  It has been previously recommended she undergo annual PFTs by pulmonology, with noted normal FEV1/FVC ratio and modestly decreased TLC and DLCO. She is undergoing catheter ablation for further management.    She reports no significant symptoms of arrhythmia over the past several months.  She historically has a good awareness of arrhythmia.  She denies spontaneous bruising, blood in urine or stool. She denies chest discomfort, palpitations, abdominal fullness/bloating or peripheral edema, shortness of breath, paroxysmal nocturnal dyspnea, orthopnea, lightheadedness, dizziness, pre-syncope, or syncope.     Data Review     EK2023: Sinus rhythm at 60 bpm, potential preexcitation, QRS 88 ms, QT/QTc 422/448 ms  Personally reviewed.     TTE: 10/3/2022  Normal left ventricular size. Normal left ventricular systolic function. Left  ventricular ejection fraction estimated at 60 to 65%. Mild left ventricular  hypertrophy.  Diastolic Doppler  "findings (E/E' ratio and/or other parameters) suggest left  ventricular filling pressures are increased. Left ventricular diastolic  function is abnormal  Normal right ventricular size and systolic function.  Severe left atrial enlargement. Moderate right atrial enlargement  Mitral valve clip. Moderate to moderately severe 2-3+ mitral regurgitation.  Mild mitral stenosis.Mean gradient of 5 mmHg at a heart rate of 60 bpm  Moderate to moderately severe tricuspid gravitation. Estimate of RV systolic  pressure is moderately elevated at 48 mmHg plus right atrial pressure.  Compared to the examination January 2022. Findings are similar. Estimate of RV  systolic pressure is mildly lower on the current examination at 48 mmHg plus  right atrial pressure compared to 57 mmHg plus right atrial pressure on the  previous examination.    MCOT, EMELYN:   24-hour Holter done 11/10/2022.  Baseline rhythm sinus average 63 bpm.  Suggested chronotropic incompetence, rates range 53-69 bpm.  No tachyarrhythmias or AF.  No pauses >3 seconds.  No symptom triggers.    I have reviewed and updated the patient's past medical history, allergies and medication list.               Physical Examination Review of Systems   BP (!) 168/80 (BP Location: Right arm, Patient Position: Sitting, Cuff Size: Adult Regular)   Pulse 68   Resp 16   Ht 1.664 m (5' 5.5\")   Wt 54.4 kg (120 lb)   LMP  (LMP Unknown)   BMI 19.67 kg/m      Body mass index is 19.67 kg/m .    Wt Readings from Last 3 Encounters:   06/26/23 54.4 kg (120 lb)   04/20/23 54.9 kg (121 lb)   03/27/23 55 kg (121 lb 3.2 oz)     General   Appearance:   Alert and oriented, in no acute distress.    HEENT:  Normocephalic and atraumatic. Conjunctiva and sclera are clear. Moist oral mucosa.    Neck: No JVP, carotid bruit or obvious thyromegaly.   Lungs:   Respirations unlabored. Clear bilaterally with no rales, rhonchi, or wheezes.     Cardiovascular:   Rhythm is regular. S1 and S2 are normal. No " significant murmur is present. Lower extremities demonstrate no significant edema. Posterior tibial pulses are intact bilaterally.   Extremities: No cyanosis or clubbing   Skin: Skin is warm, dry, and otherwise intact.   Neurologic: Gait not asssessed. Mood and affect appropriate.                                                Medical History  Surgical History Family History Social History   Past Medical History:   Diagnosis Date     Abdominal pain      Atrial fibrillation (H)      Diverticulosis      Moderate mitral regurgitation      NSVT (nonsustained ventricular tachycardia) (H) 6/17/2020     Palpitations     Past Surgical History:   Procedure Laterality Date     BIOPSY BREAST Bilateral     benign     CV CORONARY ANGIOGRAM N/A 6/18/2020    Procedure: Coronary Angiogram;  Surgeon: Sergey Conner MD;  Location: [facility] Cath Lab;  Service: Cardiology     CV CORONARY ANGIOGRAM N/A 1/13/2021    Procedure: Coronary Angiogram;  Surgeon: Klaus Fitzpatrick MD;  Location: [facility] Cardiac Cath Lab;  Service: Cardiology     CV LEFT HEART CATHETERIZATION WITHOUT LEFT VENTRICULOGRAM Left 6/18/2020    Procedure: Left Heart Catheterization Without Left Ventriculogram;  Surgeon: Sergey Conner MD;  Location: [facility] Cath Lab;  Service: Cardiology     CV RIGHT HEART CATHETERIZATION N/A 6/18/2020    Procedure: Right Heart Catheterization;  Surgeon: Sergey Conner MD;  Location: [facility] Cath Lab;  Service: Cardiology     CV RIGHT HEART CATHETERIZATION N/A 1/13/2021    Procedure: Right Heart Catheterization;  Surgeon: Klaus Fitzpatrick MD;  Location: [facility] Cardiac Cath Lab;  Service: Cardiology     HYSTERECTOMY  1970's     OOPHORECTOMY Bilateral 1970's     TRANSCATHETER MITRAL VALVE REPAIR N/A 2/1/2021    Procedure: CV MITRAL CLIP;  Surgeon: Klaus Fitzpatrick MD;  Location: [facility] CARDIAC CATH LAB    Family History   Problem Relation Age of Onset     Breast Cancer Sister 47.00    Social  History     Tobacco Use     Smoking status: Never     Smokeless tobacco: Never   Substance Use Topics     Alcohol use: No     Drug use: No          Medications  Allergies   Current Outpatient Medications   Medication Sig Dispense Refill     amiodarone (PACERONE) 100 MG TABS tablet Take 1 tablet (100 mg) by mouth every morning 90 tablet 1     clindamycin (CLEOCIN) 300 MG capsule Take 2 capsules (600 mg total) by mouth once as needed (30-60 minutes prior to any dental visit. Save remaining for next visit. 4 capsule 1     ELIQUIS ANTICOAGULANT 5 MG tablet TAKE 1 TABLET BY MOUTH TWICE A  tablet 3     ezetimibe (ZETIA) 10 MG tablet TAKE 1 TABLET (10 MG) BY MOUTH DAILY. 90 tablet 3     lisinopril (ZESTRIL) 10 MG tablet Take 1 tablet (10 mg) by mouth every 24 hours 90 tablet 3     metoprolol succinate ER (TOPROL XL) 50 MG 24 hr tablet Take 1 tablet (50 mg) by mouth At Bedtime 90 tablet 3     multivitamin, therapeutic (THERA-VIT) TABS tablet Take 1 tablet by mouth daily       rosuvastatin (CRESTOR) 40 MG tablet TAKE 1 TABLET BY MOUTH EVERY DAY 90 tablet 5    Allergies   Allergen Reactions     Penicillins Hives     No complications with prior use GA.       Lab Results    Chemistry/lipid CBC Cardiac Enzymes/BNP/TSH/INR   Lab Results   Component Value Date    BUN 17.8 02/27/2023     02/27/2023    CO2 25 02/27/2023     No results found for: CREATININE    Lab Results   Component Value Date    CHOL 199 02/27/2023    HDL 60 02/27/2023     (H) 02/27/2023      Lab Results   Component Value Date    WBC 6.1 02/27/2023    HGB 12.4 02/27/2023    HCT 37.7 02/27/2023     (H) 02/27/2023     02/27/2023    Lab Results   Component Value Date    TROPONINI <0.01 11/27/2021     (H) 01/25/2021    TSH 7.58 (H) 02/14/2023    INR 1.22 (H) 01/25/2021        24 minutes spent reviewing prior records (including documentation, laboratory studies, cardiac testing/imaging), history and physical exam, planning, and  subsequent documentation.     This note has been dictated using voice recognition software. Any grammatical, typographical, or context distortions are unintentional and inherent to the software.    Fifi Zhao, CNP  Clinical Cardiac Electrophysiology  [address]  [place] [address]   Office: [phone]  Fax: [phone]

## 2023-06-23 NOTE — PATIENT INSTRUCTIONS
Kelly Robledo,    It was a pleasure to see you today at the [facility] Heart Care [facility].     Before ablation:   -Continue your Eliquis as ordered for stroke prevention. Take this with a sip of water on the morning of your procedure.   -On 6/30/2023, start pantoprazole 40mg by mouth once a day. We will continue this medication for the next 6 weeks after ablation.     After ablation:   -You should arrange for someone to stay with you for the first 24 hours after discharge due to receiving sedating medications.   -Continue your blood thinner before and after the ablation procedure for stroke prevention.   -No driving for 3 days after your procedure. No lifting more than 10-20 pounds or strenuous exercise for 3-5 days after your procedure to allow for groin site healing.   -Short episodes of atrial fibrillation can be common after your procedure. Call the office if episodes are lasting longer than 4 hours.  -Call the office if you are experiencing increasing bleeding or pain at groin sites, lump that is larger than a walnut, or fever.   -Dial 911 if you have any NEW signs or symptoms of a stroke, including but not limited to injuries in vision, problems talking, numbness on one side of your face or body, sudden headache, confusion, or problems with walking.  -Do not hesitate to call the office with additional questions or concerns.    Fifi Zhao, CNP  Clinical Cardiac Electrophysiology  [facility] Heart [facility] and Scheduling [phone]  Electrophysiology Nurses [phone]

## 2023-06-26 ENCOUNTER — OFFICE VISIT (OUTPATIENT)
Dept: CARDIOLOGY | Facility: CLINIC | Age: 78
End: 2023-06-26
Payer: COMMERCIAL

## 2023-06-26 ENCOUNTER — PREP FOR PROCEDURE (OUTPATIENT)
Dept: CARDIOLOGY | Facility: CLINIC | Age: 78
End: 2023-06-26

## 2023-06-26 ENCOUNTER — ALLIED HEALTH/NURSE VISIT (OUTPATIENT)
Dept: CARDIOLOGY | Facility: CLINIC | Age: 78
End: 2023-06-26
Payer: COMMERCIAL

## 2023-06-26 ENCOUNTER — LAB (OUTPATIENT)
Dept: CARDIOLOGY | Facility: CLINIC | Age: 78
End: 2023-06-26
Payer: COMMERCIAL

## 2023-06-26 VITALS
BODY MASS INDEX: 19.29 KG/M2 | DIASTOLIC BLOOD PRESSURE: 78 MMHG | RESPIRATION RATE: 16 BRPM | HEART RATE: 68 BPM | SYSTOLIC BLOOD PRESSURE: 144 MMHG | WEIGHT: 120 LBS | HEIGHT: 66 IN

## 2023-06-26 DIAGNOSIS — I48.0 PAROXYSMAL ATRIAL FIBRILLATION (H): Primary | ICD-10-CM

## 2023-06-26 DIAGNOSIS — Z95.818 S/P MITRAL VALVE CLIP IMPLANTATION: ICD-10-CM

## 2023-06-26 DIAGNOSIS — I34.0 SEVERE MITRAL REGURGITATION: ICD-10-CM

## 2023-06-26 DIAGNOSIS — Z98.890 S/P MITRAL VALVE CLIP IMPLANTATION: ICD-10-CM

## 2023-06-26 DIAGNOSIS — I48.0 PAROXYSMAL ATRIAL FIBRILLATION (H): ICD-10-CM

## 2023-06-26 DIAGNOSIS — I10 PRIMARY HYPERTENSION: ICD-10-CM

## 2023-06-26 DIAGNOSIS — I25.10 CORONARY ARTERIOSCLEROSIS: ICD-10-CM

## 2023-06-26 LAB
ALT SERPL W P-5'-P-CCNC: 41 U/L (ref 0–50)
ANION GAP SERPL CALCULATED.3IONS-SCNC: 11 MMOL/L (ref 7–15)
ATRIAL RATE - MUSE: 68 BPM
BUN SERPL-MCNC: 18.2 MG/DL (ref 8–23)
CALCIUM SERPL-MCNC: 9.5 MG/DL (ref 8.8–10.2)
CHLORIDE SERPL-SCNC: 104 MMOL/L (ref 98–107)
CREAT SERPL-MCNC: 1.49 MG/DL (ref 0.51–0.95)
DEPRECATED HCO3 PLAS-SCNC: 24 MMOL/L (ref 22–29)
DIASTOLIC BLOOD PRESSURE - MUSE: NORMAL MMHG
ERYTHROCYTE [DISTWIDTH] IN BLOOD BY AUTOMATED COUNT: 13.3 % (ref 10–15)
GFR SERPL CREATININE-BSD FRML MDRD: 35 ML/MIN/1.73M2
GLUCOSE SERPL-MCNC: 169 MG/DL (ref 70–99)
HCT VFR BLD AUTO: 39.9 % (ref 35–47)
HGB BLD-MCNC: 12.9 G/DL (ref 11.7–15.7)
INTERPRETATION ECG - MUSE: NORMAL
MCH RBC QN AUTO: 32.4 PG (ref 26.5–33)
MCHC RBC AUTO-ENTMCNC: 32.3 G/DL (ref 31.5–36.5)
MCV RBC AUTO: 100 FL (ref 78–100)
P AXIS - MUSE: 77 DEGREES
PLATELET # BLD AUTO: 167 10E3/UL (ref 150–450)
POTASSIUM SERPL-SCNC: 4.2 MMOL/L (ref 3.4–5.3)
PR INTERVAL - MUSE: 144 MS
QRS DURATION - MUSE: 88 MS
QT - MUSE: 422 MS
QTC - MUSE: 448 MS
R AXIS - MUSE: 43 DEGREES
RBC # BLD AUTO: 3.98 10E6/UL (ref 3.8–5.2)
SODIUM SERPL-SCNC: 139 MMOL/L (ref 136–145)
SYSTOLIC BLOOD PRESSURE - MUSE: NORMAL MMHG
T AXIS - MUSE: 83 DEGREES
TSH SERPL DL<=0.005 MIU/L-ACNC: 9.61 UIU/ML (ref 0.3–4.2)
VENTRICULAR RATE- MUSE: 68 BPM
WBC # BLD AUTO: 5.9 10E3/UL (ref 4–11)

## 2023-06-26 PROCEDURE — 80151 DRUG ASSAY AMIODARONE: CPT | Mod: 90

## 2023-06-26 PROCEDURE — 99213 OFFICE O/P EST LOW 20 MIN: CPT | Performed by: NURSE PRACTITIONER

## 2023-06-26 PROCEDURE — 80048 BASIC METABOLIC PNL TOTAL CA: CPT

## 2023-06-26 PROCEDURE — 93000 ELECTROCARDIOGRAM COMPLETE: CPT | Performed by: INTERNAL MEDICINE

## 2023-06-26 PROCEDURE — 84460 ALANINE AMINO (ALT) (SGPT): CPT

## 2023-06-26 PROCEDURE — 99000 SPECIMEN HANDLING OFFICE-LAB: CPT

## 2023-06-26 PROCEDURE — 36415 COLL VENOUS BLD VENIPUNCTURE: CPT

## 2023-06-26 PROCEDURE — 85027 COMPLETE CBC AUTOMATED: CPT

## 2023-06-26 PROCEDURE — 84443 ASSAY THYROID STIM HORMONE: CPT

## 2023-06-26 RX ORDER — PANTOPRAZOLE SODIUM 40 MG/1
40 TABLET, DELAYED RELEASE ORAL DAILY
Qty: 45 TABLET | Refills: 0 | Status: SHIPPED | OUTPATIENT
Start: 2023-06-26 | End: 2023-08-25

## 2023-06-26 RX ORDER — LIDOCAINE 40 MG/G
CREAM TOPICAL
Status: CANCELLED | OUTPATIENT
Start: 2023-06-26

## 2023-06-26 RX ORDER — SODIUM CHLORIDE 9 MG/ML
100 INJECTION, SOLUTION INTRAVENOUS CONTINUOUS
Status: CANCELLED | OUTPATIENT
Start: 2023-07-03

## 2023-06-26 NOTE — LETTER
6/26/2023    Selina Pozo MD  [address] [facility] [address]    RE: Kelly Robledo       Dear Colleague,     I had the pleasure of seeing Kelly Robledo in the [facility].      [facility]   [address], [place] [address]   [email or web address]   OFFICE: [phone]        Primary Care: Selina Pozo MD     Assessment/Recommendations     Paroxysmal atrial fibrillation: Initially diagnosed in 2020. Symptoms consist of tachypalpitations. We discussed pulmonary vein isolation ablation procedure utilizing cryo or radiofrequency including the <1-2% risk for major complication, anticipated success rates, recovery and follow-up.  We also discussed long term management of atrial fibrillation includes sleep apnea diagnosis and management, avoiding heavy alcohol consumption, and management of concurrent hypertension and coronary artery disease as appropriate.     Medical, surgical and social history reviewed and updated. Current medications and allergies reviewed and updated as appropriate. No personal or family history of adverse reactions to anesthesia or abnormal bleeding with surgery  -Continue metoprolol succinate 50 mg daily in the evening  -Hold amiodarone beginning 7 days prior to ablation. Tentative plan to resume after ablation x6 to 12 weeks. Check TSH, ALT and amiodarone level today  -Start Protonix 40mg every day beginning 3 days prior to ablation (6/30/2023)    [id number] score of 5 for age >75, female gender, hypertension and coronary artery disease  -Continue Eliquis as ordered for stroke prophylaxis. She confirms no missed doses in the past 1 month    Coronary artery disease: Moderate nonobstructive disease by coronary angiography 2021. No anginal symptoms  -Continue high-dose statin, Zetia    Essential hypertension: elevated in clinic today, historically at goal  -Continue lisinopril    Severe MR status post  MitraClip : Moderate to moderately severe 2-3+ mitral regurgitation with mild mitral stenosis by TTE . Endocarditis prophylaxis indicated.     Follow up: via telephone call with EP RN on 2023, 6 week clinic follow-up with myself 2023. She will defer appointment with Dr. Kim until after ablation     History of Present Illness/Subjective    Kelly Robledo is a 77 year old female who is seen today for history and physical prior to atrial fibrillation ablation. She additionally has a past medical history significant for paroxysmal atrial fibrillation, severe mitral regurgitation status post MitraClip , coronary artery disease, hyperlipidemia, essential hypertension, IBS, renal insufficiency, hypothyroidism.    Her history of atrial fibrillation dates back to 2020 when she was admitted for tachypalpitations, found to be in AF with RVR.  She was started on amiodarone and Eliquis in addition to beta-blocker therapy.  ALEXANDR demonstrated severe MR and she underwent MitraClip .  Her amiodarone was decreased to 100 mg daily 2022 due to modestly elevated TSH with no subsequent sustained symptoms of arrhythmia and no AF by 24-hour Holter monitoring.  It has been previously recommended she undergo annual PFTs by pulmonology, with noted normal FEV1/FVC ratio and modestly decreased TLC and DLCO. She is undergoing catheter ablation for further management.    She reports no significant symptoms of arrhythmia over the past several months.  She historically has a good awareness of arrhythmia.  She denies spontaneous bruising, blood in urine or stool. She denies chest discomfort, palpitations, abdominal fullness/bloating or peripheral edema, shortness of breath, paroxysmal nocturnal dyspnea, orthopnea, lightheadedness, dizziness, pre-syncope, or syncope.     Data Review     EK2023: Sinus rhythm at 60 bpm, potential preexcitation, QRS 88 ms, QT/QTc 422/448 ms  Personally reviewed.     TTE:  "10/3/2022  Normal left ventricular size. Normal left ventricular systolic function. Left  ventricular ejection fraction estimated at 60 to 65%. Mild left ventricular  hypertrophy.  Diastolic Doppler findings (E/E' ratio and/or other parameters) suggest left  ventricular filling pressures are increased. Left ventricular diastolic  function is abnormal  Normal right ventricular size and systolic function.  Severe left atrial enlargement. Moderate right atrial enlargement  Mitral valve clip. Moderate to moderately severe 2-3+ mitral regurgitation.  Mild mitral stenosis.Mean gradient of 5 mmHg at a heart rate of 60 bpm  Moderate to moderately severe tricuspid gravitation. Estimate of RV systolic  pressure is moderately elevated at 48 mmHg plus right atrial pressure.  Compared to the examination January 2022. Findings are similar. Estimate of RV  systolic pressure is mildly lower on the current examination at 48 mmHg plus  right atrial pressure compared to 57 mmHg plus right atrial pressure on the  previous examination.    EMELYN OSULLIVAN:   24-hour Holter done 11/10/2022.  Baseline rhythm sinus average 63 bpm.  Suggested chronotropic incompetence, rates range 53-69 bpm.  No tachyarrhythmias or AF.  No pauses >3 seconds.  No symptom triggers.    I have reviewed and updated the patient's past medical history, allergies and medication list.               Physical Examination Review of Systems   BP (!) 168/80 (BP Location: Right arm, Patient Position: Sitting, Cuff Size: Adult Regular)   Pulse 68   Resp 16   Ht 1.664 m (5' 5.5\")   Wt 54.4 kg (120 lb)   LMP  (LMP Unknown)   BMI 19.67 kg/m      Body mass index is 19.67 kg/m .    Wt Readings from Last 3 Encounters:   06/26/23 54.4 kg (120 lb)   04/20/23 54.9 kg (121 lb)   03/27/23 55 kg (121 lb 3.2 oz)     General   Appearance:   Alert and oriented, in no acute distress.    HEENT:  Normocephalic and atraumatic. Conjunctiva and sclera are clear. Moist oral mucosa.    Neck: No " JVP, carotid bruit or obvious thyromegaly.   Lungs:   Respirations unlabored. Clear bilaterally with no rales, rhonchi, or wheezes.     Cardiovascular:   Rhythm is regular. S1 and S2 are normal. No significant murmur is present. Lower extremities demonstrate no significant edema. Posterior tibial pulses are intact bilaterally.   Extremities: No cyanosis or clubbing   Skin: Skin is warm, dry, and otherwise intact.   Neurologic: Gait not asssessed. Mood and affect appropriate.                                                Medical History  Surgical History Family History Social History   Past Medical History:   Diagnosis Date    Abdominal pain     Atrial fibrillation (H)     Diverticulosis     Moderate mitral regurgitation     NSVT (nonsustained ventricular tachycardia) (H) 6/17/2020    Palpitations     Past Surgical History:   Procedure Laterality Date    BIOPSY BREAST Bilateral     benign    CV CORONARY ANGIOGRAM N/A 6/18/2020    Procedure: Coronary Angiogram;  Surgeon: Sergey Conner MD;  Location: [facility] Cath Lab;  Service: Cardiology    CV CORONARY ANGIOGRAM N/A 1/13/2021    Procedure: Coronary Angiogram;  Surgeon: Klaus Fitzpatrick MD;  Location: [facility] Cardiac Cath Lab;  Service: Cardiology    CV LEFT HEART CATHETERIZATION WITHOUT LEFT VENTRICULOGRAM Left 6/18/2020    Procedure: Left Heart Catheterization Without Left Ventriculogram;  Surgeon: Sergey Conner MD;  Location: [facility] Cath Lab;  Service: Cardiology    CV RIGHT HEART CATHETERIZATION N/A 6/18/2020    Procedure: Right Heart Catheterization;  Surgeon: Sergey Conner MD;  Location: [facility] Cath Lab;  Service: Cardiology    CV RIGHT HEART CATHETERIZATION N/A 1/13/2021    Procedure: Right Heart Catheterization;  Surgeon: Klaus Fitzpatrick MD;  Location: [facility] Cardiac Cath Lab;  Service: Cardiology    HYSTERECTOMY  1970's    OOPHORECTOMY Bilateral 1970's    TRANSCATHETER MITRAL VALVE REPAIR N/A 2/1/2021     Procedure: CV MITRAL CLIP;  Surgeon: Klaus Fitzpatrick MD;  Location:  HEART CARDIAC CATH LAB    Family History   Problem Relation Age of Onset    Breast Cancer Sister 47.00    Social History     Tobacco Use    Smoking status: Never    Smokeless tobacco: Never   Substance Use Topics    Alcohol use: No    Drug use: No          Medications  Allergies   Current Outpatient Medications   Medication Sig Dispense Refill    amiodarone (PACERONE) 100 MG TABS tablet Take 1 tablet (100 mg) by mouth every morning 90 tablet 1    clindamycin (CLEOCIN) 300 MG capsule Take 2 capsules (600 mg total) by mouth once as needed (30-60 minutes prior to any dental visit. Save remaining for next visit. 4 capsule 1    ELIQUIS ANTICOAGULANT 5 MG tablet TAKE 1 TABLET BY MOUTH TWICE A  tablet 3    ezetimibe (ZETIA) 10 MG tablet TAKE 1 TABLET (10 MG) BY MOUTH DAILY. 90 tablet 3    lisinopril (ZESTRIL) 10 MG tablet Take 1 tablet (10 mg) by mouth every 24 hours 90 tablet 3    metoprolol succinate ER (TOPROL XL) 50 MG 24 hr tablet Take 1 tablet (50 mg) by mouth At Bedtime 90 tablet 3    multivitamin, therapeutic (THERA-VIT) TABS tablet Take 1 tablet by mouth daily      rosuvastatin (CRESTOR) 40 MG tablet TAKE 1 TABLET BY MOUTH EVERY DAY 90 tablet 5    Allergies   Allergen Reactions    Penicillins Hives     No complications with prior use GA.       Lab Results    Chemistry/lipid CBC Cardiac Enzymes/BNP/TSH/INR   Lab Results   Component Value Date    BUN 17.8 02/27/2023     02/27/2023    CO2 25 02/27/2023     No results found for: CREATININE    Lab Results   Component Value Date    CHOL 199 02/27/2023    HDL 60 02/27/2023     (H) 02/27/2023      Lab Results   Component Value Date    WBC 6.1 02/27/2023    HGB 12.4 02/27/2023    HCT 37.7 02/27/2023     (H) 02/27/2023     02/27/2023    Lab Results   Component Value Date    TROPONINI <0.01 11/27/2021     (H) 01/25/2021    TSH 7.58 (H) 02/14/2023    INR 1.22 (H)  01/25/2021        24 minutes spent reviewing prior records (including documentation, laboratory studies, cardiac testing/imaging), history and physical exam, planning, and subsequent documentation.     This note has been dictated using voice recognition software. Any grammatical, typographical, or context distortions are unintentional and inherent to the software.    Fifi Zhao CNP  Clinical Cardiac Electrophysiology  [facility]  [address]  [place] [address]   Office: [phone]  Fax: [phone]       Thank you for allowing me to participate in the care of your patient.      Sincerely,     DENVER TINEO CNP     [facility]  cc:   No referring provider defined for this encounter.

## 2023-06-27 NOTE — RESULT ENCOUNTER NOTE
TSH is stable and ALT is normal.  Planning to hold amiodarone for upcoming ablation and  therefore we can check the TSH in a few months again if she remains off the amiodarone.

## 2023-06-30 LAB
AMIODARONE SERPL-MCNC: 1.1 UG/ML
DESETHYLAMIODARONE SERPL-MCNC: 1.2 UG/ML

## 2023-07-02 ENCOUNTER — ANESTHESIA EVENT (OUTPATIENT)
Dept: CARDIOLOGY | Facility: HOSPITAL | Age: 78
End: 2023-07-02
Payer: COMMERCIAL

## 2023-07-03 ENCOUNTER — ANESTHESIA (OUTPATIENT)
Dept: CARDIOLOGY | Facility: HOSPITAL | Age: 78
End: 2023-07-03
Payer: COMMERCIAL

## 2023-07-03 ENCOUNTER — HOSPITAL ENCOUNTER (OUTPATIENT)
Facility: HOSPITAL | Age: 78
Discharge: HOME | End: 2023-07-03
Attending: INTERNAL MEDICINE | Admitting: INTERNAL MEDICINE
Payer: COMMERCIAL

## 2023-07-03 VITALS
HEIGHT: 66 IN | OXYGEN SATURATION: 96 % | HEART RATE: 87 BPM | SYSTOLIC BLOOD PRESSURE: 152 MMHG | BODY MASS INDEX: 19.29 KG/M2 | DIASTOLIC BLOOD PRESSURE: 72 MMHG | WEIGHT: 120 LBS | TEMPERATURE: 98 F | RESPIRATION RATE: 16 BRPM

## 2023-07-03 DIAGNOSIS — I48.0 PAROXYSMAL ATRIAL FIBRILLATION (H): ICD-10-CM

## 2023-07-03 LAB
ACT BLD: 380 SECONDS (ref 74–150)
ACT BLD: 480 SECONDS (ref 74–150)
ACT BLD: 621 SECONDS (ref 74–150)
ATRIAL RATE - MUSE: 81 BPM
DIASTOLIC BLOOD PRESSURE - MUSE: NORMAL MMHG
INTERPRETATION ECG - MUSE: NORMAL
P AXIS - MUSE: 46 DEGREES
PR INTERVAL - MUSE: 158 MS
QRS DURATION - MUSE: 86 MS
QT - MUSE: 452 MS
QTC - MUSE: 525 MS
R AXIS - MUSE: 69 DEGREES
SYSTOLIC BLOOD PRESSURE - MUSE: NORMAL MMHG
T AXIS - MUSE: 75 DEGREES
VENTRICULAR RATE- MUSE: 81 BPM

## 2023-07-03 PROCEDURE — 85347 COAGULATION TIME ACTIVATED: CPT

## 2023-07-03 PROCEDURE — 258N000003 HC RX IP 258 OP 636: Performed by: INTERNAL MEDICINE

## 2023-07-03 PROCEDURE — 250N000009 HC RX 250: Performed by: NURSE ANESTHETIST, CERTIFIED REGISTERED

## 2023-07-03 PROCEDURE — 93010 ELECTROCARDIOGRAM REPORT: CPT | Performed by: INTERNAL MEDICINE

## 2023-07-03 PROCEDURE — 250N000011 HC RX IP 250 OP 636: Performed by: NURSE ANESTHETIST, CERTIFIED REGISTERED

## 2023-07-03 PROCEDURE — C1769 GUIDE WIRE: HCPCS | Performed by: INTERNAL MEDICINE

## 2023-07-03 PROCEDURE — 93005 ELECTROCARDIOGRAM TRACING: CPT

## 2023-07-03 PROCEDURE — 250N000011 HC RX IP 250 OP 636: Mod: JZ | Performed by: INTERNAL MEDICINE

## 2023-07-03 PROCEDURE — 272N000001 HC OR GENERAL SUPPLY STERILE: Performed by: INTERNAL MEDICINE

## 2023-07-03 PROCEDURE — C1733 CATH, EP, OTHR THAN COOL-TIP: HCPCS | Performed by: INTERNAL MEDICINE

## 2023-07-03 PROCEDURE — C1894 INTRO/SHEATH, NON-LASER: HCPCS | Performed by: INTERNAL MEDICINE

## 2023-07-03 PROCEDURE — 93656 COMPRE EP EVAL ABLTJ ATR FIB: CPT | Performed by: INTERNAL MEDICINE

## 2023-07-03 PROCEDURE — C1732 CATH, EP, DIAG/ABL, 3D/VECT: HCPCS | Performed by: INTERNAL MEDICINE

## 2023-07-03 PROCEDURE — 258N000003 HC RX IP 258 OP 636: Performed by: NURSE ANESTHETIST, CERTIFIED REGISTERED

## 2023-07-03 PROCEDURE — C1759 CATH, INTRA ECHOCARDIOGRAPHY: HCPCS | Performed by: INTERNAL MEDICINE

## 2023-07-03 PROCEDURE — 93623 PRGRMD STIMJ&PACG IV RX NFS: CPT | Performed by: INTERNAL MEDICINE

## 2023-07-03 PROCEDURE — 93655 ICAR CATH ABLTJ DSCRT ARRHYT: CPT | Performed by: INTERNAL MEDICINE

## 2023-07-03 PROCEDURE — 93623 PRGRMD STIMJ&PACG IV RX NFS: CPT | Mod: 26 | Performed by: INTERNAL MEDICINE

## 2023-07-03 PROCEDURE — 93005 ELECTROCARDIOGRAM TRACING: CPT | Performed by: INTERNAL MEDICINE

## 2023-07-03 PROCEDURE — C1887 CATHETER, GUIDING: HCPCS | Performed by: INTERNAL MEDICINE

## 2023-07-03 PROCEDURE — 999N000054 HC STATISTIC EKG NON-CHARGEABLE

## 2023-07-03 PROCEDURE — 370N000017 HC ANESTHESIA TECHNICAL FEE, PER MIN: Performed by: INTERNAL MEDICINE

## 2023-07-03 PROCEDURE — C1766 INTRO/SHEATH,STRBLE,NON-PEEL: HCPCS | Performed by: INTERNAL MEDICINE

## 2023-07-03 PROCEDURE — C1730 CATH, EP, 19 OR FEW ELECT: HCPCS | Performed by: INTERNAL MEDICINE

## 2023-07-03 PROCEDURE — 250N000011 HC RX IP 250 OP 636

## 2023-07-03 PROCEDURE — 710N000010 HC RECOVERY PHASE 1, LEVEL 2, PER MIN

## 2023-07-03 RX ORDER — DEXAMETHASONE SODIUM PHOSPHATE 10 MG/ML
INJECTION, SOLUTION INTRAMUSCULAR; INTRAVENOUS PRN
Status: DISCONTINUED | OUTPATIENT
Start: 2023-07-03 | End: 2023-07-03

## 2023-07-03 RX ORDER — EPHEDRINE SULFATE 50 MG/ML
INJECTION, SOLUTION INTRAMUSCULAR; INTRAVENOUS; SUBCUTANEOUS PRN
Status: DISCONTINUED | OUTPATIENT
Start: 2023-07-03 | End: 2023-07-03

## 2023-07-03 RX ORDER — ONDANSETRON 2 MG/ML
4 INJECTION INTRAMUSCULAR; INTRAVENOUS EVERY 30 MIN PRN
Status: DISCONTINUED | OUTPATIENT
Start: 2023-07-03 | End: 2023-07-03

## 2023-07-03 RX ORDER — GLYCOPYRROLATE 0.2 MG/ML
INJECTION, SOLUTION INTRAMUSCULAR; INTRAVENOUS PRN
Status: DISCONTINUED | OUTPATIENT
Start: 2023-07-03 | End: 2023-07-03

## 2023-07-03 RX ORDER — OXYCODONE HYDROCHLORIDE 5 MG/1
10 TABLET ORAL
Status: DISCONTINUED | OUTPATIENT
Start: 2023-07-03 | End: 2023-07-03 | Stop reason: HOSPADM

## 2023-07-03 RX ORDER — ONDANSETRON 2 MG/ML
4 INJECTION INTRAMUSCULAR; INTRAVENOUS EVERY 6 HOURS PRN
Status: DISCONTINUED | OUTPATIENT
Start: 2023-07-03 | End: 2023-07-03

## 2023-07-03 RX ORDER — ACETAMINOPHEN 325 MG/1
975 TABLET ORAL
Status: DISCONTINUED | OUTPATIENT
Start: 2023-07-03 | End: 2023-07-03 | Stop reason: HOSPADM

## 2023-07-03 RX ORDER — ADENOSINE 3 MG/ML
INJECTION, SOLUTION INTRAVENOUS
Status: DISCONTINUED | OUTPATIENT
Start: 2023-07-03 | End: 2023-07-03 | Stop reason: HOSPADM

## 2023-07-03 RX ORDER — SODIUM CHLORIDE, SODIUM LACTATE, POTASSIUM CHLORIDE, CALCIUM CHLORIDE 600; 310; 30; 20 MG/100ML; MG/100ML; MG/100ML; MG/100ML
INJECTION, SOLUTION INTRAVENOUS CONTINUOUS
Status: DISCONTINUED | OUTPATIENT
Start: 2023-07-03 | End: 2023-07-03 | Stop reason: HOSPADM

## 2023-07-03 RX ORDER — ONDANSETRON 2 MG/ML
INJECTION INTRAMUSCULAR; INTRAVENOUS PRN
Status: DISCONTINUED | OUTPATIENT
Start: 2023-07-03 | End: 2023-07-03

## 2023-07-03 RX ORDER — FENTANYL CITRATE 50 UG/ML
50 INJECTION, SOLUTION INTRAMUSCULAR; INTRAVENOUS EVERY 5 MIN PRN
Status: DISCONTINUED | OUTPATIENT
Start: 2023-07-03 | End: 2023-07-03 | Stop reason: HOSPADM

## 2023-07-03 RX ORDER — FENTANYL CITRATE 50 UG/ML
25 INJECTION, SOLUTION INTRAMUSCULAR; INTRAVENOUS
Status: DISCONTINUED | OUTPATIENT
Start: 2023-07-03 | End: 2023-07-03 | Stop reason: HOSPADM

## 2023-07-03 RX ORDER — ONDANSETRON 4 MG/1
4 TABLET, ORALLY DISINTEGRATING ORAL EVERY 6 HOURS PRN
Status: DISCONTINUED | OUTPATIENT
Start: 2023-07-03 | End: 2023-07-03

## 2023-07-03 RX ORDER — PROPOFOL 10 MG/ML
INJECTION, EMULSION INTRAVENOUS PRN
Status: DISCONTINUED | OUTPATIENT
Start: 2023-07-03 | End: 2023-07-03

## 2023-07-03 RX ORDER — FENTANYL CITRATE 50 UG/ML
25 INJECTION, SOLUTION INTRAMUSCULAR; INTRAVENOUS EVERY 5 MIN PRN
Status: DISCONTINUED | OUTPATIENT
Start: 2023-07-03 | End: 2023-07-03 | Stop reason: HOSPADM

## 2023-07-03 RX ORDER — LIDOCAINE 40 MG/G
CREAM TOPICAL
Status: DISCONTINUED | OUTPATIENT
Start: 2023-07-03 | End: 2023-07-03 | Stop reason: HOSPADM

## 2023-07-03 RX ORDER — NALOXONE HYDROCHLORIDE 0.4 MG/ML
0.2 INJECTION, SOLUTION INTRAMUSCULAR; INTRAVENOUS; SUBCUTANEOUS
Status: DISCONTINUED | OUTPATIENT
Start: 2023-07-03 | End: 2023-07-03 | Stop reason: HOSPADM

## 2023-07-03 RX ORDER — NALOXONE HYDROCHLORIDE 0.4 MG/ML
0.4 INJECTION, SOLUTION INTRAMUSCULAR; INTRAVENOUS; SUBCUTANEOUS
Status: DISCONTINUED | OUTPATIENT
Start: 2023-07-03 | End: 2023-07-03 | Stop reason: HOSPADM

## 2023-07-03 RX ORDER — FENTANYL CITRATE 50 UG/ML
INJECTION, SOLUTION INTRAMUSCULAR; INTRAVENOUS PRN
Status: DISCONTINUED | OUTPATIENT
Start: 2023-07-03 | End: 2023-07-03

## 2023-07-03 RX ORDER — PROTAMINE SULFATE 10 MG/ML
INJECTION, SOLUTION INTRAVENOUS PRN
Status: DISCONTINUED | OUTPATIENT
Start: 2023-07-03 | End: 2023-07-03

## 2023-07-03 RX ORDER — MEPERIDINE HYDROCHLORIDE 25 MG/ML
12.5 INJECTION INTRAMUSCULAR; INTRAVENOUS; SUBCUTANEOUS EVERY 5 MIN PRN
Status: DISCONTINUED | OUTPATIENT
Start: 2023-07-03 | End: 2023-07-03 | Stop reason: HOSPADM

## 2023-07-03 RX ORDER — HEPARIN SODIUM 10000 [USP'U]/100ML
INJECTION, SOLUTION INTRAVENOUS CONTINUOUS PRN
Status: DISCONTINUED | OUTPATIENT
Start: 2023-07-03 | End: 2023-07-03 | Stop reason: HOSPADM

## 2023-07-03 RX ORDER — ONDANSETRON 4 MG/1
4 TABLET, ORALLY DISINTEGRATING ORAL EVERY 30 MIN PRN
Status: DISCONTINUED | OUTPATIENT
Start: 2023-07-03 | End: 2023-07-03

## 2023-07-03 RX ORDER — ACETAMINOPHEN 325 MG/1
650 TABLET ORAL EVERY 4 HOURS PRN
Status: DISCONTINUED | OUTPATIENT
Start: 2023-07-03 | End: 2023-07-03 | Stop reason: HOSPADM

## 2023-07-03 RX ORDER — OXYCODONE HYDROCHLORIDE 5 MG/1
5 TABLET ORAL
Status: DISCONTINUED | OUTPATIENT
Start: 2023-07-03 | End: 2023-07-03 | Stop reason: HOSPADM

## 2023-07-03 RX ORDER — HYDROMORPHONE HYDROCHLORIDE 1 MG/ML
0.4 INJECTION, SOLUTION INTRAMUSCULAR; INTRAVENOUS; SUBCUTANEOUS EVERY 5 MIN PRN
Status: DISCONTINUED | OUTPATIENT
Start: 2023-07-03 | End: 2023-07-03 | Stop reason: HOSPADM

## 2023-07-03 RX ORDER — HYDROMORPHONE HYDROCHLORIDE 1 MG/ML
0.2 INJECTION, SOLUTION INTRAMUSCULAR; INTRAVENOUS; SUBCUTANEOUS EVERY 5 MIN PRN
Status: DISCONTINUED | OUTPATIENT
Start: 2023-07-03 | End: 2023-07-03 | Stop reason: HOSPADM

## 2023-07-03 RX ORDER — SODIUM CHLORIDE 9 MG/ML
100 INJECTION, SOLUTION INTRAVENOUS CONTINUOUS
Status: DISCONTINUED | OUTPATIENT
Start: 2023-07-03 | End: 2023-07-03 | Stop reason: HOSPADM

## 2023-07-03 RX ORDER — IBUPROFEN 600 MG/1
600 TABLET, FILM COATED ORAL EVERY 6 HOURS PRN
Status: DISCONTINUED | OUTPATIENT
Start: 2023-07-03 | End: 2023-07-03 | Stop reason: HOSPADM

## 2023-07-03 RX ORDER — HEPARIN SODIUM 1000 [USP'U]/ML
INJECTION, SOLUTION INTRAVENOUS; SUBCUTANEOUS
Status: DISCONTINUED | OUTPATIENT
Start: 2023-07-03 | End: 2023-07-03 | Stop reason: HOSPADM

## 2023-07-03 RX ADMIN — FENTANYL CITRATE 50 MCG: 50 INJECTION, SOLUTION INTRAMUSCULAR; INTRAVENOUS at 07:05

## 2023-07-03 RX ADMIN — PROPOFOL 30 MG: 10 INJECTION, EMULSION INTRAVENOUS at 07:18

## 2023-07-03 RX ADMIN — ONDANSETRON 4 MG: 2 INJECTION INTRAMUSCULAR; INTRAVENOUS at 08:51

## 2023-07-03 RX ADMIN — PHENYLEPHRINE HYDROCHLORIDE 50 MCG: 10 INJECTION INTRAVENOUS at 07:15

## 2023-07-03 RX ADMIN — ROCURONIUM BROMIDE 10 MG: 50 INJECTION, SOLUTION INTRAVENOUS at 08:26

## 2023-07-03 RX ADMIN — PROPOFOL 20 MG: 10 INJECTION, EMULSION INTRAVENOUS at 07:12

## 2023-07-03 RX ADMIN — PHENYLEPHRINE HYDROCHLORIDE 200 MCG: 10 INJECTION INTRAVENOUS at 08:19

## 2023-07-03 RX ADMIN — PROPOFOL 130 MG: 10 INJECTION, EMULSION INTRAVENOUS at 07:05

## 2023-07-03 RX ADMIN — SODIUM CHLORIDE: 9 INJECTION, SOLUTION INTRAVENOUS at 08:20

## 2023-07-03 RX ADMIN — FENTANYL CITRATE 25 MCG: 50 INJECTION, SOLUTION INTRAMUSCULAR; INTRAVENOUS at 07:31

## 2023-07-03 RX ADMIN — PHENYLEPHRINE HYDROCHLORIDE 100 MCG: 10 INJECTION INTRAVENOUS at 07:39

## 2023-07-03 RX ADMIN — PROTAMINE SULFATE 50 MG: 10 INJECTION, SOLUTION INTRAVENOUS at 09:06

## 2023-07-03 RX ADMIN — Medication 10 MG: at 07:52

## 2023-07-03 RX ADMIN — FENTANYL CITRATE 25 MCG: 50 INJECTION, SOLUTION INTRAMUSCULAR; INTRAVENOUS at 07:18

## 2023-07-03 RX ADMIN — PHENYLEPHRINE HYDROCHLORIDE 0.2 MCG/KG/MIN: 10 INJECTION INTRAVENOUS at 07:39

## 2023-07-03 RX ADMIN — ROCURONIUM BROMIDE 10 MG: 50 INJECTION, SOLUTION INTRAVENOUS at 08:55

## 2023-07-03 RX ADMIN — GLYCOPYRROLATE 0.2 MG: 0.2 INJECTION INTRAMUSCULAR; INTRAVENOUS at 07:06

## 2023-07-03 RX ADMIN — Medication 10 MG: at 07:46

## 2023-07-03 RX ADMIN — ROCURONIUM BROMIDE 30 MG: 50 INJECTION, SOLUTION INTRAVENOUS at 07:55

## 2023-07-03 RX ADMIN — SUGAMMADEX 150 MG: 100 INJECTION, SOLUTION INTRAVENOUS at 09:12

## 2023-07-03 RX ADMIN — PROPOFOL 20 MG: 10 INJECTION, EMULSION INTRAVENOUS at 07:31

## 2023-07-03 RX ADMIN — DEXAMETHASONE SODIUM PHOSPHATE 10 MG: 10 INJECTION, SOLUTION INTRAMUSCULAR; INTRAVENOUS at 07:06

## 2023-07-03 RX ADMIN — Medication 5 MG: at 07:49

## 2023-07-03 RX ADMIN — PHENYLEPHRINE HYDROCHLORIDE 50 MCG: 10 INJECTION INTRAVENOUS at 07:25

## 2023-07-03 RX ADMIN — Medication 80 MG: at 07:06

## 2023-07-03 RX ADMIN — PHENYLEPHRINE HYDROCHLORIDE 100 MCG: 10 INJECTION INTRAVENOUS at 07:29

## 2023-07-03 RX ADMIN — SODIUM CHLORIDE 100 ML/HR: 9 INJECTION, SOLUTION INTRAVENOUS at 06:12

## 2023-07-03 ASSESSMENT — ACTIVITIES OF DAILY LIVING (ADL)
ADLS_ACUITY_SCORE: 35

## 2023-07-03 NOTE — ANESTHESIA POSTPROCEDURE EVALUATION
Patient: Kelly Robledo    Procedure: Procedure(s):  Ablation Atrial Fibrillation       Anesthesia Type:  General    Note:  Disposition: Outpatient   Postop Pain Control: Uneventful            Sign Out: Well controlled pain   PONV: No   Neuro/Psych: Uneventful            Sign Out: Acceptable/Baseline neuro status   Airway/Respiratory: Uneventful            Sign Out: Acceptable/Baseline resp. status   CV/Hemodynamics: Uneventful            Sign Out: Acceptable CV status; No obvious hypovolemia; No obvious fluid overload   Other NRE:    DID A NON-ROUTINE EVENT OCCUR?            Last vitals:  Vitals Value Taken Time   /72 07/03/23 1200   Temp 36.7  C (98  F) 07/03/23 0923   Pulse 91 07/03/23 1224   Resp 33 07/03/23 1224   SpO2 97 % 07/03/23 1224   Vitals shown include unvalidated device data.    Electronically Signed By: Fifi Esparza MD  July 3, 2023  2:35 PM

## 2023-07-03 NOTE — INTERVAL H&P NOTE
I have reviewed the surgical (or preoperative) H&P that is linked to this encounter, and examined the patient. There are no significant changes    Clinical Conditions Present on Arrival:  Clinically Significant Risk Factors Present on Admission                # Drug Induced Coagulation Defect: home medication list includes an anticoagulant medication

## 2023-07-03 NOTE — DISCHARGE INSTRUCTIONS
[facility]  Cardiac Electrophysiology  [address]  [place] [address]   Office: [phone]  Fax: [phone]     Cardiac Electrophysiology - Post Ablation Discharge Instructions      PROCEDURE   Atrial fibrillation ablation  Mapping and ablation of posterior atrioventricular accessory pathway         MEDICATION INSTRUCTIONS   Continue taking your prior to procedure medications, including amiodarone for now - we will assess for cessation at your 6 week follow-up visit.  Continue taking your blood thinner apixaban (Eliquis).          DISCHARGE INSTRUCTIONS   Medications   Take your medications as prescribed.   It is important for you to continue your blood thinner without interruption, unless your electrophysiologist instructs you otherwise.     General instructions   Have an adult stay with you until tomorrow.   You may resume your normal diet.     You may shower tomorrow.  Do not take a bath, or use a hot tub or pool for at least 1 week.    Activity recommendations   Do not drive for 3 days.   Avoid stooping or squatting more than 90 degrees at the hips for 7 days.   Avoid repetitive motions such as loading , vacuuming, raking or shoveling for 7 days.   Avoid heavy lifting (greater than 25lbs) for 7 days.       Groin care instructions   For the first 24 hours after your ablation, check the groin access sites every 1-2 hours while awake.   You may keep a bandaid over the puncture sites for 1 or 2 days post-procedure and thereafter may keep these sites uncovered.  Change the bandaid daily.  If there is minor oozing, apply another bandaid and remove it after 12 hours.   For 2 days, when you cough, sneeze, laugh or move your bowels, hold your hand over the puncture sites and press firmly.   Do not scrub the groin access sites.   Do not use lotion or powder near the groin access sites.       Arrhythmias following ablation  Recurrent atrial fibrillation and  palpitations are common within the first 3 months post ablation while your heart recovers from the procedure.  These are usually more frequent in the first few weeks following ablation and should occur less frequently over time.  Please contact us if you are having frequent or long lasting atrial fibrillation episodes following ablation.    Common findings after ablation  Bruising and a dime or pea size lump at the access sites is common.  If you notice increased swelling, external bleeding, or have other concerns regarding your groin access sites please call your electrophysiology team's office, and if after hours consider emergency department evaluation.   Soreness and mild pain at your groin sites is normal, to help relieve this pain you can apply ice/cold packs to the sites for 20min 3-4 times per day.   Pleuritic chest discomfort (chest pain worse with taking deep breaths, worse with laying flat on your back) can occur after ablation, usually coming about within the first 24-72hrs post ablation.  If this occurs and is severe enough to be troublesome to you, please call us and consider starting a course of ibuprofen 400mg three times daily for 5 to 7 days.     Things to watch for   As with any type of procedure, please be more attentive to unusual symptoms post ablation (eg. fever, neurologic changes, pain with swallowing, loss of consciousness, etc) - we recommend ER evaluation for any such symptoms in the first few weeks post procedure.       Contact the EP Nurse line with any of the following.  Contact the cardiology on-call number after business hours.    Consider ER evaluation for severe symptoms.   Groin pain, swelling, or growing hard lump around the puncture sites.   Groin redness, tenderness, swelling, or drainage (blood or pus).   Neurological changes (for example: leg, arm or face weakness or numbness, difficulties with speech or word finding, problems walking or with your balance, vision changes).    Any numbness, coolness or changes in color in your extremities.  Sudden onset severe abdominal or back pain.  Moderate or severe chest pain not relieved by Tylenol or Ibuprofen, particularly after the first 48 hours.   Shortness of breath.   Chills or fever greater than 100 F.   Difficulty swallowing food or liquids.  Coughing up blood.   Blood in your stool.  Nausea and vomiting.  Difficulty urinating.  Recurrent atrial fibrillation associated with prolonged rapid heart rates (for example, heart rates over 140bpm for greater than 4 hours) or associated with additional concerning symptoms (for example, chest pain, lightheadedness, loss of consciousness, sweating).     If you are being evaluated at an emergency department, please tell your ER doctor that you have recently underwent an atrial fibrillation ablation and ask the ER to contact our office.  Many special considerations apply following ablation - these may be overlooked during an ER evaluation.      Our office will have a follow-up visit scheduled for you in approximately 6 weeks.  Please do not hesitate to call us before that time should issues arise.         [facility]      To reach the EP nurses working with Dr. Hernandez:   [phone]        To reach the general Heart Care Clinic line or after hours service:   [phone]   If you are calling after hours, please listen to the entire voicemail,    a live  will answer at the end of the message.

## 2023-07-03 NOTE — ANESTHESIA PREPROCEDURE EVALUATION
Anesthesia Pre-Procedure Evaluation    Patient: Kelly Robledo   MRN: [id number] : 1945        Procedure : Procedure(s):  Ablation Atrial Fibrillation          Past Medical History:   Diagnosis Date     Abdominal pain      Atrial fibrillation (H)      Diverticulosis      Moderate mitral regurgitation      NSVT (nonsustained ventricular tachycardia) (H) 2020     Palpitations       Past Surgical History:   Procedure Laterality Date     BIOPSY BREAST Bilateral     benign     CV CORONARY ANGIOGRAM N/A 2020    Procedure: Coronary Angiogram;  Surgeon: Sergey Conner MD;  Location: [facility] Cath Lab;  Service: Cardiology     CV CORONARY ANGIOGRAM N/A 2021    Procedure: Coronary Angiogram;  Surgeon: Klaus Fitzpatrick MD;  Location: [facility] Cardiac Cath Lab;  Service: Cardiology     CV LEFT HEART CATHETERIZATION WITHOUT LEFT VENTRICULOGRAM Left 2020    Procedure: Left Heart Catheterization Without Left Ventriculogram;  Surgeon: Sergey Conner MD;  Location: [facility] Cath Lab;  Service: Cardiology     CV RIGHT HEART CATHETERIZATION N/A 2020    Procedure: Right Heart Catheterization;  Surgeon: Sergey Cnoner MD;  Location: [facility] Cath Lab;  Service: Cardiology     CV RIGHT HEART CATHETERIZATION N/A 2021    Procedure: Right Heart Catheterization;  Surgeon: Klaus Fitzpatrick MD;  Location: [facility] Cardiac Cath Lab;  Service: Cardiology     HYSTERECTOMY       OOPHORECTOMY Bilateral      TRANSCATHETER MITRAL VALVE REPAIR N/A 2021    Procedure: CV MITRAL CLIP;  Surgeon: Klaus Fitzpatrick MD;  Location: [facility] CARDIAC CATH LAB      Allergies   Allergen Reactions     Penicillins Hives      Social History     Tobacco Use     Smoking status: Never     Smokeless tobacco: Never   Substance Use Topics     Alcohol use: No      Wt Readings from Last 1 Encounters:   23 54.4 kg (120 lb)        Anesthesia Evaluation            ROS/MED  HX  ENT/Pulmonary:       Neurologic:       Cardiovascular: Comment: First degree AVB     (+) hypertension--CAD ---valvular problems/murmurs type: MR severe. Previous cardiac testing   Echo: Date: 12/3/20 Results:  Mitral Valve: The following structural abnormalities were observed:   non-specific thickening. Mildly decreased anterior leaflet mobility.   Moderately decreased posterior leaflet mobility. Severe mitral   regurgitation. The jet is posterior directed and is eccentric.    Left atrial volume is severely increased.    Left ventricle ejection fraction is normal. The calculated left   ventricular ejection fraction is 70%.    Moderate tricuspid valve regurgitation. Moderate pulmonary hypertension   present. The estimated systolic pulmonary artery pressure is 68 mmhg.    Normal right ventricular size. TAPSE is abnormal, which is consistent   with abnormal right ventricular systolic function.    When compared to the previous study dated 6/19/2020, pulmonary   hypertension is present.  Stress Test: Date: Results:    ECG Reviewed: Date: Results:    Cath: Date: 1/13/21 Results:  Left main with 30% ostial narrowing  LAD with mild to moderate diffuse disease, and a 50% stenosis in the   midportion, that appears unchanged from her prior angiogram  Ramus with a 50% proximal to mid stenosis and mild disease distally  LCx is a small vessel with no significant obtuse marginals noted  RCA is a large caliber, dominant vessel supplying the inferior and   posterior walls.  There is 40% mid to distal focal stenosis, also   unchanged from prior.    METS/Exercise Tolerance:     Hematologic:       Musculoskeletal:       GI/Hepatic:       Renal/Genitourinary:       Endo:    (-) thyroid disease   Psychiatric/Substance Use:       Infectious Disease:       Malignancy:       Other:            Physical Exam    Airway        Mallampati: II   TM distance: > 3 FB   Neck ROM: full     Respiratory Devices and Support         Dental            Cardiovascular   cardiovascular exam normal          Pulmonary   pulmonary exam normal                OUTSIDE LABS:  CBC:   Lab Results   Component Value Date    WBC 5.9 06/26/2023    WBC 6.1 02/27/2023    HGB 12.9 06/26/2023    HGB 12.4 02/27/2023    HCT 39.9 06/26/2023    HCT 37.7 02/27/2023     06/26/2023     02/27/2023     BMP:   Lab Results   Component Value Date     06/26/2023     02/27/2023    POTASSIUM 4.2 06/26/2023    POTASSIUM 4.3 02/27/2023    CHLORIDE 104 06/26/2023    CHLORIDE 105 02/27/2023    CO2 24 06/26/2023    CO2 25 02/27/2023    BUN 18.2 06/26/2023    BUN 17.8 02/27/2023    CR 1.49 (H) 06/26/2023    CR 1.33 (H) 02/27/2023     (H) 06/26/2023     (H) 02/27/2023     COAGS:   Lab Results   Component Value Date    PTT 37 09/23/2020    INR 1.22 (H) 01/25/2021     POC:   Lab Results   Component Value Date     (H) 02/01/2021     HEPATIC:   Lab Results   Component Value Date    ALBUMIN 3.9 02/14/2023    PROTTOTAL 7.5 02/14/2023    ALT 41 06/26/2023    AST 31 02/14/2023    ALKPHOS 63 02/14/2023    BILITOTAL 1.0 02/14/2023     OTHER:   Lab Results   Component Value Date    PH 7.47 (H) 06/18/2020    LACT 1.6 09/23/2020    A1C 5.9 (H) 02/27/2023    SHADI 9.5 06/26/2023    MAG 2.2 02/02/2021    TSH 9.61 (H) 06/26/2023    T4 1.21 12/10/2021    T3 68 12/10/2021       Anesthesia Plan    ASA Status:  3   NPO Status:  NPO Appropriate    Anesthesia Type: General.     - Airway: ETT              Consents    Anesthesia Plan(s) and associated risks, benefits, and realistic alternatives discussed. Questions answered and patient/representative(s) expressed understanding.     - Discussed: Risks, Benefits and Alternatives for BOTH SEDATION and the PROCEDURE were discussed     - Discussed with:  Patient         Postoperative Care            Comments:                Fifi Esparza MD

## 2023-07-03 NOTE — PLAN OF CARE
Goal Outcome Evaluation:             Pt admitted for Ablation due to her hx of tachy palpitations. Pt prepped and ready for procedure.  Pt has held her amiodarone. Pt took her eliquis this morning with no missed doses. Pt's  went home but would like a call after the procedure.        Adelaide Ricks RN

## 2023-07-03 NOTE — PROGRESS NOTES
D/I/A: Patient is tolerating liquids and foods, ambulating, urinating, puncture sites are stable (no bleeding or hematoma) and patient has a .   A+O x 4 and making needs known.  CCL access sites C/D/I with no bleeding or hematoma.  CMS +.  VSSA.   IV access removed.    Education completed and outlined in AVS.  Medication changes reviewed with patient. Questions answered prior to discharge. Belongings returned to patient at discharge  P: Discharged to self care.  Patient to follow up as per discharge instruction

## 2023-07-03 NOTE — Clinical Note
Uptake Med system 12 lead EKG, hemodynamics 5 lead, pulse oximetery, NIBP, Physiocontrol hands off defibrillator/external pacer, with 3 monitoring leads to patient. Baseline assessment done.

## 2023-07-03 NOTE — ANESTHESIA PROCEDURE NOTES
Airway       Patient location during procedure: OR       Procedure Start/Stop Times: 7/3/2023 7:08 AM and 7/3/2023 7:10 AM  Staff -        CRNA: Tk Bai APRN CRNA       Performed By: CRNA  Consent for Airway        Urgency: elective  Indications and Patient Condition       Indications for airway management: beni-procedural       Induction type:intravenous       Mask difficulty assessment: 1 - vent by mask    Final Airway Details       Final airway type: endotracheal airway       Successful airway: ETT - single  Endotracheal Airway Details        ETT size (mm): 7.0       Cuffed: yes       Successful intubation technique: direct laryngoscopy       DL Blade Type: Hernandez 2       Grade View of Cords: 1       Adjucts: stylet       Position: Right       Measured from: gums/teeth       Secured at (cm): 20       Bite block used: None    Post intubation assessment        Placement verified by: capnometry, equal breath sounds and chest rise        Number of attempts at approach: 1       Number of other approaches attempted: 0       Secured with: silk tape       Ease of procedure: easy       Dentition: Intact and Unchanged       Dental guard used and removed. Dental Guard Type: Proguard Red.    Medication(s) Administered   Medication Administration Time: 7/3/2023 7:08 AM

## 2023-07-03 NOTE — ANESTHESIA CARE TRANSFER NOTE
Patient: Kelly Robledo    Procedure: Procedure(s):  Ablation Atrial Fibrillation       Diagnosis: Atrial Fibrillation  Diagnosis Additional Information: No value filed.    Anesthesia Type:   General     Note:    Oropharynx: oropharynx clear of all foreign objects  Level of Consciousness: awake  Oxygen Supplementation: face mask  Level of Supplemental Oxygen (L/min / FiO2): 6  Independent Airway: airway patency satisfactory and stable  Dentition: dentition unchanged  Vital Signs Stable: post-procedure vital signs reviewed and stable  Report to RN Given: handoff report given  Patient transferred to: PACU    Handoff Report: Identifed the Patient, Identified the Reponsible Provider, Reviewed the pertinent medical history, Discussed the surgical course, Reviewed Intra-OP anesthesia mangement and issues during anesthesia, Set expectations for post-procedure period and Allowed opportunity for questions and acknowledgement of understanding      Vitals:  Vitals Value Taken Time   /76    Temp 36.7  C (98  F) 07/03/23 0923   Pulse 80 07/03/23 0923   Resp 12 07/03/23 0923   SpO2 99 % 07/03/23 0923   Vitals shown include unvalidated device data.    Electronically Signed By: DENVER Rodriguez CRNA  July 3, 2023  9:24 AM   25-Sep-2021

## 2023-07-06 ENCOUNTER — VIRTUAL VISIT (OUTPATIENT)
Dept: CARDIOLOGY | Facility: CLINIC | Age: 78
End: 2023-07-06
Payer: COMMERCIAL

## 2023-07-06 DIAGNOSIS — I48.0 PAROXYSMAL ATRIAL FIBRILLATION (H): Primary | ICD-10-CM

## 2023-07-06 PROCEDURE — 99207 PR NO CHARGE NURSE ONLY: CPT

## 2023-07-06 NOTE — PATIENT INSTRUCTIONS
Your anticoagulation medication Eliquis:  It is important to remain on your anticoagulation medication uninterrupted after your ablation to reduce your risk of a stroke or heart attack, do not stop this medication  Please contact me if you have any questions regarding your anticoagulation medication    Healing from your pulmonary vein ablation:  Stay well hydrated, and increase your fluid intake during this recovery period  High protein foods aide in your bodies healing process  No aggressive or aerobic activity for 7-10 days, and do not lift more than 25 pounds for 7 days   Increase your activity gradually over the next 5-10 days, working back to your normal daily activity  If you are experiencing pain at your groin sites from the procedure, we advise applying ice for 20 minute durations 3-4 times per day     Please call me if any of the following occur:  Episodes of Atrial Fibrillation lasting greater than 4 hours, or if you notice the episodes are increasing in frequency or duration  If you develop shortness of breath, dizziness, or unresolving chest pains   Changes at your groin sites including swelling, hardening, drainage, increase in bruising, or an increase in pain  If you develop a temperature greater than 100.5 degrees (especially weeks 2-5 post   Procedure)    Call 911 if you are having symptoms of a stroke; difficulty with your speech, problems walking, difficulty with balance, vision disturbances, facial drooping or numbness, and muscle weakness on one side of your body     Your follow up appointments are as follows:  You will be seen by the electrophysiologist nurse practitioner at 6 weeks after your ablation  At your 6 week appointment, it will be determined if a 3 month follow-up is needed    Sincerely,  Rebecca Sims RN [phone]    After hours please contact the on call service at # [phone]

## 2023-07-06 NOTE — PROGRESS NOTES
Post PVI Procedural Follow Up Call    Pt is s/p PVI from 7/3/23 with Dr Hernandez  PC was placed to pt, spoke to pt    General Assessment:     Weight: Pt reports pt is unable to report weight, but denies s/s of fluid retention    Pain: Pt denies generalized or localized pain abnormal to healing s/p     /GI: Pt denies difficulty swallowing, denies constipation, denies urinary retention/difficulty, reports no s/s of infection, report normal appetite and reports staying hydrated.    Respiratory: Pt denies SOB, denies difficulty breathing, denies throat pain, denies changes/abnormal sputum and denies any further symptoms abnormal to normal healing process s/p PVI.    Activity: Pt is tolerating advancement in activity while following physical restrictions, staying well hydrated and gradually working into baseline activity.     Rhythm Assessment:   Pt reports occasional palpitations normal to PVI recovery, denies irregularities in HR or rhythm and denies symptoms or sustained AF episodes.    Procedure Site Assessment:   Pts no visible/physical changes in groin sites, some bruising around sites without significant change from hospital discharge and normal to PVI recovery and small pea/dime size hardening under suture sites normal to PVI recovery    Anticoagulation/Medication:  Pt remain on Eliquis without interruption  Per guidelines by Dr Hernandez no ASA needed upon discharge    Education completed with pt at this visit:  Reviewed normal post-op PVI healing process, when to contact EP-RN/EP-MD, contact information was given to the pt for further concerns or questions and pt verbalized understanding    Follow up  Pts AVS was printed and mailed to pt by scheduling team, pt will be seen by EP NP in 4-6 wks, monitor will be ordered at this follow-up if indicated and 3mo follow-up and monitor will be determined at 6wk follow-up by EP NP    7/6/2023 9:05 AM  Rebecca Sims RN

## 2023-07-14 ENCOUNTER — NURSE TRIAGE (OUTPATIENT)
Dept: NURSING | Facility: CLINIC | Age: 78
End: 2023-07-14
Payer: COMMERCIAL

## 2023-07-15 ENCOUNTER — APPOINTMENT (OUTPATIENT)
Dept: CT IMAGING | Facility: HOSPITAL | Age: 78
End: 2023-07-15
Attending: EMERGENCY MEDICINE
Payer: COMMERCIAL

## 2023-07-15 ENCOUNTER — HOSPITAL ENCOUNTER (EMERGENCY)
Facility: HOSPITAL | Age: 78
Discharge: HOME | End: 2023-07-15
Attending: EMERGENCY MEDICINE | Admitting: EMERGENCY MEDICINE
Payer: COMMERCIAL

## 2023-07-15 VITALS
TEMPERATURE: 97.7 F | WEIGHT: 120 LBS | SYSTOLIC BLOOD PRESSURE: 167 MMHG | DIASTOLIC BLOOD PRESSURE: 89 MMHG | HEIGHT: 66 IN | OXYGEN SATURATION: 98 % | RESPIRATION RATE: 18 BRPM | HEART RATE: 77 BPM | BODY MASS INDEX: 19.29 KG/M2

## 2023-07-15 DIAGNOSIS — R31.9 HEMATURIA, UNSPECIFIED TYPE: ICD-10-CM

## 2023-07-15 LAB
ABO/RH(D): NORMAL
ALBUMIN SERPL BCG-MCNC: 4.2 G/DL (ref 3.5–5.2)
ALBUMIN UR-MCNC: 50 MG/DL
ALP SERPL-CCNC: 73 U/L (ref 35–104)
ALT SERPL W P-5'-P-CCNC: 43 U/L (ref 0–50)
ANION GAP SERPL CALCULATED.3IONS-SCNC: 12 MMOL/L (ref 7–15)
ANTIBODY SCREEN: NEGATIVE
APPEARANCE UR: ABNORMAL
AST SERPL W P-5'-P-CCNC: 42 U/L (ref 0–45)
BASOPHILS # BLD AUTO: 0 10E3/UL (ref 0–0.2)
BASOPHILS NFR BLD AUTO: 0 %
BILIRUB SERPL-MCNC: 0.8 MG/DL
BILIRUB UR QL STRIP: NEGATIVE
BUN SERPL-MCNC: 17.7 MG/DL (ref 8–23)
CALCIUM SERPL-MCNC: 9.5 MG/DL (ref 8.8–10.2)
CHLORIDE SERPL-SCNC: 101 MMOL/L (ref 98–107)
COLOR UR AUTO: ABNORMAL
CREAT SERPL-MCNC: 1.26 MG/DL (ref 0.51–0.95)
DEPRECATED HCO3 PLAS-SCNC: 25 MMOL/L (ref 22–29)
EOSINOPHIL # BLD AUTO: 0.1 10E3/UL (ref 0–0.7)
EOSINOPHIL NFR BLD AUTO: 1 %
ERYTHROCYTE [DISTWIDTH] IN BLOOD BY AUTOMATED COUNT: 13.3 % (ref 10–15)
GFR SERPL CREATININE-BSD FRML MDRD: 43 ML/MIN/1.73M2
GLUCOSE SERPL-MCNC: 144 MG/DL (ref 70–99)
GLUCOSE UR STRIP-MCNC: NEGATIVE MG/DL
HCT VFR BLD AUTO: 37.9 % (ref 35–47)
HGB BLD-MCNC: 12.6 G/DL (ref 11.7–15.7)
HGB UR QL STRIP: ABNORMAL
HOLD SPECIMEN: NORMAL
HOLD SPECIMEN: NORMAL
IMM GRANULOCYTES # BLD: 0 10E3/UL
IMM GRANULOCYTES NFR BLD: 0 %
KETONES UR STRIP-MCNC: NEGATIVE MG/DL
LEUKOCYTE ESTERASE UR QL STRIP: NEGATIVE
LYMPHOCYTES # BLD AUTO: 0.8 10E3/UL (ref 0.8–5.3)
LYMPHOCYTES NFR BLD AUTO: 11 %
MCH RBC QN AUTO: 32.6 PG (ref 26.5–33)
MCHC RBC AUTO-ENTMCNC: 33.2 G/DL (ref 31.5–36.5)
MCV RBC AUTO: 98 FL (ref 78–100)
MONOCYTES # BLD AUTO: 0.3 10E3/UL (ref 0–1.3)
MONOCYTES NFR BLD AUTO: 4 %
NEUTROPHILS # BLD AUTO: 6.3 10E3/UL (ref 1.6–8.3)
NEUTROPHILS NFR BLD AUTO: 84 %
NITRATE UR QL: NEGATIVE
NRBC # BLD AUTO: 0 10E3/UL
NRBC BLD AUTO-RTO: 0 /100
PH UR STRIP: 6 [PH] (ref 5–7)
PLATELET # BLD AUTO: 174 10E3/UL (ref 150–450)
POTASSIUM SERPL-SCNC: 3.5 MMOL/L (ref 3.4–5.3)
PROT SERPL-MCNC: 7 G/DL (ref 6.4–8.3)
RBC # BLD AUTO: 3.86 10E6/UL (ref 3.8–5.2)
RBC URINE: >182 /HPF
SODIUM SERPL-SCNC: 138 MMOL/L (ref 136–145)
SP GR UR STRIP: 1.02 (ref 1–1.03)
SPECIMEN EXPIRATION DATE: NORMAL
UROBILINOGEN UR STRIP-MCNC: <2 MG/DL
WBC # BLD AUTO: 7.6 10E3/UL (ref 4–11)
WBC URINE: 0 /HPF
YEAST #/AREA URNS HPF: ABNORMAL /HPF

## 2023-07-15 PROCEDURE — 258N000003 HC RX IP 258 OP 636: Performed by: EMERGENCY MEDICINE

## 2023-07-15 PROCEDURE — 96360 HYDRATION IV INFUSION INIT: CPT

## 2023-07-15 PROCEDURE — 86850 RBC ANTIBODY SCREEN: CPT | Performed by: EMERGENCY MEDICINE

## 2023-07-15 PROCEDURE — 93005 ELECTROCARDIOGRAM TRACING: CPT | Performed by: EMERGENCY MEDICINE

## 2023-07-15 PROCEDURE — 85025 COMPLETE CBC W/AUTO DIFF WBC: CPT | Performed by: EMERGENCY MEDICINE

## 2023-07-15 PROCEDURE — 99285 EMERGENCY DEPT VISIT HI MDM: CPT | Mod: 25

## 2023-07-15 PROCEDURE — 36415 COLL VENOUS BLD VENIPUNCTURE: CPT | Performed by: EMERGENCY MEDICINE

## 2023-07-15 PROCEDURE — 81001 URINALYSIS AUTO W/SCOPE: CPT | Performed by: EMERGENCY MEDICINE

## 2023-07-15 PROCEDURE — 74176 CT ABD & PELVIS W/O CONTRAST: CPT

## 2023-07-15 PROCEDURE — 96361 HYDRATE IV INFUSION ADD-ON: CPT

## 2023-07-15 PROCEDURE — 80053 COMPREHEN METABOLIC PANEL: CPT | Performed by: EMERGENCY MEDICINE

## 2023-07-15 RX ADMIN — SODIUM CHLORIDE 500 ML: 9 INJECTION, SOLUTION INTRAVENOUS at 03:03

## 2023-07-15 ASSESSMENT — ACTIVITIES OF DAILY LIVING (ADL)
ADLS_ACUITY_SCORE: 33
ADLS_ACUITY_SCORE: 35

## 2023-07-15 NOTE — ED TRIAGE NOTES
Pt had a procedure done on 7/30, Pulmonary Vein Isolation Ablation, and noticed blood in her urine at 9pm, bright red, no clots. Pt on blood thinner, Eliquis.     Triage Assessment       Row Name 07/15/23 0045       Triage Assessment (Adult)    Airway WDL WDL       Respiratory WDL    Respiratory WDL WDL       Skin Circulation/Temperature WDL    Skin Circulation/Temperature WDL WDL       Cardiac WDL    Cardiac WDL WDL       Peripheral/Neurovascular WDL    Peripheral Neurovascular WDL WDL       Cognitive/Neuro/Behavioral WDL    Cognitive/Neuro/Behavioral WDL WDL

## 2023-07-15 NOTE — ED NOTES
Pt presents NAD. SKIN: NWD.   HEENT: normocephalic, PERRL, clear x 3.   CHEST: symmetrical rise, CEBBS, no CP or SOB.  ABD: NTND, no N&V or diarrhea. Pt c/o bright red blood in urine with no clots twice since 2100.  EXT: GALICIA x 4  Rest of exam unremarkable.

## 2023-07-15 NOTE — TELEPHONE ENCOUNTER
Patient is calling to report to have blood in her urine post pulmonary vein ablation on 7/3.    She noticed it tonight.  She is on Eliquis.  Informed patient that AVS states to go to the hospital if blood in urine occurs.    Pt verbalized understanding.    Shruthi Pena, RN, BSN Nurse Triage Advisor 7/15/2023 12:08 AM     Reason for Disposition    Nursing judgment or information in reference    Additional Information    Negative: Nursing judgment, per information in Reference    Protocols used: NO GUIDELINE [id number]

## 2023-07-15 NOTE — ED NOTES
Nursing Update: Patient has voided twice since initial assessment and labs sent. She reports no pain with voiding. No fevers. Lung sounds clear, heart tones normal.

## 2023-07-15 NOTE — ED PROVIDER NOTES
EMERGENCY DEPARTMENT ENCOUNTER      NAME: Kelly Robledo  AGE: 77 year old female  YOB: 1945  MRN: [id number]  EVALUATION DATE & TIME: 7/15/2023 12:47 AM    PCP: Selina Pooz    ED PROVIDER: Cristi Ventura M.D.      Chief Complaint   Patient presents with     Hematuria         FINAL IMPRESSION:  1. Hematuria, unspecified type          ED COURSE & MEDICAL DECISION MAKING:    Pertinent Labs & Imaging studies reviewed. (See chart for details)  77 year old female presents to the Emergency Department for evaluation of hematuria. Patient appears non toxic with stable vitals signs, patient afebrile with no tachycardia or hypoxia. Overall exam is benign.  Lungs are clear and abdomen is benign, patient has no flank pain or CVA tenderness.  Patient denies any painful urination, history and exam very atypical for cystitis, pyelonephritis or nephrolithiasis.  Review the medical record shows patient had recent pulmonary vein isolation ablation procedure performed on July 3, 2023.  Likely, patient had Krishnamurthy catheter placement at that time though she had no hematuria following the procedure and she has been otherwise asymptomatic since her procedure.  Hematuria just started tonight, again she is on Eliquis and I suspect her symptoms likely secondary to her medication for anticoagulation.  Considered but low suspicion for intra-abdominal mass, kidney stone, cystitis, pyelonephritis.  We will obtain screening labs, urine studies and CT imaging.    Reassessment: Labs by my independent interpretation showed no acute concerning findings, no signs of acute anemia with a hemoglobin of 12.6, no signs of acute kidney injury with creatinine of 1.26 which is actually improved from prior, no signs of electrolyte abnormalities.  Urine showed blood but no signs of infection.  CT imaging by my independent interpretation showed no signs of kidney stones, by report there was no acute process identified in the abdomen  or pelvis.  Patient had 2 separate episodes of voiding here in the emergency department both of which were clear of any blood or clots.  Repeat exam was benign.  Ultimately, I suspect her transient hematuria likely secondary to her anticoagulation but given resolution of symptoms here, negative work-up, benign exam and well appearance feel she is safe for discharge and close follow-up.  Of note did see a yeast reported on her urine studies but no reports of any vaginal symptoms here and no indication for emergent treatment.  Will recommend patient follow-up with her primary cardiology team to discuss hematuria and indications or contraindications for continued anticoagulation use.  I also provided contact information and discussed outpatient follow-up with urology as indicated for continued painless hematuria.  Discussed all these findings and recommendations with the patient felt reassured and comfortable with discharge.  Return precautions provided.    Medical Decision Making    History:    Supplemental history from: Documented in chart, if applicable    External Record(s) reviewed: Documented in chart, if applicable. and Inpatient Record: Admission on 7/3/23    Work Up:    Chart documentation includes differential considered and any EKGs or imaging independently interpreted by provider, where specified.    In additional to work up documented, I considered the following work up: Documented in chart, if applicable.    External consultation:    Discussion of management with another provider: Documented in chart, if applicable    Complicating factors:    Care impacted by chronic illness: Anticoagulated State, Chronic Kidney Disease and Heart Disease    Care affected by social determinants of health: N/A    Disposition considerations: Discharge. I recommended the patient continue their current prescription strength medication(s): Eliquis. See documentation for any additional details.    2:36 AM I met with the patient  "to gather history and perform my exam. ED course and treatment discussed.   4:32 AM Nurse reports the patient had an episode of voiding with no blood noted in her urine  4:35 AM repeat exam is benign, discussed findings and plan for discharge with close follow up    At the conclusion of the encounter I discussed the results of all of the tests and the disposition. The questions were answered and return precautions provided. The patient or family acknowledged understanding and was agreeable with the care plan.         MEDICATIONS GIVEN IN THE EMERGENCY:  Medications   0.9% sodium chloride BOLUS (0 mLs Intravenous Stopped 7/15/23 0436)       NEW PRESCRIPTIONS STARTED AT TODAY'S ER VISIT  Discharge Medication List as of 7/15/2023  4:40 AM               =================================================================    HPI    Patient information was obtained from: Patient     Use of Intrepreter: N/A       Kelly Robledo is a 77 year old female who presents with hematuria     Per Chart review: The patient was admitted to [facility] on 7/3/23 for Atrial fibrillation ablation with pulmonary vein isolation. Patient was discharged with a plan o continue Apixaban and amiodarone medications.     The patient reports following surgery she felt \"excellent\" until last night she noticed hematuria. The patient notes she is still taking Eliquis. The patient denies dysuria, trouble urinating, flank pain, fevers, or any other symptoms or complaints.    The patient notes she had an episode of hematuria before when she started taking aspirin for the first time.    REVIEW OF SYSTEMS   Constitutional:  Denies fever, chills  Respiratory:  Denies productive cough or increased work of breathing  Cardiovascular:  Denies chest pain, palpitations  GI:  Denies dysuria, abdominal pain, nausea, vomiting, or change in bowel habits. Positive for hematuria   Musculoskeletal:  Denies any new muscle/joint swelling  Skin:  Denies rash "   Neurologic:  Denies focal weakness  All systems negative except as marked.     PAST MEDICAL HISTORY:  Past Medical History:   Diagnosis Date     Abdominal pain      Atrial fibrillation (H)      Diverticulosis      Moderate mitral regurgitation      NSVT (nonsustained ventricular tachycardia) (H) 6/17/2020     Palpitations        PAST SURGICAL HISTORY:  Past Surgical History:   Procedure Laterality Date     BIOPSY BREAST Bilateral     benign     CV CORONARY ANGIOGRAM N/A 6/18/2020    Procedure: Coronary Angiogram;  Surgeon: Sergey Conner MD;  Location: [facility] Cath Lab;  Service: Cardiology     CV CORONARY ANGIOGRAM N/A 1/13/2021    Procedure: Coronary Angiogram;  Surgeon: Klaus Fitzpatrick MD;  Location: [facility] Cardiac Cath Lab;  Service: Cardiology     CV LEFT HEART CATHETERIZATION WITHOUT LEFT VENTRICULOGRAM Left 6/18/2020    Procedure: Left Heart Catheterization Without Left Ventriculogram;  Surgeon: Sergey Conner MD;  Location: [facility] Cath Lab;  Service: Cardiology     CV RIGHT HEART CATHETERIZATION N/A 6/18/2020    Procedure: Right Heart Catheterization;  Surgeon: Sergey Conner MD;  Location: [facility] Cath Lab;  Service: Cardiology     CV RIGHT HEART CATHETERIZATION N/A 1/13/2021    Procedure: Right Heart Catheterization;  Surgeon: Klaus Fitzpatrick MD;  Location: [facility] Cardiac Cath Lab;  Service: Cardiology     EP ABLATION PULMONARY VEIN ISOLATION N/A 7/3/2023    Procedure: Ablation Atrial Fibrillation;  Surgeon: Kathleen Hernandez MD;  Location: [facility] CV     HYSTERECTOMY  1970's     OOPHORECTOMY Bilateral 1970's     TRANSCATHETER MITRAL VALVE REPAIR N/A 2/1/2021    Procedure: CV MITRAL CLIP;  Surgeon: Klaus Fitzpatrick MD;  Location: [facility] CARDIAC CATH LAB         CURRENT MEDICATIONS:    Prior to Admission medications    Medication Sig Start Date End Date Taking? Authorizing Provider   amiodarone (PACERONE) 100 MG TABS tablet Take 1 tablet  (100 mg) by mouth every morning 6/2/23   Kathleen Hernandez MD   clindamycin (CLEOCIN) 300 MG capsule Take 2 capsules (600 mg total) by mouth once as needed (30-60 minutes prior to any dental visit. Save remaining for next visit. 6/16/22   Selina Pozo MD   ELIQUIS ANTICOAGULANT 5 MG tablet TAKE 1 TABLET BY MOUTH TWICE A DAY 5/2/23   Dayne Kim MD   ezetimibe (ZETIA) 10 MG tablet TAKE 1 TABLET (10 MG) BY MOUTH DAILY. 5/31/23   Dayne Kim MD   lisinopril (ZESTRIL) 10 MG tablet Take 1 tablet (10 mg) by mouth every 24 hours 2/17/23   Selina Pozo MD   metoprolol succinate ER (TOPROL XL) 50 MG 24 hr tablet Take 1 tablet (50 mg) by mouth At Bedtime 6/1/23   Dayne Kim MD   multivitamin, therapeutic (THERA-VIT) TABS tablet Take 1 tablet by mouth daily    Unknown, Entered By History   pantoprazole (PROTONIX) 40 MG EC tablet Take 1 tablet (40 mg) by mouth daily 6/26/23   Fifi Zhao APRN CNP   rosuvastatin (CRESTOR) 40 MG tablet TAKE 1 TABLET BY MOUTH EVERY DAY 5/25/23   Selina Pozo MD        ALLERGIES:  Allergies   Allergen Reactions     Penicillins Hives       FAMILY HISTORY:  Family History   Problem Relation Age of Onset     Breast Cancer Sister 47.00       SOCIAL HISTORY:   Social History     Socioeconomic History     Marital status:    Tobacco Use     Smoking status: Never     Smokeless tobacco: Never   Substance and Sexual Activity     Alcohol use: No     Drug use: No   Social History Narrative    . No children. Family in the [place] area.   Enjoys walking- very regular with this especially in [place] when they used to vacation there for 6 months of the year.   Likes reading - romantic happy endings.   Never alcohol or smoking.  Selina Pozo MD         VITALS:  Patient Vitals for the past 24 hrs:   BP Temp Pulse Resp SpO2 Height Weight   07/15/23 0430 (!) 167/89 -- 77 -- 98 % -- --   07/15/23 0043 (!) 186/88 97.7  F (36.5  C) 84 18 97 %  "1.664 m (5' 5.5\") 54.4 kg (120 lb)        PHYSICAL EXAM    Constitutional:  Awake, alert, in no apparent distress  HENT:  Normocephalic, Atraumatic. Bilateral external ears normal. Oropharynx moist. Nose normal. Neck- Normal range of motion with no guarding, No midline cervical tenderness, Supple, No stridor.   Eyes:  PERRL, EOMI with no signs of entrapment, Conjunctiva normal, No discharge.   Respiratory:  Normal breath sounds, No respiratory distress, No wheezing.    Cardiovascular:  Normal heart rate, Normal rhythm, No appreciable rubs or gallops.   GI:  Soft, No tenderness, No distension, No palpable masses  : No CVA tenderness  Musculoskeletal:  Intact distal pulses, No edema. Good range of motion in all major joints. No tenderness to palpation or major deformities noted.  Integument:  Warm, Dry, No erythema, No rash.   Neurologic:  Alert & oriented, Normal motor function, Normal sensory function, No focal deficits noted.   Psychiatric:  Affect normal, Judgment normal, Mood normal.     LAB:  All pertinent labs reviewed and interpreted.  Results for orders placed or performed during the hospital encounter of 07/15/23   CT Abdomen Pelvis w/o Contrast    Impression    IMPRESSION:   1.  No acute process identified in the abdomen or pelvis.  2.  Colonic diverticulosis.     UA with Microscopic reflex to Culture    Specimen: Urine, Clean Catch   Result Value Ref Range    Color Urine Orange (A) Colorless, Straw, Light Yellow, Yellow    Appearance Urine Turbid (A) Clear    Glucose Urine Negative Negative mg/dL    Bilirubin Urine Negative Negative    Ketones Urine Negative Negative mg/dL    Specific Gravity Urine 1.016 1.001 - 1.030    Blood Urine >1.0 mg/dL (A) Negative    pH Urine 6.0 5.0 - 7.0    Protein Albumin Urine 50 (A) Negative mg/dL    Urobilinogen Urine <2.0 <2.0 mg/dL    Nitrite Urine Negative Negative    Leukocyte Esterase Urine Negative Negative    Budding Yeast Urine Moderate (A) None Seen /HPF    RBC " Urine >182 (H) <=2 /HPF    WBC Urine 0 <=5 /HPF   Comprehensive metabolic panel   Result Value Ref Range    Sodium 138 136 - 145 mmol/L    Potassium 3.5 3.4 - 5.3 mmol/L    Chloride 101 98 - 107 mmol/L    Carbon Dioxide (CO2) 25 22 - 29 mmol/L    Anion Gap 12 7 - 15 mmol/L    Urea Nitrogen 17.7 8.0 - 23.0 mg/dL    Creatinine 1.26 (H) 0.51 - 0.95 mg/dL    Calcium 9.5 8.8 - 10.2 mg/dL    Glucose 144 (H) 70 - 99 mg/dL    Alkaline Phosphatase 73 35 - 104 U/L    AST 42 0 - 45 U/L    ALT 43 0 - 50 U/L    Protein Total 7.0 6.4 - 8.3 g/dL    Albumin 4.2 3.5 - 5.2 g/dL    Bilirubin Total 0.8 <=1.2 mg/dL    GFR Estimate 43 (L) >60 mL/min/1.73m2   CBC with platelets and differential   Result Value Ref Range    WBC Count 7.6 4.0 - 11.0 10e3/uL    RBC Count 3.86 3.80 - 5.20 10e6/uL    Hemoglobin 12.6 11.7 - 15.7 g/dL    Hematocrit 37.9 35.0 - 47.0 %    MCV 98 78 - 100 fL    MCH 32.6 26.5 - 33.0 pg    MCHC 33.2 31.5 - 36.5 g/dL    RDW 13.3 10.0 - 15.0 %    Platelet Count 174 150 - 450 10e3/uL    % Neutrophils 84 %    % Lymphocytes 11 %    % Monocytes 4 %    % Eosinophils 1 %    % Basophils 0 %    % Immature Granulocytes 0 %    NRBCs per 100 WBC 0 <1 /100    Absolute Neutrophils 6.3 1.6 - 8.3 10e3/uL    Absolute Lymphocytes 0.8 0.8 - 5.3 10e3/uL    Absolute Monocytes 0.3 0.0 - 1.3 10e3/uL    Absolute Eosinophils 0.1 0.0 - 0.7 10e3/uL    Absolute Basophils 0.0 0.0 - 0.2 10e3/uL    Absolute Immature Granulocytes 0.0 <=0.4 10e3/uL    Absolute NRBCs 0.0 10e3/uL   Extra Blue Top Tube   Result Value Ref Range    Hold Specimen JIC    Extra Red Top Tube   Result Value Ref Range    Hold Specimen JIC    Adult Type and Screen   Result Value Ref Range    ABO/RH(D) AB POS     Antibody Screen Negative Negative    SPECIMEN EXPIRATION DATE [id number]        RADIOLOGY:  CT Abdomen Pelvis w/o Contrast   Final Result   IMPRESSION:    1.  No acute process identified in the abdomen or pelvis.   2.  Colonic diverticulosis.                EKG:     Normal sinus rhythm, no specific ST acute ischemic changes, no concerning dysrhythmias and for prolongation, did note nonspecific ST in V6 but when compared to ECG from July 3, 2023 it appears similar with no specific ST acute ischemic changes when compared to prior, though QTc length has improved from prior ECG    I have independently reviewed and interpreted the EKG(s) documented above.    PROCEDURES:            I, Amparo Quintero, am serving as a scribe to document services personally performed by Cristi Ventura MD, based on my observation and the provider's statements to me. I, Cristi Ventura MD attest that Amparo Quintero is acting in a scribe capacity, has observed my performance of the services and has documented them in accordance with my direction.    Cristi Ventura M.D.  Emergency Medicine  [facility] EMERGENCY DEPARTMENT  [facility] [facility] [address]  [phone]  Dept: [phone]     Cristi Ventura MD  07/15/23 0727

## 2023-07-17 ENCOUNTER — TELEPHONE (OUTPATIENT)
Dept: CARDIOLOGY | Facility: CLINIC | Age: 78
End: 2023-07-17
Payer: COMMERCIAL

## 2023-07-17 LAB
ATRIAL RATE - MUSE: 76 BPM
DIASTOLIC BLOOD PRESSURE - MUSE: NORMAL MMHG
INTERPRETATION ECG - MUSE: NORMAL
P AXIS - MUSE: 96 DEGREES
PR INTERVAL - MUSE: 160 MS
QRS DURATION - MUSE: 76 MS
QT - MUSE: 414 MS
QTC - MUSE: 465 MS
R AXIS - MUSE: 67 DEGREES
SYSTOLIC BLOOD PRESSURE - MUSE: NORMAL MMHG
T AXIS - MUSE: 80 DEGREES
VENTRICULAR RATE- MUSE: 76 BPM

## 2023-07-17 NOTE — TELEPHONE ENCOUNTER
Pt called to report she was in ED 7/15/23 because she had blood in her urine.  Pt said this has never happened to her before, and ED provider suggested she is on too much Eliquis.    Pt age = 77  Weight = 54.4 kg  Cr = 1.26    Pt said it hasn't happened since she left the ED.  Advised pt that Dr Kim is currently out of the office.  -lucas

## 2023-07-17 NOTE — TELEPHONE ENCOUNTER
Ablation done 7/3/2023, indications to continue OAC x3 months following and thereafter indefinitely given Chads 2 vasc of 5.  No indications for renal dosing at present but once she turns 78 in November, given her weight <60 kg, transition to Eliquis 2.5 mg twice a day. Reassuring she has had no further hematuria. No further recommendations at this time.  She sees me in follow-up at the end of August and I will address watchman with her at that time.  Thank you!

## 2023-07-28 ENCOUNTER — ALLIED HEALTH/NURSE VISIT (OUTPATIENT)
Dept: FAMILY MEDICINE | Facility: CLINIC | Age: 78
End: 2023-07-28
Payer: COMMERCIAL

## 2023-07-28 DIAGNOSIS — Z23 HIGH PRIORITY FOR 2019-NCOV VACCINE: Primary | ICD-10-CM

## 2023-07-28 DIAGNOSIS — Z23 COVID-19 VACCINE ADMINISTERED: Primary | ICD-10-CM

## 2023-07-28 PROCEDURE — 91312 COVID-19 BIVALENT 12+ (PFIZER): CPT

## 2023-07-28 PROCEDURE — 99207 PR NO CHARGE NURSE ONLY: CPT

## 2023-07-28 PROCEDURE — 0121A COVID-19 BIVALENT 12+ (PFIZER): CPT

## 2023-08-07 DIAGNOSIS — I48.0 PAROXYSMAL ATRIAL FIBRILLATION (H): ICD-10-CM

## 2023-08-09 RX ORDER — PANTOPRAZOLE SODIUM 40 MG/1
40 TABLET, DELAYED RELEASE ORAL DAILY
Qty: 45 TABLET | Refills: 0 | OUTPATIENT
Start: 2023-08-09

## 2023-08-24 PROBLEM — Z98.890 STATUS POST CATHETER ABLATION OF ATRIAL FIBRILLATION: Status: ACTIVE | Noted: 2023-08-24

## 2023-08-24 NOTE — PROGRESS NOTES
"[facility]  Cardiac Electrophysiology  [address]  [place] [address]   Office: [phone]  Fax: [phone]     HEART CARE ELECTROPHYSIOLOGY FOLLOW UP    Primary Care: Selina Pozo MD    Assessment/Recommendations     Paroxysmal atrial fibrillation: Status post atrial fibrillation ablation including PVI and left posterior AV accessory pathway ablation. No symptomatology suggesting recurrent arrhythmia. She has had no complications subsequent to catheter ablation procedure  -Discontinue amiodarone 100 mg daily  -Continue metoprolol succinate 50 mg daily in the evening    [id number] score of 5 for age >75, female gender, hypertension and coronary artery disease.  Episode of transient hematuria without anemia 3 weeks ago and spontaneous bruising  -Continue Eliquis 5 mg twice a day for stroke prophylaxis  -We briefly discussed left atrial appendage closure via Watchman device as an alternative to chronic oral anticoagulation.  She is not interested in pursuing this further at present but will consider in the future.     Coronary artery disease: Moderate nonobstructive disease by coronary angiography 2021. No anginal symptoms  -Continue high-dose statin, Zetia     Essential hypertension: Remains elevated on current regimen.  She emphasizes she has a history of \"whitecoat hypertension\"  -Continue lisinopril     Severe MR status post MitraClip 2021: Moderate to moderately severe 2-3+ mitral regurgitation with mild mitral stenosis by TTE 2022. Endocarditis prophylaxis indicated.    Follow up: With myself in 6 weeks, 3 months after ablation     History of Present Illness/Subjective    Kelly Robledo is a 77 year old female who is seen today for follow up status post atrial fibrillation ablation. She has a past medical history significant for paroxysmal atrial fibrillation, severe mitral regurgitation status post MitraClip 2021, coronary artery disease, " hyperlipidemia, essential hypertension, IBS, renal insufficiency, hypothyroidism.  She underwent catheter ablation 7/3/2023 including PVI, with severe LAE and scattered fibrosis noted.  She also underwent ablation of the left posterior atrioventricular accessory pathway, requiring second set of ablation lesions.  Her amiodarone was continued following ablation.  Since that time she was seen in the ED 7/15/2023 for transient hematuria which has not since recurred.  She has easy bruising on Eliquis.  She denies blood in stool or recent falls.  She reports otherwise uneventful recovery from ablation. She denies groin site issues, heartburn, difficulty swallowing, or neurologic changes. She denies chest discomfort, palpitations, peripheral edema, shortness of breath, lightheadedness/dizziness or syncope.  She keeps active with walking.       Data Review     Arrhythmia hx:   Dx/date: AFRVR 2020. Started on amiodarone, beta blocker and Eliquis.  Status post MitraClip .  Amiodarone decreased to 100 mg daily 2022 due to modestly elevated TSH.  No subsequent recurrence since that time.  Status post ablation as below and continued amiodarone.  Amiodarone discontinued 2023.  Sx: Tachypalpitations  Prior AAD, AV diya blocking agents: Amiodarone  Procedures  DCCV: NA  Ablation: 7/3/2023, Dr. Hernandez-PVI and left posterior AV accessory pathway (requiring second set of ablation lesions for complete elimination); severe LAE and scattered fibrosis    EK/15/2023: SR 76 bpm,  ms, QRS 76 ms, QT/QTc 414/465 ms  7/3/2023: SR 81 bpm,  ms, QRS 86 ms, QT/QTc 452/525 ms  2023: Sinus rhythm at 60 bpm, potential preexcitation,  ms, QRS 88 ms, QT/QTc 422/448 ms  Personally reviewed.      TTE: 10/3/2022  Normal left ventricular size. Normal left ventricular systolic function. Left  ventricular ejection fraction estimated at 60 to 65%. Mild left ventricular  hypertrophy.  Diastolic Doppler  findings (E/E' ratio and/or other parameters) suggest left  ventricular filling pressures are increased. Left ventricular diastolic  function is abnormal  Normal right ventricular size and systolic function.  Severe left atrial enlargement. Moderate right atrial enlargement  Mitral valve clip. Moderate to moderately severe 2-3+ mitral regurgitation.  Mild mitral stenosis.Mean gradient of 5 mmHg at a heart rate of 60 bpm  Moderate to moderately severe tricuspid gravitation. Estimate of RV systolic  pressure is moderately elevated at 48 mmHg plus right atrial pressure.  Compared to the examination January 2022. Findings are similar. Estimate of RV  systolic pressure is mildly lower on the current examination at 48 mmHg plus  right atrial pressure compared to 57 mmHg plus right atrial pressure on the  previous examination.    MCOT, EMELYN:   24-hour Holter done 11/10/2022.  Baseline rhythm sinus average 63 bpm.  Suggested chronotropic incompetence, rates range 53-69 bpm.  No tachyarrhythmias or AF.  No pauses >3 seconds.  No symptom triggers.    I have reviewed and updated the patient's past medical history, allergy list and medication list.                Physical Examination Review of Systems   Vitals: BP (!) 175/83 (BP Location: Left arm, Patient Position: Sitting, Cuff Size: Adult Regular)   Pulse 72   Resp 16   Wt 54.4 kg (120 lb)   LMP  (LMP Unknown)   SpO2 97%   BMI 19.67 kg/m      BMI= Body mass index is 19.67 kg/m .    Wt Readings from Last 3 Encounters:   08/25/23 54.4 kg (120 lb)   07/15/23 54.4 kg (120 lb)   07/03/23 54.4 kg (120 lb)       General   Appearance:   Alert and oriented, in no acute distress.    HEENT:  Normocephalic and atraumatic. Conjunctiva and sclera are clear. Moist oral mucosa.    Neck: No JVP, carotid bruit or obvious thyromegaly.   Lungs:   Respirations unlabored. Clear bilaterally with no rales, rhonchi, or wheezes.     Cardiovascular:   Rhythm is regular. S1 and S2 are normal. No  significant murmur is present. Lower extremities demonstrate no significant edema. Posterior tibial pulses are intact bilaterally.   Extremities: No cyanosis or clubbing   Skin: Skin is warm, dry, and otherwise intact.   Neurologic: Gait not assessed. Mood and affect appropriate.    A 12 point comprehensive review of systems was  negative except as noted.      Medical History  Surgical History Family History Social History   Past Medical History:   Diagnosis Date    Abdominal pain     Atrial fibrillation (H)     Diverticulosis     Moderate mitral regurgitation     NSVT (nonsustained ventricular tachycardia) (H) 6/17/2020    Palpitations     Past Surgical History:   Procedure Laterality Date    BIOPSY BREAST Bilateral     benign    CV CORONARY ANGIOGRAM N/A 6/18/2020    Procedure: Coronary Angiogram;  Surgeon: Sergey Conner MD;  Location: [facility] Cath Lab;  Service: Cardiology    CV CORONARY ANGIOGRAM N/A 1/13/2021    Procedure: Coronary Angiogram;  Surgeon: Klaus Fitzpatrick MD;  Location: [facility] Cardiac Cath Lab;  Service: Cardiology    CV LEFT HEART CATHETERIZATION WITHOUT LEFT VENTRICULOGRAM Left 6/18/2020    Procedure: Left Heart Catheterization Without Left Ventriculogram;  Surgeon: Sergey Conner MD;  Location: [facility] Cath Lab;  Service: Cardiology    CV RIGHT HEART CATHETERIZATION N/A 6/18/2020    Procedure: Right Heart Catheterization;  Surgeon: Sergey Conner MD;  Location: [facility] Cath Lab;  Service: Cardiology    CV RIGHT HEART CATHETERIZATION N/A 1/13/2021    Procedure: Right Heart Catheterization;  Surgeon: Klaus Fitzpatrick MD;  Location: [facility] Cath Lab;  Service: Cardiology    EP ABLATION PULMONARY VEIN ISOLATION N/A 7/3/2023    Procedure: Ablation Atrial Fibrillation;  Surgeon: Kathleen Hernandez MD;  Location: [facility] CV    HYSTERECTOMY  1970's    OOPHORECTOMY Bilateral 1970's    TRANSCATHETER MITRAL VALVE REPAIR N/A 2/1/2021     Procedure: CV MITRAL CLIP;  Surgeon: Klaus Fitzpatrick MD;  Location:  HEART CARDIAC CATH LAB    Family History   Problem Relation Age of Onset    Breast Cancer Sister 47.00    Social History     Socioeconomic History    Marital status:      Spouse name: Not on file    Number of children: Not on file    Years of education: Not on file    Highest education level: Not on file   Occupational History    Not on file   Tobacco Use    Smoking status: Never    Smokeless tobacco: Never   Substance and Sexual Activity    Alcohol use: No    Drug use: No    Sexual activity: Not on file   Other Topics Concern    Parent/sibling w/ CABG, MI or angioplasty before 65F 55M? Not Asked   Social History Narrative    . No children. Family in the [place] area.   Enjoys walking- very regular with this especially in [place] when they used to vacation there for 6 months of the year.   Likes reading - romantic happy endings.   Never alcohol or smoking.  Selina Pozo MD       Social Determinants of Health     Financial Resource Strain: Not on file   Food Insecurity: Not on file   Transportation Needs: Not on file   Physical Activity: Not on file   Stress: Not on file   Social Connections: Not on file   Intimate Partner Violence: Not on file   Housing Stability: Not on file          Medications  Allergies   Scheduled Meds:  Current Outpatient Medications   Medication Sig Dispense Refill    amiodarone (PACERONE) 100 MG TABS tablet Take 1 tablet (100 mg) by mouth every morning 90 tablet 1    clindamycin (CLEOCIN) 300 MG capsule Take 2 capsules (600 mg total) by mouth once as needed (30-60 minutes prior to any dental visit. Save remaining for next visit. 4 capsule 1    ELIQUIS ANTICOAGULANT 5 MG tablet TAKE 1 TABLET BY MOUTH TWICE A  tablet 3    ezetimibe (ZETIA) 10 MG tablet TAKE 1 TABLET (10 MG) BY MOUTH DAILY. 90 tablet 3    lisinopril (ZESTRIL) 10 MG tablet Take 1 tablet (10 mg) by mouth every 24 hours 90 tablet 3     metoprolol succinate ER (TOPROL XL) 50 MG 24 hr tablet Take 1 tablet (50 mg) by mouth At Bedtime 90 tablet 3    rosuvastatin (CRESTOR) 40 MG tablet TAKE 1 TABLET BY MOUTH EVERY DAY 90 tablet 5    multivitamin, therapeutic (THERA-VIT) TABS tablet Take 1 tablet by mouth daily (Patient not taking: Reported on 2023)      pantoprazole (PROTONIX) 40 MG EC tablet Take 1 tablet (40 mg) by mouth daily (Patient not taking: Reported on 2023) 45 tablet 0    Allergies   Allergen Reactions    Penicillins Hives         Lab Results    Chemistry/lipid CBC Cardiac Enzymes/BNP/TSH/INR   Lab Results   Component Value Date    CHOL 199 2023    HDL 60 2023    TRIG 124 2023    BUN 17.7 07/15/2023     07/15/2023    CO2 25 07/15/2023    Lab Results   Component Value Date    WBC 7.6 07/15/2023    HGB 12.6 07/15/2023    HCT 37.9 07/15/2023    MCV 98 07/15/2023     07/15/2023    @RESUFAST(BMP,CBC,BNP,TSH,  INR)@      15 minutes spent reviewing prior records (including documentation, laboratory studies, cardiac testing/imaging), history and physical exam, planning, and subsequent documentation.     This note has been dictated using voice recognition software. Any grammatical, typographical, or context distortions are unintentional and inherent to the software.    Fifi Barboza CNP  Clinical Cardiac Electrophysiology  [facility]  Clinic and [phone]  Fax: [phone]  Electrophysiology Nurses: [phone]

## 2023-08-24 NOTE — PATIENT INSTRUCTIONS
Kelly Robledo,    It was a pleasure to see you today at the [facility].     My recommendations after this visit include:  -May stop amiodarone today  -Continue Eliquis 5mg twice a day  -Follow up with me in about 6 weeks    Please do not hesitate to call with additional questions or concerns.     Fifi Barboza, CNP  Clinical Cardiac Electrophysiology  [facility]  Clinic and [phone]  Fax: [phone]  Electrophysiology Nurses: [phone]

## 2023-08-25 ENCOUNTER — OFFICE VISIT (OUTPATIENT)
Dept: CARDIOLOGY | Facility: CLINIC | Age: 78
End: 2023-08-25
Payer: COMMERCIAL

## 2023-08-25 VITALS
DIASTOLIC BLOOD PRESSURE: 83 MMHG | RESPIRATION RATE: 16 BRPM | BODY MASS INDEX: 19.67 KG/M2 | OXYGEN SATURATION: 97 % | WEIGHT: 120 LBS | SYSTOLIC BLOOD PRESSURE: 175 MMHG | HEART RATE: 72 BPM

## 2023-08-25 DIAGNOSIS — I25.10 CORONARY ARTERIOSCLEROSIS: ICD-10-CM

## 2023-08-25 DIAGNOSIS — I48.0 PAROXYSMAL ATRIAL FIBRILLATION (H): Primary | ICD-10-CM

## 2023-08-25 DIAGNOSIS — Z98.890 S/P MITRAL VALVE CLIP IMPLANTATION: ICD-10-CM

## 2023-08-25 DIAGNOSIS — Z95.818 S/P MITRAL VALVE CLIP IMPLANTATION: ICD-10-CM

## 2023-08-25 DIAGNOSIS — Z98.890 STATUS POST CATHETER ABLATION OF ATRIAL FIBRILLATION: ICD-10-CM

## 2023-08-25 DIAGNOSIS — I10 PRIMARY HYPERTENSION: ICD-10-CM

## 2023-08-25 PROCEDURE — 99214 OFFICE O/P EST MOD 30 MIN: CPT | Performed by: NURSE PRACTITIONER

## 2023-08-25 NOTE — LETTER
"8/25/2023    Selina Pozo MD  [address] [address]    RE: Kelly Robledo       Dear Colleague,     I had the pleasure of seeing Kelly Robledo in the [facility].       [facility]  Cardiac Electrophysiology  [address]  [place] [address]   Office: [phone]  Fax: [phone]     HEART CARE ELECTROPHYSIOLOGY FOLLOW UP    Primary Care: Selina Pozo MD    Assessment/Recommendations     Paroxysmal atrial fibrillation: Status post atrial fibrillation ablation including PVI and left posterior AV accessory pathway ablation. No symptomatology suggesting recurrent arrhythmia. She has had no complications subsequent to catheter ablation procedure  -Discontinue amiodarone 100 mg daily  -Continue metoprolol succinate 50 mg daily in the evening    [id number] score of 5 for age >75, female gender, hypertension and coronary artery disease.  Episode of transient hematuria without anemia 3 weeks ago and spontaneous bruising  -Continue Eliquis 5 mg twice a day for stroke prophylaxis  -We briefly discussed left atrial appendage closure via Watchman device as an alternative to chronic oral anticoagulation.  She is not interested in pursuing this further at present but will consider in the future.     Coronary artery disease: Moderate nonobstructive disease by coronary angiography 2021. No anginal symptoms  -Continue high-dose statin, Zetia     Essential hypertension: Remains elevated on current regimen.  She emphasizes she has a history of \"whitecoat hypertension\"  -Continue lisinopril     Severe MR status post MitraClip 2021: Moderate to moderately severe 2-3+ mitral regurgitation with mild mitral stenosis by TTE 2022. Endocarditis prophylaxis indicated.    Follow up: With myself in 6 weeks, 3 months after ablation     History of Present Illness/Subjective    Kelly Robledo is a 77 year old female who is seen today for follow " up status post atrial fibrillation ablation. She has a past medical history significant for paroxysmal atrial fibrillation, severe mitral regurgitation status post MitraClip , coronary artery disease, hyperlipidemia, essential hypertension, IBS, renal insufficiency, hypothyroidism.  She underwent catheter ablation 7/3/2023 including PVI, with severe LAE and scattered fibrosis noted.  She also underwent ablation of the left posterior atrioventricular accessory pathway, requiring second set of ablation lesions.  Her amiodarone was continued following ablation.  Since that time she was seen in the ED 7/15/2023 for transient hematuria which has not since recurred.  She has easy bruising on Eliquis.  She denies blood in stool or recent falls.  She reports otherwise uneventful recovery from ablation. She denies groin site issues, heartburn, difficulty swallowing, or neurologic changes. She denies chest discomfort, palpitations, peripheral edema, shortness of breath, lightheadedness/dizziness or syncope.  She keeps active with walking.       Data Review     Arrhythmia hx:   Dx/date: AFRVR 2020. Started on amiodarone, beta blocker and Eliquis.  Status post MitraClip .  Amiodarone decreased to 100 mg daily 2022 due to modestly elevated TSH.  No subsequent recurrence since that time.  Status post ablation as below and continued amiodarone.  Amiodarone discontinued 2023.  Sx: Tachypalpitations  Prior AAD, AV diya blocking agents: Amiodarone  Procedures  DCCV: NA  Ablation: 7/3/2023, Dr. Hernandez-PVI and left posterior AV accessory pathway (requiring second set of ablation lesions for complete elimination); severe LAE and scattered fibrosis    EK/15/2023: SR 76 bpm,  ms, QRS 76 ms, QT/QTc 414/465 ms  7/3/2023: SR 81 bpm,  ms, QRS 86 ms, QT/QTc 452/525 ms  2023: Sinus rhythm at 60 bpm, potential preexcitation,  ms, QRS 88 ms, QT/QTc 422/448 ms  Personally reviewed.       TTE: 10/3/2022  Normal left ventricular size. Normal left ventricular systolic function. Left  ventricular ejection fraction estimated at 60 to 65%. Mild left ventricular  hypertrophy.  Diastolic Doppler findings (E/E' ratio and/or other parameters) suggest left  ventricular filling pressures are increased. Left ventricular diastolic  function is abnormal  Normal right ventricular size and systolic function.  Severe left atrial enlargement. Moderate right atrial enlargement  Mitral valve clip. Moderate to moderately severe 2-3+ mitral regurgitation.  Mild mitral stenosis.Mean gradient of 5 mmHg at a heart rate of 60 bpm  Moderate to moderately severe tricuspid gravitation. Estimate of RV systolic  pressure is moderately elevated at 48 mmHg plus right atrial pressure.  Compared to the examination January 2022. Findings are similar. Estimate of RV  systolic pressure is mildly lower on the current examination at 48 mmHg plus  right atrial pressure compared to 57 mmHg plus right atrial pressure on the  previous examination.    EMELYN OSULLIVAN:   24-hour Holter done 11/10/2022.  Baseline rhythm sinus average 63 bpm.  Suggested chronotropic incompetence, rates range 53-69 bpm.  No tachyarrhythmias or AF.  No pauses >3 seconds.  No symptom triggers.    I have reviewed and updated the patient's past medical history, allergy list and medication list.                Physical Examination Review of Systems   Vitals: BP (!) 175/83 (BP Location: Left arm, Patient Position: Sitting, Cuff Size: Adult Regular)   Pulse 72   Resp 16   Wt 54.4 kg (120 lb)   LMP  (LMP Unknown)   SpO2 97%   BMI 19.67 kg/m      BMI= Body mass index is 19.67 kg/m .    Wt Readings from Last 3 Encounters:   08/25/23 54.4 kg (120 lb)   07/15/23 54.4 kg (120 lb)   07/03/23 54.4 kg (120 lb)       General   Appearance:   Alert and oriented, in no acute distress.    HEENT:  Normocephalic and atraumatic. Conjunctiva and sclera are clear. Moist oral mucosa.     Neck: No JVP, carotid bruit or obvious thyromegaly.   Lungs:   Respirations unlabored. Clear bilaterally with no rales, rhonchi, or wheezes.     Cardiovascular:   Rhythm is regular. S1 and S2 are normal. No significant murmur is present. Lower extremities demonstrate no significant edema. Posterior tibial pulses are intact bilaterally.   Extremities: No cyanosis or clubbing   Skin: Skin is warm, dry, and otherwise intact.   Neurologic: Gait not assessed. Mood and affect appropriate.    A 12 point comprehensive review of systems was  negative except as noted.      Medical History  Surgical History Family History Social History   Past Medical History:   Diagnosis Date    Abdominal pain     Atrial fibrillation (H)     Diverticulosis     Moderate mitral regurgitation     NSVT (nonsustained ventricular tachycardia) (H) 6/17/2020    Palpitations     Past Surgical History:   Procedure Laterality Date    BIOPSY BREAST Bilateral     benign    CV CORONARY ANGIOGRAM N/A 6/18/2020    Procedure: Coronary Angiogram;  Surgeon: Sergye Conner MD;  Location: [facility] Cath Lab;  Service: Cardiology    CV CORONARY ANGIOGRAM N/A 1/13/2021    Procedure: Coronary Angiogram;  Surgeon: Klaus Fitzpatrick MD;  Location: [facility] Cardiac Cath Lab;  Service: Cardiology    CV LEFT HEART CATHETERIZATION WITHOUT LEFT VENTRICULOGRAM Left 6/18/2020    Procedure: Left Heart Catheterization Without Left Ventriculogram;  Surgeon: Sergey Conner MD;  Location: [facility] Cath Lab;  Service: Cardiology    CV RIGHT HEART CATHETERIZATION N/A 6/18/2020    Procedure: Right Heart Catheterization;  Surgeon: Sergey Conner MD;  Location: [facility] Cath Lab;  Service: Cardiology    CV RIGHT HEART CATHETERIZATION N/A 1/13/2021    Procedure: Right Heart Catheterization;  Surgeon: Klaus Fitzpatrick MD;  Location: [facility] Cardiac Cath Lab;  Service: Cardiology    EP ABLATION PULMONARY VEIN ISOLATION N/A 7/3/2023    Procedure:  Ablation Atrial Fibrillation;  Surgeon: Kathleen Hernandez MD;  Location: [facility] CATH LAB CV    HYSTERECTOMY  1970's    OOPHORECTOMY Bilateral 1970's    TRANSCATHETER MITRAL VALVE REPAIR N/A 2/1/2021    Procedure: CV MITRAL CLIP;  Surgeon: Klaus Fitzpatrick MD;  Location:  HEART CARDIAC CATH LAB    Family History   Problem Relation Age of Onset    Breast Cancer Sister 47.00    Social History     Socioeconomic History    Marital status:      Spouse name: Not on file    Number of children: Not on file    Years of education: Not on file    Highest education level: Not on file   Occupational History    Not on file   Tobacco Use    Smoking status: Never    Smokeless tobacco: Never   Substance and Sexual Activity    Alcohol use: No    Drug use: No    Sexual activity: Not on file   Other Topics Concern    Parent/sibling w/ CABG, MI or angioplasty before 65F 55M? Not Asked   Social History Narrative    . No children. Family in the [place] area.   Enjoys walking- very regular with this especially in [place] when they used to vacation there for 6 months of the year.   Likes reading - romantic happy endings.   Never alcohol or smoking.  Selina Pozo MD       Social Determinants of Health     Financial Resource Strain: Not on file   Food Insecurity: Not on file   Transportation Needs: Not on file   Physical Activity: Not on file   Stress: Not on file   Social Connections: Not on file   Intimate Partner Violence: Not on file   Housing Stability: Not on file          Medications  Allergies   Scheduled Meds:  Current Outpatient Medications   Medication Sig Dispense Refill    amiodarone (PACERONE) 100 MG TABS tablet Take 1 tablet (100 mg) by mouth every morning 90 tablet 1    clindamycin (CLEOCIN) 300 MG capsule Take 2 capsules (600 mg total) by mouth once as needed (30-60 minutes prior to any dental visit. Save remaining for next visit. 4 capsule 1    ELIQUIS ANTICOAGULANT 5 MG tablet TAKE 1 TABLET BY MOUTH TWICE  A  tablet 3    ezetimibe (ZETIA) 10 MG tablet TAKE 1 TABLET (10 MG) BY MOUTH DAILY. 90 tablet 3    lisinopril (ZESTRIL) 10 MG tablet Take 1 tablet (10 mg) by mouth every 24 hours 90 tablet 3    metoprolol succinate ER (TOPROL XL) 50 MG 24 hr tablet Take 1 tablet (50 mg) by mouth At Bedtime 90 tablet 3    rosuvastatin (CRESTOR) 40 MG tablet TAKE 1 TABLET BY MOUTH EVERY DAY 90 tablet 5    multivitamin, therapeutic (THERA-VIT) TABS tablet Take 1 tablet by mouth daily (Patient not taking: Reported on 2023)      pantoprazole (PROTONIX) 40 MG EC tablet Take 1 tablet (40 mg) by mouth daily (Patient not taking: Reported on 2023) 45 tablet 0    Allergies   Allergen Reactions    Penicillins Hives         Lab Results    Chemistry/lipid CBC Cardiac Enzymes/BNP/TSH/INR   Lab Results   Component Value Date    CHOL 199 2023    HDL 60 2023    TRIG 124 2023    BUN 17.7 07/15/2023     07/15/2023    CO2 25 07/15/2023    Lab Results   Component Value Date    WBC 7.6 07/15/2023    HGB 12.6 07/15/2023    HCT 37.9 07/15/2023    MCV 98 07/15/2023     07/15/2023    @RESUFAST(BMP,CBC,BNP,TSH,  INR)@      15 minutes spent reviewing prior records (including documentation, laboratory studies, cardiac testing/imaging), history and physical exam, planning, and subsequent documentation.     This note has been dictated using voice recognition software. Any grammatical, typographical, or context distortions are unintentional and inherent to the software.    Fifi Barboza CNP  Clinical Cardiac Electrophysiology  [facility]  Clinic and [phone]  Fax: [phone]  Electrophysiology Nurses: [phone]        Thank you for allowing me to participate in the care of your patient.      Sincerely,     DENVER TINEO CNP     [facility]  cc:   Kathleen Hernandez MD  [address]  [facility]   [place] [address]

## 2023-09-06 ENCOUNTER — OFFICE VISIT (OUTPATIENT)
Dept: FAMILY MEDICINE | Facility: CLINIC | Age: 78
End: 2023-09-06
Payer: COMMERCIAL

## 2023-09-06 VITALS
DIASTOLIC BLOOD PRESSURE: 79 MMHG | TEMPERATURE: 97.7 F | WEIGHT: 119 LBS | RESPIRATION RATE: 16 BRPM | SYSTOLIC BLOOD PRESSURE: 179 MMHG | HEART RATE: 81 BPM | OXYGEN SATURATION: 97 % | BODY MASS INDEX: 19.5 KG/M2

## 2023-09-06 DIAGNOSIS — H61.21 IMPACTED CERUMEN OF RIGHT EAR: Primary | ICD-10-CM

## 2023-09-06 PROCEDURE — 69209 REMOVE IMPACTED EAR WAX UNI: CPT | Mod: RT | Performed by: PHYSICIAN ASSISTANT

## 2023-09-06 NOTE — PROGRESS NOTES
Assessment & Plan:      Problem List Items Addressed This Visit    None  Visit Diagnoses       Impacted cerumen of right ear    -  Primary    Relevant Orders    Patient care order          Medical Decision Making  Patient presents with plugged ear sensation of the right ear for 2 to 3 days.  Physical exam shows mild cerumen impaction.  Clinical assistant successfully irrigated without complication.  Patient noted complete resolution of symptoms and was appreciative.  Discussed appropriate ear care.  Discussed treatment and symptomatic care.  Allergies and medication interactions reviewed.  Discussed signs of worsening symptoms and when to follow-up with PCP if no symptom improvement.     Subjective:      Kelly Robledo is a 77 year old female here for evaluation of plugged ear sensation of the right ear.  Onset of symptoms was 2 to 3 days ago.  No ear pains.     The following portions of the patient's history were reviewed and updated as appropriate: allergies, current medications, and problem list.     Review of Systems  Pertinent items are noted in HPI.    Allergies  Allergies   Allergen Reactions    Penicillins Hives       Family History   Problem Relation Age of Onset    Breast Cancer Sister 47.00       Social History     Tobacco Use    Smoking status: Never    Smokeless tobacco: Never   Substance Use Topics    Alcohol use: No        Objective:      BP (!) 179/79   Pulse 81   Temp 97.7  F (36.5  C) (Oral)   Resp 16   Wt 54 kg (119 lb)   LMP  (LMP Unknown)   SpO2 97%   BMI 19.50 kg/m    General appearance - alert, well appearing, and in no distress and non-toxic  Ears - right: Unable to visualize TM due to cerumen impaction, external ear otherwise normal.  Left: TM intact with no fluid, bulging, erythema, external ear normal    The use of Dragon/[employer]. [employer]iff dictation services was used to construct the content of this note; any grammatical errors are non-intentional. Please contact the author directly if  you are in need of any clarification.

## 2023-09-08 ENCOUNTER — TRANSFERRED RECORDS (OUTPATIENT)
Dept: HEALTH INFORMATION MANAGEMENT | Facility: CLINIC | Age: 78
End: 2023-09-08
Payer: COMMERCIAL

## 2023-10-05 NOTE — PROGRESS NOTES
[facility]  Cardiac Electrophysiology  [address]  [place] [address]   Office: [phone]  Fax: [phone]     HEART CARE ELECTROPHYSIOLOGY FOLLOW UP    Primary Care: Selina Pozo MD    Assessment/Recommendations     Paroxysmal atrial fibrillation: Status post atrial fibrillation ablation including PVI and left posterior AV accessory pathway ablation.  Amiodarone discontinued 6 weeks ago without interval symptomatology or documentation of recurrent arrhythmia  -Continue metoprolol succinate 50 mg daily in the evening    [id number] score of 5 for age >75, female gender, hypertension and coronary artery disease.  She has spontaneous bruising. Deferred percutaneous left atrial appendage closure but will consider in the future.   -Continue Eliquis 5 mg twice a day for stroke prophylaxis     Coronary artery disease: Moderate nonobstructive disease by coronary angiography 2021. No anginal symptoms     Essential hypertension: consistently elevated in clinic; home readings at goal      Severe MR status post MitraClip 2021: Moderate to moderately severe 2-3+ mitral regurgitation with mild mitral stenosis by TTE 2022. Endocarditis prophylaxis indicated    Follow up: EP 1 year post ablation       History of Present Illness/Subjective    Kelly Robledo is a 77 year old female with past medical history significant for paroxysmal atrial fibrillation, severe mitral regurgitation status post MitraClip 2021, coronary artery disease, hyperlipidemia, essential hypertension, IBS, renal insufficiency, hypothyroidism. She is seen today for 3-month follow-up status post catheter ablation including PVI and left posterior AV accessory pathway ablation.  Her amiodarone was discontinued 8/25/2023.  She continues to feel well without palpitations, chest discomfort, dyspnea or lightheadedness/dizziness.  She walks daily for exercise. She has no fatigue or changes in activity  tolerance.       Data Review     Arrhythmia hx:   Dx/date: AFRVR 2020. Started on amiodarone, beta blocker and Eliquis.  Status post MitraClip .  Amiodarone decreased to 100 mg daily 2022 due to modestly elevated TSH.  No subsequent recurrence since that time.  Status post ablation as below and continued amiodarone.  Amiodarone discontinued 2023.  Sx: Tachypalpitations  Prior AAD, AV diya blocking agents: Amiodarone  Procedures  DCCV: NA  Ablation: 7/3/2023, Dr. Hernandez-PVI and left posterior AV accessory pathway (requiring second set of ablation lesions for complete elimination); severe LAE and scattered fibrosis    EK/15/2023: SR 76 bpm,  ms, QRS 76 ms, QT/QTc 414/465 ms  7/3/2023: SR 81 bpm,  ms, QRS 86 ms, QT/QTc 452/525 ms  2023: Sinus rhythm at 60 bpm, potential preexcitation,  ms, QRS 88 ms, QT/QTc 422/448 ms  Personally reviewed.      TTE: 10/3/2022  Normal left ventricular size. Normal left ventricular systolic function. Left  ventricular ejection fraction estimated at 60 to 65%. Mild left ventricular  hypertrophy.  Diastolic Doppler findings (E/E' ratio and/or other parameters) suggest left  ventricular filling pressures are increased. Left ventricular diastolic  function is abnormal  Normal right ventricular size and systolic function.  Severe left atrial enlargement. Moderate right atrial enlargement  Mitral valve clip. Moderate to moderately severe 2-3+ mitral regurgitation.  Mild mitral stenosis.Mean gradient of 5 mmHg at a heart rate of 60 bpm  Moderate to moderately severe tricuspid gravitation. Estimate of RV systolic  pressure is moderately elevated at 48 mmHg plus right atrial pressure.  Compared to the examination 2022. Findings are similar. Estimate of RV  systolic pressure is mildly lower on the current examination at 48 mmHg plus  right atrial pressure compared to 57 mmHg plus right atrial pressure on the  previous examination.    "  MARVINOT, EMELYN:   24-hour Holter done 11/10/2022.  Baseline rhythm sinus average 63 bpm.  Suggested chronotropic incompetence, rates range 53-69 bpm.  No tachyarrhythmias or AF.  No pauses >3 seconds.  No symptom triggers.    I have reviewed and updated the patient's past medical history, allergy list and medication list.          Physical Examination   Vitals: BP (!) 172/84 (BP Location: Left arm, Patient Position: Sitting, Cuff Size: Adult Regular)   Pulse 84   Resp 16   Ht 1.664 m (5' 5.5\")   Wt 54.5 kg (120 lb 3.2 oz)   LMP  (LMP Unknown)   BMI 19.70 kg/m      BMI= Body mass index is 19.7 kg/m .    Wt Readings from Last 3 Encounters:   10/06/23 54.5 kg (120 lb 3.2 oz)   09/06/23 54 kg (119 lb)   08/25/23 54.4 kg (120 lb)       General   Appearance:   Alert and oriented, in no acute distress.    HEENT:  Normocephalic and atraumatic. Conjunctiva and sclera are clear. Moist oral mucosa.    Neck: No JVP, carotid bruit or obvious thyromegaly.   Lungs:   Respirations unlabored. Clear bilaterally with no rales, rhonchi, or wheezes.     Cardiovascular:   Rhythm is regular. S1 and S2 are normal. No significant murmur is present. Lower extremities demonstrate no significant edema. Posterior tibial pulses are intact bilaterally.   Extremities: No cyanosis or clubbing   Skin: Skin is warm, dry, and otherwise intact.   Neurologic: Gait not assessed. Mood and affect appropriate.           Medical History  Surgical History Family History Social History   Past Medical History:   Diagnosis Date    Abdominal pain     Atrial fibrillation (H)     Diverticulosis     Moderate mitral regurgitation     NSVT (nonsustained ventricular tachycardia) (H) 6/17/2020    Palpitations     Past Surgical History:   Procedure Laterality Date    BIOPSY BREAST Bilateral     benign    CV CORONARY ANGIOGRAM N/A 6/18/2020    Procedure: Coronary Angiogram;  Surgeon: Sergey Conner MD;  Location: [facility] Cath Lab;  Service: Cardiology    " CV CORONARY ANGIOGRAM N/A 1/13/2021    Procedure: Coronary Angiogram;  Surgeon: Klaus Fitzpatrick MD;  Location: [facility] Cardiac Cath Lab;  Service: Cardiology    CV LEFT HEART CATHETERIZATION WITHOUT LEFT VENTRICULOGRAM Left 6/18/2020    Procedure: Left Heart Catheterization Without Left Ventriculogram;  Surgeon: Sergey Conner MD;  Location: [facility] Cath Lab;  Service: Cardiology    CV RIGHT HEART CATHETERIZATION N/A 6/18/2020    Procedure: Right Heart Catheterization;  Surgeon: Sergey Conner MD;  Location: [facility] Cath Lab;  Service: Cardiology    CV RIGHT HEART CATHETERIZATION N/A 1/13/2021    Procedure: Right Heart Catheterization;  Surgeon: Klaus Fitzpatrick MD;  Location: [facility] Cardiac Cath Lab;  Service: Cardiology    EP ABLATION PULMONARY VEIN ISOLATION N/A 7/3/2023    Procedure: Ablation Atrial Fibrillation;  Surgeon: Kathleen Hernandez MD;  Location: [facility] CV    HYSTERECTOMY  1970's    OOPHORECTOMY Bilateral 1970's    TRANSCATHETER MITRAL VALVE REPAIR N/A 2/1/2021    Procedure: CV MITRAL CLIP;  Surgeon: Klaus Fitzpatrick MD;  Location: [facility] CARDIAC CATH LAB    Family History   Problem Relation Age of Onset    Breast Cancer Sister 47.00    Social History     Socioeconomic History    Marital status:      Spouse name: Not on file    Number of children: Not on file    Years of education: Not on file    Highest education level: Not on file   Occupational History    Not on file   Tobacco Use    Smoking status: Never    Smokeless tobacco: Never   Vaping Use    Vaping Use: Never used   Substance and Sexual Activity    Alcohol use: No    Drug use: No    Sexual activity: Not on file   Other Topics Concern    Parent/sibling w/ CABG, MI or angioplasty before 65F 55M? Not Asked   Social History Narrative    . No children. Family in the [place] area.   Enjoys walking- very regular with this especially in [place] when they used to vacation there for 6 months of the  year.   Likes reading - romantic happy endings.   Never alcohol or smoking.  Selina Pozo MD       Social Determinants of Health     Financial Resource Strain: Not on file   Food Insecurity: Not on file   Transportation Needs: Not on file   Physical Activity: Not on file   Stress: Not on file   Social Connections: Not on file   Interpersonal Safety: Not on file   Housing Stability: Not on file          Medications  Allergies   Scheduled Meds:  Current Outpatient Medications   Medication Sig Dispense Refill    clindamycin (CLEOCIN) 300 MG capsule Take 2 capsules (600 mg total) by mouth once as needed (30-60 minutes prior to any dental visit. Save remaining for next visit. 4 capsule 1    ELIQUIS ANTICOAGULANT 5 MG tablet TAKE 1 TABLET BY MOUTH TWICE A  tablet 3    ezetimibe (ZETIA) 10 MG tablet TAKE 1 TABLET (10 MG) BY MOUTH DAILY. 90 tablet 3    lisinopril (ZESTRIL) 10 MG tablet Take 1 tablet (10 mg) by mouth every 24 hours 90 tablet 3    metoprolol succinate ER (TOPROL XL) 50 MG 24 hr tablet Take 1 tablet (50 mg) by mouth At Bedtime 90 tablet 3    rosuvastatin (CRESTOR) 40 MG tablet TAKE 1 TABLET BY MOUTH EVERY DAY 90 tablet 5    Allergies   Allergen Reactions    Penicillins Hives         Lab Results    Chemistry/lipid CBC Cardiac Enzymes/BNP/TSH/INR   Lab Results   Component Value Date    CHOL 199 02/27/2023    HDL 60 02/27/2023    TRIG 124 02/27/2023    BUN 17.7 07/15/2023     07/15/2023    CO2 25 07/15/2023    Lab Results   Component Value Date    WBC 7.6 07/15/2023    HGB 12.6 07/15/2023    HCT 37.9 07/15/2023    MCV 98 07/15/2023     07/15/2023    @RESUFAST(BMP,CBC,BNP,TSH,  INR)@      15 minutes spent reviewing prior records (including documentation, laboratory studies, cardiac testing/imaging), history and physical exam, planning, and subsequent documentation.     This note has been dictated using voice recognition software. Any grammatical, typographical, or context distortions are  unintentional and inherent to the software.    Fifi Barboza CNP  Clinical Cardiac Electrophysiology  [facility]  Clinic and [phone]  Fax: [phone]  Electrophysiology Nurses: [phone]

## 2023-10-06 ENCOUNTER — OFFICE VISIT (OUTPATIENT)
Dept: CARDIOLOGY | Facility: CLINIC | Age: 78
End: 2023-10-06
Attending: NURSE PRACTITIONER
Payer: COMMERCIAL

## 2023-10-06 VITALS
HEIGHT: 66 IN | RESPIRATION RATE: 16 BRPM | SYSTOLIC BLOOD PRESSURE: 172 MMHG | BODY MASS INDEX: 19.32 KG/M2 | WEIGHT: 120.2 LBS | DIASTOLIC BLOOD PRESSURE: 84 MMHG | HEART RATE: 84 BPM

## 2023-10-06 DIAGNOSIS — I10 PRIMARY HYPERTENSION: ICD-10-CM

## 2023-10-06 DIAGNOSIS — Z98.890 STATUS POST CATHETER ABLATION OF ATRIAL FIBRILLATION: ICD-10-CM

## 2023-10-06 DIAGNOSIS — I25.10 CORONARY ARTERIOSCLEROSIS: ICD-10-CM

## 2023-10-06 DIAGNOSIS — Z95.818 S/P MITRAL VALVE CLIP IMPLANTATION: ICD-10-CM

## 2023-10-06 DIAGNOSIS — Z98.890 S/P MITRAL VALVE CLIP IMPLANTATION: ICD-10-CM

## 2023-10-06 DIAGNOSIS — I48.0 PAROXYSMAL ATRIAL FIBRILLATION (H): Primary | ICD-10-CM

## 2023-10-06 PROCEDURE — 99214 OFFICE O/P EST MOD 30 MIN: CPT | Performed by: NURSE PRACTITIONER

## 2023-10-06 NOTE — LETTER
10/6/2023    Selina Pozo MD  [address] [address]    RE: Kelly Robledo       Dear Colleague,     I had the pleasure of seeing Kelly Robledo in the [facility].     [facility]  Cardiac Electrophysiology  [address]  [place] [address]   Office: [phone]  Fax: [phone]     HEART CARE ELECTROPHYSIOLOGY FOLLOW UP    Primary Care: Selina Pozo MD    Assessment/Recommendations     Paroxysmal atrial fibrillation: Status post atrial fibrillation ablation including PVI and left posterior AV accessory pathway ablation.  Amiodarone discontinued 6 weeks ago without interval symptomatology or documentation of recurrent arrhythmia  -Continue metoprolol succinate 50 mg daily in the evening    [id number] score of 5 for age >75, female gender, hypertension and coronary artery disease.  She has spontaneous bruising. Deferred percutaneous left atrial appendage closure but will consider in the future.   -Continue Eliquis 5 mg twice a day for stroke prophylaxis     Coronary artery disease: Moderate nonobstructive disease by coronary angiography 2021. No anginal symptoms     Essential hypertension: consistently elevated in clinic; home readings at goal      Severe MR status post MitraClip 2021: Moderate to moderately severe 2-3+ mitral regurgitation with mild mitral stenosis by TTE 2022. Endocarditis prophylaxis indicated    Follow up: EP 1 year post ablation       History of Present Illness/Subjective    Kelly Robledo is a 77 year old female with past medical history significant for paroxysmal atrial fibrillation, severe mitral regurgitation status post MitraClip 2021, coronary artery disease, hyperlipidemia, essential hypertension, IBS, renal insufficiency, hypothyroidism. She is seen today for 3-month follow-up status post catheter ablation including PVI and left posterior AV accessory pathway ablation.  Her amiodarone  was discontinued 2023.  She continues to feel well without palpitations, chest discomfort, dyspnea or lightheadedness/dizziness.  She walks daily for exercise. She has no fatigue or changes in activity tolerance.       Data Review     Arrhythmia hx:   Dx/date: AFRVR 2020. Started on amiodarone, beta blocker and Eliquis.  Status post MitraClip .  Amiodarone decreased to 100 mg daily 2022 due to modestly elevated TSH.  No subsequent recurrence since that time.  Status post ablation as below and continued amiodarone.  Amiodarone discontinued 2023.  Sx: Tachypalpitations  Prior AAD, AV diya blocking agents: Amiodarone  Procedures  DCCV: NA  Ablation: 7/3/2023, Dr. Hernandez-PVI and left posterior AV accessory pathway (requiring second set of ablation lesions for complete elimination); severe LAE and scattered fibrosis    EK/15/2023: SR 76 bpm,  ms, QRS 76 ms, QT/QTc 414/465 ms  7/3/2023: SR 81 bpm,  ms, QRS 86 ms, QT/QTc 452/525 ms  2023: Sinus rhythm at 60 bpm, potential preexcitation,  ms, QRS 88 ms, QT/QTc 422/448 ms  Personally reviewed.      TTE: 10/3/2022  Normal left ventricular size. Normal left ventricular systolic function. Left  ventricular ejection fraction estimated at 60 to 65%. Mild left ventricular  hypertrophy.  Diastolic Doppler findings (E/E' ratio and/or other parameters) suggest left  ventricular filling pressures are increased. Left ventricular diastolic  function is abnormal  Normal right ventricular size and systolic function.  Severe left atrial enlargement. Moderate right atrial enlargement  Mitral valve clip. Moderate to moderately severe 2-3+ mitral regurgitation.  Mild mitral stenosis.Mean gradient of 5 mmHg at a heart rate of 60 bpm  Moderate to moderately severe tricuspid gravitation. Estimate of RV systolic  pressure is moderately elevated at 48 mmHg plus right atrial pressure.  Compared to the examination 2022. Findings are  "similar. Estimate of RV  systolic pressure is mildly lower on the current examination at 48 mmHg plus  right atrial pressure compared to 57 mmHg plus right atrial pressure on the  previous examination.     [place] [facility]   24-hour Holter done 11/10/2022.  Baseline rhythm sinus average 63 bpm.  Suggested chronotropic incompetence, rates range 53-69 bpm.  No tachyarrhythmias or AF.  No pauses >3 seconds.  No symptom triggers.    I have reviewed and updated the patient's past medical history, allergy list and medication list.          Physical Examination   Vitals: BP (!) 172/84 (BP Location: Left arm, Patient Position: Sitting, Cuff Size: Adult Regular)   Pulse 84   Resp 16   Ht 1.664 m (5' 5.5\")   Wt 54.5 kg (120 lb 3.2 oz)   LMP  (LMP Unknown)   BMI 19.70 kg/m      BMI= Body mass index is 19.7 kg/m .    Wt Readings from Last 3 Encounters:   10/06/23 54.5 kg (120 lb 3.2 oz)   09/06/23 54 kg (119 lb)   08/25/23 54.4 kg (120 lb)       General   Appearance:   Alert and oriented, in no acute distress.    HEENT:  Normocephalic and atraumatic. Conjunctiva and sclera are clear. Moist oral mucosa.    Neck: No JVP, carotid bruit or obvious thyromegaly.   Lungs:   Respirations unlabored. Clear bilaterally with no rales, rhonchi, or wheezes.     Cardiovascular:   Rhythm is regular. S1 and S2 are normal. No significant murmur is present. Lower extremities demonstrate no significant edema. Posterior tibial pulses are intact bilaterally.   Extremities: No cyanosis or clubbing   Skin: Skin is warm, dry, and otherwise intact.   Neurologic: Gait not assessed. Mood and affect appropriate.           Medical History  Surgical History Family History Social History   Past Medical History:   Diagnosis Date    Abdominal pain     Atrial fibrillation (H)     Diverticulosis     Moderate mitral regurgitation     NSVT (nonsustained ventricular tachycardia) (H) 6/17/2020    Palpitations     Past Surgical History:   Procedure Laterality Date    " BIOPSY BREAST Bilateral     benign    CV CORONARY ANGIOGRAM N/A 6/18/2020    Procedure: Coronary Angiogram;  Surgeon: Sergey Conner MD;  Location: [facility] Cath Lab;  Service: Cardiology    CV CORONARY ANGIOGRAM N/A 1/13/2021    Procedure: Coronary Angiogram;  Surgeon: Klaus Fitzpatrick MD;  Location: [facility] Cardiac Cath Lab;  Service: Cardiology    CV LEFT HEART CATHETERIZATION WITHOUT LEFT VENTRICULOGRAM Left 6/18/2020    Procedure: Left Heart Catheterization Without Left Ventriculogram;  Surgeon: Sergey Conner MD;  Location: [facility] Cath Lab;  Service: Cardiology    CV RIGHT HEART CATHETERIZATION N/A 6/18/2020    Procedure: Right Heart Catheterization;  Surgeon: Sergey Conner MD;  Location: [facility] Cath Lab;  Service: Cardiology    CV RIGHT HEART CATHETERIZATION N/A 1/13/2021    Procedure: Right Heart Catheterization;  Surgeon: Klaus Fitzpatrick MD;  Location: [facility] Cardiac Cath Lab;  Service: Cardiology    EP ABLATION PULMONARY VEIN ISOLATION N/A 7/3/2023    Procedure: Ablation Atrial Fibrillation;  Surgeon: Kathleen Hernandez MD;  Location: [facility] CV    HYSTERECTOMY  1970's    OOPHORECTOMY Bilateral 1970's    TRANSCATHETER MITRAL VALVE REPAIR N/A 2/1/2021    Procedure: CV MITRAL CLIP;  Surgeon: Klaus Fitzpatrick MD;  Location: [facility] CARDIAC CATH LAB    Family History   Problem Relation Age of Onset    Breast Cancer Sister 47.00    Social History     Socioeconomic History    Marital status:      Spouse name: Not on file    Number of children: Not on file    Years of education: Not on file    Highest education level: Not on file   Occupational History    Not on file   Tobacco Use    Smoking status: Never    Smokeless tobacco: Never   Vaping Use    Vaping Use: Never used   Substance and Sexual Activity    Alcohol use: No    Drug use: No    Sexual activity: Not on file   Other Topics Concern    Parent/sibling w/ CABG, MI or angioplasty before  65F 55M? Not Asked   Social History Narrative    . No children. Family in the [place] area.   Enjoys walking- very regular with this especially in [place] when they used to vacation there for 6 months of the year.   Likes reading - romantic happy endings.   Never alcohol or smoking.  Selina Pozo MD       Social Determinants of Health     Financial Resource Strain: Not on file   Food Insecurity: Not on file   Transportation Needs: Not on file   Physical Activity: Not on file   Stress: Not on file   Social Connections: Not on file   Interpersonal Safety: Not on file   Housing Stability: Not on file          Medications  Allergies   Scheduled Meds:  Current Outpatient Medications   Medication Sig Dispense Refill    clindamycin (CLEOCIN) 300 MG capsule Take 2 capsules (600 mg total) by mouth once as needed (30-60 minutes prior to any dental visit. Save remaining for next visit. 4 capsule 1    ELIQUIS ANTICOAGULANT 5 MG tablet TAKE 1 TABLET BY MOUTH TWICE A  tablet 3    ezetimibe (ZETIA) 10 MG tablet TAKE 1 TABLET (10 MG) BY MOUTH DAILY. 90 tablet 3    lisinopril (ZESTRIL) 10 MG tablet Take 1 tablet (10 mg) by mouth every 24 hours 90 tablet 3    metoprolol succinate ER (TOPROL XL) 50 MG 24 hr tablet Take 1 tablet (50 mg) by mouth At Bedtime 90 tablet 3    rosuvastatin (CRESTOR) 40 MG tablet TAKE 1 TABLET BY MOUTH EVERY DAY 90 tablet 5    Allergies   Allergen Reactions    Penicillins Hives         Lab Results    Chemistry/lipid CBC Cardiac Enzymes/BNP/TSH/INR   Lab Results   Component Value Date    CHOL 199 02/27/2023    HDL 60 02/27/2023    TRIG 124 02/27/2023    BUN 17.7 07/15/2023     07/15/2023    CO2 25 07/15/2023    Lab Results   Component Value Date    WBC 7.6 07/15/2023    HGB 12.6 07/15/2023    HCT 37.9 07/15/2023    MCV 98 07/15/2023     07/15/2023    @RESUFAST(BMP,CBC,BNP,TSH,  INR)@      15 minutes spent reviewing prior records (including documentation, laboratory studies, cardiac  testing/imaging), history and physical exam, planning, and subsequent documentation.     This note has been dictated using voice recognition software. Any grammatical, typographical, or context distortions are unintentional and inherent to the software.    Fifi Barboza CNP  Clinical Cardiac Electrophysiology  [facility]  Clinic and [phone]  Fax: [phone]  Electrophysiology Nurses: [phone]          Thank you for allowing me to participate in the care of your patient.      Sincerely,     DENVER TINEO CNP     [facility]  cc:   DENVER Lewis CNP  [facility] [address]  [address] [place] [address]

## 2023-10-06 NOTE — PATIENT INSTRUCTIONS
Kelly Robledo,    It was a pleasure to see you today at the [facility].     My recommendations after this visit include:  -Continue current medications  -Follow up with me in 9 months, about 1 year after ablation    Please do not hesitate to call with additional questions or concerns.     Fifi Barboza, CNP  Clinical Cardiac Electrophysiology  [facility]  Clinic and [phone]  Fax: [phone]  Electrophysiology Nurses: [phone]

## 2023-11-30 ENCOUNTER — OFFICE VISIT (OUTPATIENT)
Dept: CARDIOLOGY | Facility: CLINIC | Age: 78
End: 2023-11-30
Payer: COMMERCIAL

## 2023-11-30 VITALS
BODY MASS INDEX: 19.76 KG/M2 | HEART RATE: 80 BPM | DIASTOLIC BLOOD PRESSURE: 90 MMHG | SYSTOLIC BLOOD PRESSURE: 164 MMHG | WEIGHT: 120.56 LBS | OXYGEN SATURATION: 97 %

## 2023-11-30 DIAGNOSIS — E78.5 HYPERLIPIDEMIA LDL GOAL <100: ICD-10-CM

## 2023-11-30 DIAGNOSIS — I05.9 MITRAL VALVE DISEASE: Primary | ICD-10-CM

## 2023-11-30 DIAGNOSIS — R09.89 RIGHT CAROTID BRUIT: ICD-10-CM

## 2023-11-30 PROCEDURE — 99214 OFFICE O/P EST MOD 30 MIN: CPT | Performed by: INTERNAL MEDICINE

## 2023-11-30 NOTE — PATIENT INSTRUCTIONS
Please call my nurse Anamaria with any heart related questions.Her number is [phone].We are going to plan a follow up ultrasound of your heart and lab work.Please write

## 2023-11-30 NOTE — PROGRESS NOTES
HEART CARE ENCOUNTER CONSULTATON NOTE      [facility]  [phone]      Assessment/Recommendations   Assessment/Plan:  1.  Mitral valve prolapse with severe mitral regurgitation.  Status post mitral valve clip procedure in 2021.  The most recent echocardiogram is from October 2022 with ejection fraction of 60 to 65%.  Severe left atrial enlargement, moderate to moderately severe 2-3+ mitral regurgitation and mild mitral stenosis.  Moderate to moderately severe tricuspid regurgitation.  Findings are similar to the previous examination January 2022.    2.  Paroxysmal atrial fibrillation that was very symptomatic.  Status post PVI and left posterior AV accessory pathway ablation.  Amiodarone was subsequently discontinued.  She remains on Eliquis.  Her preference is to remain on Eliquis.  She is in sinus rhythm based on examination today.    3.  Moderate nonobstructive coronary artery disease based on prior angiogram.  This dates back to January 2021.  Lipid results are from February 2023 at which time cholesterol total was 199 and .  Ideally would like to see LDL levels less.  She has been on rosuvastatin and Zetia.  Now that she is off amiodarone we have had additional discussions today about cholesterol management.  4.  Hypertension.  Blood pressure tends to run high in the office and normal at home.  She brings excellent blood pressure readings that have all been under good control.  Blood pressure on arrival here today was 164/90 but my examination 134/80.    Plan 1.  Echocardiogram  2.  Carotid ultrasound  3.  Fasting lipid panel  4.  Follow-up 3 to 4 months.     History of Present Illness/Subjective    HPI: Kelly Robledo is a 78 year old female who is seen in follow up.She has a history of mitral valve prolapse with significant mitral regurgitation admitted to hospital in June 2020 with tachypalpitations.  She was diagnosed with atrial fibrillation with rapid ventricular response  and started on amiodarone and Eliquis.  Coronary angiogram at that time showed moderate nonobstructive disease.  Follow-up transesophageal echocardiogram showed progression of her mitral regurgitation and she underwent mitral valve clip procedure in .    She was seen in the atrial fibrillation nurse practitioner clinic 2023.  Chart notes are reviewed.  Chads vascular score of 5.  She was reporting that she felt well at that time.  She reports feeling well.  She specifically denies chest pain, shortness of breath, dizziness, lightheadedness, syncope or near syncope.    She continues to be active and is walking regularly.    Recent Echocardiogram Results:  Echocardiogram Complete  Order: [id number]  Status: Edited Result - FINAL     Visible to patient: Yes (not seen)     Next appt: 2023 at 08:50 AM in Cardiology (aDyne Kim MD)     Dx: Pulmonary hypertension (H); S/P juan...     2 Result Notes    1 Patient Communication  Details    Reading Physician Reading Date Result Priority   Dayne Kim MD  [phone] 10/3/2022      Narrative & Impression  [id number]  [id number]  [id number]  [id number]^PAULA^DAYNE^[place] [address]     Name: ANTIONE MISHRA  MRN: [id number]  : 1945  Study Date: 10/03/2022 10:55 AM  Age: 76 yrs  Gender: Female  Patient [facility]: [facility]  Reason For Study: S/P mitral valve clip implantation, S/P mitral valve clip  implan  Ordering Physician: DAYNE KIM  Referring Physician: DAYNE KIM  Performed By:      BSA: 1.6 m2  Height: 65 in  Weight: 118 lb  HR: 63  BP: 172/80 mmHg  ______________________________________________________________________________  Procedure  Complete Echo Adult.  ______________________________________________________________________________  Interpretation Summary     Normal left ventricular size. Normal left ventricular systolic function. Left  ventricular ejection fraction estimated at 60  to 65%. Mild left ventricular  hypertrophy.  Diastolic Doppler findings (E/E' ratio and/or other parameters) suggest left  ventricular filling pressures are increased. Left ventricular diastolic  function is abnormal  Normal right ventricular size and systolic function.  Severe left atrial enlargement. Moderate right atrial enlargement  Mitral valve clip. Moderate to moderately severe 2-3+ mitral regurgitation.  Mild mitral stenosis.Mean gradient of 5 mmHg at a heart rate of 60 bpm  Moderate to moderately severe tricuspid gravitation. Estimate of RV systolic  pressure is moderately elevated at 48 mmHg plus right atrial pressure.  Compared to the examination January 2022. Findings are similar. Estimate of RV  systolic pressure is mildly lower on the current examination at 48 mmHg plus  right atrial pressure compared to 57 mmHg plus right atrial pressure on the  previous examination         Recent Coronary Angiogram Results:    Mitral regurgitation [I34.0 (ICD-10-CM)]         Conclusion    76 yo F with recent diagnosis of moderate to severe mitral regurgitation, causing increasing shortness of breath, as well as a recent hospitalization with congestive heart failure.  Being evaluated for transcatheter mitral valve repair using MitraClip,   and here for preprocedure coronary angiography.     Coronary geography today showed:     Left main with 30% ostial narrowing  LAD with mild to moderate diffuse disease, and a 50% stenosis in the midportion, that appears unchanged from her prior angiogram  Ramus with a 50% proximal to mid stenosis and mild disease distally  LCx is a small vessel with no significant obtuse marginals noted  RCA is a large caliber, dominant vessel supplying the inferior and posterior walls.  There is 40% mid to distal focal stenosis, also unchanged from prior.        Right heart catheterization showed:   PA 50/16  (36)   RA mean  7   PCW  16 /43  (26) v waves of 45 mmHg   AO  164/62  (107)   RV  57/-6,   6     LVEDP 15 mmHg     Given that her symptoms have progressed over the past 6 months, with no change in her coronary anatomy, and clear worsening of her mitral regurgitation, she will benefit from mitral valve repair with MitraClip.  Her hemodynamics today also confirmed the presence of severe mitral regurgitation, with large V waves noted on her pulmonary wedge tracing      [facility]  Cardiac Electrophysiology Procedure note: Atrial fibrillation ablation        Patient: Kelly Robledo   : 1945      : Kathleen Hernandez MD  Date: 7/3/2023     Procedures performed:  1. Atrial fibrillation ablation with pulmonary vein isolation  2. Mapping and ablation of left posterior atrioventricular accessory pathway  3. Programmed stimulation with IV drug infusion     Recommendations:  1. [place] [facility] [place]  2. Apixaban will be continued  3. Continue amiodarone for now - assess for cessation at 6 week follow-up visit  4. Remove figure of eight sutures in 3 hours  5. Anticipate discharge later today if groin checks, vital signs and telemetry monitoring are stable and following ambulation assessment  6. Follow-up clinic visit in approximately 6 weeks             Physical Examination  Review of Systems   Vitals: 164/90 upon arrival, 134/80 during my examination, heart rate of 80 and regular on today's examination, weight 120 pounds.  Wt Readings from Last 3 Encounters:   10/06/23 54.5 kg (120 lb 3.2 oz)   23 54 kg (119 lb)   23 54.4 kg (120 lb)       General Appearance:   no distress, normal body habitus   ENT/Mouth: membranes moist, no oral lesions or bleeding gums.      EYES:  no scleral icterus, normal conjunctivae   Neck: no carotid bruits    Chest/Lungs:   lungs are clear to auscultation, no rales or wheezing, equal chest wall expansion    Cardiovascular:   Regular. Normal first and second heart sounds with 2/6 systolic murmur, possible right carotid bruit rubs,  or gallops;  radial and posterior tibial pulses are intact, Jugular venous pressure within normal limits on today's examination, no edema bilaterally    Abdomen:  No bruits, or tenderness; bowel sounds are present   Extremities: no cyanosis or clubbing   Skin: no xanthelasma, warm.    Neurologic:  no tremors     Psychiatric: alert and oriented x3, calm        Please refer above for cardiac ROS details.        Medical History  Surgical History Family History Social History   Past Medical History:   Diagnosis Date    Abdominal pain     Atrial fibrillation (H)     Diverticulosis     Moderate mitral regurgitation     NSVT (nonsustained ventricular tachycardia) (H) 6/17/2020    Palpitations      Past Surgical History:   Procedure Laterality Date    BIOPSY BREAST Bilateral     benign    CV CORONARY ANGIOGRAM N/A 6/18/2020    Procedure: Coronary Angiogram;  Surgeon: Sergey Conner MD;  Location: [facility] Cath Lab;  Service: Cardiology    CV CORONARY ANGIOGRAM N/A 1/13/2021    Procedure: Coronary Angiogram;  Surgeon: Klaus Fitzpatrick MD;  Location: [facility] Cardiac Cath Lab;  Service: Cardiology    CV LEFT HEART CATHETERIZATION WITHOUT LEFT VENTRICULOGRAM Left 6/18/2020    Procedure: Left Heart Catheterization Without Left Ventriculogram;  Surgeon: Sergey Conner MD;  Location: [facility] Cath Lab;  Service: Cardiology    CV RIGHT HEART CATHETERIZATION N/A 6/18/2020    Procedure: Right Heart Catheterization;  Surgeon: Sergey Conner MD;  Location: [facility] Cath Lab;  Service: Cardiology    CV RIGHT HEART CATHETERIZATION N/A 1/13/2021    Procedure: Right Heart Catheterization;  Surgeon: Klaus Fitzpatrick MD;  Location: [facility] Cardiac Cath Lab;  Service: Cardiology    EP ABLATION PULMONARY VEIN ISOLATION N/A 7/3/2023    Procedure: Ablation Atrial Fibrillation;  Surgeon: Kathleen Hernandez MD;  Location: [facility] CV    HYSTERECTOMY  1970's    OOPHORECTOMY Bilateral 1970's     TRANSCATHETER MITRAL VALVE REPAIR N/A 2/1/2021    Procedure: CV MITRAL CLIP;  Surgeon: Klaus Fitzpatrick MD;  Location:  HEART CARDIAC CATH LAB     Family History   Problem Relation Age of Onset    Breast Cancer Sister 47.00        Social History     Socioeconomic History    Marital status:      Spouse name: Not on file    Number of children: Not on file    Years of education: Not on file    Highest education level: Not on file   Occupational History    Not on file   Tobacco Use    Smoking status: Never    Smokeless tobacco: Never   Vaping Use    Vaping Use: Never used   Substance and Sexual Activity    Alcohol use: No    Drug use: No    Sexual activity: Not on file   Other Topics Concern    Parent/sibling w/ CABG, MI or angioplasty before 65F 55M? Not Asked   Social History Narrative    . No children. Family in the [place] area.   Enjoys walking- very regular with this especially in [place] when they used to vacation there for 6 months of the year.   Likes reading - romantic happy endings.   Never alcohol or smoking.  Selina Pozo MD       Social Determinants of Health     Financial Resource Strain: Not on file   Food Insecurity: Not on file   Transportation Needs: Not on file   Physical Activity: Not on file   Stress: Not on file   Social Connections: Not on file   Interpersonal Safety: Not on file   Housing Stability: Not on file           Medications  Allergies   Current Outpatient Medications   Medication Sig Dispense Refill    clindamycin (CLEOCIN) 300 MG capsule Take 2 capsules (600 mg total) by mouth once as needed (30-60 minutes prior to any dental visit. Save remaining for next visit. 4 capsule 1    ELIQUIS ANTICOAGULANT 5 MG tablet TAKE 1 TABLET BY MOUTH TWICE A  tablet 3    ezetimibe (ZETIA) 10 MG tablet TAKE 1 TABLET (10 MG) BY MOUTH DAILY. 90 tablet 3    lisinopril (ZESTRIL) 10 MG tablet Take 1 tablet (10 mg) by mouth every 24 hours 90 tablet 3    metoprolol succinate ER (TOPROL XL)  50 MG 24 hr tablet Take 1 tablet (50 mg) by mouth At Bedtime 90 tablet 3    rosuvastatin (CRESTOR) 40 MG tablet TAKE 1 TABLET BY MOUTH EVERY DAY 90 tablet 5       Allergies   Allergen Reactions    Penicillins Hives          Lab Results    Chemistry/lipid CBC Cardiac Enzymes/BNP/TSH/INR   Recent Labs   Lab Test 02/27/23  0738   CHOL 199   HDL 60   *   TRIG 124     Recent Labs   Lab Test 02/27/23  0738 11/10/22  0824 07/29/22  0735   * 103* 105*     Recent Labs   Lab Test 07/15/23  0303      POTASSIUM 3.5   CHLORIDE 101   CO2 25   *   BUN 17.7   CR 1.26*   GFRESTIMATED 43*   SHADI 9.5     Recent Labs   Lab Test 07/15/23  0303 06/26/23  1325 02/27/23  0738   CR 1.26* 1.49* 1.33*     Recent Labs   Lab Test 02/27/23  0738   A1C 5.9*          Recent Labs   Lab Test 07/15/23  0303   WBC 7.6   HGB 12.6   HCT 37.9   MCV 98        Recent Labs   Lab Test 07/15/23  0303 06/26/23  1325 02/27/23  0738   HGB 12.6 12.9 12.4    Recent Labs   Lab Test 11/27/21  1234 11/27/21  0843 11/27/21  0331   TROPONINI <0.01 <0.01 <0.01     Recent Labs   Lab Test 01/25/21  1355 10/14/20  1319 09/23/20  1920   * 344* 624*     Recent Labs   Lab Test 06/26/23  1355   TSH 9.61*     Recent Labs   Lab Test 01/25/21  1355 09/23/20  1920   INR 1.22* 1.37*        Dayne Kim MD

## 2023-11-30 NOTE — LETTER
11/30/2023    Selina Pozo MD  [address] [facility] [id number]    RE: Kelly Robledo       Dear Colleague,     I had the pleasure of seeing Kelly Robledo in the [facility].    HEART CARE ENCOUNTER CONSULTATON NOTE      [facility]  [phone]      Assessment/Recommendations   Assessment/Plan:  1.  Mitral valve prolapse with severe mitral regurgitation.  Status post mitral valve clip procedure in 2021.  The most recent echocardiogram is from October 2022 with ejection fraction of 60 to 65%.  Severe left atrial enlargement, moderate to moderately severe 2-3+ mitral regurgitation and mild mitral stenosis.  Moderate to moderately severe tricuspid regurgitation.  Findings are similar to the previous examination January 2022.    2.  Paroxysmal atrial fibrillation that was very symptomatic.  Status post PVI and left posterior AV accessory pathway ablation.  Amiodarone was subsequently discontinued.  She remains on Eliquis.  Her preference is to remain on Eliquis.  She is in sinus rhythm based on examination today.    3.  Moderate nonobstructive coronary artery disease based on prior angiogram.  This dates back to January 2021.  Lipid results are from February 2023 at which time cholesterol total was 199 and .  Ideally would like to see LDL levels less.  She has been on rosuvastatin and Zetia.  Now that she is off amiodarone we have had additional discussions today about cholesterol management.  4.  Hypertension.  Blood pressure tends to run high in the office and normal at home.  She brings excellent blood pressure readings that have all been under good control.  Blood pressure on arrival here today was 164/90 but my examination 134/80.    Plan 1.  Echocardiogram  2.  Carotid ultrasound  3.  Fasting lipid panel  4.  Follow-up 3 to 4 months.     History of Present Illness/Subjective    HPI: Kelly Robledo is a 78 year old female who is seen in follow up.She  has a history of mitral valve prolapse with significant mitral regurgitation admitted to hospital in 2020 with tachypalpitations.  She was diagnosed with atrial fibrillation with rapid ventricular response and started on amiodarone and Eliquis.  Coronary angiogram at that time showed moderate nonobstructive disease.  Follow-up transesophageal echocardiogram showed progression of her mitral regurgitation and she underwent mitral valve clip procedure in .    She was seen in the atrial fibrillation nurse practitioner clinic 2023.  Chart notes are reviewed.  Chads vascular score of 5.  She was reporting that she felt well at that time.  She reports feeling well.  She specifically denies chest pain, shortness of breath, dizziness, lightheadedness, syncope or near syncope.    She continues to be active and is walking regularly.    Recent Echocardiogram Results:  Echocardiogram Complete  Order: [id number]  Status: Edited Result - FINAL     Visible to patient: Yes (not seen)     Next appt: 2023 at 08:50 AM in Cardiology (Dayne Kim MD)     Dx: Pulmonary hypertension (H); S/P juan...     2 Result Notes    1 Patient Communication  Details    Reading Physician Reading Date Result Priority   Dayne Kim MD  [phone] 10/3/2022      Narrative & Impression  [id number]  [id number]  [id number]  [id number]^PAULA^DAYNE^[place] [address]     Name: ANTIONE MISHRA  MRN: [id number]  : 1945  Study Date: 10/03/2022 10:55 AM  Age: 76 yrs  Gender: Female  Patient Location: [facility]  Reason For Study: S/P mitral valve clip implantation, S/P mitral valve clip  implan  Ordering Physician: DAYNE KIM  Referring Physician: DAYNE KIM  Performed By: DEL     BSA: 1.6 m2  Height: 65 in  Weight: 118 lb  HR: 63  BP: 172/80 mmHg  ______________________________________________________________________________  Procedure  Complete Echo  Adult.  ______________________________________________________________________________  Interpretation Summary     Normal left ventricular size. Normal left ventricular systolic function. Left  ventricular ejection fraction estimated at 60 to 65%. Mild left ventricular  hypertrophy.  Diastolic Doppler findings (E/E' ratio and/or other parameters) suggest left  ventricular filling pressures are increased. Left ventricular diastolic  function is abnormal  Normal right ventricular size and systolic function.  Severe left atrial enlargement. Moderate right atrial enlargement  Mitral valve clip. Moderate to moderately severe 2-3+ mitral regurgitation.  Mild mitral stenosis.Mean gradient of 5 mmHg at a heart rate of 60 bpm  Moderate to moderately severe tricuspid gravitation. Estimate of RV systolic  pressure is moderately elevated at 48 mmHg plus right atrial pressure.  Compared to the examination January 2022. Findings are similar. Estimate of RV  systolic pressure is mildly lower on the current examination at 48 mmHg plus  right atrial pressure compared to 57 mmHg plus right atrial pressure on the  previous examination         Recent Coronary Angiogram Results:    Mitral regurgitation [I34.0 (ICD-10-CM)]         Conclusion    76 yo F with recent diagnosis of moderate to severe mitral regurgitation, causing increasing shortness of breath, as well as a recent hospitalization with congestive heart failure.  Being evaluated for transcatheter mitral valve repair using MitraClip,   and here for preprocedure coronary angiography.     Coronary geography today showed:     Left main with 30% ostial narrowing  LAD with mild to moderate diffuse disease, and a 50% stenosis in the midportion, that appears unchanged from her prior angiogram  Ramus with a 50% proximal to mid stenosis and mild disease distally  LCx is a small vessel with no significant obtuse marginals noted  RCA is a large caliber, dominant vessel supplying the  inferior and posterior walls.  There is 40% mid to distal focal stenosis, also unchanged from prior.        Right heart catheterization showed:   PA 50/16  (36)   RA mean  7   PCW  16 /43  (26) v waves of 45 mmHg   AO  164/62  (107)   RV  57/-6,  6     LVEDP 15 mmHg     Given that her symptoms have progressed over the past 6 months, with no change in her coronary anatomy, and clear worsening of her mitral regurgitation, she will benefit from mitral valve repair with MitraClip.  Her hemodynamics today also confirmed the presence of severe mitral regurgitation, with large V waves noted on her pulmonary wedge tracing      [facility]  Cardiac Electrophysiology Procedure note: Atrial fibrillation ablation        Patient: Kelly Robledo   : 1945      : Kathleen Hernandez MD  Date: 7/3/2023     Procedures performed:  1. Atrial fibrillation ablation with pulmonary vein isolation  2. Mapping and ablation of left posterior atrioventricular accessory pathway  3. Programmed stimulation with IV drug infusion     Recommendations:  1. Transfer to [facility] for post-procedure care  2. Apixaban will be continued  3. Continue amiodarone for now - assess for cessation at 6 week follow-up visit  4. Remove figure of eight sutures in 3 hours  5. Anticipate discharge later today if groin checks, vital signs and telemetry monitoring are stable and following ambulation assessment  6. Follow-up clinic visit in approximately 6 weeks             Physical Examination  Review of Systems   Vitals: 164/90 upon arrival, 134/80 during my examination, heart rate of 80 and regular on today's examination, weight 120 pounds.  Wt Readings from Last 3 Encounters:   10/06/23 54.5 kg (120 lb 3.2 oz)   23 54 kg (119 lb)   23 54.4 kg (120 lb)       General Appearance:   no distress, normal body habitus   ENT/Mouth: membranes moist, no oral lesions or bleeding gums.      EYES:  no scleral icterus, normal conjunctivae   Neck:  no carotid bruits    Chest/Lungs:   lungs are clear to auscultation, no rales or wheezing, equal chest wall expansion    Cardiovascular:   Regular. Normal first and second heart sounds with 2/6 systolic murmur, possible right carotid bruit rubs, or gallops;  radial and posterior tibial pulses are intact, Jugular venous pressure within normal limits on today's examination, no edema bilaterally    Abdomen:  No bruits, or tenderness; bowel sounds are present   Extremities: no cyanosis or clubbing   Skin: no xanthelasma, warm.    Neurologic:  no tremors     Psychiatric: alert and oriented x3, calm        Please refer above for cardiac ROS details.        Medical History  Surgical History Family History Social History   Past Medical History:   Diagnosis Date    Abdominal pain     Atrial fibrillation (H)     Diverticulosis     Moderate mitral regurgitation     NSVT (nonsustained ventricular tachycardia) (H) 6/17/2020    Palpitations      Past Surgical History:   Procedure Laterality Date    BIOPSY BREAST Bilateral     benign    CV CORONARY ANGIOGRAM N/A 6/18/2020    Procedure: Coronary Angiogram;  Surgeon: Sergey Conner MD;  Location: [facility] Cath Lab;  Service: Cardiology    CV CORONARY ANGIOGRAM N/A 1/13/2021    Procedure: Coronary Angiogram;  Surgeon: Klaus Fitzpatrick MD;  Location: [facility] Cardiac Cath Lab;  Service: Cardiology    CV LEFT HEART CATHETERIZATION WITHOUT LEFT VENTRICULOGRAM Left 6/18/2020    Procedure: Left Heart Catheterization Without Left Ventriculogram;  Surgeon: Sergey Conner MD;  Location: [facility] Cath Lab;  Service: Cardiology    CV RIGHT HEART CATHETERIZATION N/A 6/18/2020    Procedure: Right Heart Catheterization;  Surgeon: Sergey Conner MD;  Location: [facility] Cath Lab;  Service: Cardiology    CV RIGHT HEART CATHETERIZATION N/A 1/13/2021    Procedure: Right Heart Catheterization;  Surgeon: Klaus Fitzpatrick MD;  Location: [facility] Cardiac Cath Lab;   Service: Cardiology    EP ABLATION PULMONARY VEIN ISOLATION N/A 7/3/2023    Procedure: Ablation Atrial Fibrillation;  Surgeon: Kathleen Hernandez MD;  Location: [facility] CATH LAB CV    HYSTERECTOMY  1970's    OOPHORECTOMY Bilateral 1970's    TRANSCATHETER MITRAL VALVE REPAIR N/A 2/1/2021    Procedure: CV MITRAL CLIP;  Surgeon: Klaus Fitzpatrick MD;  Location:  HEART CARDIAC CATH LAB     Family History   Problem Relation Age of Onset    Breast Cancer Sister 47.00        Social History     Socioeconomic History    Marital status:      Spouse name: Not on file    Number of children: Not on file    Years of education: Not on file    Highest education level: Not on file   Occupational History    Not on file   Tobacco Use    Smoking status: Never    Smokeless tobacco: Never   Vaping Use    Vaping Use: Never used   Substance and Sexual Activity    Alcohol use: No    Drug use: No    Sexual activity: Not on file   Other Topics Concern    Parent/sibling w/ CABG, MI or angioplasty before 65F 55M? Not Asked   Social History Narrative    . No children. Family in the [place] area.   Enjoys walking- very regular with this especially in [place] when they used to vacation there for 6 months of the year.   Likes reading - romantic happy endings.   Never alcohol or smoking.  Selina Pozo MD       Social Determinants of Health     Financial Resource Strain: Not on file   Food Insecurity: Not on file   Transportation Needs: Not on file   Physical Activity: Not on file   Stress: Not on file   Social Connections: Not on file   Interpersonal Safety: Not on file   Housing Stability: Not on file           Medications  Allergies   Current Outpatient Medications   Medication Sig Dispense Refill    clindamycin (CLEOCIN) 300 MG capsule Take 2 capsules (600 mg total) by mouth once as needed (30-60 minutes prior to any dental visit. Save remaining for next visit. 4 capsule 1    ELIQUIS ANTICOAGULANT 5 MG tablet TAKE 1 TABLET BY  MOUTH TWICE A  tablet 3    ezetimibe (ZETIA) 10 MG tablet TAKE 1 TABLET (10 MG) BY MOUTH DAILY. 90 tablet 3    lisinopril (ZESTRIL) 10 MG tablet Take 1 tablet (10 mg) by mouth every 24 hours 90 tablet 3    metoprolol succinate ER (TOPROL XL) 50 MG 24 hr tablet Take 1 tablet (50 mg) by mouth At Bedtime 90 tablet 3    rosuvastatin (CRESTOR) 40 MG tablet TAKE 1 TABLET BY MOUTH EVERY DAY 90 tablet 5       Allergies   Allergen Reactions    Penicillins Hives          Lab Results    Chemistry/lipid CBC Cardiac Enzymes/BNP/TSH/INR   Recent Labs   Lab Test 02/27/23  0738   CHOL 199   HDL 60   *   TRIG 124     Recent Labs   Lab Test 02/27/23  0738 11/10/22  0824 07/29/22  0735   * 103* 105*     Recent Labs   Lab Test 07/15/23  0303      POTASSIUM 3.5   CHLORIDE 101   CO2 25   *   BUN 17.7   CR 1.26*   GFRESTIMATED 43*   SHADI 9.5     Recent Labs   Lab Test 07/15/23  0303 06/26/23  1325 02/27/23  0738   CR 1.26* 1.49* 1.33*     Recent Labs   Lab Test 02/27/23  0738   A1C 5.9*          Recent Labs   Lab Test 07/15/23  0303   WBC 7.6   HGB 12.6   HCT 37.9   MCV 98        Recent Labs   Lab Test 07/15/23  0303 06/26/23  1325 02/27/23  0738   HGB 12.6 12.9 12.4    Recent Labs   Lab Test 11/27/21  1234 11/27/21  0843 11/27/21  0331   TROPONINI <0.01 <0.01 <0.01     Recent Labs   Lab Test 01/25/21  1355 10/14/20  1319 09/23/20  1920   * 344* 624*     Recent Labs   Lab Test 06/26/23  1355   TSH 9.61*     Recent Labs   Lab Test 01/25/21  1355 09/23/20  1920   INR 1.22* 1.37*        Dayne Kim MD                  Thank you for allowing me to participate in the care of your patient.      Sincerely,     Dayne Kim MD     [facility]  cc:   No referring provider defined for this encounter.

## 2023-12-06 ENCOUNTER — TELEPHONE (OUTPATIENT)
Dept: ANTICOAGULATION | Facility: CLINIC | Age: 78
End: 2023-12-06
Payer: COMMERCIAL

## 2023-12-06 DIAGNOSIS — I48.0 PAROXYSMAL ATRIAL FIBRILLATION (H): Primary | ICD-10-CM

## 2023-12-06 NOTE — TELEPHONE ENCOUNTER
ANTICOAGULATION DIRECT ORAL ANTICOAGULANT MONITORING    SUBJECTIVE     The [facility] is evaluating Kelly Robledo's Apixaban (Eliquis) as part of its Anticoagulation Monitoring Program.    Indication: Atrial Fibrillation   Current dose per medication list: Apixaban 5 mg BID  On therapy since: 11/16/2020  Recent hospitalizations/ED/Office Visits for bleeding/clotting concerns: Yes: Had an A.fib ablation 7/3/23. ED visit 7/15/23 for hematuria  Other bleeding or side effect concerns: No  Additional findings: Noted that cardiology APRN did recommend reducing Eliquis dose to 2.5 mg BID after she turns 78.    OBJECTIVE     Age: 78 year old    Wt Readings from Last 2 Encounters:   11/30/23 54.7 kg (120 lb 9 oz)   10/06/23 54.5 kg (120 lb 3.2 oz)      Lab Results   Component Value Date    CR 1.26 (H) 07/15/2023    CR 1.49 (H) 06/26/2023    CR 1.33 (H) 02/27/2023     Lab Results   Component Value Date    HGB 12.6 07/15/2023    HGB 11.5 02/02/2021     07/15/2023     02/02/2021     ASSESSMENT/PLAN     A chart review for Direct Oral Anticoagulant (DOAC) Stewardship has been completed for:     Dosing: recommend adjustment to Apixaban 2.5 mg BID for age >= 80 years and weight <= 60 kg (consistent with package insert dosing)    Plan made with [facility] Pharmacist Jeanine Mak RN  Anticoagulation Clinic

## 2023-12-08 NOTE — TELEPHONE ENCOUNTER
See 7/17/23 TE - cardiology approval to lower dose of Eliquis following birthday.     Jeanine Palma, PharmD, BCPS

## 2023-12-11 ENCOUNTER — APPOINTMENT (OUTPATIENT)
Dept: RADIOLOGY | Facility: CLINIC | Age: 78
End: 2023-12-11
Attending: EMERGENCY MEDICINE
Payer: COMMERCIAL

## 2023-12-11 ENCOUNTER — HOSPITAL ENCOUNTER (EMERGENCY)
Facility: CLINIC | Age: 78
Discharge: HOME | End: 2023-12-11
Attending: EMERGENCY MEDICINE | Admitting: EMERGENCY MEDICINE
Payer: COMMERCIAL

## 2023-12-11 VITALS
HEIGHT: 65 IN | HEART RATE: 80 BPM | WEIGHT: 122 LBS | DIASTOLIC BLOOD PRESSURE: 79 MMHG | SYSTOLIC BLOOD PRESSURE: 165 MMHG | OXYGEN SATURATION: 97 % | BODY MASS INDEX: 20.33 KG/M2 | RESPIRATION RATE: 16 BRPM | TEMPERATURE: 97.9 F

## 2023-12-11 DIAGNOSIS — I47.20 V-TACH (H): ICD-10-CM

## 2023-12-11 LAB
ALBUMIN UR-MCNC: 30 MG/DL
ANION GAP SERPL CALCULATED.3IONS-SCNC: 8 MMOL/L (ref 7–15)
APPEARANCE UR: CLEAR
ATRIAL RATE - MUSE: 89 BPM
BASOPHILS # BLD AUTO: 0 10E3/UL (ref 0–0.2)
BASOPHILS NFR BLD AUTO: 1 %
BILIRUB UR QL STRIP: NEGATIVE
BUN SERPL-MCNC: 17.9 MG/DL (ref 8–23)
CALCIUM SERPL-MCNC: 9.5 MG/DL (ref 8.8–10.2)
CHLORIDE SERPL-SCNC: 103 MMOL/L (ref 98–107)
COLOR UR AUTO: ABNORMAL
CREAT SERPL-MCNC: 1.22 MG/DL (ref 0.51–0.95)
DEPRECATED HCO3 PLAS-SCNC: 27 MMOL/L (ref 22–29)
DIASTOLIC BLOOD PRESSURE - MUSE: 82 MMHG
EGFRCR SERPLBLD CKD-EPI 2021: 45 ML/MIN/1.73M2
EOSINOPHIL # BLD AUTO: 0.2 10E3/UL (ref 0–0.7)
EOSINOPHIL NFR BLD AUTO: 3 %
ERYTHROCYTE [DISTWIDTH] IN BLOOD BY AUTOMATED COUNT: 13.2 % (ref 10–15)
GLUCOSE SERPL-MCNC: 158 MG/DL (ref 70–99)
GLUCOSE UR STRIP-MCNC: NEGATIVE MG/DL
HCT VFR BLD AUTO: 38.6 % (ref 35–47)
HGB BLD-MCNC: 12.6 G/DL (ref 11.7–15.7)
HGB UR QL STRIP: NEGATIVE
IMM GRANULOCYTES # BLD: 0 10E3/UL
IMM GRANULOCYTES NFR BLD: 0 %
INTERPRETATION ECG - MUSE: NORMAL
KETONES UR STRIP-MCNC: NEGATIVE MG/DL
LEUKOCYTE ESTERASE UR QL STRIP: NEGATIVE
LYMPHOCYTES # BLD AUTO: 1 10E3/UL (ref 0.8–5.3)
LYMPHOCYTES NFR BLD AUTO: 17 %
MAGNESIUM SERPL-MCNC: 2.4 MG/DL (ref 1.7–2.3)
MCH RBC QN AUTO: 32.1 PG (ref 26.5–33)
MCHC RBC AUTO-ENTMCNC: 32.6 G/DL (ref 31.5–36.5)
MCV RBC AUTO: 98 FL (ref 78–100)
MONOCYTES # BLD AUTO: 0.5 10E3/UL (ref 0–1.3)
MONOCYTES NFR BLD AUTO: 8 %
MUCOUS THREADS #/AREA URNS LPF: PRESENT /LPF
NEUTROPHILS # BLD AUTO: 4.3 10E3/UL (ref 1.6–8.3)
NEUTROPHILS NFR BLD AUTO: 71 %
NITRATE UR QL: NEGATIVE
NRBC # BLD AUTO: 0 10E3/UL
NRBC BLD AUTO-RTO: 0 /100
P AXIS - MUSE: 47 DEGREES
PH UR STRIP: 7 [PH] (ref 5–7)
PLATELET # BLD AUTO: 174 10E3/UL (ref 150–450)
POTASSIUM SERPL-SCNC: 4.3 MMOL/L (ref 3.4–5.3)
PR INTERVAL - MUSE: 132 MS
QRS DURATION - MUSE: 90 MS
QT - MUSE: 378 MS
QTC - MUSE: 459 MS
R AXIS - MUSE: 69 DEGREES
RBC # BLD AUTO: 3.93 10E6/UL (ref 3.8–5.2)
RBC URINE: <1 /HPF
SODIUM SERPL-SCNC: 138 MMOL/L (ref 135–145)
SP GR UR STRIP: 1.02 (ref 1–1.03)
SQUAMOUS EPITHELIAL: <1 /HPF
SYSTOLIC BLOOD PRESSURE - MUSE: 179 MMHG
T AXIS - MUSE: 90 DEGREES
T4 FREE SERPL-MCNC: 1.22 NG/DL (ref 0.9–1.7)
TROPONIN T SERPL HS-MCNC: 10 NG/L
TROPONIN T SERPL HS-MCNC: 9 NG/L
TSH SERPL DL<=0.005 MIU/L-ACNC: 10.2 UIU/ML (ref 0.3–4.2)
UROBILINOGEN UR STRIP-MCNC: <2 MG/DL
VENTRICULAR RATE- MUSE: 89 BPM
WBC # BLD AUTO: 6 10E3/UL (ref 4–11)
WBC URINE: 1 /HPF

## 2023-12-11 PROCEDURE — 93005 ELECTROCARDIOGRAM TRACING: CPT | Mod: 76

## 2023-12-11 PROCEDURE — 93005 ELECTROCARDIOGRAM TRACING: CPT | Performed by: EMERGENCY MEDICINE

## 2023-12-11 PROCEDURE — 81003 URINALYSIS AUTO W/O SCOPE: CPT | Performed by: EMERGENCY MEDICINE

## 2023-12-11 PROCEDURE — 82310 ASSAY OF CALCIUM: CPT | Performed by: EMERGENCY MEDICINE

## 2023-12-11 PROCEDURE — 84443 ASSAY THYROID STIM HORMONE: CPT | Performed by: EMERGENCY MEDICINE

## 2023-12-11 PROCEDURE — 99285 EMERGENCY DEPT VISIT HI MDM: CPT | Mod: 25

## 2023-12-11 PROCEDURE — 71046 X-RAY EXAM CHEST 2 VIEWS: CPT

## 2023-12-11 PROCEDURE — 84439 ASSAY OF FREE THYROXINE: CPT | Performed by: EMERGENCY MEDICINE

## 2023-12-11 PROCEDURE — 36415 COLL VENOUS BLD VENIPUNCTURE: CPT | Performed by: EMERGENCY MEDICINE

## 2023-12-11 PROCEDURE — 93005 ELECTROCARDIOGRAM TRACING: CPT

## 2023-12-11 PROCEDURE — 83735 ASSAY OF MAGNESIUM: CPT | Performed by: EMERGENCY MEDICINE

## 2023-12-11 PROCEDURE — 85014 HEMATOCRIT: CPT | Performed by: EMERGENCY MEDICINE

## 2023-12-11 PROCEDURE — 84484 ASSAY OF TROPONIN QUANT: CPT | Performed by: EMERGENCY MEDICINE

## 2023-12-11 ASSESSMENT — ACTIVITIES OF DAILY LIVING (ADL)
ADLS_ACUITY_SCORE: 35
ADLS_ACUITY_SCORE: 35

## 2023-12-11 NOTE — ED PROVIDER NOTES
EMERGENCY DEPARTMENT ENCOUNTER      NAME: Kelly Robledo  AGE: 78 year old female  YOB: 1945  MRN: [id number]  EVALUATION DATE & TIME: 12/11/2023  3:37 PM    PCP: Selina Pozo    ED PROVIDER: Shalini Brown MD      Chief Complaint   Patient presents with    Palpitations    Dizziness         FINAL IMPRESSION:  1. V-tach (H)          ED COURSE & MEDICAL DECISION MAKING:    Pertinent Labs & Imaging studies reviewed. (See chart for details)  78 year old female presents to the Emergency Department for evaluation of palpitations.  The patient reports that she has been feeling as if her heart was racing over the last 2 to 3 days.  She had 1 transient episode of feeling dizzy yesterday when she stood up, otherwise has had no dizziness with these sensations of palpitations.  She denies any chest pain or shortness of breath.  She is otherwise well-appearing, nontoxic.  While in the emergency department, we did catch 5 beats of V. tach while the patient was on telemetry.  At that time, the patient did feel fluttering in her chest.  She did not feel lightheaded or dizzy, did not have onset of chest pain, shortness of breath, nausea.  I discussed these findings with cardiology on-call, Dr. Cotter.  He would recommend setting the patient up with a Holter monitor, after she has a Holter monitor having the patient follow-up in rapid access cardiology clinic.  She will troponins are unchanged.  Patient is comfortable with this plan.    4:01 PM I met with the patient for an initial encounter and evaluation.  7:03 PM Nurse notified V-Tach episode. I rechecked the patient.  7:16 PM I spoke with Dr. Cotter, Cardiology who recommends Holter monitor and having the patient follow-up with rapid access cardiology after holter monitor.  7:31 PM I updated the patient on laboratory and imaging results.  We discussed plan to discharge.    At the conclusion of the encounter I discussed the results of all of the  tests and the disposition. The questions were answered. The patient or family acknowledged understanding and was agreeable with the care plan.     0 minutes of critical care time     Medical Decision Making    History:  Supplemental history from: Documented in chart, if applicable  External Record(s) reviewed: Documented in chart, if applicable. and Outpatient Record: Patient visited [facility] on 11/30. See [facility] for more information.    Work Up:  Chart documentation includes differential considered and any EKGs or imaging independently interpreted by provider, where specified.  In additional to work up documented, I considered the following work up: Documented in chart, if applicable.    External consultation:  Discussion of management with another provider: Documented in chart, if applicable and Cardiology    Complicating factors:  Care impacted by chronic illness: Chronic Kidney Disease, Heart Disease, Hyperlipidemia, and Hypertension  Care affected by social determinants of health: Access to Medical Care    Disposition considerations: Discharge. No recommendations on prescription strength medication(s). See documentation for any additional details.      MEDICATIONS GIVEN IN THE EMERGENCY:  Medications - No data to display    NEW PRESCRIPTIONS STARTED AT TODAY'S ER VISIT  Discharge Medication List as of 12/11/2023  7:51 PM             =================================================================    [facility]    Patient information was obtained from: Patient    Use of : N/A       Kelly Robledo is a 78 year old female with a pertinent history of CKD, atrial fibrillation, NSVT,  hypertensive disorder, hyperlipidemia, who presents to this ED by private car for evaluation of palpitations and dizziness.    Patient reports an onset of palpitations which occurred on 12/10 (~2 days ago). She felt a constant sensation all weekend which would come spontaneously. She notes inspiration  didn't affect her symptoms to occur. Patient reports having a dizzy episode yesterday. She has a history of atrial fibrillation and is currently on Eliquis. She hasn't had any recent changes in her medications. Patient denies chest pain, shortness of breath, fever, chills, diaphoresis, loss of appetite. No other medical concerns are expressed at this time.     Per chart review, patient visited [facility] on 11/30, for a follow-up. Last echo was in 10/2022, with EF of 60-65%.. Findings similar from previous, severe 2-3+ mitral regurgitation and mild mitral stenosis. Afib: Amiodarone was discontinued, continued on Eliquis.On rosuvastin and Zetia for CAD.     PAST MEDICAL HISTORY:  Past Medical History:   Diagnosis Date    Abdominal pain     Atrial fibrillation (H)     Diverticulosis     Moderate mitral regurgitation     NSVT (nonsustained ventricular tachycardia) (H) 6/17/2020    Palpitations        PAST SURGICAL HISTORY:  Past Surgical History:   Procedure Laterality Date    BIOPSY BREAST Bilateral     benign    CV CORONARY ANGIOGRAM N/A 6/18/2020    Procedure: Coronary Angiogram;  Surgeon: Sergey Conner MD;  Location: [facility] Cath Lab;  Service: Cardiology    CV CORONARY ANGIOGRAM N/A 1/13/2021    Procedure: Coronary Angiogram;  Surgeon: Klaus Fitzpatrick MD;  Location: [facility] Cardiac Cath Lab;  Service: Cardiology    CV LEFT HEART CATHETERIZATION WITHOUT LEFT VENTRICULOGRAM Left 6/18/2020    Procedure: Left Heart Catheterization Without Left Ventriculogram;  Surgeon: Sergey Conner MD;  Location: [facility] Cath Lab;  Service: Cardiology    CV RIGHT HEART CATHETERIZATION N/A 6/18/2020    Procedure: Right Heart Catheterization;  Surgeon: Sergey Conner MD;  Location: [facility] Cath Lab;  Service: Cardiology    CV RIGHT HEART CATHETERIZATION N/A 1/13/2021    Procedure: Right Heart Catheterization;  Surgeon: Klaus Fitzpatrick MD;  Location: [facility]  "Cardiac Cath Lab;  Service: Cardiology    EP ABLATION PULMONARY VEIN ISOLATION N/A 7/3/2023    Procedure: Ablation Atrial Fibrillation;  Surgeon: Kathleen Hernandez MD;  Location: [facility] CATH LAB CV    HYSTERECTOMY  1970's    OOPHORECTOMY Bilateral 1970's    TRANSCATHETER MITRAL VALVE REPAIR N/A 2/1/2021    Procedure: CV MITRAL CLIP;  Surgeon: Klaus Fitzpatrick MD;  Location:  HEART CARDIAC CATH LAB           CURRENT MEDICATIONS:    clindamycin (CLEOCIN) 300 MG capsule  ELIQUIS ANTICOAGULANT 5 MG tablet  ezetimibe (ZETIA) 10 MG tablet  lisinopril (ZESTRIL) 10 MG tablet  metoprolol succinate ER (TOPROL XL) 50 MG 24 hr tablet  rosuvastatin (CRESTOR) 40 MG tablet        ALLERGIES:  Allergies   Allergen Reactions    Penicillins Hives       FAMILY HISTORY:  Family History   Problem Relation Age of Onset    Breast Cancer Sister 47.00       SOCIAL HISTORY:   Social History     Socioeconomic History    Marital status:    Tobacco Use    Smoking status: Never    Smokeless tobacco: Never   Vaping Use    Vaping Use: Never used   Substance and Sexual Activity    Alcohol use: No    Drug use: No   Social History Narrative    . No children. Family in the [place] area.   Enjoys walking- very regular with this especially in [place] when they used to vacation there for 6 months of the year.   Likes reading - romantic happy endings.   Never alcohol or smoking.  Selina Pozo MD         VITALS:  BP (!) 165/79   Pulse 80   Temp 97.9  F (36.6  C) (Temporal)   Resp 16   Ht 1.651 m (5' 5\")   Wt 55.3 kg (122 lb)   LMP  (LMP Unknown)   SpO2 97%   BMI 20.30 kg/m      PHYSICAL EXAM    Constitutional: Well developed, Well nourished, NAD  HENT: Normocephalic, Atraumatic, Bilateral external ears normal, Oropharynx normal, mucous membranes moist, Nose normal.   Neck- Normal range of motion, No tenderness, Supple, No stridor.  Eyes: PERRL, EOMI, Conjunctiva normal, No discharge.   Respiratory: Normal breath sounds, No " respiratory distress  Cardiovascular: Normal heart rate, Regular rhythm  GI: Bowel sounds normal, Soft, No tenderness,   Musculoskeletal: No edema. Good range of motion in all major joints. No tenderness to palpation or major deformities noted.   Integument: Warm, Dry, No erythema, No rash  Neurologic: Alert & oriented x 3, Normal motor function, Normal sensory function, No focal deficits noted. Normal gait.   Psychiatric: Affect normal, Judgment normal, Mood normal.      LAB:  All pertinent labs reviewed and interpreted.  Results for orders placed or performed during the hospital encounter of 12/11/23   Chest XR,  PA & LAT    Impression    IMPRESSION: No focal airspace consolidation. No pleural effusion or pneumothorax. Mild biapical pleural/parenchymal scarring.    Cardiomediastinal silhouette is normal. Mitral valve clip. Mild atherosclerosis of the thoracoabdominal aorta.   Basic metabolic panel   Result Value Ref Range    Sodium 138 135 - 145 mmol/L    Potassium 4.3 3.4 - 5.3 mmol/L    Chloride 103 98 - 107 mmol/L    Carbon Dioxide (CO2) 27 22 - 29 mmol/L    Anion Gap 8 7 - 15 mmol/L    Urea Nitrogen 17.9 8.0 - 23.0 mg/dL    Creatinine 1.22 (H) 0.51 - 0.95 mg/dL    GFR Estimate 45 (L) >60 mL/min/1.73m2    Calcium 9.5 8.8 - 10.2 mg/dL    Glucose 158 (H) 70 - 99 mg/dL   Result Value Ref Range    Magnesium 2.4 (H) 1.7 - 2.3 mg/dL   Result Value Ref Range    Troponin T, High Sensitivity 10 <=14 ng/L   TSH with free T4 reflex   Result Value Ref Range    TSH 10.20 (H) 0.30 - 4.20 uIU/mL   UA with Microscopic reflex to Culture    Specimen: Urine, Clean Catch   Result Value Ref Range    Color Urine Light Yellow Colorless, Straw, Light Yellow, Yellow    Appearance Urine Clear Clear    Glucose Urine Negative Negative mg/dL    Bilirubin Urine Negative Negative    Ketones Urine Negative Negative mg/dL    Specific Gravity Urine 1.016 1.001 - 1.030    Blood Urine Negative Negative    pH Urine 7.0 5.0 - 7.0    Protein Albumin  Urine 30 (A) Negative mg/dL    Urobilinogen Urine <2.0 <2.0 mg/dL    Nitrite Urine Negative Negative    Leukocyte Esterase Urine Negative Negative    Mucus Urine Present (A) None Seen /LPF    RBC Urine <1 <=2 /HPF    WBC Urine 1 <=5 /HPF    Squamous Epithelials Urine <1 <=1 /HPF   CBC with platelets and differential   Result Value Ref Range    WBC Count 6.0 4.0 - 11.0 10e3/uL    RBC Count 3.93 3.80 - 5.20 10e6/uL    Hemoglobin 12.6 11.7 - 15.7 g/dL    Hematocrit 38.6 35.0 - 47.0 %    MCV 98 78 - 100 fL    MCH 32.1 26.5 - 33.0 pg    MCHC 32.6 31.5 - 36.5 g/dL    RDW 13.2 10.0 - 15.0 %    Platelet Count 174 150 - 450 10e3/uL    % Neutrophils 71 %    % Lymphocytes 17 %    % Monocytes 8 %    % Eosinophils 3 %    % Basophils 1 %    % Immature Granulocytes 0 %    NRBCs per 100 WBC 0 <1 /100    Absolute Neutrophils 4.3 1.6 - 8.3 10e3/uL    Absolute Lymphocytes 1.0 0.8 - 5.3 10e3/uL    Absolute Monocytes 0.5 0.0 - 1.3 10e3/uL    Absolute Eosinophils 0.2 0.0 - 0.7 10e3/uL    Absolute Basophils 0.0 0.0 - 0.2 10e3/uL    Absolute Immature Granulocytes 0.0 <=0.4 10e3/uL    Absolute NRBCs 0.0 10e3/uL   Result Value Ref Range    Free T4 1.22 0.90 - 1.70 ng/dL   Result Value Ref Range    Troponin T, High Sensitivity 9 <=14 ng/L   ECG 12-LEAD WITH MUSE (LHE)   Result Value Ref Range    Systolic Blood Pressure 179 mmHg    Diastolic Blood Pressure 82 mmHg    Ventricular Rate 89 BPM    Atrial Rate 89 BPM    PA Interval 132 ms    QRS Duration 90 ms     ms    QTc 459 ms    P Axis 47 degrees    R AXIS 69 degrees    T Axis 90 degrees    Interpretation ECG       Sinus rhythm  Marked ST abnormality, possible inferior subendocardial injury  Abnormal ECG  When compared with ECG of 15-JUL-2023 02:57,  ST now depressed in Inferior leads  Nonspecific T wave abnormality now evident in Lateral leads  Confirmed by SEE ED PROVIDER NOTE FOR, ECG INTERPRETATION ([id number]),  MILADYS BALLARD ([id number]) on 12/11/2023 3:11:30 PM          RADIOLOGY:  Reviewed all pertinent imaging. Please see official radiology report.  Chest XR,  PA & LAT   Final Result   IMPRESSION: No focal airspace consolidation. No pleural effusion or pneumothorax. Mild biapical pleural/parenchymal scarring.      Cardiomediastinal silhouette is normal. Mitral valve clip. Mild atherosclerosis of the thoracoabdominal aorta.      Adult Holter Monitor 24 hour    (Results Pending)       EKG:    Performed at: 14:45    Impression: sinus rhythm    Rate: 89 bpm  Rhythm: sinus    UT Interval: 132 ms  QRS Interval: 90 ms  QTc Interval: 459 ms  ST Changes: no acute ischemia, no acute ST depression  Comparison: no significant change when compared to 7/15/23    I have independently reviewed and interpreted the EKG(s) documented above.        I, Clinton Durand, am serving as a scribe to document services personally performed by Shalini Brown, based on my observation and the provider's statements to me. I, Shaliin Brown MD, attest that Clinton Durand is acting in a scribe capacity, has observed my performance of the services and has documented them in accordance with my direction.    Shalini Brown MD  Emergency Medicine  [facility] EMERGENCY ROOM  [facility] [facility] [address]  [phone]       Shalini Brown MD  12/11/23 2016

## 2023-12-11 NOTE — ED TRIAGE NOTES
Patient has palpitations/sensation of racing heart since yesterday. Some intermittent lightheadedness. No pain. Hx. Afib. Takes eliquis. Had RSV vaccine Friday.

## 2023-12-12 ENCOUNTER — OFFICE VISIT (OUTPATIENT)
Dept: CARDIOLOGY | Facility: CLINIC | Age: 78
End: 2023-12-12
Payer: COMMERCIAL

## 2023-12-12 VITALS
SYSTOLIC BLOOD PRESSURE: 146 MMHG | HEIGHT: 65 IN | DIASTOLIC BLOOD PRESSURE: 80 MMHG | HEART RATE: 86 BPM | RESPIRATION RATE: 16 BRPM | WEIGHT: 121 LBS | BODY MASS INDEX: 20.16 KG/M2

## 2023-12-12 DIAGNOSIS — I47.20 V-TACH (H): ICD-10-CM

## 2023-12-12 PROCEDURE — 99214 OFFICE O/P EST MOD 30 MIN: CPT | Performed by: INTERNAL MEDICINE

## 2023-12-12 NOTE — PROGRESS NOTES
"   RESPIRATORY CARE NOTE    Complete Pulmonary Function Test completed by patient.  ** Race changed to \"Unspecified\" - predicted values may have changed **  Good patient effort and cooperation. Albuterol 2.5 mg neb given for bronchodilation.  The results of this test meet the ATS standards for acceptability and repeatability. PT returned to baseline and left in no distress.    Ursula Gutierrez, RT  3/16/2023    " [facility] -  PROGRESS NOTE               Author: Zac Daily D.O. Date & Time: 12/12/2023  10:07 AM     HPI:  54 y.o. 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 Abd imaging demonstrated colitis. C. Diff pending and started on abx. He notes abd pain and diarrhea for several weeks. Edema improving with 2.5% dextrose solution. Still has HD permacath in-place. Pain with peritoneal dialysis but no cloudy effluent of fibrin clots. No f/c/s. Normally does 3v5869mm fills all green.     DAILY NEPHROLOGY SUMMARY:  11/28: consult done  11/29: ongoing abd pain, seen on HD-tolerating procedure well, PD fluid concerning for peritonitis  11/30:c.diff positive, started on oral vanc, new onset aflutter-transferred to [facility], wbc improving, Peritoneal cultures remain negative, abd pain improving  12/1: PD culture remains negative, seen on HD-tolerating procedure, abd pain improving, less diarrhea    12/2: HD yesterday 2.5 liter UF, still with SOB decreased abdoimnal pain  12/3: PD last night 1.5 UF, pt still with SOB  12/4: No events, tolerated HD yest with 2.5L UF, BP stable, tachycardic this am, stable on RA, reports SOB has resolved, still with crampy abd discomfort, reports diarrhea is starting to become more formed  12/5: No events, no new labs, BP stable, tachycardic, stable on RA this am, still with crampy abd pain, diarrhea improving, still with nausea/anorexia, denies any CP/LE edema, reports mild SOB  12/6: No events, tolerated HD yest with 1L UF, BP stable, remains tachycardic, reports nausea is improved and having more firm stools, denies any CP/SOB, reports poor appetite  12/7: No events, BP stable, tolerated HD this am  "with 1L UF, still doesn't feel well and feels very depressed and frustrated, +abd pain and n/v this am and unable to take PO  12/8: patient is doing well, resting in bed. Plan next iHD tomorrow  12/09: patient is seen during HD, refused labs this am. Does report some abdominal discomfort this am  12/10: patient had iHD yesterday, UF 2000 ml. Abdominal issues ongoing  12/11: pt had CT thorax done yesterday, showing loculated effusion. Pt feels SOB. Diarrhea continues but is better overall.   12/12: pt reports no more diarrhea, only 2-3 loose stools. Seen on HD and tolerating. Pulm recommending chest tube for loculated effusion.     REVIEW OF SYSTEMS:    10 point ROS reviewed and is as per HPI/daily summary or otherwise negative    PMH/PSH/SH/FH:   Reviewed and unchanged since admission note    CURRENT MEDICATIONS:   Reviewed from admission to present day    VS:  /78   Pulse (!) 102   Temp 36.7 °C (98 °F) (Temporal)   Resp 16   Ht 1.702 m (5' 7\")   Wt 78.2 kg (172 lb 6.4 oz)   SpO2 96%   BMI 27.00 kg/m²     Physical Exam  Vitals and nursing note reviewed.   Constitutional:       General: He is not in acute distress.     Appearance: Normal appearance.   HENT:      Head: Normocephalic and atraumatic.   Eyes:      General: No scleral icterus.     Extraocular Movements: Extraocular movements intact.   Cardiovascular:      Rate and Rhythm: Normal rate and regular rhythm.      Comments: +R chest PC  Pulmonary:      Effort: Pulmonary effort is normal.   Abdominal:      General: Bowel sounds are normal. There is distension.      Palpations: Abdomen is soft.      Tenderness: There is no abdominal tenderness.   Musculoskeletal:         General: No deformity.      Right lower leg: No edema.      Left lower leg: No edema.   Skin:     General: Skin is warm and dry.      Findings: No rash.   Neurological:      General: No focal deficit present.      Mental Status: He is alert and oriented to person, place, and time. "   Psychiatric:         Mood and Affect: Mood normal.         Behavior: Behavior normal. Behavior is cooperative.         Fluids:  In: 930 [P.O.:930]  Out: -     LABS:  Recent Labs     12/10/23  1019 12/11/23  0501 12/11/23  2357   SODIUM 134* 131* 134*   POTASSIUM 5.0 5.1 5.8*   CHLORIDE 96 94* 95*   CO2 27 26 26   GLUCOSE 82 87 99   BUN 43* 56* 65*   CREATININE 7.66* 8.71* 11.11*   CALCIUM 8.7 8.6 8.3*         IMAGING:   All imaging reviewed from admission to present day    IMPRESSION:  # ESRD on outpt PD   -Etiology 2/2 FSGS  - s/p permcath on 11/10, and on HD currently  # R/O PD cath associated peritonitis  - CX negative  # c. Diff.  -symptoms improved with initiation of oral vanc  -PD guidelines vague on scenarios such as this,  but pt on HD currently  # S/P AVR/ Ascending aneurysm repair  # CKD-MBD  -phos elevated  # Anemia of CKD, below goal hgb 10-11     Resumed EPO 6000U IV w HD on 12/12  # BL pleural effusions/congestion     Large R loculated effusion on CT thorax 12/11     Pulm recommending chest tube placement  # Pericardial effusion   # C. Diff colitis  -C.diff positive  # C.diff  # Aflutter     PLAN:    - await chest tube placement  - pt seen on HD and tolerating  - cont TTS HD schedule, will remove UF with HD as tolerated  - cont EPO 6000U IV w HD  - Hold PD for now, will resume once abd pain/discomfort improves, can re-eval as outpt  - cont c diff tx per primary team  - Renal diet, Encourage protein intake given low albumin  - Phos binder with meals   - Dose all medications as per ESRD    **Pt will need outpt HD chair at [facility] for the next 30 days until abd pain improves enough that he can resume PD

## 2023-12-12 NOTE — PATIENT INSTRUCTIONS
Please call my nurse Anamaria with any concerns.Lets change the metoprolol to morning instead evening.Ok to take a dose tonite and again tomorrow morning unless your pulse tomorrow morning is less than 60 then dont take the metoprolol in the morning and call my nurse Anamaria.[phone].Please make a follow up with Dr Pozo to discuss you thyroid.

## 2023-12-12 NOTE — ED NOTES
"Pt resting on bed. Noted 6 beat run of V tach. Visualized pt, sitting up and smiling. \"I feel fine\". Reports \"palpitation feeling\" during V tach episode. Provider notified and at bedside to speak with pt.   "

## 2023-12-12 NOTE — LETTER
12/12/2023    Selina Pozo MD  [address] [facility] [address]    RE: Kelly Robledo       Dear Colleague,     I had the pleasure of seeing Kelly ULISSES Robledo in the [facility].    HEART CARE ENCOUNTER CONSULTATON NOTE      [facility]  [phone]      Assessment/Recommendations   Assessment/Plan:  1.  Mitral valve prolapse with severe mitral regurgitation.  She is status post mitral valve clip procedure in 2021 with the most recent echocardiogram completed October 2022 with an ejection fraction of 60 to 65%, severe left atrial enlargement, moderate to moderately severe 2-3+ mitral regurgitation and mild mitral stenosis.  Moderately severe tricuspid regurgitation was also noted felt to be similar to January 2022.    2.  Paroxysmal atrial fibrillation that was very symptomatic.  She is status post recent PVI and left posterior AV accessory pathway ablation.  Amiodarone was subsequently discontinued and she has been maintained on Eliquis.  Most recent evaluation recently in the ER is reviewed.  Patient was describing palpitations and dizziness with a Holter monitor that was ordered but not yet fully completed.    3.  History of moderate nonobstructive coronary artery disease based on prior angiogram in January 2021.  [facility] above ideal goal with an LDL of 114.  She has been on rosuvastatin and Zetia but a follow-up fasting lipid panel was recommended but not yet completed.    4.  Hypertension.  Blood pressure trends higher in the office and normal at home she has been monitoring her blood pressure quite regularly.  Blood pressure improved after I checked during 5.  My examination.    5.  Recent ER visit because of a sensation of faster heart rate.  Workup essentially negative but with reported 5 beat run of faster heart rhythm per the ER nurse with the strips not available for direct review.  I suspect that this is just a manifestation of  coming off the amiodarone and it wearing off over time and she is feeling her heart rate being a little faster.  She has not had chest discomfort and I reviewed the EKG and laboratory studies.  We are going to have her change the metoprolol to morning dosing instead to evening dosing and update me with how she has done with this change and consideration of further increase the dose of metoprolol pending her response.  Will also plan a Holter monitor.      Holter monitor  Echocardiogram which is already been scheduled as well as laboratory studies.  Follow-up with nurse practitioner in a few weeks and one of my colleagues in 2 months.  Carotid ultrasound pending.                 History of Present Illness/Subjective    HPI: Kelly Robledo is a 78 year old female who recently saw me November 30 with chart notes reviewed.  She was in the ER yesterday with complaints of palpitation and had a feeling as if her heart rate was not racing over the past 2 to 3 days.  She reported 1 transient episode of feeling dizzy the day prior to evaluation when she stood up.  She was reporting no chest pain or shortness of breath.  ER notes indicate that they identified a 5 beat run of ventricular tachycardia while the patient was on telemetry.  My colleague was called and recommendations were for Holter monitor and follow-up in the rapid access clinic.  Test results are reviewed from that visit yesterday.  Troponins were negative.  TSH mildly elevated at 10.2, potassium and renal function stable.  ECG from December 11 reported sinus rhythm with ST-T changes.  ST was noted to be depressed in the inferior leads as well as V4 through V6.  This was perhaps more prominent than on an EKG from July.    She was feeling well during our recent visit.  She had been seen in the A-fib nurse practitioner clinic October 2023.  She has a chads vascular score of 5.  I had recommended an echocardiogram and a carotid ultrasound which was still  pending.    Recent Echocardiogram Results:  Recent Echocardiogram Results:  Echocardiogram Complete  Order: [id number]  Status: Edited Result - FINAL     Visible to patient: Yes (not seen)     Next appt: 2023 at 08:50 AM in Cardiology (Dayne Kim MD)     Dx: Pulmonary hypertension (H); S/P juan...     2 Result Notes    1 Patient Communication  Details     Reading Physician Reading Date Result Priority   Dayne Kim MD  [phone] 10/3/2022        Narrative & Impression  [id number]  [id number]  [id number]  [id number]^PAULA^DAYNE^[place] [address]     Name: ANTIONE MISHRA  MRN: [id number]  : 1945  Study Date: 10/03/2022 10:55 AM  Age: 76 yrs  Gender: Female  [facility]  Reason For Study: S/P mitral valve clip implantation, S/P mitral valve clip  implan  Ordering Physician: DAYNE KIM  Referring Physician: DAYNE KIM  Performed By:      BSA: 1.6 m2  Height: 65 in  Weight: 118 lb  HR: 63  BP: 172/80 mmHg  ______________________________________________________________________________  Procedure  Complete Echo Adult.  ______________________________________________________________________________  Interpretation Summary     Normal left ventricular size. Normal left ventricular systolic function. Left  ventricular ejection fraction estimated at 60 to 65%. Mild left ventricular  hypertrophy.  Diastolic Doppler findings (E/E' ratio and/or other parameters) suggest left  ventricular filling pressures are increased. Left ventricular diastolic  function is abnormal  Normal right ventricular size and systolic function.  Severe left atrial enlargement. Moderate right atrial enlargement  Mitral valve clip. Moderate to moderately severe 2-3+ mitral regurgitation.  Mild mitral stenosis.Mean gradient of 5 mmHg at a heart rate of 60 bpm  Moderate to moderately severe tricuspid gravitation. Estimate of RV systolic  pressure is moderately  elevated at 48 mmHg plus right atrial pressure.  Compared to the examination 2022. Findings are similar. Estimate of RV  systolic pressure is mildly lower on the current examination at 48 mmHg plus  right atrial pressure compared to 57 mmHg plus right atrial pressure on the  previous examination            Recent Coronary Angiogram Results:     Mitral regurgitation [I34.0 (ICD-10-CM)]            Conclusion     76 yo F with recent diagnosis of moderate to severe mitral regurgitation, causing increasing shortness of breath, as well as a recent hospitalization with congestive heart failure.  Being evaluated for transcatheter mitral valve repair using MitraClip,   and here for preprocedure coronary angiography.     Coronary geography today showed:     Left main with 30% ostial narrowing  LAD with mild to moderate diffuse disease, and a 50% stenosis in the midportion, that appears unchanged from her prior angiogram  Ramus with a 50% proximal to mid stenosis and mild disease distally  LCx is a small vessel with no significant obtuse marginals noted  RCA is a large caliber, dominant vessel supplying the inferior and posterior walls.  There is 40% mid to distal focal stenosis, also unchanged from prior.        Right heart catheterization showed:   PA 50/16  (36)   RA mean  7   PCW  16 /43  (26) v waves of 45 mmHg   AO  164/62  (107)   RV  57/-6,  6     LVEDP 15 mmHg     Given that her symptoms have progressed over the past 6 months, with no change in her coronary anatomy, and clear worsening of her mitral regurgitation, she will benefit from mitral valve repair with MitraClip.  Her hemodynamics today also confirmed the presence of severe mitral regurgitation, with large V waves noted on her pulmonary wedge tracing        E  Recent Coronary Angiogram Results:    [facility]  Cardiac Electrophysiology Procedure note: Atrial fibrillation ablation        Patient: Kelly GTZ Venkat   : 1945       : Kathleen Hernandez MD  Date: 7/3/2023     Procedures performed:  1. Atrial fibrillation ablation with pulmonary vein isolation  2. Mapping and ablation of left posterior atrioventricular accessory pathway  3. Programmed stimulation with IV drug infusion     Recommendations:  1. Transfer to [facility] for post-procedure care  2. Apixaban will be continued  3. Continue amiodarone for now - assess for cessation at 6 week follow-up visit  4. Remove figure of eight sutures in 3 hours  5. Anticipate discharge later today if groin checks, vital signs and telemetry monitoring are stable and following ambulation assessment  6. Follow-up clinic visit in approximately 6 weeks     Physical Examination  Review of Systems   Vitals: 146/80, repeat 126/80, pulse of 86, respiratory rate 16  Wt Readings from Last 3 Encounters:   12/11/23 55.3 kg (122 lb)   11/30/23 54.7 kg (120 lb 9 oz)   10/06/23 54.5 kg (120 lb 3.2 oz)       General Appearance:   no distress, normal body habitus   ENT/Mouth: membranes moist, no oral lesions or bleeding gums.      EYES:  no scleral icterus, normal conjunctivae   Neck: no carotid bruits    Chest/Lungs:   lungs are clear to auscultation, no rales or wheezing, , equal chest wall expansion    Cardiovascular:   Regular. Normal first and second heart sounds with 2/6 systolic murmur rubs, or gallops; the carotid, radial and posterior tibial pulses are intact, Jugular venous pressure within normal limits, no edema bilaterally    Abdomen:  no , bruits, or tenderness; bowel sounds are present   Extremities: no cyanosis or clubbing   Skin: no xanthelasma, warm.    Neurologic: , no tremors     Psychiatric: alert and oriented x3, calm        Please refer above for cardiac ROS details.        Medical History  Surgical History Family History Social History   Past Medical History:   Diagnosis Date    Abdominal pain     Atrial fibrillation (H)     Diverticulosis     Moderate mitral regurgitation     NSVT  (nonsustained ventricular tachycardia) (H) 6/17/2020    Palpitations      Past Surgical History:   Procedure Laterality Date    BIOPSY BREAST Bilateral     benign    CV CORONARY ANGIOGRAM N/A 6/18/2020    Procedure: Coronary Angiogram;  Surgeon: Sergey Conner MD;  Location: [facility] Cath Lab;  Service: Cardiology    CV CORONARY ANGIOGRAM N/A 1/13/2021    Procedure: Coronary Angiogram;  Surgeon: Klaus Fitzpatrick MD;  Location: [facility] Cardiac Cath Lab;  Service: Cardiology    CV LEFT HEART CATHETERIZATION WITHOUT LEFT VENTRICULOGRAM Left 6/18/2020    Procedure: Left Heart Catheterization Without Left Ventriculogram;  Surgeon: Sergey Conner MD;  Location: [facility] Cath Lab;  Service: Cardiology    CV RIGHT HEART CATHETERIZATION N/A 6/18/2020    Procedure: Right Heart Catheterization;  Surgeon: Sergey Conner MD;  Location: [facility] Cath Lab;  Service: Cardiology    CV RIGHT HEART CATHETERIZATION N/A 1/13/2021    Procedure: Right Heart Catheterization;  Surgeon: Klaus Fitzpatrick MD;  Location: [facility] Cardiac Cath Lab;  Service: Cardiology    EP ABLATION PULMONARY VEIN ISOLATION N/A 7/3/2023    Procedure: Ablation Atrial Fibrillation;  Surgeon: Kathleen Hernandez MD;  Location: [facility] CV    HYSTERECTOMY  1970's    OOPHORECTOMY Bilateral 1970's    TRANSCATHETER MITRAL VALVE REPAIR N/A 2/1/2021    Procedure: CV MITRAL CLIP;  Surgeon: Klaus Fitzpatrick MD;  Location: [facility] CARDIAC CATH LAB     Family History   Problem Relation Age of Onset    Breast Cancer Sister 47.00        Social History     Socioeconomic History    Marital status:      Spouse name: Not on file    Number of children: Not on file    Years of education: Not on file    Highest education level: Not on file   Occupational History    Not on file   Tobacco Use    Smoking status: Never    Smokeless tobacco: Never   Vaping Use    Vaping Use: Never used   Substance and Sexual Activity    Alcohol  use: No    Drug use: No    Sexual activity: Not on file   Other Topics Concern    Parent/sibling w/ CABG, MI or angioplasty before 65F 55M? Not Asked   Social History Narrative    . No children. Family in the [place] area.   Enjoys walking- very regular with this especially in [place] when they used to vacation there for 6 months of the year.   Likes reading - romantic happy endings.   Never alcohol or smoking.  Selina Pozo MD       Social Determinants of Health     Financial Resource Strain: Not on file   Food Insecurity: Not on file   Transportation Needs: Not on file   Physical Activity: Not on file   Stress: Not on file   Social Connections: Not on file   Interpersonal Safety: Not on file   Housing Stability: Not on file           Medications  Allergies   Current Outpatient Medications   Medication Sig Dispense Refill    clindamycin (CLEOCIN) 300 MG capsule Take 2 capsules (600 mg total) by mouth once as needed (30-60 minutes prior to any dental visit. Save remaining for next visit. 4 capsule 1    ELIQUIS ANTICOAGULANT 5 MG tablet TAKE 1 TABLET BY MOUTH TWICE A  tablet 3    ezetimibe (ZETIA) 10 MG tablet TAKE 1 TABLET (10 MG) BY MOUTH DAILY. 90 tablet 3    lisinopril (ZESTRIL) 10 MG tablet Take 1 tablet (10 mg) by mouth every 24 hours 90 tablet 3    metoprolol succinate ER (TOPROL XL) 50 MG 24 hr tablet Take 1 tablet (50 mg) by mouth At Bedtime 90 tablet 3    rosuvastatin (CRESTOR) 40 MG tablet TAKE 1 TABLET BY MOUTH EVERY DAY 90 tablet 5       Allergies   Allergen Reactions    Penicillins Hives          Lab Results    Chemistry/lipid CBC Cardiac Enzymes/BNP/TSH/INR   Recent Labs   Lab Test 02/27/23  0738   CHOL 199   HDL 60   *   TRIG 124     Recent Labs   Lab Test 02/27/23  0738 11/10/22  0824 07/29/22  0735   * 103* 105*     Recent Labs   Lab Test 12/11/23  1634      POTASSIUM 4.3   CHLORIDE 103   CO2 27   *   BUN 17.9   CR 1.22*   GFRESTIMATED 45*   SHADI 9.5     Recent  Labs   Lab Test 12/11/23  1634 07/15/23  0303 06/26/23  1325   CR 1.22* 1.26* 1.49*     Recent Labs   Lab Test 02/27/23  0738   A1C 5.9*          Recent Labs   Lab Test 12/11/23  1634   WBC 6.0   HGB 12.6   HCT 38.6   MCV 98        Recent Labs   Lab Test 12/11/23  1634 07/15/23  0303 06/26/23  1325   HGB 12.6 12.6 12.9    Recent Labs   Lab Test 11/27/21  1234 11/27/21  0843 11/27/21  0331   TROPONINI <0.01 <0.01 <0.01     Recent Labs   Lab Test 01/25/21  1355 10/14/20  1319 09/23/20  1920   * 344* 624*     Recent Labs   Lab Test 12/11/23  1634   TSH 10.20*     Recent Labs   Lab Test 01/25/21  1355 09/23/20  1920   INR 1.22* 1.37*        Dayne Kim MD        Thank you for allowing me to participate in the care of your patient.      Sincerely,     Dayne Kim MD     [facility]  cc:   Shalini Brown MD  [address] [place] [address]

## 2023-12-12 NOTE — PROGRESS NOTES
HEART CARE ENCOUNTER CONSULTATON NOTE      [facility]  [phone]      Assessment/Recommendations   Assessment/Plan:  1.  Mitral valve prolapse with severe mitral regurgitation.  She is status post mitral valve clip procedure in 2021 with the most recent echocardiogram completed October 2022 with an ejection fraction of 60 to 65%, severe left atrial enlargement, moderate to moderately severe 2-3+ mitral regurgitation and mild mitral stenosis.  Moderately severe tricuspid regurgitation was also noted felt to be similar to January 2022.    2.  Paroxysmal atrial fibrillation that was very symptomatic.  She is status post recent PVI and left posterior AV accessory pathway ablation.  Amiodarone was subsequently discontinued and she has been maintained on Eliquis.  Most recent evaluation recently in the ER is reviewed.  Patient was describing palpitations and dizziness with a Holter monitor that was ordered but not yet fully completed.    3.  History of moderate nonobstructive coronary artery disease based on prior angiogram in January 2021.  Lipids in [facility] 2023 were above ideal goal with an LDL of 114.  She has been on rosuvastatin and Zetia but a follow-up fasting lipid panel was recommended but not yet completed.    4.  Hypertension.  Blood pressure trends higher in the office and normal at home she has been monitoring her blood pressure quite regularly.  Blood pressure improved after I checked during 5.  My examination.    5.  Recent ER visit because of a sensation of faster heart rate.  Workup essentially negative but with reported 5 beat run of faster heart rhythm per the ER nurse with the strips not available for direct review.  I suspect that this is just a manifestation of coming off the amiodarone and it wearing off over time and she is feeling her heart rate being a little faster.  She has not had chest discomfort and I reviewed the EKG and laboratory studies.  We are going to have  her change the metoprolol to morning dosing instead to evening dosing and update me with how she has done with this change and consideration of further increase the dose of metoprolol pending her response.  Will also plan a Holter monitor.      Holter monitor  Echocardiogram which is already been scheduled as well as laboratory studies.  Follow-up with nurse practitioner in a few weeks and one of my colleagues in 2 months.  Carotid ultrasound pending.                 History of Present Illness/Subjective    HPI: Kelly Robledo is a 78 year old female who recently saw me November 30 with chart notes reviewed.  She was in the ER yesterday with complaints of palpitation and had a feeling as if her heart rate was not racing over the past 2 to 3 days.  She reported 1 transient episode of feeling dizzy the day prior to evaluation when she stood up.  She was reporting no chest pain or shortness of breath.  ER notes indicate that they identified a 5 beat run of ventricular tachycardia while the patient was on telemetry.  My colleague was called and recommendations were for Holter monitor and follow-up in the rapid access clinic.  Test results are reviewed from that visit yesterday.  Troponins were negative.  TSH mildly elevated at 10.2, potassium and renal function stable.  ECG from December 11 reported sinus rhythm with ST-T changes.  ST was noted to be depressed in the inferior leads as well as V4 through V6.  This was perhaps more prominent than on an EKG from July.    She was feeling well during our recent visit.  She had been seen in the A-fib nurse practitioner clinic October 2023.  She has a chads vascular score of 5.  I had recommended an echocardiogram and a carotid ultrasound which was still pending.    Recent Echocardiogram Results:  Recent Echocardiogram Results:  Echocardiogram Complete  Order: [id number]  Status: Edited Result - FINAL     Visible to patient: Yes (not seen)     Next appt: 11/30/2023 at 08:50 AM  in Cardiology (Dayne Kim MD)     Dx: Pulmonary hypertension (H); S/P juan...     2 Result Notes    1 Patient Communication  Details     Reading Physician Reading Date Result Priority   Dayne Kim MD  [phone] 10/3/2022        Narrative & Impression  [id number]  [id number]  [id number]  [id number]^PAULA^DAYNE^[place] [address]     Name: ANTIONE MISHRA  MRN: [id number]  : 1945  Study Date: 10/03/2022 10:55 AM  Age: 76 yrs  Gender: Female  Patient Location: [facility]  Reason For Study: S/P mitral valve clip implantation, S/P mitral valve clip  implan  Ordering Physician: DAYNE KIM  Referring Physician: DAYNE KIM  Performed By: DEL     BSA: 1.6 m2  Height: 65 in  Weight: 118 lb  HR: 63  BP: 172/80 mmHg  ______________________________________________________________________________  Procedure  Complete Echo Adult.  ______________________________________________________________________________  Interpretation Summary     Normal left ventricular size. Normal left ventricular systolic function. Left  ventricular ejection fraction estimated at 60 to 65%. Mild left ventricular  hypertrophy.  Diastolic Doppler findings (E/E' ratio and/or other parameters) suggest left  ventricular filling pressures are increased. Left ventricular diastolic  function is abnormal  Normal right ventricular size and systolic function.  Severe left atrial enlargement. Moderate right atrial enlargement  Mitral valve clip. Moderate to moderately severe 2-3+ mitral regurgitation.  Mild mitral stenosis.Mean gradient of 5 mmHg at a heart rate of 60 bpm  Moderate to moderately severe tricuspid gravitation. Estimate of RV systolic  pressure is moderately elevated at 48 mmHg plus right atrial pressure.  Compared to the examination 2022. Findings are similar. Estimate of RV  systolic pressure is mildly lower on the current examination at 48 mmHg plus  right atrial pressure  compared to 57 mmHg plus right atrial pressure on the  previous examination            Recent Coronary Angiogram Results:     Mitral regurgitation [I34.0 (ICD-10-CM)]            Conclusion     74 yo F with recent diagnosis of moderate to severe mitral regurgitation, causing increasing shortness of breath, as well as a recent hospitalization with congestive heart failure.  Being evaluated for transcatheter mitral valve repair using MitraClip,   and here for preprocedure coronary angiography.     Coronary geography today showed:     Left main with 30% ostial narrowing  LAD with mild to moderate diffuse disease, and a 50% stenosis in the midportion, that appears unchanged from her prior angiogram  Ramus with a 50% proximal to mid stenosis and mild disease distally  LCx is a small vessel with no significant obtuse marginals noted  RCA is a large caliber, dominant vessel supplying the inferior and posterior walls.  There is 40% mid to distal focal stenosis, also unchanged from prior.        Right heart catheterization showed:   PA 50/16  (36)   RA mean  7   PCW  16 /43  (26) v waves of 45 mmHg   AO  164/62  (107)   RV  57/-6,  6     LVEDP 15 mmHg     Given that her symptoms have progressed over the past 6 months, with no change in her coronary anatomy, and clear worsening of her mitral regurgitation, she will benefit from mitral valve repair with MitraClip.  Her hemodynamics today also confirmed the presence of severe mitral regurgitation, with large V waves noted on her pulmonary wedge tracing        E  Recent Coronary Angiogram Results:    [facility]  Cardiac Electrophysiology Procedure note: Atrial fibrillation ablation        Patient: Kelly Robledo   : 1945      : Kathleen Hernandez MD  Date: 7/3/2023     Procedures performed:  1. Atrial fibrillation ablation with pulmonary vein isolation  2. Mapping and ablation of left posterior atrioventricular accessory pathway  3. Programmed  stimulation with IV drug infusion     Recommendations:  1. Transfer [place] [facility] [place]procedure care  2. Apixaban will be continued  3. Continue amiodarone for now - assess for cessation at 6 week follow-up visit  4. Remove figure of eight sutures in 3 hours  5. Anticipate discharge later today if groin checks, vital signs and telemetry monitoring are stable and following ambulation assessment  6. Follow-up clinic visit in approximately 6 weeks     Physical Examination  Review of Systems   Vitals: 146/80, repeat 126/80, pulse of 86, respiratory rate 16  Wt Readings from Last 3 Encounters:   12/11/23 55.3 kg (122 lb)   11/30/23 54.7 kg (120 lb 9 oz)   10/06/23 54.5 kg (120 lb 3.2 oz)       General Appearance:   no distress, normal body habitus   ENT/Mouth: membranes moist, no oral lesions or bleeding gums.      EYES:  no scleral icterus, normal conjunctivae   Neck: no carotid bruits    Chest/Lungs:   lungs are clear to auscultation, no rales or wheezing, , equal chest wall expansion    Cardiovascular:   Regular. Normal first and second heart sounds with 2/6 systolic murmur rubs, or gallops; the carotid, radial and posterior tibial pulses are intact, Jugular venous pressure within normal limits, no edema bilaterally    Abdomen:  no , bruits, or tenderness; bowel sounds are present   Extremities: no cyanosis or clubbing   Skin: no xanthelasma, warm.    Neurologic: , no tremors     Psychiatric: alert and oriented x3, calm        Please refer above for cardiac ROS details.        Medical History  Surgical History Family History Social History   Past Medical History:   Diagnosis Date    Abdominal pain     Atrial fibrillation (H)     Diverticulosis     Moderate mitral regurgitation     NSVT (nonsustained ventricular tachycardia) (H) 6/17/2020    Palpitations      Past Surgical History:   Procedure Laterality Date    BIOPSY BREAST Bilateral     benign    CV CORONARY ANGIOGRAM N/A 6/18/2020    Procedure: Coronary Angiogram;   Surgeon: Sergey Conner MD;  Location: [facility] Cath Lab;  Service: Cardiology    CV CORONARY ANGIOGRAM N/A 1/13/2021    Procedure: Coronary Angiogram;  Surgeon: Klaus Fitzpatrick MD;  Location: [facility] Cardiac Cath Lab;  Service: Cardiology    CV LEFT HEART CATHETERIZATION WITHOUT LEFT VENTRICULOGRAM Left 6/18/2020    Procedure: Left Heart Catheterization Without Left Ventriculogram;  Surgeon: Sergey Conner MD;  Location: [facility] Lab;  Service: Cardiology    CV RIGHT HEART CATHETERIZATION N/A 6/18/2020    Procedure: Right Heart Catheterization;  Surgeon: Sergey Conner MD;  Location: [facility];  Service: Cardiology    CV RIGHT HEART CATHETERIZATION N/A 1/13/2021    Procedure: Right Heart Catheterization;  Surgeon: Klaus Fitzpatrick MD;  Location: [facility] Cardiac Cath Lab;  Service: Cardiology    EP ABLATION PULMONARY VEIN ISOLATION N/A 7/3/2023    Procedure: Ablation Atrial Fibrillation;  Surgeon: Kathleen Hernandez MD;  Location: [facility] CV    HYSTERECTOMY  1970's    OOPHORECTOMY Bilateral 1970's    TRANSCATHETER MITRAL VALVE REPAIR N/A 2/1/2021    Procedure: CV MITRAL CLIP;  Surgeon: Klaus Fitzpatrick MD;  Location: [facility] CARDIAC CATH LAB     Family History   Problem Relation Age of Onset    Breast Cancer Sister 47.00        Social History     Socioeconomic History    Marital status:      Spouse name: Not on file    Number of children: Not on file    Years of education: Not on file    Highest education level: Not on file   Occupational History    Not on file   Tobacco Use    Smoking status: Never    Smokeless tobacco: Never   Vaping Use    Vaping Use: Never used   Substance and Sexual Activity    Alcohol use: No    Drug use: No    Sexual activity: Not on file   Other Topics Concern    Parent/sibling w/ CABG, MI or angioplasty before 65F 55M? Not Asked   Social History Narrative    . No children. Family in the [place] area.   Enjoys  walking- very regular with this especially in [place] when they used to vacation there for 6 months of the year.   Likes reading - romantic happy endings.   Never alcohol or smoking.  Selina Pozo MD       Social Determinants of Health     Financial Resource Strain: Not on file   Food Insecurity: Not on file   Transportation Needs: Not on file   Physical Activity: Not on file   Stress: Not on file   Social Connections: Not on file   Interpersonal Safety: Not on file   Housing Stability: Not on file           Medications  Allergies   Current Outpatient Medications   Medication Sig Dispense Refill    clindamycin (CLEOCIN) 300 MG capsule Take 2 capsules (600 mg total) by mouth once as needed (30-60 minutes prior to any dental visit. Save remaining for next visit. 4 capsule 1    ELIQUIS ANTICOAGULANT 5 MG tablet TAKE 1 TABLET BY MOUTH TWICE A  tablet 3    ezetimibe (ZETIA) 10 MG tablet TAKE 1 TABLET (10 MG) BY MOUTH DAILY. 90 tablet 3    lisinopril (ZESTRIL) 10 MG tablet Take 1 tablet (10 mg) by mouth every 24 hours 90 tablet 3    metoprolol succinate ER (TOPROL XL) 50 MG 24 hr tablet Take 1 tablet (50 mg) by mouth At Bedtime 90 tablet 3    rosuvastatin (CRESTOR) 40 MG tablet TAKE 1 TABLET BY MOUTH EVERY DAY 90 tablet 5       Allergies   Allergen Reactions    Penicillins Hives          Lab Results    Chemistry/lipid CBC Cardiac Enzymes/BNP/TSH/INR   Recent Labs   Lab Test 02/27/23  0738   CHOL 199   HDL 60   *   TRIG 124     Recent Labs   Lab Test 02/27/23  0738 11/10/22  0824 07/29/22  0735   * 103* 105*     Recent Labs   Lab Test 12/11/23  1634      POTASSIUM 4.3   CHLORIDE 103   CO2 27   *   BUN 17.9   CR 1.22*   GFRESTIMATED 45*   SHADI 9.5     Recent Labs   Lab Test 12/11/23  1634 07/15/23  0303 06/26/23  1325   CR 1.22* 1.26* 1.49*     Recent Labs   Lab Test 02/27/23  0738   A1C 5.9*          Recent Labs   Lab Test 12/11/23  1634   WBC 6.0   HGB 12.6   HCT 38.6   MCV 98         Recent Labs   Lab Test 12/11/23  1634 07/15/23  0303 06/26/23  1325   HGB 12.6 12.6 12.9    Recent Labs   Lab Test 11/27/21  1234 11/27/21  0843 11/27/21  0331   TROPONINI <0.01 <0.01 <0.01     Recent Labs   Lab Test 01/25/21  1355 10/14/20  1319 09/23/20  1920   * 344* 624*     Recent Labs   Lab Test 12/11/23  1634   TSH 10.20*     Recent Labs   Lab Test 01/25/21  1355 09/23/20  1920   INR 1.22* 1.37*        Dayne Kim MD

## 2023-12-14 ENCOUNTER — HOSPITAL ENCOUNTER (OUTPATIENT)
Dept: CARDIOLOGY | Facility: CLINIC | Age: 78
Discharge: HOME | End: 2023-12-14
Attending: INTERNAL MEDICINE
Payer: COMMERCIAL

## 2023-12-14 ENCOUNTER — LAB (OUTPATIENT)
Dept: CARDIOLOGY | Facility: CLINIC | Age: 78
End: 2023-12-14
Payer: COMMERCIAL

## 2023-12-14 ENCOUNTER — HOSPITAL ENCOUNTER (OUTPATIENT)
Dept: ULTRASOUND IMAGING | Facility: CLINIC | Age: 78
Discharge: HOME | End: 2023-12-14
Attending: INTERNAL MEDICINE
Payer: COMMERCIAL

## 2023-12-14 DIAGNOSIS — I05.9 MITRAL VALVE DISEASE: ICD-10-CM

## 2023-12-14 DIAGNOSIS — E78.5 HYPERLIPIDEMIA LDL GOAL <100: ICD-10-CM

## 2023-12-14 DIAGNOSIS — R09.89 RIGHT CAROTID BRUIT: ICD-10-CM

## 2023-12-14 LAB
ANION GAP SERPL CALCULATED.3IONS-SCNC: 7 MMOL/L (ref 7–15)
BUN SERPL-MCNC: 19.5 MG/DL (ref 8–23)
CALCIUM SERPL-MCNC: 9.9 MG/DL (ref 8.8–10.2)
CHLORIDE SERPL-SCNC: 104 MMOL/L (ref 98–107)
CREAT SERPL-MCNC: 1.34 MG/DL (ref 0.51–0.95)
DEPRECATED HCO3 PLAS-SCNC: 29 MMOL/L (ref 22–29)
EGFRCR SERPLBLD CKD-EPI 2021: 40 ML/MIN/1.73M2
GLUCOSE SERPL-MCNC: 89 MG/DL (ref 70–99)
LVEF ECHO: NORMAL
POTASSIUM SERPL-SCNC: 4.6 MMOL/L (ref 3.4–5.3)
SODIUM SERPL-SCNC: 140 MMOL/L (ref 135–145)

## 2023-12-14 PROCEDURE — 93880 EXTRACRANIAL BILAT STUDY: CPT

## 2023-12-14 PROCEDURE — 93306 TTE W/DOPPLER COMPLETE: CPT | Mod: 26 | Performed by: INTERNAL MEDICINE

## 2023-12-14 PROCEDURE — 80061 LIPID PANEL: CPT

## 2023-12-14 PROCEDURE — 80048 BASIC METABOLIC PNL TOTAL CA: CPT

## 2023-12-14 PROCEDURE — 36415 COLL VENOUS BLD VENIPUNCTURE: CPT

## 2023-12-14 PROCEDURE — 93306 TTE W/DOPPLER COMPLETE: CPT

## 2023-12-14 NOTE — TELEPHONE ENCOUNTER
ANTICOAGULATION  STEWARDSHIP    Spoke with Kelly to review advised dosage change for Apixaban (Eliquis).    Education provided: how to take apixaban (Eilquis) safely: take doses as close to every 12 hours as possible; at the same time each day, may split tablets in order to start new dose & finish current supply of medication., what to do in the event of a missed dose, not discontinue therapy without talking to their provider first, and may take longer than usual for bleeding to stop and patient may bruise or bleed more easily; any unusual bleeding to their provider.     They verbalized understanding of new Eliquis dose/tablet strength  They were notified Rx for new dosage would be sent to preferred pharmacy    Selina Easley RN  [facility]

## 2023-12-15 ENCOUNTER — HOSPITAL ENCOUNTER (OUTPATIENT)
Dept: CARDIOLOGY | Facility: CLINIC | Age: 78
Discharge: HOME | End: 2023-12-15
Attending: INTERNAL MEDICINE | Admitting: INTERNAL MEDICINE
Payer: COMMERCIAL

## 2023-12-15 DIAGNOSIS — I47.20 V-TACH (H): ICD-10-CM

## 2023-12-15 LAB
CHOLEST SERPL-MCNC: 185 MG/DL
FASTING STATUS PATIENT QL REPORTED: YES
HDLC SERPL-MCNC: 55 MG/DL
LDLC SERPL CALC-MCNC: 106 MG/DL
NONHDLC SERPL-MCNC: 130 MG/DL
TRIGL SERPL-MCNC: 118 MG/DL

## 2023-12-15 PROCEDURE — 93226 XTRNL ECG REC<48 HR SCAN A/R: CPT

## 2023-12-16 NOTE — RESULT ENCOUNTER NOTE
Carotid shows mild non obstructive plaque, my chart note sent , echo shows stable findings, normal function, mitral valve findings look stable, my chart note sent  mdg ps dose of eliquis should now be 2.5mg bid, looks like pharmacy has contacted her.She has folllow up with np and should see cardiology in 4 months  mdg

## 2023-12-17 NOTE — RESULT ENCOUNTER NOTE
Cholesterol numbers are still above goal, she is on both rosuvastatin and zetia and max dose, my chart note sent yesterday about other test results, I want to confirm she has been taking both meds regularly , can we please inquire when able, she hadnt read the previous my chart note as of yet  ty mdg

## 2023-12-19 PROCEDURE — 93227 XTRNL ECG REC<48 HR R&I: CPT | Performed by: INTERNAL MEDICINE

## 2024-01-05 NOTE — PROGRESS NOTES
HEART CARE ENCOUNTER NOTE      [facility]  [phone]      Assessment/Recommendations   Assessment:   Paroxysmal atrial fibrillation: Status post PVI and left posterior AV accessory pathway ablation.  Patient is maintained on Eliquis for anticoagulation.  Had previously been on amiodarone which was discontinued after ablation.  Patient had been noting palpitations recently and underwent Holter monitor 12/15/2023 which showed no episodes of atrial fibrillation.  Mitral valve prolapse with severe mitral regurgitation: Status post mitral clip 2021 with most recent echocardiogram 12/14/2023 showing normal ejection fraction of 60 to 65% with normal right ventricular size and systolic function.  Residual mitral regurgitation after mitral clip and mild mitral valve stenosis.  Coronary artery disease: Moderate nonobstructive as seen on angiogram January 2021.  Hyperlipidemia: On rosuvastatin 40 mg daily and Zetia 10 mg daily.  Last lipids 12/14/2023 showed elevated LDL of 106.  Patient is open to considering PCSK9 inhibitor.  Patient wants to reach out to insurance to determine cost first.    Hypertension: Well-controlled on metoprolol 50 mg daily, lisinopril 10 mg daily.  Patient notes that blood pressures are always elevated at her doctor appointments.  At home typically in the low 130s.  This morning was 134/84 on her home machine.  Reviewed BMP 12/14/2023 which is stable.  NSVT: Patient had been having sensation of palpitations or racing heart.  Went to the ED in November 2023.  Telemetry had shown a 5 beat run of ventricular tachycardia.  Subsequent Holter monitor showed 2 runs of NSVT for 6 beats.  Patient does endorse feeling fluttering/palpitations daily the last a few seconds more so in the morning.  Patient had a recent echocardiogram that showed normal EF 60 to 65%.  Patient denies any exertional chest pain or shortness of breath.  Will further increase metoprolol today to 75 mg daily.  If  having increased symptoms would pursue ischemic workup.  Will defer at this time as patient is having no exertional symptoms and notes feeling otherwise great despite few seconds of palpitations in the morning.    Plan:   Increase metoprolol to 75 mg daily  Reach out to insurance regarding PCSK9 inhibitor cost/coverage  Patient will let us know if symptoms are worsening or if she is developing any exertional symptoms at which point I would recommend ischemic workup.  Follow-up with Dr. Conner in 4 to 5 months, sooner if needed          The level of medical decision making during this visit was of moderate complexity.    Follow up with Dr. Conner in 4 to 5 months, sooner if needed.     History of Present Illness/Subjective    HPI: Kelly Robledo is a 78 year old female with PMHx of paroxysmal atrial fibrillation, mitral valve prolapse status post mitral clip in 2021, moderate nonobstructive coronary artery disease, hyperlipidemia, hypertension presents for follow-up. Patient had been in the hospital in November with fast heart beat sensation and on telemetry had a 5 beat run of ventricular tachycardia. Holter monitor showed 2 runs of NSVT for 6 beats.     Patient notes still having palpitations that occur more so in the mornings.  Patient notes that only last a couple seconds and then go away.  Does happen daily.  Denies any accompanying symptoms such as chest pain or shortness of breath.  Patient is fairly active walking and denies any exertional symptoms.  She denies fatigue, lightheadedness, shortness of breath, dyspnea on exertion, orthopnea, PND, chest pain, abdominal fullness/bloating, and lower extremity edema.        Echocardiogram 12/14/2023 results:  Left ventricular size, wall motion and function are normal. The ejection  fraction is 60-65%.  Normal right ventricle size and systolic function.  MitraClip is present with stable bileaflet insertion.  There is mild (+1) residual mitral regurgitation after  "MitraClip.  There is mild mitral valve stenosis across MitraClip.  The left atrium is severely dilated.  The right atrium is mildly dilated.  There is mild to moderate (1-2+) tricuspid regurgitation.  IVC diameter <2.1 cm collapsing >50% with sniff suggests a normal RA pressure  of 3 mmHg.    Holter monitor 12/15/2023:  Holter monitoring (duration 24hrs).  Predominant underlying rhythm was sinus rhythm, 67 to 83bpm, average 74bpm.  No sustained tachyarrhythmias.  No atrial fibrillation.  There were no pauses of greater than 3 seconds.  Rare supraventricular ectopic beats (<1%).  Rare premature ventricular contractions (<1%). Two runs of NSVT were noted, longest being 6 beats  Symptom triggers - none.    US carotid bilateral 12/14/2023:  1.  Mild plaque formation, velocities consistent with less than 50% stenosis in the right internal carotid artery.  2.  Mild plaque formation, velocities consistent with less than 50% stenosis in the left internal carotid artery.  3.  Flow within the vertebral arteries is antegrade.        Coronary angiogram 1/13/2021:  Left main with 30% ostial narrowing  LAD with mild to moderate diffuse disease, and a 50% stenosis in the midportion, that appears unchanged from her prior angiogram  Ramus with a 50% proximal to mid stenosis and mild disease distally  LCx is a small vessel with no significant obtuse marginals noted  RCA is a large caliber, dominant vessel supplying the inferior and posterior walls.  There is 40% mid to distal focal stenosis, also unchanged from prior.     Physical Examination  Review of Systems   Vitals: BP (!) 148/80 (BP Location: Left arm, Patient Position: Sitting, Cuff Size: Adult Regular)   Pulse 86   Resp 16   Ht 1.651 m (5' 5\")   Wt 55.6 kg (122 lb 9.6 oz)   LMP  (LMP Unknown)   SpO2 97%   BMI 20.40 kg/m    BMI= Body mass index is 20.4 kg/m .  Wt Readings from Last 3 Encounters:   01/08/24 55.6 kg (122 lb 9.6 oz)   12/12/23 54.9 kg (121 lb)   12/11/23 " 55.3 kg (122 lb)           ENT/Mouth: membranes moist, no oral lesions or bleeding gums.      EYES:  no scleral icterus, normal conjunctivae       Chest/Lungs:   lungs are clear to auscultation, no rales or wheezing,  equal chest wall expansion    Cardiovascular:   Regular. Normal first and second heart sounds with systolic murmur, no gallops; the carotid, radial pulses are intact,  no edema bilaterally        Extremities: no cyanosis or clubbing   Skin: no xanthelasma, warm.        Psychiatric: alert and oriented x3, calm        Please refer above for cardiac ROS details.        Medical History  Surgical History Family History Social History   Past Medical History:   Diagnosis Date    Abdominal pain     Atrial fibrillation (H)     Diverticulosis     Moderate mitral regurgitation     NSVT (nonsustained ventricular tachycardia) (H) 6/17/2020    Palpitations      Past Surgical History:   Procedure Laterality Date    BIOPSY BREAST Bilateral     benign    CV CORONARY ANGIOGRAM N/A 6/18/2020    Procedure: Coronary Angiogram;  Surgeon: Sergey Conner MD;  Location: [facility] Cath Lab;  Service: Cardiology    CV CORONARY ANGIOGRAM N/A 1/13/2021    Procedure: Coronary Angiogram;  Surgeon: Klaus Fitzpatrick MD;  Location: [facility] Cardiac Cath Lab;  Service: Cardiology    CV LEFT HEART CATHETERIZATION WITHOUT LEFT VENTRICULOGRAM Left 6/18/2020    Procedure: Left Heart Catheterization Without Left Ventriculogram;  Surgeon: Sergey Conner MD;  Location: [facility] Cath Lab;  Service: Cardiology    CV RIGHT HEART CATHETERIZATION N/A 6/18/2020    Procedure: Right Heart Catheterization;  Surgeon: Sergey Conner MD;  Location: [facility] Cath Lab;  Service: Cardiology    CV RIGHT HEART CATHETERIZATION N/A 1/13/2021    Procedure: Right Heart Catheterization;  Surgeon: Klaus Fitzpatrick MD;  Location: [facility] Cardiac Cath Lab;  Service: Cardiology    EP ABLATION PULMONARY VEIN ISOLATION N/A 7/3/2023     Procedure: Ablation Atrial Fibrillation;  Surgeon: Kathleen Hernandez MD;  Location: [facility] CATH LAB CV    HYSTERECTOMY  1970's    OOPHORECTOMY Bilateral 1970's    TRANSCATHETER MITRAL VALVE REPAIR N/A 2/1/2021    Procedure: CV MITRAL CLIP;  Surgeon: Klaus Fitzpatrick MD;  Location: [facility] CARDIAC CATH LAB     Family History   Problem Relation Age of Onset    Breast Cancer Sister 47.00        Social History     Socioeconomic History    Marital status:      Spouse name: Not on file    Number of children: Not on file    Years of education: Not on file    Highest education level: Not on file   Occupational History    Not on file   Tobacco Use    Smoking status: Never    Smokeless tobacco: Never   Vaping Use    Vaping Use: Never used   Substance and Sexual Activity    Alcohol use: No    Drug use: No    Sexual activity: Not on file   Other Topics Concern    Parent/sibling w/ CABG, MI or angioplasty before 65F 55M? Not Asked   Social History Narrative    . No children. Family in the [place] area.   Enjoys walking- very regular with this especially in [place] when they used to vacation there for 6 months of the year.   Likes reading - romantic happy endings.   Never alcohol or smoking.  Selina Pozo MD       Social Determinants of Health     Financial Resource Strain: Not on file   Food Insecurity: Not on file   Transportation Needs: Not on file   Physical Activity: Not on file   Stress: Not on file   Social Connections: Not on file   Interpersonal Safety: Not on file   Housing Stability: Not on file           Medications  Allergies   Current Outpatient Medications   Medication Sig Dispense Refill    apixaban ANTICOAGULANT (ELIQUIS) 2.5 MG tablet Take 1 tablet (2.5 mg) by mouth 2 times daily 180 tablet 0    clindamycin (CLEOCIN) 300 MG capsule Take 2 capsules (600 mg total) by mouth once as needed (30-60 minutes prior to any dental visit. Save remaining for next visit. 4 capsule 1    ezetimibe (ZETIA)  10 MG tablet TAKE 1 TABLET (10 MG) BY MOUTH DAILY. 90 tablet 3    lisinopril (ZESTRIL) 10 MG tablet Take 1 tablet (10 mg) by mouth every 24 hours 90 tablet 3    metoprolol succinate ER (TOPROL XL) 50 MG 24 hr tablet Take 1 tablet (50 mg) by mouth At Bedtime 90 tablet 3    rosuvastatin (CRESTOR) 40 MG tablet TAKE 1 TABLET BY MOUTH EVERY DAY 90 tablet 5       Allergies   Allergen Reactions    Penicillins Hives          Lab Results    Chemistry/lipid CBC Cardiac Enzymes/BNP/TSH/INR   Recent Labs   Lab Test 12/14/23  1008   CHOL 185   HDL 55   *   TRIG 118     Recent Labs   Lab Test 12/14/23  1008 02/27/23  0738 11/10/22  0824   * 114* 103*     Recent Labs   Lab Test 12/14/23  1008      POTASSIUM 4.6   CHLORIDE 104   CO2 29   GLC 89   BUN 19.5   CR 1.34*   GFRESTIMATED 40*   SHADI 9.9     Recent Labs   Lab Test 12/14/23  1008 12/11/23  1634 07/15/23  0303   CR 1.34* 1.22* 1.26*     Recent Labs   Lab Test 02/27/23  0738   A1C 5.9*          Recent Labs   Lab Test 12/11/23  1634   WBC 6.0   HGB 12.6   HCT 38.6   MCV 98        Recent Labs   Lab Test 12/11/23  1634 07/15/23  0303 06/26/23  1325   HGB 12.6 12.6 12.9    Recent Labs   Lab Test 11/27/21  1234 11/27/21  0843 11/27/21  0331   TROPONINI <0.01 <0.01 <0.01     Recent Labs   Lab Test 01/25/21  1355 10/14/20  1319 09/23/20  1920   * 344* 624*     Recent Labs   Lab Test 12/11/23  1634   TSH 10.20*     Recent Labs   Lab Test 01/25/21  1355 09/23/20  1920   INR 1.22* 1.37*        Jailene White PA-C

## 2024-01-08 ENCOUNTER — OFFICE VISIT (OUTPATIENT)
Dept: CARDIOLOGY | Facility: CLINIC | Age: 79
End: 2024-01-08
Attending: INTERNAL MEDICINE
Payer: COMMERCIAL

## 2024-01-08 VITALS
BODY MASS INDEX: 20.43 KG/M2 | OXYGEN SATURATION: 97 % | HEIGHT: 65 IN | WEIGHT: 122.6 LBS | HEART RATE: 86 BPM | DIASTOLIC BLOOD PRESSURE: 80 MMHG | SYSTOLIC BLOOD PRESSURE: 148 MMHG | RESPIRATION RATE: 16 BRPM

## 2024-01-08 DIAGNOSIS — Z98.890 S/P MITRAL VALVE CLIP IMPLANTATION: ICD-10-CM

## 2024-01-08 DIAGNOSIS — I25.10 CORONARY ARTERIOSCLEROSIS: ICD-10-CM

## 2024-01-08 DIAGNOSIS — I34.0 SEVERE MITRAL REGURGITATION: ICD-10-CM

## 2024-01-08 DIAGNOSIS — I48.0 PAF (PAROXYSMAL ATRIAL FIBRILLATION) (H): ICD-10-CM

## 2024-01-08 DIAGNOSIS — Z98.890 STATUS POST CATHETER ABLATION OF ATRIAL FIBRILLATION: ICD-10-CM

## 2024-01-08 DIAGNOSIS — I48.0 PAROXYSMAL ATRIAL FIBRILLATION (H): Primary | ICD-10-CM

## 2024-01-08 DIAGNOSIS — I10 BENIGN ESSENTIAL HYPERTENSION: ICD-10-CM

## 2024-01-08 DIAGNOSIS — I47.29 NSVT (NONSUSTAINED VENTRICULAR TACHYCARDIA) (H): ICD-10-CM

## 2024-01-08 DIAGNOSIS — Z95.818 S/P MITRAL VALVE CLIP IMPLANTATION: ICD-10-CM

## 2024-01-08 DIAGNOSIS — E78.00 PURE HYPERCHOLESTEROLEMIA: ICD-10-CM

## 2024-01-08 PROCEDURE — 99214 OFFICE O/P EST MOD 30 MIN: CPT | Performed by: STUDENT IN AN ORGANIZED HEALTH CARE EDUCATION/TRAINING PROGRAM

## 2024-01-08 RX ORDER — METOPROLOL SUCCINATE 50 MG/1
75 TABLET, EXTENDED RELEASE ORAL AT BEDTIME
Start: 2024-01-08 | End: 2024-01-30

## 2024-01-08 NOTE — LETTER
1/8/2024    Selina Pozo MD  [address] [address]    RE: Kelly Robledo       Dear Colleague,     I had the pleasure of seeing Kelly Robledo in the [facility].    HEART CARE ENCOUNTER NOTE      [facility]  [phone]      Assessment/Recommendations   Assessment:   Paroxysmal atrial fibrillation: Status post PVI and left posterior AV accessory pathway ablation.  Patient is maintained on Eliquis for anticoagulation.  Had previously been on amiodarone which was discontinued after ablation.  Patient had been noting palpitations recently and underwent Holter monitor 12/15/2023 which showed no episodes of atrial fibrillation.  Mitral valve prolapse with severe mitral regurgitation: Status post mitral clip 2021 with most recent echocardiogram 12/14/2023 showing normal ejection fraction of 60 to 65% with normal right ventricular size and systolic function.  Residual mitral regurgitation after mitral clip and mild mitral valve stenosis.  Coronary artery disease: Moderate nonobstructive as seen on angiogram January 2021.  Hyperlipidemia: On rosuvastatin 40 mg daily and Zetia 10 mg daily.  Last lipids 12/14/2023 showed elevated LDL of 106.  Patient is open to considering PCSK9 inhibitor.  Patient wants to reach out to insurance to determine cost first.    Hypertension: Well-controlled on metoprolol 50 mg daily, lisinopril 10 mg daily.  Patient notes that blood pressures are always elevated at her doctor appointments.  At home typically in the low 130s.  This morning was 134/84 on her home machine.  Reviewed BMP 12/14/2023 which is stable.  NSVT: Patient had been having sensation of palpitations or racing heart.  Went to the ED in November 2023.  Telemetry had shown a 5 beat run of ventricular tachycardia.  Subsequent Holter monitor showed 2 runs of NSVT for 6 beats.  Patient does endorse feeling fluttering/palpitations daily the last a few seconds more so  in the morning.  Patient had a recent echocardiogram that showed normal EF 60 to 65%.  Patient denies any exertional chest pain or shortness of breath.  Will further increase metoprolol today to 75 mg daily.  If having increased symptoms would pursue ischemic workup.  Will defer at this time as patient is having no exertional symptoms and notes feeling otherwise great despite few seconds of palpitations in the morning.    Plan:   Increase metoprolol to 75 mg daily  Reach out to insurance regarding PCSK9 inhibitor cost/coverage  Patient will let us know if symptoms are worsening or if she is developing any exertional symptoms at which point I would recommend ischemic workup.  Follow-up with Dr. Conner in 4 to 5 months, sooner if needed          The level of medical decision making during this visit was of moderate complexity.    Follow up with Dr. Conner in 4 to 5 months, sooner if needed.     History of Present Illness/Subjective    HPI: Kelly Robledo is a 78 year old female with PMHx of paroxysmal atrial fibrillation, mitral valve prolapse status post mitral clip in 2021, moderate nonobstructive coronary artery disease, hyperlipidemia, hypertension presents for follow-up. Patient had been in the hospital in November with fast heart beat sensation and on telemetry had a 5 beat run of ventricular tachycardia. Holter monitor showed 2 runs of NSVT for 6 beats.     Patient notes still having palpitations that occur more so in the mornings.  Patient notes that only last a couple seconds and then go away.  Does happen daily.  Denies any accompanying symptoms such as chest pain or shortness of breath.  Patient is fairly active walking and denies any exertional symptoms.  She denies fatigue, lightheadedness, shortness of breath, dyspnea on exertion, orthopnea, PND, chest pain, abdominal fullness/bloating, and lower extremity edema.        Echocardiogram 12/14/2023 results:  Left ventricular size, wall motion and  "function are normal. The ejection  fraction is 60-65%.  Normal right ventricle size and systolic function.  MitraClip is present with stable bileaflet insertion.  There is mild (+1) residual mitral regurgitation after MitraClip.  There is mild mitral valve stenosis across MitraClip.  The left atrium is severely dilated.  The right atrium is mildly dilated.  There is mild to moderate (1-2+) tricuspid regurgitation.  IVC diameter <2.1 cm collapsing >50% with sniff suggests a normal RA pressure  of 3 mmHg.    Holter monitor 12/15/2023:  Holter monitoring (duration 24hrs).  Predominant underlying rhythm was sinus rhythm, 67 to 83bpm, average 74bpm.  No sustained tachyarrhythmias.  No atrial fibrillation.  There were no pauses of greater than 3 seconds.  Rare supraventricular ectopic beats (<1%).  Rare premature ventricular contractions (<1%). Two runs of NSVT were noted, longest being 6 beats  Symptom triggers - none.    US carotid bilateral 12/14/2023:  1.  Mild plaque formation, velocities consistent with less than 50% stenosis in the right internal carotid artery.  2.  Mild plaque formation, velocities consistent with less than 50% stenosis in the left internal carotid artery.  3.  Flow within the vertebral arteries is antegrade.        Coronary angiogram 1/13/2021:  Left main with 30% ostial narrowing  LAD with mild to moderate diffuse disease, and a 50% stenosis in the midportion, that appears unchanged from her prior angiogram  Ramus with a 50% proximal to mid stenosis and mild disease distally  LCx is a small vessel with no significant obtuse marginals noted  RCA is a large caliber, dominant vessel supplying the inferior and posterior walls.  There is 40% mid to distal focal stenosis, also unchanged from prior.     Physical Examination  Review of Systems   Vitals: BP (!) 148/80 (BP Location: Left arm, Patient Position: Sitting, Cuff Size: Adult Regular)   Pulse 86   Resp 16   Ht 1.651 m (5' 5\")   Wt 55.6 kg " (122 lb 9.6 oz)   LMP  (LMP Unknown)   SpO2 97%   BMI 20.40 kg/m    BMI= Body mass index is 20.4 kg/m .  Wt Readings from Last 3 Encounters:   01/08/24 55.6 kg (122 lb 9.6 oz)   12/12/23 54.9 kg (121 lb)   12/11/23 55.3 kg (122 lb)           ENT/Mouth: membranes moist, no oral lesions or bleeding gums.      EYES:  no scleral icterus, normal conjunctivae       Chest/Lungs:   lungs are clear to auscultation, no rales or wheezing,  equal chest wall expansion    Cardiovascular:   Regular. Normal first and second heart sounds with systolic murmur, no gallops; the carotid, radial pulses are intact,  no edema bilaterally        Extremities: no cyanosis or clubbing   Skin: no xanthelasma, warm.        Psychiatric: alert and oriented x3, calm        Please refer above for cardiac ROS details.        Medical History  Surgical History Family History Social History   Past Medical History:   Diagnosis Date    Abdominal pain     Atrial fibrillation (H)     Diverticulosis     Moderate mitral regurgitation     NSVT (nonsustained ventricular tachycardia) (H) 6/17/2020    Palpitations      Past Surgical History:   Procedure Laterality Date    BIOPSY BREAST Bilateral     benign    CV CORONARY ANGIOGRAM N/A 6/18/2020    Procedure: Coronary Angiogram;  Surgeon: Sergey Conner MD;  Location: [facility] Cath Lab;  Service: Cardiology    CV CORONARY ANGIOGRAM N/A 1/13/2021    Procedure: Coronary Angiogram;  Surgeon: Klaus Fitzpatrick MD;  Location: [facility] Cardiac Cath Lab;  Service: Cardiology    CV LEFT HEART CATHETERIZATION WITHOUT LEFT VENTRICULOGRAM Left 6/18/2020    Procedure: Left Heart Catheterization Without Left Ventriculogram;  Surgeon: Sergey Conner MD;  Location: [facility] Cath Lab;  Service: Cardiology    CV RIGHT HEART CATHETERIZATION N/A 6/18/2020    Procedure: Right Heart Catheterization;  Surgeon: Sergey Conner MD;  Location: [facility] Cath Lab;  Service: Cardiology    CV RIGHT HEART  CATHETERIZATION N/A 1/13/2021    Procedure: Right Heart Catheterization;  Surgeon: Klaus Fitzpatrick MD;  Location: [facility] Cardiac Cath Lab;  Service: Cardiology    EP ABLATION PULMONARY VEIN ISOLATION N/A 7/3/2023    Procedure: Ablation Atrial Fibrillation;  Surgeon: Kathleen Hernandez MD;  Location: [facility] CATH LAB CV    HYSTERECTOMY  1970's    OOPHORECTOMY Bilateral 1970's    TRANSCATHETER MITRAL VALVE REPAIR N/A 2/1/2021    Procedure: CV MITRAL CLIP;  Surgeon: Klaus Fitzpatrick MD;  Location: [facility] CARDIAC CATH LAB     Family History   Problem Relation Age of Onset    Breast Cancer Sister 47.00        Social History     Socioeconomic History    Marital status:      Spouse name: Not on file    Number of children: Not on file    Years of education: Not on file    Highest education level: Not on file   Occupational History    Not on file   Tobacco Use    Smoking status: Never    Smokeless tobacco: Never   Vaping Use    Vaping Use: Never used   Substance and Sexual Activity    Alcohol use: No    Drug use: No    Sexual activity: Not on file   Other Topics Concern    Parent/sibling w/ CABG, MI or angioplasty before 65F 55M? Not Asked   Social History Narrative    . No children. Family in the [place] area.   Enjoys walking- very regular with this especially in [place] when they used to vacation there for 6 months of the year.   Likes reading - romantic happy endings.   Never alcohol or smoking.  Selina Pozo MD       Social Determinants of Health     Financial Resource Strain: Not on file   Food Insecurity: Not on file   Transportation Needs: Not on file   Physical Activity: Not on file   Stress: Not on file   Social Connections: Not on file   Interpersonal Safety: Not on file   Housing Stability: Not on file           Medications  Allergies   Current Outpatient Medications   Medication Sig Dispense Refill    apixaban ANTICOAGULANT (ELIQUIS) 2.5 MG tablet Take 1 tablet (2.5 mg) by mouth 2 times  daily 180 tablet 0    clindamycin (CLEOCIN) 300 MG capsule Take 2 capsules (600 mg total) by mouth once as needed (30-60 minutes prior to any dental visit. Save remaining for next visit. 4 capsule 1    ezetimibe (ZETIA) 10 MG tablet TAKE 1 TABLET (10 MG) BY MOUTH DAILY. 90 tablet 3    lisinopril (ZESTRIL) 10 MG tablet Take 1 tablet (10 mg) by mouth every 24 hours 90 tablet 3    metoprolol succinate ER (TOPROL XL) 50 MG 24 hr tablet Take 1 tablet (50 mg) by mouth At Bedtime 90 tablet 3    rosuvastatin (CRESTOR) 40 MG tablet TAKE 1 TABLET BY MOUTH EVERY DAY 90 tablet 5       Allergies   Allergen Reactions    Penicillins Hives          Lab Results    Chemistry/lipid CBC Cardiac Enzymes/BNP/TSH/INR   Recent Labs   Lab Test 12/14/23  1008   CHOL 185   HDL 55   *   TRIG 118     Recent Labs   Lab Test 12/14/23  1008 02/27/23  0738 11/10/22  0824   * 114* 103*     Recent Labs   Lab Test 12/14/23  1008      POTASSIUM 4.6   CHLORIDE 104   CO2 29   GLC 89   BUN 19.5   CR 1.34*   GFRESTIMATED 40*   SHADI 9.9     Recent Labs   Lab Test 12/14/23  1008 12/11/23  1634 07/15/23  0303   CR 1.34* 1.22* 1.26*     Recent Labs   Lab Test 02/27/23  0738   A1C 5.9*          Recent Labs   Lab Test 12/11/23  1634   WBC 6.0   HGB 12.6   HCT 38.6   MCV 98        Recent Labs   Lab Test 12/11/23  1634 07/15/23  0303 06/26/23  1325   HGB 12.6 12.6 12.9    Recent Labs   Lab Test 11/27/21  1234 11/27/21  0843 11/27/21  0331   TROPONINI <0.01 <0.01 <0.01     Recent Labs   Lab Test 01/25/21  1355 10/14/20  1319 09/23/20  1920   * 344* 624*     Recent Labs   Lab Test 12/11/23  1634   TSH 10.20*     Recent Labs   Lab Test 01/25/21  1355 09/23/20  1920   INR 1.22* 1.37*        Jailene White PA-C              Thank you for allowing me to participate in the care of your patient.      Sincerely,     Jailene White PA-C     [facility]  cc:   Dayne Kim MD  [address] [facility] [address]  [address]  [place] [address]

## 2024-01-08 NOTE — PATIENT INSTRUCTIONS
Kelly Robledo,    It was a pleasure to see you today at the [facility].     My recommendations after this visit include:    - Increase your metoprolol to 75 mg daily (1.5 tablets)  - Can reach out to insurance regarding the PCSK9 inhibitor (Repatha/Praulent)   - Follow up with Dr. Conner in 4-5 months, sooner if needed    - Please call [phone], if you have any questions or concerns      Jailene White PA-C

## 2024-01-09 DIAGNOSIS — I10 ESSENTIAL HYPERTENSION: ICD-10-CM

## 2024-01-09 NOTE — TELEPHONE ENCOUNTER
Refill Request  Medication name: Pending Prescriptions:                       Disp   Refills    lisinopril (ZESTRIL) 10 MG tablet         90 tab*3            Sig: Take 1 tablet (10 mg) by mouth every 24 hours    Requested Pharmacy:  [facility]/PH[facility]Y #[id number] [place], [place] - [address]

## 2024-01-10 RX ORDER — LISINOPRIL 10 MG/1
10 TABLET ORAL EVERY 24 HOURS
Qty: 90 TABLET | Refills: 3 | Status: SHIPPED | OUTPATIENT
Start: 2024-01-10 | End: 2024-07-19

## 2024-01-18 ENCOUNTER — PATIENT OUTREACH (OUTPATIENT)
Dept: CARE COORDINATION | Facility: CLINIC | Age: 79
End: 2024-01-18
Payer: COMMERCIAL

## 2024-01-23 ENCOUNTER — TELEPHONE (OUTPATIENT)
Dept: CARDIOLOGY | Facility: CLINIC | Age: 79
End: 2024-01-23
Payer: COMMERCIAL

## 2024-01-23 NOTE — TELEPHONE ENCOUNTER
[facility] Call Center    Phone Message    May a detailed message be left on voicemail: yes     Reason for Call: Other: Patient called in says she has been having some irregular heart beats, wanted to know if a change to her medication is needed, please call pt back for further discussion.     Action Taken: Other: cardiology    Travel Screening: Not Applicable    Thank you!  [facility]

## 2024-01-23 NOTE — TELEPHONE ENCOUNTER
PC to patient to discuss reported irregular heart beats. She is requesting to take her metoprolol in the evening rather than the am. Informed her that is the recommendation per the medication directions, so ok to do so. Reviewed upcoming appt with Dr Conner 5/24, and provided direct contact for questions or concerns in the interim. No further concerns at this time.  -sea

## 2024-01-25 ENCOUNTER — TELEPHONE (OUTPATIENT)
Dept: CARDIOLOGY | Facility: CLINIC | Age: 79
End: 2024-01-25
Payer: COMMERCIAL

## 2024-01-25 NOTE — TELEPHONE ENCOUNTER
"Rcv'd PC from pt reporting \"weird\" sensation in her chest. She denies CP or SOB, but replies yes to palpitations and reports increased frequency. She states is able to check the rate and is in the 80's, and irregular. She admits is making her anxious and \"probably making it worse\". She reports no decrease in symptoms since recent increase in metoprolol, and states palpitations are making her upset. Offered follow-up in clinic with [facility] and she agrees to plan. No further concerns. Call transferred to .  -sea  "

## 2024-01-29 NOTE — PROGRESS NOTES
HEART CARE ENCOUNTER NOTE      [facility]  [phone]      Assessment/Recommendations   Assessment:   Paroxysmal atrial fibrillation: Status post PVI and left posterior AV accessory pathway ablation.  Patient is maintained on Eliquis for anticoagulation.  Had previously been on amiodarone which was discontinued after ablation.  Patient had been noting palpitations recently and underwent Holter monitor 12/15/2023 which showed no episodes of atrial fibrillation.  Mitral valve prolapse with severe mitral regurgitation: Status post mitral clip 2021 with most recent echocardiogram 12/14/2023 showing normal ejection fraction of 60 to 65% with normal right ventricular size and systolic function.  Residual mitral regurgitation after mitral clip and mild mitral valve stenosis.  Coronary artery disease: Moderate nonobstructive as seen on angiogram January 2021.  Denies exertional angina or dyspnea.  Hyperlipidemia: On rosuvastatin 40 mg daily and Zetia 10 mg daily.  Last lipids 12/14/2023 showed elevated LDL of 106.  Patient is open to considering PCSK9 inhibitor.  Patient has tried reaching out to insurance multiple times but was unable to get a hold of them.  Will continue trying.   Hypertension: Well-controlled on metoprolol 75 mg daily, lisinopril 10 mg daily.  Patient notes that blood pressures are always elevated at her doctor appointments.  At home typically in the 110s to low 130s.Reviewed BMP 12/14/2023 which is stable.  NSVT: Went to the ED in November 2023.  Telemetry had shown a 5 beat run of ventricular tachycardia.  Subsequent Holter monitor showed 2 runs of NSVT for 6 beats.  Palpitations:  Patient does endorse feeling fluttering/palpitations daily that were previously lasting a few seconds and now maybe 1 to 2 minutes.  Notes they are occurring throughout the day now.  Denies any symptoms at nighttime.  Notes they have possibly worsened since increasing metoprolol to 75 mg daily which  "she takes at nighttime.  Patient had a recent echocardiogram that showed normal EF 60 to 65%.  Patient denies any exertional chest pain or shortness of breath.  Given increased symptoms and history of NSVT we will pursue ischemic workup with nuclear stress test.  Will further increase metoprolol to 100 mg daily and recommended patient take this in the morning as her symptoms are in the morning and throughout the day.      Plan:   Increase metoprolol to 100 mg daily and switch this to taking in the morning.  Monitor for any lightheadedness.  Schedule nuclear stress test for ischemic workup given NSVT noted on monitoring patient now having increased symptoms of more palpitations and \"weird feeling in her chest\"  Reach out to insurance regarding PCSK9 inhibitor cost/coverage  Follow-up with Dr. Conner 5/24/2024          The level of medical decision making during this visit was of moderate complexity.    Follow up with Dr. Conner 5/24/2024     History of Present Illness/Subjective    HPI: Kelly Robledo is a 78 year old female with PMHx of paroxysmal atrial fibrillation, mitral valve prolapse status post mitral clip in 2021, moderate nonobstructive coronary artery disease, hyperlipidemia, hypertension presents for follow-up. Patient had been in the hospital in November with fast heart beat sensation and on telemetry had a 5 beat run of ventricular tachycardia. Holter monitor showed 2 runs of NSVT for 6 beats.  I last saw patient 1/8/2024 where she had been noting palpitations that were occurring more in the morning but only lasting a couple seconds.  We had increased metoprolol to 75 mg daily.    Patient is now noting a weird sensation in her chest.  Does endorse palpitations that are increasing in frequency.  Feels her pulse noting it is irregular which is causing her to feel anxious.  Patient notes that is lasting longer maybe 1 to 2 minutes now for seconds and happening throughout the day.  She denies any episode " at nighttime noting she sleeps well.  Continues to deny any shortness of breath or chest pain with exertion.  Denies any accompanying shortness of breath when she gets this sensation. She denies fatigue, lightheadedness, shortness of breath, dyspnea on exertion, orthopnea, PND, chest pain, abdominal fullness/bloating, and lower extremity edema.        Echocardiogram 12/14/2023 results:  Left ventricular size, wall motion and function are normal. The ejection  fraction is 60-65%.  Normal right ventricle size and systolic function.  MitraClip is present with stable bileaflet insertion.  There is mild (+1) residual mitral regurgitation after MitraClip.  There is mild mitral valve stenosis across MitraClip.  The left atrium is severely dilated.  The right atrium is mildly dilated.  There is mild to moderate (1-2+) tricuspid regurgitation.  IVC diameter <2.1 cm collapsing >50% with sniff suggests a normal RA pressure  of 3 mmHg.    Holter monitor 12/15/2023:  Holter monitoring (duration 24hrs).  Predominant underlying rhythm was sinus rhythm, 67 to 83bpm, average 74bpm.  No sustained tachyarrhythmias.  No atrial fibrillation.  There were no pauses of greater than 3 seconds.  Rare supraventricular ectopic beats (<1%).  Rare premature ventricular contractions (<1%). Two runs of NSVT were noted, longest being 6 beats  Symptom triggers - none.    US carotid bilateral 12/14/2023:  1.  Mild plaque formation, velocities consistent with less than 50% stenosis in the right internal carotid artery.  2.  Mild plaque formation, velocities consistent with less than 50% stenosis in the left internal carotid artery.  3.  Flow within the vertebral arteries is antegrade.        Coronary angiogram 1/13/2021:  Left main with 30% ostial narrowing  LAD with mild to moderate diffuse disease, and a 50% stenosis in the midportion, that appears unchanged from her prior angiogram  Ramus with a 50% proximal to mid stenosis and mild disease  "distally  LCx is a small vessel with no significant obtuse marginals noted  RCA is a large caliber, dominant vessel supplying the inferior and posterior walls.  There is 40% mid to distal focal stenosis, also unchanged from prior.     Physical Examination  Review of Systems   Vitals: BP (!) 154/70 (BP Location: Right arm, Patient Position: Sitting, Cuff Size: Adult Regular)   Pulse 80   Resp 16   Ht 1.651 m (5' 5\")   Wt 55.3 kg (122 lb)   LMP  (LMP Unknown)   SpO2 98%   BMI 20.30 kg/m    BMI= Body mass index is 20.3 kg/m .  Wt Readings from Last 3 Encounters:   01/30/24 55.3 kg (122 lb)   01/08/24 55.6 kg (122 lb 9.6 oz)   12/12/23 54.9 kg (121 lb)           ENT/Mouth: membranes moist, no oral lesions or bleeding gums.      EYES:  no scleral icterus, normal conjunctivae       Chest/Lungs:   lungs are clear to auscultation, no rales or wheezing,  equal chest wall expansion    Cardiovascular:   Regular. Normal first and second heart sounds with systolic murmur, no gallops; the carotid, radial pulses are intact,  no edema bilaterally        Extremities: no cyanosis or clubbing   Skin: no xanthelasma, warm.        Psychiatric: alert and oriented x3, calm        Please refer above for cardiac ROS details.        Medical History  Surgical History Family History Social History   Past Medical History:   Diagnosis Date    Abdominal pain     Atrial fibrillation (H)     Diverticulosis     Moderate mitral regurgitation     NSVT (nonsustained ventricular tachycardia) (H) 6/17/2020    Palpitations      Past Surgical History:   Procedure Laterality Date    BIOPSY BREAST Bilateral     benign    CV CORONARY ANGIOGRAM N/A 6/18/2020    Procedure: Coronary Angiogram;  Surgeon: Sergey Conner MD;  Location: [facility] Cath Lab;  Service: Cardiology    CV CORONARY ANGIOGRAM N/A 1/13/2021    Procedure: Coronary Angiogram;  Surgeon: Klaus Fitzpatrick MD;  Location: [facility] Cardiac Cath Lab;  Service: Cardiology    CV LEFT " HEART CATHETERIZATION WITHOUT LEFT VENTRICULOGRAM Left 6/18/2020    Procedure: Left Heart Catheterization Without Left Ventriculogram;  Surgeon: Sergey Conner MD;  Location: [facility] Cath Lab;  Service: Cardiology    CV RIGHT HEART CATHETERIZATION N/A 6/18/2020    Procedure: Right Heart Catheterization;  Surgeon: Sergey Conner MD;  Location: [facility] Cath Lab;  Service: Cardiology    CV RIGHT HEART CATHETERIZATION N/A 1/13/2021    Procedure: Right Heart Catheterization;  Surgeon: Klaus Fitzpatrick MD;  Location: [facility] Cardiac Cath Lab;  Service: Cardiology    EP ABLATION PULMONARY VEIN ISOLATION N/A 7/3/2023    Procedure: Ablation Atrial Fibrillation;  Surgeon: Kathleen Hernandez MD;  Location: [facility] CV    HYSTERECTOMY  1970's    OOPHORECTOMY Bilateral 1970's    TRANSCATHETER MITRAL VALVE REPAIR N/A 2/1/2021    Procedure: CV MITRAL CLIP;  Surgeon: Klaus Fitzpatrick MD;  Location: [facility] CARDIAC CATH LAB     Family History   Problem Relation Age of Onset    Breast Cancer Sister 47.00        Social History     Socioeconomic History    Marital status:      Spouse name: Not on file    Number of children: Not on file    Years of education: Not on file    Highest education level: Not on file   Occupational History    Not on file   Tobacco Use    Smoking status: Never    Smokeless tobacco: Never   Vaping Use    Vaping Use: Never used   Substance and Sexual Activity    Alcohol use: No    Drug use: No    Sexual activity: Not on file   Other Topics Concern    Parent/sibling w/ CABG, MI or angioplasty before 65F 55M? Not Asked   Social History Narrative    . No children. Family in the [place] area.   Enjoys walking- very regular with this especially in [place] when they used to vacation there for 6 months of the year.   Likes reading - romantic happy endings.   Never alcohol or smoking.  Selina Pozo MD       Social Determinants of Health     Financial Resource Strain:  Not on file   Food Insecurity: Not on file   Transportation Needs: Not on file   Physical Activity: Not on file   Stress: Not on file   Social Connections: Not on file   Interpersonal Safety: Not on file   Housing Stability: Not on file           Medications  Allergies   Current Outpatient Medications   Medication Sig Dispense Refill    apixaban ANTICOAGULANT (ELIQUIS) 2.5 MG tablet Take 1 tablet (2.5 mg) by mouth 2 times daily 180 tablet 0    clindamycin (CLEOCIN) 300 MG capsule Take 2 capsules (600 mg total) by mouth once as needed (30-60 minutes prior to any dental visit. Save remaining for next visit. 4 capsule 1    ezetimibe (ZETIA) 10 MG tablet TAKE 1 TABLET (10 MG) BY MOUTH DAILY. 90 tablet 3    lisinopril (ZESTRIL) 10 MG tablet Take 1 tablet (10 mg) by mouth every 24 hours 90 tablet 3    metoprolol succinate ER (TOPROL XL) 50 MG 24 hr tablet Take 1.5 tablets (75 mg) by mouth at bedtime      rosuvastatin (CRESTOR) 40 MG tablet TAKE 1 TABLET BY MOUTH EVERY DAY 90 tablet 5       Allergies   Allergen Reactions    Penicillins Hives          Lab Results    Chemistry/lipid CBC Cardiac Enzymes/BNP/TSH/INR   Recent Labs   Lab Test 12/14/23  1008   CHOL 185   HDL 55   *   TRIG 118     Recent Labs   Lab Test 12/14/23  1008 02/27/23  0738 11/10/22  0824   * 114* 103*     Recent Labs   Lab Test 12/14/23  1008      POTASSIUM 4.6   CHLORIDE 104   CO2 29   GLC 89   BUN 19.5   CR 1.34*   GFRESTIMATED 40*   SHADI 9.9     Recent Labs   Lab Test 12/14/23  1008 12/11/23  1634 07/15/23  0303   CR 1.34* 1.22* 1.26*     Recent Labs   Lab Test 02/27/23  0738   A1C 5.9*          Recent Labs   Lab Test 12/11/23  1634   WBC 6.0   HGB 12.6   HCT 38.6   MCV 98        Recent Labs   Lab Test 12/11/23  1634 07/15/23  0303 06/26/23  1325   HGB 12.6 12.6 12.9    Recent Labs   Lab Test 11/27/21  1234 11/27/21  0843 11/27/21  0331   TROPONINI <0.01 <0.01 <0.01     Recent Labs   Lab Test 01/25/21  1355 10/14/20  6643  09/23/20 1920   * 344* 624*     Recent Labs   Lab Test 12/11/23  1634   TSH 10.20*     Recent Labs   Lab Test 01/25/21  1355 09/23/20 1920   INR 1.22* 1.37*        Jailene White PA-C

## 2024-01-30 ENCOUNTER — OFFICE VISIT (OUTPATIENT)
Dept: CARDIOLOGY | Facility: CLINIC | Age: 79
End: 2024-01-30
Payer: COMMERCIAL

## 2024-01-30 VITALS
SYSTOLIC BLOOD PRESSURE: 154 MMHG | RESPIRATION RATE: 16 BRPM | HEART RATE: 80 BPM | HEIGHT: 65 IN | OXYGEN SATURATION: 98 % | DIASTOLIC BLOOD PRESSURE: 70 MMHG | WEIGHT: 122 LBS | BODY MASS INDEX: 20.33 KG/M2

## 2024-01-30 DIAGNOSIS — I25.10 CORONARY ARTERIOSCLEROSIS: ICD-10-CM

## 2024-01-30 DIAGNOSIS — E78.00 PURE HYPERCHOLESTEROLEMIA: ICD-10-CM

## 2024-01-30 DIAGNOSIS — Z95.818 S/P MITRAL VALVE CLIP IMPLANTATION: ICD-10-CM

## 2024-01-30 DIAGNOSIS — Z98.890 S/P MITRAL VALVE CLIP IMPLANTATION: ICD-10-CM

## 2024-01-30 DIAGNOSIS — I47.29 NSVT (NONSUSTAINED VENTRICULAR TACHYCARDIA) (H): Primary | ICD-10-CM

## 2024-01-30 DIAGNOSIS — I48.0 PAF (PAROXYSMAL ATRIAL FIBRILLATION) (H): ICD-10-CM

## 2024-01-30 DIAGNOSIS — I10 ESSENTIAL HYPERTENSION: ICD-10-CM

## 2024-01-30 PROCEDURE — 99214 OFFICE O/P EST MOD 30 MIN: CPT | Performed by: STUDENT IN AN ORGANIZED HEALTH CARE EDUCATION/TRAINING PROGRAM

## 2024-01-30 RX ORDER — METOPROLOL SUCCINATE 50 MG/1
100 TABLET, EXTENDED RELEASE ORAL AT BEDTIME
Start: 2024-01-30 | End: 2024-02-12

## 2024-01-30 NOTE — PATIENT INSTRUCTIONS
Kelly Robledo,    It was a pleasure to see you today at the [facility].     My recommendations after this visit include:    - Increase metoprolol to 100 mg daily and try taking in the morning as your palpations are during the day. Let me know if you are feeling lightheaded or dizzy  - Schedule nuclear stress test given episodes of NVST  - Follow up with Dr. Conner as scheduled 5/24/24    - Please call [phone], if you have any questions or concerns      Jailene White PA-C

## 2024-01-30 NOTE — LETTER
1/30/2024    Selina Pozo MD  [address] [facility] [address]    RE: Kelly Robledo       Dear Colleague,     I had the pleasure of seeing [place] ULISSES Robledo in the [facility].    HEART CARE ENCOUNTER NOTE      [facility]  [phone]      Assessment/Recommendations   Assessment:   Paroxysmal atrial fibrillation: Status post PVI and left posterior AV accessory pathway ablation.  Patient is maintained on Eliquis for anticoagulation.  Had previously been on amiodarone which was discontinued after ablation.  Patient had been noting palpitations recently and underwent Holter monitor 12/15/2023 which showed no episodes of atrial fibrillation.  Mitral valve prolapse with severe mitral regurgitation: Status post mitral clip 2021 with most recent echocardiogram 12/14/2023 showing normal ejection fraction of 60 to 65% with normal right ventricular size and systolic function.  Residual mitral regurgitation after mitral clip and mild mitral valve stenosis.  Coronary artery disease: Moderate nonobstructive as seen on angiogram January 2021.  Denies exertional angina or dyspnea.  Hyperlipidemia: On rosuvastatin 40 mg daily and Zetia 10 mg daily.  Last lipids 12/14/2023 showed elevated LDL of 106.  Patient is open to considering PCSK9 inhibitor.  Patient has tried reaching out to insurance multiple times but was unable to get a hold of them.  Will continue trying.   Hypertension: Well-controlled on metoprolol 75 mg daily, lisinopril 10 mg daily.  Patient notes that blood pressures are always elevated at her doctor appointments.  At home typically in the 110s to low 130s.Reviewed BMP 12/14/2023 which is stable.  NSVT: Went to the ED in November 2023.  Telemetry had shown a 5 beat run of ventricular tachycardia.  Subsequent Holter monitor showed 2 runs of NSVT for 6 beats.  Palpitations:  Patient does endorse feeling fluttering/palpitations daily that were previously lasting a  "few seconds and now maybe 1 to 2 minutes.  Notes they are occurring throughout the day now.  Denies any symptoms at nighttime.  Notes they have possibly worsened since increasing metoprolol to 75 mg daily which she takes at nighttime.  Patient had a recent echocardiogram that showed normal EF 60 to 65%.  Patient denies any exertional chest pain or shortness of breath.  Given increased symptoms and history of NSVT we will pursue ischemic workup with nuclear stress test.  Will further increase metoprolol to 100 mg daily and recommended patient take this in the morning as her symptoms are in the morning and throughout the day.      Plan:   Increase metoprolol to 100 mg daily and switch this to taking in the morning.  Monitor for any lightheadedness.  Schedule nuclear stress test for ischemic workup given NSVT noted on monitoring patient now having increased symptoms of more palpitations and \"weird feeling in her chest\"  Reach out to insurance regarding PCSK9 inhibitor cost/coverage  Follow-up with Dr. Conner 5/24/2024          The level of medical decision making during this visit was of moderate complexity.    Follow up with Dr. Conner 5/24/2024     History of Present Illness/Subjective    HPI: Kelly Robledo is a 78 year old female with PMHx of paroxysmal atrial fibrillation, mitral valve prolapse status post mitral clip in 2021, moderate nonobstructive coronary artery disease, hyperlipidemia, hypertension presents for follow-up. Patient had been in the hospital in November with fast heart beat sensation and on telemetry had a 5 beat run of ventricular tachycardia. Holter monitor showed 2 runs of NSVT for 6 beats.  I last saw patient 1/8/2024 where she had been noting palpitations that were occurring more in the morning but only lasting a couple seconds.  We had increased metoprolol to 75 mg daily.    Patient is now noting a weird sensation in her chest.  Does endorse palpitations that are increasing in frequency. "  Feels her pulse noting it is irregular which is causing her to feel anxious.  Patient notes that is lasting longer maybe 1 to 2 minutes now for seconds and happening throughout the day.  She denies any episode at nighttime noting she sleeps well.  Continues to deny any shortness of breath or chest pain with exertion.  Denies any accompanying shortness of breath when she gets this sensation. She denies fatigue, lightheadedness, shortness of breath, dyspnea on exertion, orthopnea, PND, chest pain, abdominal fullness/bloating, and lower extremity edema.        Echocardiogram 12/14/2023 results:  Left ventricular size, wall motion and function are normal. The ejection  fraction is 60-65%.  Normal right ventricle size and systolic function.  MitraClip is present with stable bileaflet insertion.  There is mild (+1) residual mitral regurgitation after MitraClip.  There is mild mitral valve stenosis across MitraClip.  The left atrium is severely dilated.  The right atrium is mildly dilated.  There is mild to moderate (1-2+) tricuspid regurgitation.  IVC diameter <2.1 cm collapsing >50% with sniff suggests a normal RA pressure  of 3 mmHg.    Holter monitor 12/15/2023:  Holter monitoring (duration 24hrs).  Predominant underlying rhythm was sinus rhythm, 67 to 83bpm, average 74bpm.  No sustained tachyarrhythmias.  No atrial fibrillation.  There were no pauses of greater than 3 seconds.  Rare supraventricular ectopic beats (<1%).  Rare premature ventricular contractions (<1%). Two runs of NSVT were noted, longest being 6 beats  Symptom triggers - none.    US carotid bilateral 12/14/2023:  1.  Mild plaque formation, velocities consistent with less than 50% stenosis in the right internal carotid artery.  2.  Mild plaque formation, velocities consistent with less than 50% stenosis in the left internal carotid artery.  3.  Flow within the vertebral arteries is antegrade.        Coronary angiogram 1/13/2021:  Left main with 30%  "ostial narrowing  LAD with mild to moderate diffuse disease, and a 50% stenosis in the midportion, that appears unchanged from her prior angiogram  Ramus with a 50% proximal to mid stenosis and mild disease distally  LCx is a small vessel with no significant obtuse marginals noted  RCA is a large caliber, dominant vessel supplying the inferior and posterior walls.  There is 40% mid to distal focal stenosis, also unchanged from prior.     Physical Examination  Review of Systems   Vitals: BP (!) 154/70 (BP Location: Right arm, Patient Position: Sitting, Cuff Size: Adult Regular)   Pulse 80   Resp 16   Ht 1.651 m (5' 5\")   Wt 55.3 kg (122 lb)   LMP  (LMP Unknown)   SpO2 98%   BMI 20.30 kg/m    BMI= Body mass index is 20.3 kg/m .  Wt Readings from Last 3 Encounters:   01/30/24 55.3 kg (122 lb)   01/08/24 55.6 kg (122 lb 9.6 oz)   12/12/23 54.9 kg (121 lb)           ENT/Mouth: membranes moist, no oral lesions or bleeding gums.      EYES:  no scleral icterus, normal conjunctivae       Chest/Lungs:   lungs are clear to auscultation, no rales or wheezing,  equal chest wall expansion    Cardiovascular:   Regular. Normal first and second heart sounds with systolic murmur, no gallops; the carotid, radial pulses are intact,  no edema bilaterally        Extremities: no cyanosis or clubbing   Skin: no xanthelasma, warm.        Psychiatric: alert and oriented x3, calm        Please refer above for cardiac ROS details.        Medical History  Surgical History Family History Social History   Past Medical History:   Diagnosis Date    Abdominal pain     Atrial fibrillation (H)     Diverticulosis     Moderate mitral regurgitation     NSVT (nonsustained ventricular tachycardia) (H) 6/17/2020    Palpitations      Past Surgical History:   Procedure Laterality Date    BIOPSY BREAST Bilateral     benign    CV CORONARY ANGIOGRAM N/A 6/18/2020    Procedure: Coronary Angiogram;  Surgeon: Sergey Conner MD;  Location: [facility] Cath Lab;  Service: Cardiology    CV CORONARY ANGIOGRAM N/A 1/13/2021    Procedure: Coronary Angiogram;  Surgeon: Klaus Fitzpatrick MD;  Location: [facility] Cardiac Cath Lab;  Service: Cardiology    CV LEFT HEART CATHETERIZATION WITHOUT LEFT VENTRICULOGRAM Left 6/18/2020    Procedure: Left Heart Catheterization Without Left Ventriculogram;  Surgeon: Sergey Conner MD;  Location: [facility] Cath Lab;  Service: Cardiology    CV RIGHT HEART CATHETERIZATION N/A 6/18/2020    Procedure: Right Heart Catheterization;  Surgeon: Sergey Conner MD;  Location: [facility] Cath Lab;  Service: Cardiology    CV RIGHT HEART CATHETERIZATION N/A 1/13/2021    Procedure: Right Heart Catheterization;  Surgeon: Klaus Fitzpatrick MD;  Location: [facility] Cardiac Cath Lab;  Service: Cardiology    EP ABLATION PULMONARY VEIN ISOLATION N/A 7/3/2023    Procedure: Ablation Atrial Fibrillation;  Surgeon: Kathleen Hernandez MD;  Location: [facility] CV    HYSTERECTOMY  1970's    OOPHORECTOMY Bilateral 1970's    TRANSCATHETER MITRAL VALVE REPAIR N/A 2/1/2021    Procedure: CV MITRAL CLIP;  Surgeon: Klaus Fitzpatrick MD;  Location: [facility] CARDIAC CATH LAB     Family History   Problem Relation Age of Onset    Breast Cancer Sister 47.00        Social History     Socioeconomic History    Marital status:      Spouse name: Not on file    Number of children: Not on file    Years of education: Not on file    Highest education level: Not on file   Occupational History    Not on file   Tobacco Use    Smoking status: Never    Smokeless tobacco: Never   Vaping Use    Vaping Use: Never used   Substance and Sexual Activity    Alcohol use: No    Drug use: No    Sexual activity: Not on file   Other Topics Concern    Parent/sibling w/ CABG, MI or angioplasty before 65F 55M? Not Asked   Social History Narrative    . No children. Family in the [place] area.   Enjoys walking- very regular with this especially in [place] when  they used to vacation there for 6 months of the year.   Likes reading - romantic happy endings.   Never alcohol or smoking.  Selina Pozo MD       Social Determinants of Health     Financial Resource Strain: Not on file   Food Insecurity: Not on file   Transportation Needs: Not on file   Physical Activity: Not on file   Stress: Not on file   Social Connections: Not on file   Interpersonal Safety: Not on file   Housing Stability: Not on file           Medications  Allergies   Current Outpatient Medications   Medication Sig Dispense Refill    apixaban ANTICOAGULANT (ELIQUIS) 2.5 MG tablet Take 1 tablet (2.5 mg) by mouth 2 times daily 180 tablet 0    clindamycin (CLEOCIN) 300 MG capsule Take 2 capsules (600 mg total) by mouth once as needed (30-60 minutes prior to any dental visit. Save remaining for next visit. 4 capsule 1    ezetimibe (ZETIA) 10 MG tablet TAKE 1 TABLET (10 MG) BY MOUTH DAILY. 90 tablet 3    lisinopril (ZESTRIL) 10 MG tablet Take 1 tablet (10 mg) by mouth every 24 hours 90 tablet 3    metoprolol succinate ER (TOPROL XL) 50 MG 24 hr tablet Take 1.5 tablets (75 mg) by mouth at bedtime      rosuvastatin (CRESTOR) 40 MG tablet TAKE 1 TABLET BY MOUTH EVERY DAY 90 tablet 5       Allergies   Allergen Reactions    Penicillins Hives          Lab Results    Chemistry/lipid CBC Cardiac Enzymes/BNP/TSH/INR   Recent Labs   Lab Test 12/14/23  1008   CHOL 185   HDL 55   *   TRIG 118     Recent Labs   Lab Test 12/14/23  1008 02/27/23  0738 11/10/22  0824   * 114* 103*     Recent Labs   Lab Test 12/14/23  1008      POTASSIUM 4.6   CHLORIDE 104   CO2 29   GLC 89   BUN 19.5   CR 1.34*   GFRESTIMATED 40*   SHADI 9.9     Recent Labs   Lab Test 12/14/23  1008 12/11/23  1634 07/15/23  0303   CR 1.34* 1.22* 1.26*     Recent Labs   Lab Test 02/27/23  0738   A1C 5.9*          Recent Labs   Lab Test 12/11/23  1634   WBC 6.0   HGB 12.6   HCT 38.6   MCV 98        Recent Labs   Lab Test  12/11/23  1634 07/15/23  0303 06/26/23  1325   HGB 12.6 12.6 12.9    Recent Labs   Lab Test 11/27/21  1234 11/27/21  0843 11/27/21  0331   TROPONINI <0.01 <0.01 <0.01     Recent Labs   Lab Test 01/25/21  1355 10/14/20  1319 09/23/20  1920   * 344* 624*     Recent Labs   Lab Test 12/11/23  1634   TSH 10.20*     Recent Labs   Lab Test 01/25/21  1355 09/23/20  1920   INR 1.22* 1.37*        Jailene White PA-C            Thank you for allowing me to participate in the care of your patient.      Sincerely,     Jailene White PA-C     [facility]  cc:   No referring provider defined for this encounter.

## 2024-02-01 ENCOUNTER — PATIENT OUTREACH (OUTPATIENT)
Dept: CARE COORDINATION | Facility: CLINIC | Age: 79
End: 2024-02-01
Payer: COMMERCIAL

## 2024-02-09 ENCOUNTER — HOSPITAL ENCOUNTER (OUTPATIENT)
Dept: NUCLEAR MEDICINE | Facility: CLINIC | Age: 79
Discharge: HOME | End: 2024-02-09
Attending: STUDENT IN AN ORGANIZED HEALTH CARE EDUCATION/TRAINING PROGRAM
Payer: COMMERCIAL

## 2024-02-09 ENCOUNTER — HOSPITAL ENCOUNTER (OUTPATIENT)
Dept: CARDIOLOGY | Facility: CLINIC | Age: 79
Discharge: HOME | End: 2024-02-09
Attending: STUDENT IN AN ORGANIZED HEALTH CARE EDUCATION/TRAINING PROGRAM
Payer: COMMERCIAL

## 2024-02-09 DIAGNOSIS — I47.29 NSVT (NONSUSTAINED VENTRICULAR TACHYCARDIA) (H): ICD-10-CM

## 2024-02-09 LAB
CV STRESS CURRENT BP HE: NORMAL
CV STRESS CURRENT HR HE: 100
CV STRESS CURRENT HR HE: 75
CV STRESS CURRENT HR HE: 76
CV STRESS CURRENT HR HE: 77
CV STRESS CURRENT HR HE: 88
CV STRESS CURRENT HR HE: 96
CV STRESS CURRENT HR HE: 98
CV STRESS DEVIATION TIME HE: NORMAL
CV STRESS ECHO PERCENT HR HE: NORMAL
CV STRESS EXERCISE STAGE HE: NORMAL
CV STRESS FINAL RESTING BP HE: NORMAL
CV STRESS FINAL RESTING HR HE: 98
CV STRESS MAX HR HE: 102
CV STRESS MAX TREADMILL GRADE HE: 0
CV STRESS MAX TREADMILL SPEED HE: 0
CV STRESS PEAK DIA BP HE: NORMAL
CV STRESS PEAK SYS BP HE: NORMAL
CV STRESS PHASE HE: NORMAL
CV STRESS PROTOCOL HE: NORMAL
CV STRESS RESTING PT POSITION HE: NORMAL
CV STRESS ST DEVIATION AMOUNT HE: NORMAL
CV STRESS ST DEVIATION ELEVATION HE: NORMAL
CV STRESS ST EVELATION AMOUNT HE: NORMAL
CV STRESS TEST TYPE HE: NORMAL
CV STRESS TOTAL STAGE TIME MIN 1 HE: NORMAL
NUC STRESS EJECTION FRACTION: 70 %
RATE PRESSURE PRODUCT: NORMAL
STRESS ECHO BASELINE DIASTOLIC HE: 92
STRESS ECHO BASELINE HR: 77
STRESS ECHO BASELINE SYSTOLIC BP: 169
STRESS ECHO CALCULATED PERCENT HR: 73 %
STRESS ECHO LAST STRESS DIASTOLIC BP: 70
STRESS ECHO LAST STRESS HR: 100
STRESS ECHO LAST STRESS SYSTOLIC BP: 144
STRESS ECHO TARGET HR: 140

## 2024-02-09 PROCEDURE — A9500 TC99M SESTAMIBI: HCPCS | Performed by: STUDENT IN AN ORGANIZED HEALTH CARE EDUCATION/TRAINING PROGRAM

## 2024-02-09 PROCEDURE — 93016 CV STRESS TEST SUPVJ ONLY: CPT | Performed by: INTERNAL MEDICINE

## 2024-02-09 PROCEDURE — 343N000001 HC RX 343: Performed by: STUDENT IN AN ORGANIZED HEALTH CARE EDUCATION/TRAINING PROGRAM

## 2024-02-09 PROCEDURE — 78452 HT MUSCLE IMAGE SPECT MULT: CPT | Mod: 26 | Performed by: INTERNAL MEDICINE

## 2024-02-09 PROCEDURE — 93017 CV STRESS TEST TRACING ONLY: CPT

## 2024-02-09 PROCEDURE — 93018 CV STRESS TEST I&R ONLY: CPT | Performed by: INTERNAL MEDICINE

## 2024-02-09 PROCEDURE — 250N000011 HC RX IP 250 OP 636: Performed by: STUDENT IN AN ORGANIZED HEALTH CARE EDUCATION/TRAINING PROGRAM

## 2024-02-09 PROCEDURE — 78452 HT MUSCLE IMAGE SPECT MULT: CPT

## 2024-02-09 RX ORDER — CAFFEINE 200 MG
200 TABLET ORAL
Status: DISCONTINUED | OUTPATIENT
Start: 2024-02-09 | End: 2024-02-09 | Stop reason: HOSPADM

## 2024-02-09 RX ORDER — REGADENOSON 0.08 MG/ML
0.4 INJECTION, SOLUTION INTRAVENOUS ONCE
Status: COMPLETED | OUTPATIENT
Start: 2024-02-09 | End: 2024-02-09

## 2024-02-09 RX ORDER — CAFFEINE CITRATE 20 MG/ML
60 SOLUTION INTRAVENOUS
Status: DISCONTINUED | OUTPATIENT
Start: 2024-02-09 | End: 2024-02-09 | Stop reason: HOSPADM

## 2024-02-09 RX ORDER — AMINOPHYLLINE 25 MG/ML
50 INJECTION, SOLUTION INTRAVENOUS
Status: DISCONTINUED | OUTPATIENT
Start: 2024-02-09 | End: 2024-02-09 | Stop reason: HOSPADM

## 2024-02-09 RX ORDER — ALBUTEROL SULFATE 0.83 MG/ML
2.5 SOLUTION RESPIRATORY (INHALATION)
Status: DISCONTINUED | OUTPATIENT
Start: 2024-02-09 | End: 2024-02-09 | Stop reason: HOSPADM

## 2024-02-09 RX ADMIN — Medication 7.99 MILLICURIE: at 08:02

## 2024-02-09 RX ADMIN — Medication 33 MILLICURIE: at 08:45

## 2024-02-09 RX ADMIN — REGADENOSON 0.4 MG: 0.08 INJECTION, SOLUTION INTRAVENOUS at 08:42

## 2024-02-12 ENCOUNTER — TELEPHONE (OUTPATIENT)
Dept: CARDIOLOGY | Facility: CLINIC | Age: 79
End: 2024-02-12
Payer: COMMERCIAL

## 2024-02-12 DIAGNOSIS — I48.0 PAF (PAROXYSMAL ATRIAL FIBRILLATION) (H): ICD-10-CM

## 2024-02-12 RX ORDER — METOPROLOL SUCCINATE 100 MG/1
100 TABLET, EXTENDED RELEASE ORAL AT BEDTIME
Qty: 90 TABLET | Refills: 3 | Status: SHIPPED | OUTPATIENT
Start: 2024-02-12

## 2024-02-12 NOTE — TELEPHONE ENCOUNTER
Called Kelly and updated her that refill would be sent. Rx changed to 1x 100 mg tablet for ease. -[facility]

## 2024-02-12 NOTE — TELEPHONE ENCOUNTER
[facility] Call Center    Phone Message    May a detailed message be left on voicemail: yes     Reason for Call: Medication Refill Request    Has the patient contacted the pharmacy for the refill? Yes   Name of medication being requested: metoprolol succinate ER (TOPROL XL) 50 MG 24 hr tablet   Provider who prescribed the medication: Arcelia White  Pharmacy:   [id number] - [place], [place] - [address]     Date medication is needed: ASAP    NOTE:  script was changed but not sent to pharmacy.  Pt is almost out now that it's been doubles     Action Taken: Message routed to:  [facility] ([facility] cardio    Travel Screening: Not Applicable     Thank you!  [facility]

## 2024-03-14 RX ORDER — ALBUTEROL SULFATE 0.83 MG/ML
2.5 SOLUTION RESPIRATORY (INHALATION) ONCE
Status: COMPLETED | OUTPATIENT
Start: 2024-03-15 | End: 2024-03-15

## 2024-03-15 ENCOUNTER — HOSPITAL ENCOUNTER (OUTPATIENT)
Dept: RESPIRATORY THERAPY | Facility: CLINIC | Age: 79
Discharge: HOME | End: 2024-03-15
Attending: INTERNAL MEDICINE | Admitting: INTERNAL MEDICINE
Payer: COMMERCIAL

## 2024-03-15 DIAGNOSIS — Z91.89 AT RISK FOR AMIODARONE TOXICITY WITH LONG TERM USE: Primary | ICD-10-CM

## 2024-03-15 DIAGNOSIS — Z79.899 AT RISK FOR AMIODARONE TOXICITY WITH LONG TERM USE: Primary | ICD-10-CM

## 2024-03-15 LAB — HGB BLD-MCNC: 12.6 G/DL (ref 11.7–15.7)

## 2024-03-15 PROCEDURE — 94726 PLETHYSMOGRAPHY LUNG VOLUMES: CPT | Mod: 26 | Performed by: INTERNAL MEDICINE

## 2024-03-15 PROCEDURE — 36415 COLL VENOUS BLD VENIPUNCTURE: CPT | Performed by: INTERNAL MEDICINE

## 2024-03-15 PROCEDURE — 94729 DIFFUSING CAPACITY: CPT | Mod: 26 | Performed by: INTERNAL MEDICINE

## 2024-03-15 PROCEDURE — 94729 DIFFUSING CAPACITY: CPT

## 2024-03-15 PROCEDURE — 250N000009 HC RX 250: Performed by: INTERNAL MEDICINE

## 2024-03-15 PROCEDURE — 999N000157 HC STATISTIC RCP TIME EA 10 MIN

## 2024-03-15 PROCEDURE — 85018 HEMOGLOBIN: CPT | Performed by: INTERNAL MEDICINE

## 2024-03-15 PROCEDURE — 94060 EVALUATION OF WHEEZING: CPT

## 2024-03-15 PROCEDURE — 94060 EVALUATION OF WHEEZING: CPT | Mod: 26 | Performed by: INTERNAL MEDICINE

## 2024-03-15 PROCEDURE — 94726 PLETHYSMOGRAPHY LUNG VOLUMES: CPT

## 2024-03-15 RX ADMIN — ALBUTEROL SULFATE 2.5 MG: 2.5 SOLUTION RESPIRATORY (INHALATION) at 07:18

## 2024-03-15 NOTE — PROGRESS NOTES
RESPIRATORY CARE NOTE    Complete Pulmonary Function Test completed by patient.  Good patient effort and cooperation. Albuterol 2.5 mg neb given for bronchodilation.  The results of this test meet the ATS standards for acceptability and repeatability. PT returned to baseline and left in no distress.    Ursula Gutierrez, RT  3/15/2024

## 2024-03-21 LAB
DLCOCOR-%PRED-PRE: 68 %
DLCOCOR-PRE: 12.89 ML/MIN/MMHG
DLCOUNC-%PRED-PRE: 66 %
DLCOUNC-PRE: 12.56 ML/MIN/MMHG
DLCOUNC-PRED: 18.76 ML/MIN/MMHG
ERV-%PRED-PRE: 71 %
ERV-PRE: 0.64 L
ERV-PRED: 0.9 L
EXPTIME-PRE: 6.45 SEC
FEF2575-%PRED-POST: 45 %
FEF2575-%PRED-PRE: 44 %
FEF2575-POST: 0.71 L/SEC
FEF2575-PRE: 0.71 L/SEC
FEF2575-PRED: 1.58 L/SEC
FEFMAX-%PRED-PRE: 67 %
FEFMAX-PRE: 3.34 L/SEC
FEFMAX-PRED: 4.92 L/SEC
FEV1-%PRED-PRE: 63 %
FEV1-PRE: 1.26 L
FEV1FEV6-PRE: 68 %
FEV1FEV6-PRED: 78 %
FEV1FVC-PRE: 67 %
FEV1FVC-PRED: 77 %
FEV1SVC-PRE: 66 %
FEV1SVC-PRED: 65 %
FIFMAX-PRE: 1.33 L/SEC
FRCPLETH-%PRED-PRE: 127 %
FRCPLETH-PRE: 3.55 L
FRCPLETH-PRED: 2.78 L
FVC-%PRED-PRE: 71 %
FVC-PRE: 1.88 L
FVC-PRED: 2.62 L
IC-%PRED-PRE: 70 %
IC-PRE: 1.27 L
IC-PRED: 1.8 L
RVPLETH-%PRED-PRE: 127 %
RVPLETH-PRE: 2.91 L
RVPLETH-PRED: 2.27 L
TLCPLETH-%PRED-PRE: 94 %
TLCPLETH-PRE: 4.82 L
TLCPLETH-PRED: 5.11 L
VA-%PRED-PRE: 85 %
VA-PRE: 3.94 L
VC-%PRED-PRE: 62 %
VC-PRE: 1.92 L
VC-PRED: 3.06 L

## 2024-04-26 ENCOUNTER — OFFICE VISIT (OUTPATIENT)
Dept: PULMONOLOGY | Facility: CLINIC | Age: 79
End: 2024-04-26
Payer: COMMERCIAL

## 2024-04-26 VITALS
WEIGHT: 124.2 LBS | BODY MASS INDEX: 20.67 KG/M2 | SYSTOLIC BLOOD PRESSURE: 161 MMHG | HEART RATE: 85 BPM | OXYGEN SATURATION: 98 % | DIASTOLIC BLOOD PRESSURE: 88 MMHG

## 2024-04-26 DIAGNOSIS — Z91.89 AT RISK FOR AMIODARONE TOXICITY WITH LONG TERM USE: ICD-10-CM

## 2024-04-26 DIAGNOSIS — Z79.899 AT RISK FOR AMIODARONE TOXICITY WITH LONG TERM USE: ICD-10-CM

## 2024-04-26 DIAGNOSIS — R94.2 ABNORMAL PFT: Primary | ICD-10-CM

## 2024-04-26 PROCEDURE — 99214 OFFICE O/P EST MOD 30 MIN: CPT | Performed by: INTERNAL MEDICINE

## 2024-04-26 NOTE — NURSING NOTE
Patient to follow up with Primary Care provider regarding elevated blood pressure. Patient will monitor at home and notify PCP if stays elevated.  Taiwo PINTO CMA, [facility]

## 2024-04-26 NOTE — LETTER
4/26/2024         RE: Kelly Robledo  [address] [place] [address]        Dear Colleague,    Thank you for referring your patient, Kelly Robledo, to the [facility]. Please see a copy of my visit note below.    Pulmonary Clinic Follow-up Visit    Impression: 78F with a history of atrial fibrillation on amiodarone presents for annual follow up for lung exam and PFTs to monitor for amiodarone pulmonary toxicity. She remains in excellent health and without any concerning respiratory symptoms. CT chest from June 2022 did not show any evidence of pulmonary toxicity (although this was not a high resolution scan). PFTs from last month showed slight declined in FEV1. TLC has remained stable for the last 2 years. Dlco has also declined which may be due in part to underlying pulmonary vascular disease. She has known severe LA dilatation and pulmonary hypertension (mild) based on her last echocardiogram. Diastolic function likely not assessable given presence of a MitraClip device. At this point she is asymptomatic from a respiratory standpoint and has remained off amiodarone for several years.    Of note, her BP was elevated today; recommend she follow up with her PMD and/or cardiologist to discuss her BP medications.     In terms of vaccinations, she appears to be UTD. She should consider getting prevnar 20 since it has been >5 years since her last pneumococcal vaccination. Defer to her PMD.     Follow up in 1 year. No PFTs needed.  All questions answered.     Mario Alberto Gutiérrez MD (Avi)  [facility] ([facility])  Clinic [phone]  Fax [phone]      CCx: history of amiodarone use; annual visit with PFTs    HPI: Interim history: I last saw Kelly on 3/27/2023. Since that time, she reports she's doing very well. No respiratory issues. Remains active. BP elevated today.    ROS:  A 12-system review was obtained and was negative with the exception  of the symptoms endorsed in the history of present illness.    PMH:  Past Medical History:   Diagnosis Date     Abdominal pain      Atrial fibrillation (H)      Diverticulosis      Moderate mitral regurgitation      NSVT (nonsustained ventricular tachycardia) (H) 6/17/2020     Palpitations        PSH:  Past Surgical History:   Procedure Laterality Date     BIOPSY BREAST Bilateral     benign     CV CORONARY ANGIOGRAM N/A 6/18/2020    Procedure: Coronary Angiogram;  Surgeon: Sergey Conner MD;  Location: [facility] Cath Lab;  Service: Cardiology     CV CORONARY ANGIOGRAM N/A 1/13/2021    Procedure: Coronary Angiogram;  Surgeon: Klaus Fitzpatrick MD;  Location: [facility] Cardiac Cath Lab;  Service: Cardiology     CV LEFT HEART CATHETERIZATION WITHOUT LEFT VENTRICULOGRAM Left 6/18/2020    Procedure: Left Heart Catheterization Without Left Ventriculogram;  Surgeon: Sergey Conner MD;  Location: [facility] Cath Lab;  Service: Cardiology     CV RIGHT HEART CATHETERIZATION N/A 6/18/2020    Procedure: Right Heart Catheterization;  Surgeon: Sergey Conner MD;  Location: [facility] Cath Lab;  Service: Cardiology     CV RIGHT HEART CATHETERIZATION N/A 1/13/2021    Procedure: Right Heart Catheterization;  Surgeon: Klaus Fitzpatrick MD;  Location: [facility] Cardiac Cath Lab;  Service: Cardiology     EP ABLATION PULMONARY VEIN ISOLATION N/A 7/3/2023    Procedure: Ablation Atrial Fibrillation;  Surgeon: Kathleen Hernandez MD;  Location: [facility] CV     HYSTERECTOMY  1970's     OOPHORECTOMY Bilateral 1970's     TRANSCATHETER MITRAL VALVE REPAIR N/A 2/1/2021    Procedure: CV MITRAL CLIP;  Surgeon: Klaus Fitzpatrick MD;  Location: [facility] CARDIAC CATH LAB       Allergies:  Allergies   Allergen Reactions     Penicillins Hives       Family HX:  Family History   Problem Relation Age of Onset     Breast Cancer Sister 47.00       Social Hx:  Social History     Socioeconomic History     Marital  status:      Spouse name: Not on file     Number of children: Not on file     Years of education: Not on file     Highest education level: Not on file   Occupational History     Not on file   Tobacco Use     Smoking status: Never     Smokeless tobacco: Never   Vaping Use     Vaping status: Never Used   Substance and Sexual Activity     Alcohol use: No     Drug use: No     Sexual activity: Not on file   Other Topics Concern     Parent/sibling w/ CABG, MI or angioplasty before 65F 55M? Not Asked   Social History Narrative    . No children. Family in the [place] area.   Enjoys walking- very regular with this especially in [place] when they used to vacation there for 6 months of the year.   Likes reading - romantic happy endings.   Never alcohol or smoking.  Selina Pozo MD       Social Determinants of Health     Financial Resource Strain: Not on file   Food Insecurity: Not on file   Transportation Needs: Not on file   Physical Activity: Not on file   Stress: Not on file   Social Connections: Not on file   Interpersonal Safety: Not on file   Housing Stability: Not on file       Current Meds:  Current Outpatient Medications   Medication Sig Dispense Refill     apixaban ANTICOAGULANT (ELIQUIS) 2.5 MG tablet Take 1 tablet (2.5 mg) by mouth 2 times daily 180 tablet 0     clindamycin (CLEOCIN) 300 MG capsule Take 2 capsules (600 mg total) by mouth once as needed (30-60 minutes prior to any dental visit. Save remaining for next visit. 4 capsule 1     ezetimibe (ZETIA) 10 MG tablet TAKE 1 TABLET (10 MG) BY MOUTH DAILY. 90 tablet 3     lisinopril (ZESTRIL) 10 MG tablet Take 1 tablet (10 mg) by mouth every 24 hours 90 tablet 3     metoprolol succinate ER (TOPROL XL) 100 MG 24 hr tablet Take 1 tablet (100 mg) by mouth at bedtime 90 tablet 3     rosuvastatin (CRESTOR) 40 MG tablet TAKE 1 TABLET BY MOUTH EVERY DAY 90 tablet 5       Physical Exam:  LMP  (LMP Unknown)   Gen: awake, alert, oriented, no distress  HEENT: nasal  turbinates are unremarkable, no oropharyngeal lesions, no cervical or supraclavicular lymphadenopathy  CV: RRR, no M/G/R  Resp: CTAB, no focal crackles or wheezes  Skin: no apparent rashes  Ext: no cyanosis, clubbing or edema  Neuro: alert, nonfocal    Labs:  reviewed    Imaging studies:  EXAM: CT CHEST W/O CONTRAST  LOCATION: [facility]  DATE/TIME: 6/10/2022 10:04 AM     INDICATION: Amiodarone toxicity monitoring. Baseline imaging.  COMPARISON: None.  TECHNIQUE: CT chest without IV contrast. Multiplanar reformats were obtained. Dose reduction techniques were used.  CONTRAST: None.     FINDINGS:   LUNGS AND PLEURA: Benign calcified granulomas right lower lobe with benign calcified right hilar and right mediastinal nodes from previous old granulomatous disease. Focal cluster of tiny nodules in the right upper lobe most likely infectious or   inflammatory in etiology given the pattern. Favor more chronic but not calcified. Lungs otherwise clear, nothing suggestive of amiodarone pulmonary toxicity related infiltrates..     MEDIASTINUM/AXILLAE: No lymphadenopathy. No thoracic aortic aneurysm.     CORONARY ARTERY CALCIFICATION: Moderate.     UPPER ABDOMEN: No significant finding.     MUSCULOSKELETAL: Unremarkable.                                                                      IMPRESSION:   1.  Nothing concerning for amiodarone pulmonary toxicity.     2.  Benign calcified granulomas right lower lobe and benign calcified hilar nodes and right mediastinal nodes.     3.  Focal tiny cluster of nodules right upper lobe most likely from prior infection or inflammatory change.    Echo Dec 2023  Interpretation Summary     Left ventricular size, wall motion and function are normal. The ejection  fraction is 60-65%.  Normal right ventricle size and systolic function.  MitraClip is present with stable bileaflet insertion.  There is mild (+1) residual mitral regurgitation after MitraClip.  There is  mild mitral valve stenosis across MitraClip.  The left atrium is severely dilated.  The right atrium is mildly dilated.  There is mild to moderate (1-2+) tricuspid regurgitation.  IVC diameter <2.1 cm collapsing >50% with sniff suggests a normal RA pressure  of 3 mmHg.    Pulmonary Function Testing  3/15/2024  FEV1 1.26L, 63%  FVC 71%  Ratio 0.67 (LLN 0.62)  No BD response  TLC 4.82L, 94%  Dlco 68% yady for hgb  Flow volume curve suggests small airways disease    3/16/2023  FEV1 1.45L 71%  FVC 75%  No BD response  Ratio 0.72  TLC 4.2L, 82%  DLco 74% yady for hgb  Compared to prior testing from 2022, slight decline in DLco corected, improvement in spirometry and very slight decline in TLC; the latter is still considered within normal limits.    Previous testing 2022  TLC 4.42L, 86%  DLco yady 85%       Again, thank you for allowing me to participate in the care of your patient.        Sincerely,        Mario Alberto Gutiérrez MD

## 2024-04-26 NOTE — PROGRESS NOTES
Pulmonary Clinic Follow-up Visit    Impression: 78F with a history of atrial fibrillation on amiodarone presents for annual follow up for lung exam and PFTs to monitor for amiodarone pulmonary toxicity. She remains in excellent health and without any concerning respiratory symptoms. CT chest from June 2022 did not show any evidence of pulmonary toxicity (although this was not a high resolution scan). PFTs from last month showed slight declined in FEV1. TLC has remained stable for the last 2 years. Dlco has also declined which may be due in part to underlying pulmonary vascular disease. She has known severe LA dilatation and pulmonary hypertension (mild) based on her last echocardiogram. Diastolic function likely not assessable given presence of a MitraClip device. At this point she is asymptomatic from a respiratory standpoint and has remained off amiodarone for several years.    Of note, her BP was elevated today; recommend she follow up with her PMD and/or cardiologist to discuss her BP medications.     In terms of vaccinations, she appears to be UTD. She should consider getting prevnar 20 since it has been >5 years since her last pneumococcal vaccination. Defer to her PMD.     Follow up in 1 year. No PFTs needed.  All questions answered.     Mario Alberto Gutiérrez MD (Avi)  [facility] ([facility])  Clinic [phone]  Fax [phone]      CCx: history of amiodarone use; annual visit with PFTs    HPI: Interim history: I last saw Kelly on 3/27/2023. Since that time, she reports she's doing very well. No respiratory issues. Remains active. BP elevated today.    ROS:  A 12-system review was obtained and was negative with the exception of the symptoms endorsed in the history of present illness.    PMH:  Past Medical History:   Diagnosis Date    Abdominal pain     Atrial fibrillation (H)     Diverticulosis     Moderate mitral regurgitation     NSVT (nonsustained ventricular  tachycardia) (H) 6/17/2020    Palpitations        PSH:  Past Surgical History:   Procedure Laterality Date    BIOPSY BREAST Bilateral     benign    CV CORONARY ANGIOGRAM N/A 6/18/2020    Procedure: Coronary Angiogram;  Surgeon: Sergey Conner MD;  Location: [facility] Cath Lab;  Service: Cardiology    CV CORONARY ANGIOGRAM N/A 1/13/2021    Procedure: Coronary Angiogram;  Surgeon: Klaus Fitzpatrick MD;  Location: [facility] Cardiac Cath Lab;  Service: Cardiology    CV LEFT HEART CATHETERIZATION WITHOUT LEFT VENTRICULOGRAM Left 6/18/2020    Procedure: Left Heart Catheterization Without Left Ventriculogram;  Surgeon: Sergey Conner MD;  Location: [facility] Cath Lab;  Service: Cardiology    CV RIGHT HEART CATHETERIZATION N/A 6/18/2020    Procedure: Right Heart Catheterization;  Surgeon: Sergey Conner MD;  Location: [facility] Cath Lab;  Service: Cardiology    CV RIGHT HEART CATHETERIZATION N/A 1/13/2021    Procedure: Right Heart Catheterization;  Surgeon: Klaus Fitzpatrick MD;  Location: [facility] Cardiac Cath Lab;  Service: Cardiology    EP ABLATION PULMONARY VEIN ISOLATION N/A 7/3/2023    Procedure: Ablation Atrial Fibrillation;  Surgeon: Kathleen Hernandez MD;  Location: [facility] CV    HYSTERECTOMY  1970's    OOPHORECTOMY Bilateral 1970's    TRANSCATHETER MITRAL VALVE REPAIR N/A 2/1/2021    Procedure: CV MITRAL CLIP;  Surgeon: Klaus Fitzpatrick MD;  Location: [facility] CARDIAC CATH LAB       Allergies:  Allergies   Allergen Reactions    Penicillins Hives       Family HX:  Family History   Problem Relation Age of Onset    Breast Cancer Sister 47.00       Social Hx:  Social History     Socioeconomic History    Marital status:      Spouse name: Not on file    Number of children: Not on file    Years of education: Not on file    Highest education level: Not on file   Occupational History    Not on file   Tobacco Use    Smoking status: Never    Smokeless tobacco: Never    Vaping Use    Vaping status: Never Used   Substance and Sexual Activity    Alcohol use: No    Drug use: No    Sexual activity: Not on file   Other Topics Concern    Parent/sibling w/ CABG, MI or angioplasty before 65F 55M? Not Asked   Social History Narrative    . No children. Family in the [place] area.   Enjoys walking- very regular with this especially in [place] when they used to vacation there for 6 months of the year.   Likes reading - romantic happy endings.   Never alcohol or smoking.  Selina Pozo MD       Social Determinants of Health     Financial Resource Strain: Not on file   Food Insecurity: Not on file   Transportation Needs: Not on file   Physical Activity: Not on file   Stress: Not on file   Social Connections: Not on file   Interpersonal Safety: Not on file   Housing Stability: Not on file       Current Meds:  Current Outpatient Medications   Medication Sig Dispense Refill    apixaban ANTICOAGULANT (ELIQUIS) 2.5 MG tablet Take 1 tablet (2.5 mg) by mouth 2 times daily 180 tablet 0    clindamycin (CLEOCIN) 300 MG capsule Take 2 capsules (600 mg total) by mouth once as needed (30-60 minutes prior to any dental visit. Save remaining for next visit. 4 capsule 1    ezetimibe (ZETIA) 10 MG tablet TAKE 1 TABLET (10 MG) BY MOUTH DAILY. 90 tablet 3    lisinopril (ZESTRIL) 10 MG tablet Take 1 tablet (10 mg) by mouth every 24 hours 90 tablet 3    metoprolol succinate ER (TOPROL XL) 100 MG 24 hr tablet Take 1 tablet (100 mg) by mouth at bedtime 90 tablet 3    rosuvastatin (CRESTOR) 40 MG tablet TAKE 1 TABLET BY MOUTH EVERY DAY 90 tablet 5       Physical Exam:  LMP  (LMP Unknown)   Gen: awake, alert, oriented, no distress  HEENT: nasal turbinates are unremarkable, no oropharyngeal lesions, no cervical or supraclavicular lymphadenopathy  CV: RRR, no M/G/R  Resp: CTAB, no focal crackles or wheezes  Skin: no apparent rashes  Ext: no cyanosis, clubbing or edema  Neuro: alert,  nonfocal    Labs:  reviewed    Imaging studies:  EXAM: CT CHEST W/O CONTRAST  LOCATION: [facility]  DATE/TIME: 6/10/2022 10:04 AM     INDICATION: Amiodarone toxicity monitoring. Baseline imaging.  COMPARISON: None.  TECHNIQUE: CT chest without IV contrast. Multiplanar reformats were obtained. Dose reduction techniques were used.  CONTRAST: None.     FINDINGS:   LUNGS AND PLEURA: Benign calcified granulomas right lower lobe with benign calcified right hilar and right mediastinal nodes from previous old granulomatous disease. Focal cluster of tiny nodules in the right upper lobe most likely infectious or   inflammatory in etiology given the pattern. Favor more chronic but not calcified. Lungs otherwise clear, nothing suggestive of amiodarone pulmonary toxicity related infiltrates..     MEDIASTINUM/AXILLAE: No lymphadenopathy. No thoracic aortic aneurysm.     CORONARY ARTERY CALCIFICATION: Moderate.     UPPER ABDOMEN: No significant finding.     MUSCULOSKELETAL: Unremarkable.                                                                      IMPRESSION:   1.  Nothing concerning for amiodarone pulmonary toxicity.     2.  Benign calcified granulomas right lower lobe and benign calcified hilar nodes and right mediastinal nodes.     3.  Focal tiny cluster of nodules right upper lobe most likely from prior infection or inflammatory change.    Echo Dec 2023  Interpretation Summary     Left ventricular size, wall motion and function are normal. The ejection  fraction is 60-65%.  Normal right ventricle size and systolic function.  MitraClip is present with stable bileaflet insertion.  There is mild (+1) residual mitral regurgitation after MitraClip.  There is mild mitral valve stenosis across MitraClip.  The left atrium is severely dilated.  The right atrium is mildly dilated.  There is mild to moderate (1-2+) tricuspid regurgitation.  IVC diameter <2.1 cm collapsing >50% with sniff suggests a  normal RA pressure  of 3 mmHg.    Pulmonary Function Testing  3/15/2024  FEV1 1.26L, 63%  FVC 71%  Ratio 0.67 (LLN 0.62)  No BD response  TLC 4.82L, 94%  Dlco 68% yady for hgb  Flow volume curve suggests small airways disease    3/16/2023  FEV1 1.45L 71%  FVC 75%  No BD response  Ratio 0.72  TLC 4.2L, 82%  DLco 74% yady for hgb  Compared to prior testing from 2022, slight decline in DLco corected, improvement in spirometry and very slight decline in TLC; the latter is still considered within normal limits.    Previous testing 2022  TLC 4.42L, 86%  DLco yady 85%

## 2024-04-30 ENCOUNTER — OFFICE VISIT (OUTPATIENT)
Dept: FAMILY MEDICINE | Facility: CLINIC | Age: 79
End: 2024-04-30
Payer: COMMERCIAL

## 2024-04-30 VITALS
BODY MASS INDEX: 20.83 KG/M2 | SYSTOLIC BLOOD PRESSURE: 120 MMHG | HEART RATE: 83 BPM | HEIGHT: 65 IN | DIASTOLIC BLOOD PRESSURE: 70 MMHG | WEIGHT: 125 LBS | OXYGEN SATURATION: 96 %

## 2024-04-30 DIAGNOSIS — I25.10 CORONARY ARTERY DISEASE INVOLVING NATIVE CORONARY ARTERY OF NATIVE HEART WITHOUT ANGINA PECTORIS: ICD-10-CM

## 2024-04-30 DIAGNOSIS — M81.0 OSTEOPOROSIS, UNSPECIFIED OSTEOPOROSIS TYPE, UNSPECIFIED PATHOLOGICAL FRACTURE PRESENCE: ICD-10-CM

## 2024-04-30 DIAGNOSIS — I10 ESSENTIAL HYPERTENSION: ICD-10-CM

## 2024-04-30 DIAGNOSIS — I27.20 PULMONARY HYPERTENSION (H): ICD-10-CM

## 2024-04-30 DIAGNOSIS — Z95.818 S/P MITRAL VALVE CLIP IMPLANTATION: ICD-10-CM

## 2024-04-30 DIAGNOSIS — N18.31 STAGE 3A CHRONIC KIDNEY DISEASE (H): ICD-10-CM

## 2024-04-30 DIAGNOSIS — Z78.0 POSTMENOPAUSAL STATUS: ICD-10-CM

## 2024-04-30 DIAGNOSIS — I42.8 NON-ISCHEMIC CARDIOMYOPATHY (H): ICD-10-CM

## 2024-04-30 DIAGNOSIS — Z00.00 MEDICARE ANNUAL WELLNESS VISIT, SUBSEQUENT: Primary | ICD-10-CM

## 2024-04-30 DIAGNOSIS — I48.0 PAROXYSMAL ATRIAL FIBRILLATION (H): ICD-10-CM

## 2024-04-30 DIAGNOSIS — E03.8 SUBCLINICAL HYPOTHYROIDISM: ICD-10-CM

## 2024-04-30 DIAGNOSIS — Z98.890 S/P MITRAL VALVE CLIP IMPLANTATION: ICD-10-CM

## 2024-04-30 DIAGNOSIS — L65.9 HAIR LOSS: ICD-10-CM

## 2024-04-30 LAB
CREAT UR-MCNC: 168 MG/DL
ERYTHROCYTE [DISTWIDTH] IN BLOOD BY AUTOMATED COUNT: 13 % (ref 10–15)
HBA1C MFR BLD: 5.6 % (ref 0–5.6)
HCT VFR BLD AUTO: 39.7 % (ref 35–47)
HGB BLD-MCNC: 13 G/DL (ref 11.7–15.7)
MCH RBC QN AUTO: 32.6 PG (ref 26.5–33)
MCHC RBC AUTO-ENTMCNC: 32.7 G/DL (ref 31.5–36.5)
MCV RBC AUTO: 100 FL (ref 78–100)
MICROALBUMIN UR-MCNC: 136 MG/L
MICROALBUMIN/CREAT UR: 80.95 MG/G CR (ref 0–25)
PLATELET # BLD AUTO: 167 10E3/UL (ref 150–450)
RBC # BLD AUTO: 3.99 10E6/UL (ref 3.8–5.2)
WBC # BLD AUTO: 5 10E3/UL (ref 4–11)

## 2024-04-30 PROCEDURE — 84439 ASSAY OF FREE THYROXINE: CPT | Performed by: FAMILY MEDICINE

## 2024-04-30 PROCEDURE — 36415 COLL VENOUS BLD VENIPUNCTURE: CPT | Performed by: FAMILY MEDICINE

## 2024-04-30 PROCEDURE — 82728 ASSAY OF FERRITIN: CPT | Performed by: FAMILY MEDICINE

## 2024-04-30 PROCEDURE — 99214 OFFICE O/P EST MOD 30 MIN: CPT | Mod: 25 | Performed by: FAMILY MEDICINE

## 2024-04-30 PROCEDURE — 80053 COMPREHEN METABOLIC PANEL: CPT | Performed by: FAMILY MEDICINE

## 2024-04-30 PROCEDURE — 83036 HEMOGLOBIN GLYCOSYLATED A1C: CPT | Performed by: FAMILY MEDICINE

## 2024-04-30 PROCEDURE — G0439 PPPS, SUBSEQ VISIT: HCPCS | Performed by: FAMILY MEDICINE

## 2024-04-30 PROCEDURE — 80061 LIPID PANEL: CPT | Performed by: FAMILY MEDICINE

## 2024-04-30 PROCEDURE — 82570 ASSAY OF URINE CREATININE: CPT | Performed by: FAMILY MEDICINE

## 2024-04-30 PROCEDURE — 82043 UR ALBUMIN QUANTITATIVE: CPT | Performed by: FAMILY MEDICINE

## 2024-04-30 PROCEDURE — 84443 ASSAY THYROID STIM HORMONE: CPT | Performed by: FAMILY MEDICINE

## 2024-04-30 PROCEDURE — 85027 COMPLETE CBC AUTOMATED: CPT | Performed by: FAMILY MEDICINE

## 2024-04-30 RX ORDER — CLINDAMYCIN HCL 300 MG
CAPSULE ORAL
Qty: 4 CAPSULE | Refills: 1 | Status: SHIPPED | OUTPATIENT
Start: 2024-04-30

## 2024-04-30 SDOH — HEALTH STABILITY: PHYSICAL HEALTH: ON AVERAGE, HOW MANY MINUTES DO YOU ENGAGE IN EXERCISE AT THIS LEVEL?: 30 MIN

## 2024-04-30 SDOH — HEALTH STABILITY: PHYSICAL HEALTH: ON AVERAGE, HOW MANY DAYS PER WEEK DO YOU ENGAGE IN MODERATE TO STRENUOUS EXERCISE (LIKE A BRISK WALK)?: 7 DAYS

## 2024-04-30 ASSESSMENT — SOCIAL DETERMINANTS OF HEALTH (SDOH): HOW OFTEN DO YOU GET TOGETHER WITH FRIENDS OR RELATIVES?: THREE TIMES A WEEK

## 2024-04-30 NOTE — PROGRESS NOTES
Preventive Care Visit  [facility]  Selina Pozo MD, Family Medicine  Apr 30, 2024      Assessment & Plan       Medicare annual wellness visit, subsequent  - Home safety information was reviewed if pertinent for falls prevention: regular checkups with vision and hearing, mindful medication use rufina with OTCs, using any assisted walking devices, adequate shoes and feet wear, avoiding loose rugs, safety additions to the home if needed, keeping the body in good shape with regular exercise especially walking, and limiting alcohol intake.  - Social history was reviewed  - Patient is independent with activities of daily living  - We reviewed active symptoms and discussed management  - We reviewed list of healthcare providers for this patient.  - We also reviewed PHQ 9   - Hemoglobin A1c  - TSH with free T4 reflex  - Comprehensive metabolic panel  - CBC with platelets  - Lipid panel reflex to direct LDL Fasting    Encouraged PCV20, SHINGRIX and Tdap at her pharmacy; she indicates plans to schedule      Essential hypertension  Well controlled. She does tend to have whitecoat hypertension here in the office, last home number was 120/70 range.   - Continues higher dose metoprolol 100mg, lisinopril  10mg  - BMP today    - consider SGLT2 inhibitor for added BP control if needed with hx of CKD/ HF.     Nonrheumatic severe mitral valve regurgitation  s/p Mitral clip x1, residual mild-mod MR  Moderate TR  Pulmonary hypertension (H)  s/p clip x1, MR reduced to trace. Post op TTE with mild-moderate MR, mean gradient of 4mmHg.  Followed by cardiology and pulmonology. most recent echocardiogram 12/14/2023 showing normal ejection fraction of 60 to 65% with normal right ventricular size and systolic function.   - Eliquis 2.5mg bid (for age >80 and weight <60kg)  - follow up with valve clinic & cardiology   - clindamycin for abx dental ppx sent today     Paroxysmal atrial fibrillation  s/p cardiac  ablation 11/2023  HFpEF   Moderate non-obstructive CAD  Followed by cardiology. Does have worsening palpitations however work up somewhat nonspecific and increased metoprolol didn't make much improvement for her sx  -  PFTs /recent pulmonology visit completed; note reviewed  - TSH monitoring is performed.  Does have subclinical hypothyroidism as noted below.   - Eliquis   - consider SGLT2 inhibitor for added BP control with hx of CKD/ HF.     HLD, CAD  Stable;  last. Due today.   - rosuvstatin 20mg every day  - Zetia 10mg daily  - encouraged to add PCSK9 inhibitor at upcoming cardiology visit in May; goal LDL <70      Stage 3A CKD  BMP reviewed from Dec 2023; stable  - recheck BMP today  - avoid nephrotoxics  - microalbumin   - consider SGLT2 inhibitor for added BP control with hx of CKD/ HF.       Subclinical hypothyroidism  subclinical hypothyroidism based on previously noted/elevated TSH and normal T4; starting in June 2020 PRIOR to initiation of amiodarone, though possibly impacted by this medication given flucuations. Her cardiologist Dr. Kim and I had discussed her options and we both feel she should not yet be started on T4 supplementation. I spent time again today to review the dx, options for tx when TSH is >10 in her age, and our concerns. Kelly was made aware of our recommendations to not treat this given her risk factors of CAD, tendency for tachyarrhythemia at baseline; increasing risk of angina or arrhythmia further if on thyroid replacment. She is comfortable with the plan  - has been off amiodarone since Nov 2023  - TSH today    Postmenopausal status  Osteoporosis, unspecified osteoporosis type, unspecified pathological fracture presence  Her last DEXA was 7/27/2018 when she was on a Fosamax holiday, showed she had osteopenia.  She has previously completed several years of Fosamax, most recently from July 2018 to November 2019.  She declines IV medication.  She is also had hormone  replacement therapy in the past. She declines going back to the osteoporosis clinic at this time.    - DEXA done in 12/2021; ordered for this year  - Encourage calcium, vitamin D and weightbearing activity     Hair loss  Stablized; will recheck ferritin  - Ferritin                Counseling  Appropriate preventive services were discussed with this patient, including applicable screening as appropriate for fall prevention, nutrition, physical activity, Tobacco-use cessation, weight loss and cognition.  Checklist reviewing preventive services available has been given to the patient.  Reviewed patient's diet, addressing concerns and/or questions.   Patient reported safety concerns were addressed today.      Nikki Mendoza is a 78 year old, presenting for the following:  Wellness Visit (Not fasting, no concerns)        4/30/2024     1:58 PM   Additional Questions   Roomed by Julianne RAGYOZA LPN         Health Care Directive  Patient does not have a Health Care Directive or Living Will: Patient states has Advance Directive and will bring in a copy to clinic.    HPI  Chief Complaint   Patient presents with    Wellness Visit     Not fasting, no concerns       HTN:Well-controlled on metoprolol now at 100 mg daily since Jan , lisinopril 10 mg daily.  Patient notes that blood pressures are always elevated at her doctor appointments. BP 120s/70s.  Continues on the lisinopril and metoprolol as well. BMP from 12/14/2023 stable.     Followed by cardiology for multiple conditions:    Afib and recent NSVT: event in Nov 2023; showing 5 beat run of SVT. Holter monitor showed a couple runs for 6 beats. She had a successful cardiac ablation and amiodarone was stopped at that time 6 months ago. Getting some palpitations but the recent increase in metoprolol seemed to make them worse and not better.   Never was a good sleeper  Avoids caffeine  No heartburn    Patient has history of mitral prolapse and mitral valve clip implantation  "(02/01/2021). Patient is anticoagulated on Eliquis. Status post mitral clip 2021 with most recent echocardiogram 12/14/2023 showing normal ejection fraction of 60 to 65% with normal right ventricular size and systolic function.      Denies any shortness of breath; due for amiodorone PFTs; recent visit with pulmonology       Hyperlipidemia: On rosuvastatin 40 mg daily and Zetia 10 mg daily.  Last lipids 12/14/2023 showed elevated LDL of 106.  Goal <70. Patient is open to considering PCSK9 inhibitor but not having luck getting through to insurance when she calls a couple times already.        She has osteoporosis and completed 2 years of alendronate (tolerated the severe nausea for that long before desiring to stop); declines for now; last DEXA in 12/2021     She takes care of all the shopping, cooking, cleaning. Her  does the finances. His health is declining; \"bad legs\"     Hair loss has stopped but continues to not really grow back ; last ferritin 46     Social History     Social History Narrative    . No children. Family in the [place] area.   Enjoys walking- very regular with this especially in [place] when they used to vacation there for 6 months of the year.   Likes reading - romantic happy endings.   Never alcohol or smoking.  Selina Pozo MD             4/30/2024   General Health   How would you rate your overall physical health? Good   Feel stress (tense, anxious, or unable to sleep) Not at all         4/30/2024   Nutrition   Diet: Low salt         4/30/2024   Exercise   Days per week of moderate/strenous exercise 7 days   Average minutes spent exercising at this level 30 min         4/30/2024   Social Factors   Frequency of gathering with friends or relatives Three times a week   Worry food won't last until get money to buy more No   Food not last or not have enough money for food? No   Do you have housing?  Yes   Are you worried about losing your housing? No   Lack of transportation? No   Unable " to get utilities (heat,electricity)? No         4/30/2024   Fall Risk   Fallen 2 or more times in the past year? No   Trouble with walking or balance? No          4/30/2024   Activities of Daily Living- Home Safety   Needs help with the following daily activites None of the above   Safety concerns in the home Throw rugs in the hallway         4/30/2024   Dental   Dentist two times every year? Yes         4/30/2024   Hearing Screening   Hearing concerns? None of the above         4/30/2024   Driving Risk Screening   Patient/family members have concerns about driving No         4/30/2024   General Alertness/Fatigue Screening   Have you been more tired than usual lately? No         4/30/2024   Urinary Incontinence Screening   Bothered by leaking urine in past 6 months No         4/30/2024   TB Screening   Were you born outside of the [place]? No         Today's PHQ-2 Score:       4/30/2024     2:02 PM   PHQ-2 ( 1999 [employer])   Q1: Little interest or pleasure in doing things 0   Q2: Feeling down, depressed or hopeless 0   PHQ-2 Score 0   Q1: Little interest or pleasure in doing things Not at all   Q2: Feeling down, depressed or hopeless Not at all   PHQ-2 Score 0           4/30/2024   Substance Use   Alcohol more than 3/day or more than 7/wk Not Applicable   Do you have a current opioid prescription? No   How severe/bad is pain from 1 to 10? 0/10 (No Pain)   Do you use any other substances recreationally? No     Social History     Tobacco Use    Smoking status: Never     Passive exposure: Past (Dad was a smoker)    Smokeless tobacco: Never   Vaping Use    Vaping status: Never Used   Substance Use Topics    Alcohol use: No    Drug use: No           10/28/2022   LAST FHS-7 RESULTS   1st degree relative breast or ovarian cancer Yes   Any relative bilateral breast cancer No   Any male have breast cancer No   Any ONE woman have BOTH breast AND ovarian cancer No   Any woman with breast cancer before 50yrs Yes   2 or more relatives  "with breast AND/OR ovarian cancer No   2 or more relatives with breast AND/OR bowel cancer No                      Reviewed and updated as needed this visit by Provider   Tobacco  Allergies  Meds  Problems  Med Hx  Surg Hx  Fam Hx              Current providers sharing in care for this patient include:  Patient Care Team:  Selina Pozo MD as PCP - General (Family Medicine)  Selina Pozo MD as Assigned PCP  Mario Alberto Gutiérrez MD as Assigned Pulmonology Provider  Fifi Barboza APRN CNP as Assigned Heart and Vascular Provider  Jailene White PA-C as Physician Assistant (Physician Assistant - Medical)    The following health maintenance items are reviewed in Epic and correct as of today:  Health Maintenance   Topic Date Due    HF ACTION PLAN  Never done    ZOSTER IMMUNIZATION (1 of 2) Never done    DTAP/TDAP/TD IMMUNIZATION (2 - Td or Tdap) 12/19/2023    COVID-19 Vaccine (8 - 2023-24 season) 02/03/2024    MEDICARE ANNUAL WELLNESS VISIT  02/17/2024    MICROALBUMIN  02/27/2024    BMP  06/14/2024    ALT  07/15/2024    LIPID  12/14/2024    ANNUAL REVIEW OF HM ORDERS  04/30/2025    FALL RISK ASSESSMENT  04/30/2025    CBC  04/30/2025    ADVANCE CARE PLANNING  12/10/2026    GLUCOSE  12/14/2026    DEXA  12/27/2036    TSH W/FREE T4 REFLEX  Completed    PHQ-2 (once per calendar year)  Completed    INFLUENZA VACCINE  Completed    Pneumococcal Vaccine: 65+ Years  Completed    URINALYSIS  Completed    RSV VACCINE (Pregnancy & 60+)  Completed    IPV IMMUNIZATION  Aged Out    HPV IMMUNIZATION  Aged Out    MENINGITIS IMMUNIZATION  Aged Out    RSV MONOCLONAL ANTIBODY  Aged Out    MAMMO SCREENING  Discontinued    HEMOGLOBIN  Discontinued            Objective    Exam  BP (!) 168/80 (BP Location: Left arm, Patient Position: Sitting, Cuff Size: Adult Regular)   Pulse 83   Ht 1.651 m (5' 5\")   Wt 56.7 kg (125 lb)   LMP  (LMP Unknown)   SpO2 96%   BMI 20.80 kg/m     Estimated body mass index is 20.8 " "kg/m  as calculated from the following:    Height as of this encounter: 1.651 m (5' 5\").    Weight as of this encounter: 56.7 kg (125 lb).    Physical Exam  GENERAL: alert and no distress  EYES: Eyes grossly normal to inspection, PERRL and conjunctivae and sclerae normal  HENT: ear canals and TM's normal, nose and mouth without ulcers or lesions  NECK: no adenopathy, no asymmetry, masses, or scars  RESP: lungs clear to auscultation - no rales, rhonchi or wheezes  BREAST: declined  CV: regular rate and rhythm, normal S1 S2, no S3 or S4, no murmur, click or rub, no peripheral edema  ABDOMEN: soft, nontender, no hepatosplenomegaly, no masses and bowel sounds normal  MS: no gross musculoskeletal defects noted, no edema  SKIN: no suspicious lesions or rashes  NEURO: Normal strength and tone, mentation intact and speech normal  PSYCH: mentation appears normal, affect normal/bright         4/30/2024   Mini Cog   Clock Draw Score 2 Normal   3 Item Recall 1 object recalled (later recalled 1 other word)   Mini Cog Total Score 3          Signed Electronically by: Selina Pozo MD    "

## 2024-04-30 NOTE — PATIENT INSTRUCTIONS
Get the PCV20 (pneumonia vaccine) at [facility]    Schedule Shingrix* (shingles) and Tdap (tetanus/whooping cough) vaccination as well       Ask Dr Conner about the PCSK9 inhibitor again

## 2024-05-01 LAB
ALBUMIN SERPL BCG-MCNC: 4.3 G/DL (ref 3.5–5.2)
ALP SERPL-CCNC: 64 U/L (ref 40–150)
ALT SERPL W P-5'-P-CCNC: 35 U/L (ref 0–50)
ANION GAP SERPL CALCULATED.3IONS-SCNC: 10 MMOL/L (ref 7–15)
AST SERPL W P-5'-P-CCNC: 45 U/L (ref 0–45)
BILIRUB SERPL-MCNC: 0.6 MG/DL
BUN SERPL-MCNC: 18.7 MG/DL (ref 8–23)
CALCIUM SERPL-MCNC: 9.5 MG/DL (ref 8.8–10.2)
CHLORIDE SERPL-SCNC: 106 MMOL/L (ref 98–107)
CHOLEST SERPL-MCNC: 163 MG/DL
CREAT SERPL-MCNC: 1.21 MG/DL (ref 0.51–0.95)
DEPRECATED HCO3 PLAS-SCNC: 27 MMOL/L (ref 22–29)
EGFRCR SERPLBLD CKD-EPI 2021: 45 ML/MIN/1.73M2
FASTING STATUS PATIENT QL REPORTED: NO
FERRITIN SERPL-MCNC: 34 NG/ML (ref 11–328)
GLUCOSE SERPL-MCNC: 118 MG/DL (ref 70–99)
HDLC SERPL-MCNC: 51 MG/DL
LDLC SERPL CALC-MCNC: 82 MG/DL
NONHDLC SERPL-MCNC: 112 MG/DL
POTASSIUM SERPL-SCNC: 4.2 MMOL/L (ref 3.4–5.3)
PROT SERPL-MCNC: 6.9 G/DL (ref 6.4–8.3)
SODIUM SERPL-SCNC: 143 MMOL/L (ref 135–145)
T4 FREE SERPL-MCNC: 1.07 NG/DL (ref 0.9–1.7)
TRIGL SERPL-MCNC: 150 MG/DL
TSH SERPL DL<=0.005 MIU/L-ACNC: 6.12 UIU/ML (ref 0.3–4.2)

## 2024-05-24 ENCOUNTER — OFFICE VISIT (OUTPATIENT)
Dept: CARDIOLOGY | Facility: CLINIC | Age: 79
End: 2024-05-24
Attending: STUDENT IN AN ORGANIZED HEALTH CARE EDUCATION/TRAINING PROGRAM
Payer: COMMERCIAL

## 2024-05-24 VITALS
HEART RATE: 83 BPM | SYSTOLIC BLOOD PRESSURE: 168 MMHG | DIASTOLIC BLOOD PRESSURE: 78 MMHG | WEIGHT: 122.7 LBS | BODY MASS INDEX: 20.42 KG/M2 | OXYGEN SATURATION: 94 % | RESPIRATION RATE: 20 BRPM

## 2024-05-24 DIAGNOSIS — I48.0 PAROXYSMAL ATRIAL FIBRILLATION (H): ICD-10-CM

## 2024-05-24 DIAGNOSIS — E78.5 HYPERLIPIDEMIA LDL GOAL <100: Primary | ICD-10-CM

## 2024-05-24 DIAGNOSIS — I47.20 V-TACH (H): ICD-10-CM

## 2024-05-24 PROCEDURE — G2211 COMPLEX E/M VISIT ADD ON: HCPCS | Performed by: INTERNAL MEDICINE

## 2024-05-24 PROCEDURE — 99204 OFFICE O/P NEW MOD 45 MIN: CPT | Performed by: INTERNAL MEDICINE

## 2024-05-24 NOTE — LETTER
5/24/2024    Selina Pozo MD  [address] [facility] [address]    RE: Kelly Robledo       Dear Colleague,     I had the pleasure of seeing Kelly Robledo in the [facility].    Thank you, Dr. White, for asking the [facility] team to see Ms. Kelly Robledo to evaluate       Assessment/Recommendations   Assessment/Plan:  Palpitations with dyspnea returned last couple days with dyspnea - possible return of afib vs VT, obtain 3 lead holter for 48 hour ASAP.  Discuss reesults with Dr. Hernandez if return VT or afib and use of amiodarone (noted elevated Cr leans against use of sotalol)  MS mild on echo but could be more significant if tachyarrythmia  MR s/p clip mild on last echo and exam  CAD on statin/zetia  CV prevention - goal is at least <100 if not <70, she will work on diet and repeat lipids in 2 months, can consider bempadoic acid     Follow up 4 mo      History of Present Illness/Subjective    Ms. Kelly Robledo is a 78 year old female with PAF, CKD (Cr 1.21),  MR s/p clip with residual midl stenosis 5mmHg mild MR (on echo 12/23), CAD mod, HTN, NSVT, stress nuclear neg 2/23, 12/23 hotler 24 hour neg SVT/Af/VT, with return of palpitations last week/couple days, with dyspnea, no chest pain/pressure, syncopal events, otherwise feels fantastic.  No GI/ bleeding.  BP at home 123/78  and 125/74 today. No fatigue/tired symptoms.  No PND/orthopnea.  Afib ablation 7/23.      Med: eliquis 2.5mg bid, metoprolol 100mg, rosuvastatin 40mg, lisinopril 10mg, zetia 10mg, clindamycin prior to dental (allergy to PCN).       Physical Examination Review of Systems   BP (!) 168/78 (BP Location: Left arm, Patient Position: Sitting, Cuff Size: Adult Regular)   Pulse 83   Resp 20   Wt 55.7 kg (122 lb 11.2 oz)   LMP  (LMP Unknown)   SpO2 94%   BMI 20.42 kg/m    Body mass index is 20.42 kg/m .  Wt Readings from Last 3 Encounters:   05/24/24 55.7 kg (122 lb 11.2 oz)   04/30/24  56.7 kg (125 lb)   04/26/24 56.3 kg (124 lb 3.2 oz)     [unfilled]  General Appearance:   no distress, normal body habitus   ENT/Mouth: membranes moist, no oral lesions or bleeding gums.      EYES:  no scleral icterus, normal conjunctivae   Neck: no carotid bruits or thyromegaly   Chest/Lungs:   lungs are clear to auscultation, no rales or wheezing,  sternal scar, equal chest wall expansion    Cardiovascular:   Regular. Normal first and second heart sounds with no murmurs, rubs, or gallops; the carotid, radial and posterior tibial pulses are intact, Jugular venous pressure , edema bilaterally    Abdomen:  no organomegaly, masses, bruits, or tenderness; bowel sounds are present   Extremities: no cyanosis or clubbing   Skin: no xanthelasma, warm.    Neurologic: normal  bilateral, no tremors     Psychiatric: alert and oriented x3, calm     Review of Systems - 12 points nega other than above      Medical History  Surgical History Family History Social History   Past Medical History:   Diagnosis Date    Abdominal pain     Atrial fibrillation (H)     Diverticulosis     Moderate mitral regurgitation     NSVT (nonsustained ventricular tachycardia) (H) 6/17/2020    Palpitations     Past Surgical History:   Procedure Laterality Date    BIOPSY BREAST Bilateral     benign    CV CORONARY ANGIOGRAM N/A 6/18/2020    Procedure: Coronary Angiogram;  Surgeon: Sergey Conner MD;  Location: [facility] Cath Lab;  Service: Cardiology    CV CORONARY ANGIOGRAM N/A 1/13/2021    Procedure: Coronary Angiogram;  Surgeon: Klaus Fitzpatrick MD;  Location: [facility] Cardiac Cath Lab;  Service: Cardiology    CV LEFT HEART CATHETERIZATION WITHOUT LEFT VENTRICULOGRAM Left 6/18/2020    Procedure: Left Heart Catheterization Without Left Ventriculogram;  Surgeon: Sergey Conner MD;  Location: [facility] Cath Lab;  Service: Cardiology    CV RIGHT HEART CATHETERIZATION N/A 6/18/2020    Procedure: Right Heart Catheterization;   Surgeon: Sergey Conner MD;  Location: [facility] Cath Lab;  Service: Cardiology    CV RIGHT HEART CATHETERIZATION N/A 1/13/2021    Procedure: Right Heart Catheterization;  Surgeon: Klaus Fitzpatrick MD;  Location: [facility] Cardiac Cath Lab;  Service: Cardiology    EP ABLATION PULMONARY VEIN ISOLATION N/A 7/3/2023    Procedure: Ablation Atrial Fibrillation;  Surgeon: Kathleen Hernandez MD;  Location: [facility] CATH LAB CV    HYSTERECTOMY  1970's    OOPHORECTOMY Bilateral 1970's    TRANSCATHETER MITRAL VALVE REPAIR N/A 2/1/2021    Procedure: CV MITRAL CLIP;  Surgeon: Klaus Fitzpatrick MD;  Location: [facility] CARDIAC CATH LAB    Family History   Problem Relation Age of Onset    Breast Cancer Sister 47.00    Social History     Socioeconomic History    Marital status:      Spouse name: Not on file    Number of children: Not on file    Years of education: Not on file    Highest education level: Not on file   Occupational History    Not on file   Tobacco Use    Smoking status: Never     Passive exposure: Past (Dad was a smoker)    Smokeless tobacco: Never   Vaping Use    Vaping status: Never Used   Substance and Sexual Activity    Alcohol use: No    Drug use: No    Sexual activity: Not on file   Other Topics Concern    Parent/sibling w/ CABG, MI or angioplasty before 65F 55M? Not Asked   Social History Narrative    . No children. Family in the [place] area.   Enjoys walking- very regular with this especially in [place] when they used to vacation there for 6 months of the year.   Likes reading - romantic happy endings.   Never alcohol or smoking.  Selina Pozo MD       Social Determinants of Health     Financial Resource Strain: Low Risk  (4/30/2024)    Financial Resource Strain     Within the past 12 months, have you or your family members you live with been unable to get utilities (heat, electricity) when it was really needed?: No   Food Insecurity: Low Risk  (4/30/2024)    Food Insecurity      Within the past 12 months, did you worry that your food would run out before you got money to buy more?: No     Within the past 12 months, did the food you bought just not last and you didn t have money to get more?: No   Transportation Needs: Low Risk  (4/30/2024)    Transportation Needs     Within the past 12 months, has lack of transportation kept you from medical appointments, getting your medicines, non-medical meetings or appointments, work, or from getting things that you need?: No   Physical Activity: Sufficiently Active (4/30/2024)    Exercise Vital Sign     Days of Exercise per Week: 7 days     Minutes of Exercise per Session: 30 min   Stress: No Stress Concern Present (4/30/2024)    [employer] [place] [employer] - Occupational Stress Questionnaire     Feeling of Stress : Not at all   Social Connections: Unknown (4/30/2024)    Social Connection and Isolation Panel [NHANES]     Frequency of Communication with Friends and Family: Not on file     Frequency of Social Gatherings with Friends and Family: Three times a week     Attends Yazidi Services: Not on file     Active Member of Clubs or Organizations: Not on file     Attends Club or Organization Meetings: Not on file     Marital Status: Not on file   Interpersonal Safety: Not on file   Housing Stability: Low Risk  (4/30/2024)    Housing Stability     Do you have housing? : Yes     Are you worried about losing your housing?: No          Medications  Allergies   Scheduled Meds:  No current facility-administered medications for this visit.     Continuous Infusions:  No current facility-administered medications for this visit.     PRN Meds:.  No current facility-administered medications for this visit.    Allergies   Allergen Reactions    Penicillins Hives         Lab Results    Chemistry/lipid CBC Cardiac Enzymes/BNP/TSH/INR   Lab Results   Component Value Date    CHOL 163 04/30/2024    HDL 51 04/30/2024    TRIG 150 (H) 04/30/2024    BUN 18.7  04/30/2024     04/30/2024    CO2 27 04/30/2024    Lab Results   Component Value Date    WBC 5.0 04/30/2024    HGB 13.0 04/30/2024    HCT 39.7 04/30/2024     04/30/2024     04/30/2024    Lab Results   Component Value Date    TROPONINI <0.01 11/27/2021     (H) 01/25/2021    TSH 6.12 (H) 04/30/2024    INR 1.22 (H) 01/25/2021              Sergey Conner MD  Interventional Cardiology  [facility]              Thank you for allowing me to participate in the care of your patient.      Sincerely,     Sergey Conner MD     [facility]  cc:   Jailene White PA-C  [address] [place] [address]

## 2024-05-24 NOTE — PATIENT INSTRUCTIONS
Holter to check your heart rhythm ? Atrial fibrillation vs other?    Avoid ice cream (I'm sorry) for two months and other high cholesterol foods to see if impact on LDL - and repeat blood test fasting in 2 months    Consider if still high: bempedoic acid ( prescription)  or can trial berberine 500mg twice a day (over the counter) and finally can add repatha or praluent (PCSK9 inhibitor) injection medication

## 2024-05-24 NOTE — PROGRESS NOTES
Thank you, Dr. White, for asking the [facility] team to see Ms. Kelly Robledo to evaluate       Assessment/Recommendations   Assessment/Plan:  Palpitations with dyspnea returned last couple days with dyspnea - possible return of afib vs VT, obtain 3 lead holter for 48 hour ASAP.  Discuss reesults with Dr. Hernandez if return VT or afib and use of amiodarone (noted elevated Cr leans against use of sotalol)  MS mild on echo but could be more significant if tachyarrythmia  MR s/p clip mild on last echo and exam  CAD on statin/zetia  CV prevention - goal is at least <100 if not <70, she will work on diet and repeat lipids in 2 months, can consider bempadoic acid     Follow up 4 mo      History of Present Illness/Subjective    Ms. Kelly Robledo is a 78 year old female with PAF, CKD (Cr 1.21),  MR s/p clip with residual midl stenosis 5mmHg mild MR (on echo 12/23), CAD mod, HTN, NSVT, stress nuclear neg 2/23, 12/23 hotler 24 hour neg SVT/Af/VT, with return of palpitations last week/couple days, with dyspnea, no chest pain/pressure, syncopal events, otherwise feels fantastic.  No GI/ bleeding.  BP at home 123/78  and 125/74 today. No fatigue/tired symptoms.  No PND/orthopnea.  Afib ablation 7/23.      Med: eliquis 2.5mg bid, metoprolol 100mg, rosuvastatin 40mg, lisinopril 10mg, zetia 10mg, clindamycin prior to dental (allergy to PCN).       Physical Examination Review of Systems   BP (!) 168/78 (BP Location: Left arm, Patient Position: Sitting, Cuff Size: Adult Regular)   Pulse 83   Resp 20   Wt 55.7 kg (122 lb 11.2 oz)   LMP  (LMP Unknown)   SpO2 94%   BMI 20.42 kg/m    Body mass index is 20.42 kg/m .  Wt Readings from Last 3 Encounters:   05/24/24 55.7 kg (122 lb 11.2 oz)   04/30/24 56.7 kg (125 lb)   04/26/24 56.3 kg (124 lb 3.2 oz)     [unfilled]  General Appearance:   no distress, normal body habitus   ENT/Mouth: membranes moist, no oral lesions or bleeding gums.      EYES:  no scleral  icterus, normal conjunctivae   Neck: no carotid bruits or thyromegaly   Chest/Lungs:   lungs are clear to auscultation, no rales or wheezing,  sternal scar, equal chest wall expansion    Cardiovascular:   Regular. Normal first and second heart sounds with no murmurs, rubs, or gallops; the carotid, radial and posterior tibial pulses are intact, Jugular venous pressure , edema bilaterally    Abdomen:  no organomegaly, masses, bruits, or tenderness; bowel sounds are present   Extremities: no cyanosis or clubbing   Skin: no xanthelasma, warm.    Neurologic: normal  bilateral, no tremors     Psychiatric: alert and oriented x3, calm     Review of Systems - 12 points nega other than above      Medical History  Surgical History Family History Social History   Past Medical History:   Diagnosis Date    Abdominal pain     Atrial fibrillation (H)     Diverticulosis     Moderate mitral regurgitation     NSVT (nonsustained ventricular tachycardia) (H) 6/17/2020    Palpitations     Past Surgical History:   Procedure Laterality Date    BIOPSY BREAST Bilateral     benign    CV CORONARY ANGIOGRAM N/A 6/18/2020    Procedure: Coronary Angiogram;  Surgeon: Sergey Conner MD;  Location: [facility] Cath Lab;  Service: Cardiology    CV CORONARY ANGIOGRAM N/A 1/13/2021    Procedure: Coronary Angiogram;  Surgeon: Klaus Fitzpatrick MD;  Location: [facility] Cardiac Cath Lab;  Service: Cardiology    CV LEFT HEART CATHETERIZATION WITHOUT LEFT VENTRICULOGRAM Left 6/18/2020    Procedure: Left Heart Catheterization Without Left Ventriculogram;  Surgeon: Sergey Conner MD;  Location: [facility] Cath Lab;  Service: Cardiology    CV RIGHT HEART CATHETERIZATION N/A 6/18/2020    Procedure: Right Heart Catheterization;  Surgeon: Sergey Conner MD;  Location: [facility] Cath Lab;  Service: Cardiology    CV RIGHT HEART CATHETERIZATION N/A 1/13/2021    Procedure: Right Heart Catheterization;  Surgeon: Klaus Fitzpatrick MD;   Location: [facility] Cardiac Cath Lab;  Service: Cardiology    EP ABLATION PULMONARY VEIN ISOLATION N/A 7/3/2023    Procedure: Ablation Atrial Fibrillation;  Surgeon: Kathleen Hernandez MD;  Location: [facility] CATH LAB CV    HYSTERECTOMY  1970's    OOPHORECTOMY Bilateral 1970's    TRANSCATHETER MITRAL VALVE REPAIR N/A 2/1/2021    Procedure: CV MITRAL CLIP;  Surgeon: Klaus Fitzpatrick MD;  Location:  HEART CARDIAC CATH LAB    Family History   Problem Relation Age of Onset    Breast Cancer Sister 47.00    Social History     Socioeconomic History    Marital status:      Spouse name: Not on file    Number of children: Not on file    Years of education: Not on file    Highest education level: Not on file   Occupational History    Not on file   Tobacco Use    Smoking status: Never     Passive exposure: Past (Dad was a smoker)    Smokeless tobacco: Never   Vaping Use    Vaping status: Never Used   Substance and Sexual Activity    Alcohol use: No    Drug use: No    Sexual activity: Not on file   Other Topics Concern    Parent/sibling w/ CABG, MI or angioplasty before 65F 55M? Not Asked   Social History Narrative    . No children. Family in the [place] area.   Enjoys walking- very regular with this especially in [place] when they used to vacation there for 6 months of the year.   Likes reading - romantic happy endings.   Never alcohol or smoking.  Selina Pozo MD       Social Determinants of Health     Financial Resource Strain: Low Risk  (4/30/2024)    Financial Resource Strain     Within the past 12 months, have you or your family members you live with been unable to get utilities (heat, electricity) when it was really needed?: No   Food Insecurity: Low Risk  (4/30/2024)    Food Insecurity     Within the past 12 months, did you worry that your food would run out before you got money to buy more?: No     Within the past 12 months, did the food you bought just not last and you didn t have money to get more?:  No   Transportation Needs: Low Risk  (4/30/2024)    Transportation Needs     Within the past 12 months, has lack of transportation kept you from medical appointments, getting your medicines, non-medical meetings or appointments, work, or from getting things that you need?: No   Physical Activity: Sufficiently Active (4/30/2024)    Exercise Vital Sign     Days of Exercise per Week: 7 days     Minutes of Exercise per Session: 30 min   Stress: No Stress Concern Present (4/30/2024)    [employer] [place] [employer]ational [employer] - Occupational Stress Questionnaire     Feeling of Stress : Not at all   Social Connections: Unknown (4/30/2024)    Social Connection and Isolation Panel [NHANES]     Frequency of Communication with Friends and Family: Not on file     Frequency of Social Gatherings with Friends and Family: Three times a week     Attends Judaism Services: Not on file     Active Member of Clubs or Organizations: Not on file     Attends Club or Organization Meetings: Not on file     Marital Status: Not on file   Interpersonal Safety: Not on file   Housing Stability: Low Risk  (4/30/2024)    Housing Stability     Do you have housing? : Yes     Are you worried about losing your housing?: No          Medications  Allergies   Scheduled Meds:  No current facility-administered medications for this visit.     Continuous Infusions:  No current facility-administered medications for this visit.     PRN Meds:.  No current facility-administered medications for this visit.    Allergies   Allergen Reactions    Penicillins Hives         Lab Results    Chemistry/lipid CBC Cardiac Enzymes/BNP/TSH/INR   Lab Results   Component Value Date    CHOL 163 04/30/2024    HDL 51 04/30/2024    TRIG 150 (H) 04/30/2024    BUN 18.7 04/30/2024     04/30/2024    CO2 27 04/30/2024    Lab Results   Component Value Date    WBC 5.0 04/30/2024    HGB 13.0 04/30/2024    HCT 39.7 04/30/2024     04/30/2024     04/30/2024    Lab Results    Component Value Date    TROPONINI <0.01 11/27/2021     (H) 01/25/2021    TSH 6.12 (H) 04/30/2024    INR 1.22 (H) 01/25/2021              Sergey Conner MD  Interventional Cardiology  [facility]

## 2024-05-30 ENCOUNTER — HOSPITAL ENCOUNTER (OUTPATIENT)
Dept: CARDIOLOGY | Facility: CLINIC | Age: 79
Discharge: HOME | End: 2024-05-30
Attending: INTERNAL MEDICINE | Admitting: INTERNAL MEDICINE
Payer: COMMERCIAL

## 2024-05-30 DIAGNOSIS — I47.20 V-TACH (H): ICD-10-CM

## 2024-05-30 DIAGNOSIS — I48.0 PAROXYSMAL ATRIAL FIBRILLATION (H): ICD-10-CM

## 2024-05-30 PROCEDURE — 93225 XTRNL ECG REC<48 HRS REC: CPT

## 2024-06-06 DIAGNOSIS — E78.5 HYPERLIPIDEMIA: ICD-10-CM

## 2024-06-06 DIAGNOSIS — I25.10 CORONARY ARTERIOSCLEROSIS: ICD-10-CM

## 2024-06-06 DIAGNOSIS — I25.10 CORONARY ARTERY DISEASE INVOLVING NATIVE CORONARY ARTERY OF NATIVE HEART WITHOUT ANGINA PECTORIS: ICD-10-CM

## 2024-06-06 RX ORDER — EZETIMIBE 10 MG/1
10 TABLET ORAL DAILY
Qty: 90 TABLET | Refills: 0 | Status: SHIPPED | OUTPATIENT
Start: 2024-06-06 | End: 2024-09-06

## 2024-06-06 RX ORDER — ROSUVASTATIN CALCIUM 40 MG/1
TABLET, COATED ORAL
Qty: 90 TABLET | Refills: 5 | OUTPATIENT
Start: 2024-06-06

## 2024-06-10 DIAGNOSIS — I10 ESSENTIAL HYPERTENSION: ICD-10-CM

## 2024-06-10 RX ORDER — LISINOPRIL 10 MG/1
10 TABLET ORAL EVERY 24 HOURS
Qty: 90 TABLET | Refills: 3 | OUTPATIENT
Start: 2024-06-10

## 2024-06-10 NOTE — TELEPHONE ENCOUNTER
Pending Prescriptions:                       Disp   Refills    lisinopril (ZESTRIL) 10 MG tablet         90 tab*3            Sig: Take 1 tablet (10 mg) by mouth every 24 hours    Pharmacy: [facility]/PHARMACY #[id number] - [facility] [address]

## 2024-06-11 DIAGNOSIS — I48.0 PAROXYSMAL ATRIAL FIBRILLATION (H): ICD-10-CM

## 2024-06-17 PROCEDURE — 93227 XTRNL ECG REC<48 HR R&I: CPT | Performed by: INTERNAL MEDICINE

## 2024-07-01 DIAGNOSIS — I47.29 NSVT (NONSUSTAINED VENTRICULAR TACHYCARDIA) (H): Primary | ICD-10-CM

## 2024-07-02 NOTE — DISCHARGE SUMMARY
[facility] MEDICINE  DISCHARGE SUMMARY     Primary Care Physician: Selina Pozo  Admission Date: 11/27/2021   Discharge Provider: Babita Shaffer MD Discharge Date: 11/28/2021   Diet: Orders Placed This Encounter      Low Saturated Fat Na <2400 mg      Diet      Code Status: Full Code   Activity: activity as tolerated   [facility]      Condition at Discharge: Stable      REASON FOR PRESENTATION(See Admission Note for Details)   chest discomfort, pain with swallowing    PRINCIPAL & ACTIVE DISCHARGE DIAGNOSES     Active problems:  Chest discomfort, dysphagia likely GI causes.  History of severe right regurgitation status post clipping  history of paroxysmal atrial fibrillation   history of nonobstructive CAD    SIGNIFICANT FINDINGS (Imaging, labs):       PENDING LABS     TSH, T4    PROCEDURES ( this hospitalization only)          RECOMMENDATION FOR F/U VISIT       DISPOSITION     Home    SUMMARY OF HOSPITAL COURSE:    76 year old old female past medical history significant for paroxysmal atrial fibrillation, severe mitral regurgitation status post mitral clipping 2/21, recent coronary angiogram 1/21 nonobstructive CAD, NSVT presented to the emergency room with complaints of chest pain mostly with drinking liquids, she describes the chest discomfort with swallowing.  No associated nausea vomiting.  No epigastric pain.  Denied any other exertional chest pain.  She was admitted.  Serial troponins were negative.  EKG no acute changes.  Her case was reviewed with cardiology.  Overall presentation could be from GERD versus gastritis.  She was started on Protonix.  Recommended outpatient follow-up.    Discharge Medications with Med changes:        Review of your medicines      START taking      Dose / Directions   pantoprazole 40 MG EC tablet  Commonly known as: PROTONIX      Dose: 40 mg  Take 1 tablet (40 mg) by mouth daily  Quantity: 30 tablet  Refills: 0        CONTINUE these  Sent the 225mg dose to pharmacy   medicines which may have CHANGED, or have new prescriptions. If we are uncertain of the size of tablets/capsules you have at home, strength may be listed as something that might have changed.      Dose / Directions   amiodarone 200 MG tablet  Commonly known as: PACERONE  This may have changed: when to take this  Used for: Essential hypertension      Dose: 200 mg  Take 1 tablet (200 mg) by mouth every evening  Quantity: 90 tablet  Refills: 1     metoprolol succinate ER 50 MG 24 hr tablet  Commonly known as: TOPROL-XL  This may have changed: See the new instructions.  Used for: Cardiac arrhythmia, unspecified      TAKE 2 TABLETS (100 MG TOTAL) BY MOUTH DAILY. OK TO TAKE 50MG IN AM AND 50MG IN PM  Quantity: 180 tablet  Refills: 2        CONTINUE these medicines which have NOT CHANGED      Dose / Directions   apixaban ANTICOAGULANT 5 MG tablet  Commonly known as: ELIQUIS ANTICOAGULANT  Used for: Paroxysmal atrial fibrillation (H)      Dose: 5 mg  Take 1 tablet (5 mg) by mouth 2 times daily  Quantity: 180 tablet  Refills: 1     ezetimibe 10 MG tablet  Commonly known as: ZETIA  Used for: Hyperlipidemia, Coronary arteriosclerosis      Dose: 10 mg  Take 1 tablet (10 mg) by mouth daily  Quantity: 90 tablet  Refills: 1     lisinopril 5 MG tablet  Commonly known as: ZESTRIL      Dose: 5 mg  Take 5 mg by mouth every 24 hours  Refills: 0     multivitamin, therapeutic Tabs tablet      Dose: 1 tablet  Take 1 tablet by mouth daily  Refills: 0     rosuvastatin 40 MG tablet  Commonly known as: CRESTOR      Dose: 40 mg  Take 40 mg by mouth daily  Refills: 0           Where to get your medicines      These medications were sent to [facility]/pharmacy #[id number] [place], [place] - [address]  [address] [address]    Phone: [phone]     pantoprazole 40 MG EC tablet              Rationale for medication changes:    Protonix chest discomfort with swallowing        Consults         Immunizations given this encounter  "        Discharge Procedure Orders   Reason for your hospital stay   Order Comments: CHEST PAIN     Follow-up and recommended labs and tests   Order Comments: Follow up with primary care provider, Selina Pozo, within 7 days for hospital follow- up.  FOLLOW up with cardiology NP in 1-2 weeks     Activity   Order Comments: Your activity upon discharge: activity as tolerated     Order Specific Question Answer Comments   Is discharge order? Yes      Diet   Order Comments: Follow this diet upon discharge: Orders Placed This Encounter      Low Saturated Fat Na <2400 mg       Order Specific Question Answer Comments   Is discharge order? Yes      Examination     Vital Signs in last 24 hours:   Vital signs:  Temp: 97.5  F (36.4  C) Temp src: Oral BP: (!) 158/76 Pulse: 78   Resp: 20 SpO2: 96 % O2 Device: None (Room air)     Weight: 54 kg (119 lb)  Estimated body mass index is 19.8 kg/m  as calculated from the following:    Height as of 7/9/21: 1.651 m (5' 5\").    Weight as of this encounter: 54 kg (119 lb).    Please see EMR for more detailed significant labs, imaging, consultant notes etc.  Total time spent on discharge: > 35 minutes    Babita Shaffer MD   [facility] Hospitalist Service: Ph:[phone]    CC:Selina Pozo      "

## 2024-07-19 DIAGNOSIS — I25.10 CORONARY ARTERY DISEASE INVOLVING NATIVE CORONARY ARTERY OF NATIVE HEART WITHOUT ANGINA PECTORIS: ICD-10-CM

## 2024-07-19 DIAGNOSIS — I48.0 PAROXYSMAL ATRIAL FIBRILLATION (H): ICD-10-CM

## 2024-07-19 DIAGNOSIS — I10 ESSENTIAL HYPERTENSION: ICD-10-CM

## 2024-07-19 RX ORDER — ROSUVASTATIN CALCIUM 40 MG/1
TABLET, COATED ORAL
Qty: 90 TABLET | Refills: 2 | Status: SHIPPED | OUTPATIENT
Start: 2024-07-19

## 2024-07-19 RX ORDER — APIXABAN 2.5 MG/1
2.5 TABLET, FILM COATED ORAL 2 TIMES DAILY
Qty: 180 TABLET | Refills: 0 | Status: SHIPPED | OUTPATIENT
Start: 2024-07-19

## 2024-07-19 RX ORDER — LISINOPRIL 10 MG/1
10 TABLET ORAL EVERY 24 HOURS
Qty: 90 TABLET | Refills: 3 | Status: SHIPPED | OUTPATIENT
Start: 2024-07-19

## 2024-07-19 NOTE — TELEPHONE ENCOUNTER
Pending Prescriptions:                       Disp   Refills    lisinopril (ZESTRIL) 10 MG tablet         90 tab*3            Sig: Take 1 tablet (10 mg) by mouth every 24 hours    Pharmacy: [facility]/PHARMACY #[id number] - [facility] [address]

## 2024-07-26 ENCOUNTER — LAB (OUTPATIENT)
Dept: CARDIOLOGY | Facility: CLINIC | Age: 79
End: 2024-07-26
Payer: COMMERCIAL

## 2024-07-26 DIAGNOSIS — E78.5 HYPERLIPIDEMIA LDL GOAL <100: ICD-10-CM

## 2024-07-26 LAB
CHOLEST SERPL-MCNC: 165 MG/DL
FASTING STATUS PATIENT QL REPORTED: YES
HDLC SERPL-MCNC: 51 MG/DL
LDLC SERPL CALC-MCNC: 89 MG/DL
NONHDLC SERPL-MCNC: 114 MG/DL
TRIGL SERPL-MCNC: 125 MG/DL

## 2024-07-26 PROCEDURE — 80061 LIPID PANEL: CPT

## 2024-07-26 PROCEDURE — 36415 COLL VENOUS BLD VENIPUNCTURE: CPT

## 2024-08-02 ENCOUNTER — TELEPHONE (OUTPATIENT)
Dept: CARDIOLOGY | Facility: CLINIC | Age: 79
End: 2024-08-02
Payer: COMMERCIAL

## 2024-08-02 NOTE — TELEPHONE ENCOUNTER
[facility]    Phone Message    May a detailed message be left on voicemail: yes     Reason for Call: Requesting Results     Name/type of test: 48-hour Holter Monitor  Date of test: 5/30/24 - 6/1/24  Was test done at a location other than [facility] (Please fill in the location if not [facility])?: No    Action Taken: Other: Cardiology    Travel Screening: Not Applicable    Thank you!  [facility]

## 2024-08-02 NOTE — TELEPHONE ENCOUNTER
----- Message -----  From: Sergey Conner MD  Sent: 7/29/2024  12:01 PM CDT  To: Nasima Hart RN    LDL slightly higher 89 prefer <70, but on max dose rosuvastatin and zetia and no symptoms or events - would track.  If non compliant with rosuvastatin due to side effects can try lower dose or another statin  ----- Message -----  From: Sergey Conner MD  Sent: 6/30/2024   7:12 PM CDT  To: Nasima Hart RN    Noted 34 beat sustained VT but not very fast only  119 bpm - would cont metoprolol and repeat 24 hour holter next month      ==  Return call to patient. Holter and Lipid results and recommendations per CJP shared with patient. Patient verbalized understanding and agreement. Notes compliance with statin and Zetia.   Patient transferred to scheduling to arrange repeat Holter monitor. -sil

## 2024-08-08 ENCOUNTER — HOSPITAL ENCOUNTER (OUTPATIENT)
Dept: CARDIOLOGY | Facility: CLINIC | Age: 79
Discharge: HOME | End: 2024-08-08
Attending: INTERNAL MEDICINE | Admitting: INTERNAL MEDICINE
Payer: COMMERCIAL

## 2024-08-08 DIAGNOSIS — I47.29 NSVT (NONSUSTAINED VENTRICULAR TACHYCARDIA) (H): ICD-10-CM

## 2024-08-08 PROCEDURE — 93225 XTRNL ECG REC<48 HRS REC: CPT

## 2024-08-13 PROCEDURE — 93227 XTRNL ECG REC<48 HR R&I: CPT | Performed by: INTERNAL MEDICINE

## 2024-08-16 NOTE — TELEPHONE ENCOUNTER
"Routing refill request to provider for review/approval because:  bp out of range and pt reports is taking differnetly    Last Written Prescription Date:  8/6/21  Last Fill Quantity: 180,  # refills: 2   Last office visit provider:  4/29/22     Requested Prescriptions   Pending Prescriptions Disp Refills     metoprolol succinate ER (TOPROL XL) 50 MG 24 hr tablet [Pharmacy Med Name: METOPROLOL SUCC ER 50 MG TAB] 180 tablet 2     Sig: TAKE 2 TABLETS (100 MG TOTAL) BY MOUTH DAILY. OK TO TAKE 50MG IN AM AND 50MG IN PM       Beta-Blockers Protocol Failed - 8/9/2022  4:13 PM        Failed - Blood pressure under 140/90 in past 12 months     BP Readings from Last 3 Encounters:   05/13/22 (!) 152/83   04/29/22 137/80   01/18/22 (!) 174/88                 Passed - Patient is age 6 or older        Passed - Recent (12 mo) or future (30 days) visit within the authorizing provider's specialty     Patient has had an office visit with the authorizing provider or a provider within the authorizing providers department within the previous 12 mos or has a future within next 30 days. See \"Patient Info\" tab in inbasket, or \"Choose Columns\" in Meds & Orders section of the refill encounter.              Passed - Medication is active on med Kitty Berry RN 08/10/22 2:06 PM  " [FreeTextEntry1] : EMR reviewed for hospital data.

## 2024-09-01 DIAGNOSIS — E78.5 HYPERLIPIDEMIA: ICD-10-CM

## 2024-09-01 DIAGNOSIS — I25.10 CORONARY ARTERIOSCLEROSIS: ICD-10-CM

## 2024-09-03 DIAGNOSIS — I10 ESSENTIAL HYPERTENSION: Primary | ICD-10-CM

## 2024-09-04 RX ORDER — LISINOPRIL 5 MG/1
5 TABLET ORAL EVERY 24 HOURS
Qty: 90 TABLET | Refills: 1 | Status: SHIPPED | OUTPATIENT
Start: 2024-09-04 | End: 2024-09-05

## 2024-09-05 ENCOUNTER — OFFICE VISIT (OUTPATIENT)
Dept: CARDIOLOGY | Facility: CLINIC | Age: 79
End: 2024-09-05
Payer: COMMERCIAL

## 2024-09-05 VITALS
SYSTOLIC BLOOD PRESSURE: 163 MMHG | DIASTOLIC BLOOD PRESSURE: 81 MMHG | HEART RATE: 83 BPM | BODY MASS INDEX: 19.77 KG/M2 | WEIGHT: 123 LBS | HEIGHT: 66 IN | OXYGEN SATURATION: 97 %

## 2024-09-05 DIAGNOSIS — I48.0 PAROXYSMAL ATRIAL FIBRILLATION (H): ICD-10-CM

## 2024-09-05 DIAGNOSIS — E78.5 HYPERLIPIDEMIA LDL GOAL <100: ICD-10-CM

## 2024-09-05 PROCEDURE — 99214 OFFICE O/P EST MOD 30 MIN: CPT | Performed by: INTERNAL MEDICINE

## 2024-09-05 NOTE — LETTER
"9/5/2024    Selina Pozo MD  [address] [address]    RE: Kelly Robledo       Dear Colleague,     I had the pleasure of seeing Kelly Robledo in the [facility].    Thank you, Dr. Conner, for asking the [facility] team to see Ms. Kelly Robledo to evaluate       Assessment/Recommendations   Assessment/Plan:  S/p MV clip with mild MS - doing great - BP at home is normal.  Cont ACE/beta blocker  Afib s/p ablation cont eliquis and metoprolol  Non sustained ventricular  - on metprolol with last holter 8/23 5 beat episodes. If palpitations or syncopal or near syncope to go to ED/call.   CV prevention - on rosuvastain/zetia, repeat in April next year,goal at least LDL <100, no prior MI, no DM or CABG/PCI.          History of Present Illness/Subjective    Ms. Kelly Robledo is a 78 year old female with MR s/p clip with residual mild MS, afib s/p ablation, CAD last LDL 89, CKD Cr 1.23, K 42. 4/2024, stress nuclear imaging 2/24 no ischemia, 12/23 echo normal EF, mild MS, midl MR.  Holter with non susteain VT. She is feeling great, no symptoms, very active, no dypsena , chest pain, pressure, syncopal events, dizzy spells, PND/orthonpea, edema, GI/ bleeding, no falling.   Still rarely feels palpitations but no dizziness or short of breath.  S/p resections in chest of basal cell cancer. BP at home 109 to 129 systolic    Med: metoprolol 100mg qAH< lisinpril 10mg, zetia 10-mg, eliquis, rosuvastatin     Physical Examination Review of Systems   BP (!) 163/81 (BP Location: Right arm, Patient Position: Sitting, Cuff Size: Adult Regular)   Pulse 83   Ht 1.664 m (5' 5.5\")   Wt 55.8 kg (123 lb)   LMP  (LMP Unknown)   SpO2 97%   BMI 20.16 kg/m    Body mass index is 20.16 kg/m .  Wt Readings from Last 3 Encounters:   09/05/24 55.8 kg (123 lb)   05/24/24 55.7 kg (122 lb 11.2 oz)   04/30/24 56.7 kg (125 lb)     [unfilled]  General Appearance:   no distress, normal " body habitus   ENT/Mouth: membranes moist, no oral lesions or bleeding gums.      EYES:  no scleral icterus, normal conjunctivae   Neck: no carotid bruits or thyromegaly   Chest/Lungs:   lungs are clear to auscultation, no rales or wheezing,  sternal scar, equal chest wall expansion    Cardiovascular:   Regular. Normal first and second heart sounds with no murmurs, rubs, or gallops; the carotid, radial and posterior tibial pulses are intact, Jugular venous pressure , edema bilaterally    Abdomen:  no organomegaly, masses, bruits, or tenderness; bowel sounds are present   Extremities: no cyanosis or clubbing   Skin: no xanthelasma, warm.    Neurologic: normal  bilateral, no tremors     Psychiatric: alert and oriented x3, calm     Review of Systems - 12 points nega other than above      Medical History  Surgical History Family History Social History   Past Medical History:   Diagnosis Date     Abdominal pain      Atrial fibrillation (H)      Diverticulosis      Moderate mitral regurgitation      NSVT (nonsustained ventricular tachycardia) (H) 6/17/2020     Palpitations     Past Surgical History:   Procedure Laterality Date     BIOPSY BREAST Bilateral     benign     CV CORONARY ANGIOGRAM N/A 6/18/2020    Procedure: Coronary Angiogram;  Surgeon: Sergey Conner MD;  Location: [facility] Cath Lab;  Service: Cardiology     CV CORONARY ANGIOGRAM N/A 1/13/2021    Procedure: Coronary Angiogram;  Surgeon: Klaus Fitzpatrick MD;  Location: [facility] Cardiac Cath Lab;  Service: Cardiology     CV LEFT HEART CATHETERIZATION WITHOUT LEFT VENTRICULOGRAM Left 6/18/2020    Procedure: Left Heart Catheterization Without Left Ventriculogram;  Surgeon: Sergey Conner MD;  Location: [facility] Cath Lab;  Service: Cardiology     CV RIGHT HEART CATHETERIZATION N/A 6/18/2020    Procedure: Right Heart Catheterization;  Surgeon: Sergey Conner MD;  Location: [facility] Cath Lab;  Service: Cardiology     CV RIGHT  HEART CATHETERIZATION N/A 1/13/2021    Procedure: Right Heart Catheterization;  Surgeon: Klaus Fitzpatrick MD;  Location: [facility] Cardiac Cath Lab;  Service: Cardiology     EP ABLATION PULMONARY VEIN ISOLATION N/A 7/3/2023    Procedure: Ablation Atrial Fibrillation;  Surgeon: Kathleen Hernandez MD;  Location: [facility] CATH LAB CV     HYSTERECTOMY  1970's     OOPHORECTOMY Bilateral 1970's     TRANSCATHETER MITRAL VALVE REPAIR N/A 2/1/2021    Procedure: CV MITRAL CLIP;  Surgeon: Klaus Fitzpatrick MD;  Location: [facility] CARDIAC CATH LAB    Family History   Problem Relation Age of Onset     Breast Cancer Sister 47.00    Social History     Socioeconomic History     Marital status:      Spouse name: Not on file     Number of children: Not on file     Years of education: Not on file     Highest education level: Not on file   Occupational History     Not on file   Tobacco Use     Smoking status: Never     Passive exposure: Past (Dad was a smoker)     Smokeless tobacco: Never   Vaping Use     Vaping status: Never Used   Substance and Sexual Activity     Alcohol use: No     Drug use: No     Sexual activity: Not on file   Other Topics Concern     Parent/sibling w/ CABG, MI or angioplasty before 65F 55M? Not Asked   Social History Narrative    . No children. Family in the [place] area.   Enjoys walking- very regular with this especially in [place] when they used to vacation there for 6 months of the year.   Likes reading - romantic happy endings.   Never alcohol or smoking.  Selina Pozo MD       Social Determinants of Health     Financial Resource Strain: Low Risk  (4/30/2024)    Financial Resource Strain      Within the past 12 months, have you or your family members you live with been unable to get utilities (heat, electricity) when it was really needed?: No   Food Insecurity: Low Risk  (4/30/2024)    Food Insecurity      Within the past 12 months, did you worry that your food would run out before you got  money to buy more?: No      Within the past 12 months, did the food you bought just not last and you didn t have money to get more?: No   Transportation Needs: Low Risk  (4/30/2024)    Transportation Needs      Within the past 12 months, has lack of transportation kept you from medical appointments, getting your medicines, non-medical meetings or appointments, work, or from getting things that you need?: No   Physical Activity: Sufficiently Active (4/30/2024)    Exercise Vital Sign      Days of Exercise per Week: 7 days      Minutes of Exercise per Session: 30 min   Stress: No Stress Concern Present (4/30/2024)    [employer] - Occupational Stress Questionnaire      Feeling of Stress : Not at all   Social Connections: Unknown (4/30/2024)    Social Connection and Isolation Panel [NHANES]      Frequency of Communication with Friends and Family: Not on file      Frequency of Social Gatherings with Friends and Family: Three times a week      Attends Christianity Services: Not on file      Active Member of Clubs or Organizations: Not on file      Attends Club or Organization Meetings: Not on file      Marital Status: Not on file   Interpersonal Safety: Not on file   Housing Stability: Low Risk  (4/30/2024)    Housing Stability      Do you have housing? : Yes      Are you worried about losing your housing?: No          Medications  Allergies   Scheduled Meds:  No current facility-administered medications for this visit.     Continuous Infusions:  No current facility-administered medications for this visit.     PRN Meds:.  No current facility-administered medications for this visit.    Allergies   Allergen Reactions     Penicillins Hives         Lab Results    Chemistry/lipid CBC Cardiac Enzymes/BNP/TSH/INR   Lab Results   Component Value Date    CHOL 165 07/26/2024    HDL 51 07/26/2024    TRIG 125 07/26/2024    BUN 18.7 04/30/2024     04/30/2024    CO2 27 04/30/2024    Lab Results    Component Value Date    WBC 5.0 04/30/2024    HGB 13.0 04/30/2024    HCT 39.7 04/30/2024     04/30/2024     04/30/2024    Lab Results   Component Value Date    TROPONINI <0.01 11/27/2021     (H) 01/25/2021    TSH 6.12 (H) 04/30/2024    INR 1.22 (H) 01/25/2021              Sergey Conner MD  Interventional Cardiology  [facility]              Thank you for allowing me to participate in the care of your patient.      Sincerely,     Sergey Conner MD     [facility]  cc:   Sergey Conner MD  [address]  [place] [address]

## 2024-09-05 NOTE — PROGRESS NOTES
"  Thank you, Dr. Conner, for asking the [facility] team to see Ms. Kelly Robledo to evaluate       Assessment/Recommendations   Assessment/Plan:  S/p MV clip with mild MS - doing great - BP at home is normal.  Cont ACE/beta blocker  Afib s/p ablation cont eliquis and metoprolol  Non sustained ventricular  - on metprolol with last holter 8/23 5 beat episodes. If palpitations or syncopal or near syncope to go to ED/call.   CV prevention - on rosuvastain/zetia, repeat in April next year,goal at least LDL <100, no prior MI, no DM or CABG/PCI.          History of Present Illness/Subjective    Ms. Kelly Robledo is a 78 year old female with MR s/p clip with residual mild MS, afib s/p ablation, CAD last LDL 89, CKD Cr 1.23, K 42. 4/2024, stress nuclear imaging 2/24 no ischemia, 12/23 echo normal EF, mild MS, midl MR.  Holter with non susteain VT. She is feeling great, no symptoms, very active, no dypsena , chest pain, pressure, syncopal events, dizzy spells, PND/orthonpea, edema, GI/ bleeding, no falling.   Still rarely feels palpitations but no dizziness or short of breath.  S/p resections in chest of basal cell cancer. BP at home 109 to 129 systolic    Med: metoprolol 100mg qAH< lisinpril 10mg, zetia 10-mg, eliquis, rosuvastatin     Physical Examination Review of Systems   BP (!) 163/81 (BP Location: Right arm, Patient Position: Sitting, Cuff Size: Adult Regular)   Pulse 83   Ht 1.664 m (5' 5.5\")   Wt 55.8 kg (123 lb)   LMP  (LMP Unknown)   SpO2 97%   BMI 20.16 kg/m    Body mass index is 20.16 kg/m .  Wt Readings from Last 3 Encounters:   09/05/24 55.8 kg (123 lb)   05/24/24 55.7 kg (122 lb 11.2 oz)   04/30/24 56.7 kg (125 lb)     [unfilled]  General Appearance:   no distress, normal body habitus   ENT/Mouth: membranes moist, no oral lesions or bleeding gums.      EYES:  no scleral icterus, normal conjunctivae   Neck: no carotid bruits or thyromegaly   Chest/Lungs:   lungs are clear to " auscultation, no rales or wheezing,  sternal scar, equal chest wall expansion    Cardiovascular:   Regular. Normal first and second heart sounds with no murmurs, rubs, or gallops; the carotid, radial and posterior tibial pulses are intact, Jugular venous pressure , edema bilaterally    Abdomen:  no organomegaly, masses, bruits, or tenderness; bowel sounds are present   Extremities: no cyanosis or clubbing   Skin: no xanthelasma, warm.    Neurologic: normal  bilateral, no tremors     Psychiatric: alert and oriented x3, calm     Review of Systems - 12 points nega other than above      Medical History  Surgical History Family History Social History   Past Medical History:   Diagnosis Date    Abdominal pain     Atrial fibrillation (H)     Diverticulosis     Moderate mitral regurgitation     NSVT (nonsustained ventricular tachycardia) (H) 6/17/2020    Palpitations     Past Surgical History:   Procedure Laterality Date    BIOPSY BREAST Bilateral     benign    CV CORONARY ANGIOGRAM N/A 6/18/2020    Procedure: Coronary Angiogram;  Surgeon: Sergey Conner MD;  Location: [facility] Cath Lab;  Service: Cardiology    CV CORONARY ANGIOGRAM N/A 1/13/2021    Procedure: Coronary Angiogram;  Surgeon: Klaus Fitzpatrick MD;  Location: [facility] Cardiac Cath Lab;  Service: Cardiology    CV LEFT HEART CATHETERIZATION WITHOUT LEFT VENTRICULOGRAM Left 6/18/2020    Procedure: Left Heart Catheterization Without Left Ventriculogram;  Surgeon: Sergey Conner MD;  Location: [facility] Cath Lab;  Service: Cardiology    CV RIGHT HEART CATHETERIZATION N/A 6/18/2020    Procedure: Right Heart Catheterization;  Surgeon: Sergey Conner MD;  Location: [facility] Cath Lab;  Service: Cardiology    CV RIGHT HEART CATHETERIZATION N/A 1/13/2021    Procedure: Right Heart Catheterization;  Surgeon: Klaus Fitzpatrick MD;  Location: [facility] Cardiac Cath Lab;  Service: Cardiology    EP ABLATION PULMONARY VEIN ISOLATION N/A  7/3/2023    Procedure: Ablation Atrial Fibrillation;  Surgeon: Kathleen Hernandez MD;  Location: [facility] CATH LAB CV    HYSTERECTOMY  1970's    OOPHORECTOMY Bilateral 1970's    TRANSCATHETER MITRAL VALVE REPAIR N/A 2/1/2021    Procedure: CV MITRAL CLIP;  Surgeon: Klaus Fitzpatrick MD;  Location:  [facility] CARDIAC CATH LAB    Family History   Problem Relation Age of Onset    Breast Cancer Sister 47.00    Social History     Socioeconomic History    Marital status:      Spouse name: Not on file    Number of children: Not on file    Years of education: Not on file    Highest education level: Not on file   Occupational History    Not on file   Tobacco Use    Smoking status: Never     Passive exposure: Past (Dad was a smoker)    Smokeless tobacco: Never   Vaping Use    Vaping status: Never Used   Substance and Sexual Activity    Alcohol use: No    Drug use: No    Sexual activity: Not on file   Other Topics Concern    Parent/sibling w/ CABG, MI or angioplasty before 65F 55M? Not Asked   Social History Narrative    . No children. Family in the [place] area.   Enjoys walking- very regular with this especially in [place] when they used to vacation there for 6 months of the year.   Likes reading - romantic happy endings.   Never alcohol or smoking.  Selina Pozo MD       Social Determinants of Health     Financial Resource Strain: Low Risk  (4/30/2024)    Financial Resource Strain     Within the past 12 months, have you or your family members you live with been unable to get utilities (heat, electricity) when it was really needed?: No   Food Insecurity: Low Risk  (4/30/2024)    Food Insecurity     Within the past 12 months, did you worry that your food would run out before you got money to buy more?: No     Within the past 12 months, did the food you bought just not last and you didn t have money to get more?: No   Transportation Needs: Low Risk  (4/30/2024)    Transportation Needs     Within the past 12 months,  has lack of transportation kept you from medical appointments, getting your medicines, non-medical meetings or appointments, work, or from getting things that you need?: No   Physical Activity: Sufficiently Active (4/30/2024)    Exercise Vital Sign     Days of Exercise per Week: 7 days     Minutes of Exercise per Session: 30 min   Stress: No Stress Concern Present (4/30/2024)    [employer] [place] [employer] - Occupational Stress Questionnaire     Feeling of Stress : Not at all   Social Connections: Unknown (4/30/2024)    Social Connection and Isolation Panel [NHANES]     Frequency of Communication with Friends and Family: Not on file     Frequency of Social Gatherings with Friends and Family: Three times a week     Attends Spiritism Services: Not on file     Active Member of Clubs or Organizations: Not on file     Attends Club or Organization Meetings: Not on file     Marital Status: Not on file   Interpersonal Safety: Not on file   Housing Stability: Low Risk  (4/30/2024)    Housing Stability     Do you have housing? : Yes     Are you worried about losing your housing?: No          Medications  Allergies   Scheduled Meds:  No current facility-administered medications for this visit.     Continuous Infusions:  No current facility-administered medications for this visit.     PRN Meds:.  No current facility-administered medications for this visit.    Allergies   Allergen Reactions    Penicillins Hives         Lab Results    Chemistry/lipid CBC Cardiac Enzymes/BNP/TSH/INR   Lab Results   Component Value Date    CHOL 165 07/26/2024    HDL 51 07/26/2024    TRIG 125 07/26/2024    BUN 18.7 04/30/2024     04/30/2024    CO2 27 04/30/2024    Lab Results   Component Value Date    WBC 5.0 04/30/2024    HGB 13.0 04/30/2024    HCT 39.7 04/30/2024     04/30/2024     04/30/2024    Lab Results   Component Value Date    TROPONINI <0.01 11/27/2021     (H) 01/25/2021    TSH 6.12 (H) 04/30/2024     INR 1.22 (H) 01/25/2021              Sergey Conner MD  Interventional Cardiology  [facility]

## 2024-09-06 RX ORDER — EZETIMIBE 10 MG/1
10 TABLET ORAL DAILY
Qty: 90 TABLET | Refills: 2 | Status: SHIPPED | OUTPATIENT
Start: 2024-09-06

## 2024-10-04 ENCOUNTER — TRANSFERRED RECORDS (OUTPATIENT)
Dept: HEALTH INFORMATION MANAGEMENT | Facility: CLINIC | Age: 79
End: 2024-10-04
Payer: COMMERCIAL

## 2024-10-09 ENCOUNTER — HOSPITAL ENCOUNTER (EMERGENCY)
Facility: CLINIC | Age: 79
Discharge: HOME | End: 2024-10-09
Payer: COMMERCIAL

## 2024-10-09 VITALS
WEIGHT: 118 LBS | DIASTOLIC BLOOD PRESSURE: 80 MMHG | HEART RATE: 86 BPM | OXYGEN SATURATION: 95 % | TEMPERATURE: 97.8 F | BODY MASS INDEX: 19.66 KG/M2 | HEIGHT: 65 IN | RESPIRATION RATE: 22 BRPM | SYSTOLIC BLOOD PRESSURE: 161 MMHG

## 2024-10-09 DIAGNOSIS — R00.2 PALPITATIONS: ICD-10-CM

## 2024-10-09 DIAGNOSIS — Z79.01 CHRONIC ANTICOAGULATION: ICD-10-CM

## 2024-10-09 LAB
ANION GAP SERPL CALCULATED.3IONS-SCNC: 13 MMOL/L (ref 7–15)
BASOPHILS # BLD AUTO: 0 10E3/UL (ref 0–0.2)
BASOPHILS NFR BLD AUTO: 0 %
BUN SERPL-MCNC: 15.8 MG/DL (ref 8–23)
CALCIUM SERPL-MCNC: 9.2 MG/DL (ref 8.8–10.4)
CHLORIDE SERPL-SCNC: 102 MMOL/L (ref 98–107)
CREAT SERPL-MCNC: 1.09 MG/DL (ref 0.51–0.95)
EGFRCR SERPLBLD CKD-EPI 2021: 51 ML/MIN/1.73M2
EOSINOPHIL # BLD AUTO: 0.2 10E3/UL (ref 0–0.7)
EOSINOPHIL NFR BLD AUTO: 2 %
ERYTHROCYTE [DISTWIDTH] IN BLOOD BY AUTOMATED COUNT: 12.9 % (ref 10–15)
GLUCOSE SERPL-MCNC: 118 MG/DL (ref 70–99)
HCO3 SERPL-SCNC: 24 MMOL/L (ref 22–29)
HCT VFR BLD AUTO: 39.9 % (ref 35–47)
HGB BLD-MCNC: 13.3 G/DL (ref 11.7–15.7)
IMM GRANULOCYTES # BLD: 0 10E3/UL
IMM GRANULOCYTES NFR BLD: 0 %
LYMPHOCYTES # BLD AUTO: 1.3 10E3/UL (ref 0.8–5.3)
LYMPHOCYTES NFR BLD AUTO: 16 %
MCH RBC QN AUTO: 32.1 PG (ref 26.5–33)
MCHC RBC AUTO-ENTMCNC: 33.3 G/DL (ref 31.5–36.5)
MCV RBC AUTO: 96 FL (ref 78–100)
MONOCYTES # BLD AUTO: 0.7 10E3/UL (ref 0–1.3)
MONOCYTES NFR BLD AUTO: 9 %
NEUTROPHILS # BLD AUTO: 6 10E3/UL (ref 1.6–8.3)
NEUTROPHILS NFR BLD AUTO: 73 %
NRBC # BLD AUTO: 0 10E3/UL
NRBC BLD AUTO-RTO: 0 /100
PLATELET # BLD AUTO: 161 10E3/UL (ref 150–450)
POTASSIUM SERPL-SCNC: 4 MMOL/L (ref 3.4–5.3)
RBC # BLD AUTO: 4.14 10E6/UL (ref 3.8–5.2)
SODIUM SERPL-SCNC: 139 MMOL/L (ref 135–145)
T4 FREE SERPL-MCNC: 1.2 NG/DL (ref 0.9–1.7)
TROPONIN T SERPL HS-MCNC: 11 NG/L
TSH SERPL DL<=0.005 MIU/L-ACNC: 7.18 UIU/ML (ref 0.3–4.2)
WBC # BLD AUTO: 8.3 10E3/UL (ref 4–11)

## 2024-10-09 PROCEDURE — 84484 ASSAY OF TROPONIN QUANT: CPT

## 2024-10-09 PROCEDURE — 36415 COLL VENOUS BLD VENIPUNCTURE: CPT

## 2024-10-09 PROCEDURE — 80048 BASIC METABOLIC PNL TOTAL CA: CPT

## 2024-10-09 PROCEDURE — 93005 ELECTROCARDIOGRAM TRACING: CPT | Performed by: EMERGENCY MEDICINE

## 2024-10-09 PROCEDURE — 84443 ASSAY THYROID STIM HORMONE: CPT

## 2024-10-09 PROCEDURE — 85004 AUTOMATED DIFF WBC COUNT: CPT

## 2024-10-09 PROCEDURE — 93005 ELECTROCARDIOGRAM TRACING: CPT

## 2024-10-09 PROCEDURE — 84439 ASSAY OF FREE THYROXINE: CPT

## 2024-10-09 PROCEDURE — 99284 EMERGENCY DEPT VISIT MOD MDM: CPT

## 2024-10-09 ASSESSMENT — ACTIVITIES OF DAILY LIVING (ADL)
ADLS_ACUITY_SCORE: 35

## 2024-10-10 ENCOUNTER — PATIENT OUTREACH (OUTPATIENT)
Dept: FAMILY MEDICINE | Facility: CLINIC | Age: 79
End: 2024-10-10
Payer: COMMERCIAL

## 2024-10-10 LAB
ATRIAL RATE - MUSE: 99 BPM
DIASTOLIC BLOOD PRESSURE - MUSE: NORMAL MMHG
INTERPRETATION ECG - MUSE: NORMAL
P AXIS - MUSE: 71 DEGREES
PR INTERVAL - MUSE: 140 MS
QRS DURATION - MUSE: 76 MS
QT - MUSE: 336 MS
QTC - MUSE: 431 MS
R AXIS - MUSE: 68 DEGREES
SYSTOLIC BLOOD PRESSURE - MUSE: NORMAL MMHG
T AXIS - MUSE: 92 DEGREES
VENTRICULAR RATE- MUSE: 99 BPM

## 2024-10-10 NOTE — TELEPHONE ENCOUNTER
Transitions of Care Outreach  Chief Complaint   Patient presents with    Hospital F/U       Most Recent Admission Date: 10/9/2024   Most Recent Admission Diagnosis:      Most Recent Discharge Date: 10/9/2024   Most Recent Discharge Diagnosis: Palpitations - R00.2  Chronic anticoagulation - Z79.01     Transitions of Care Assessment    Discharge Assessment  How are you doing now that you are home?: better  How are your symptoms? (Red Flag symptoms escalate to triage hotline per guidelines): Improved  Do you know how to contact your clinic care team if you have future questions or changes to your health status? : Yes  Does the patient have their discharge instructions? : Yes  Does the patient have questions regarding their discharge instructions? : No  Were you started on any new medications or were there changes to any of your previous medications? : No  Does the patient have all of their medications?: Yes  Do you have questions regarding any of your medications? : No  Do you have all of your needed medical supplies or equipment (DME)?  (i.e. oxygen tank, CPAP, cane, etc.): Yes    Follow up Plan     Discharge Follow-Up  Discharge follow up appointment scheduled in alignment with recommended follow up timeframe or Transitions of Risk Category? (Low = within 30 days; Moderate= within 14 days; High= within 7 days): Yes  Discharge Follow Up Appointment Date:  (patient states following up with Cardiology in 2 weeks)  Discharge Follow Up Appointment Scheduled with?: Specialty Care Provider (Cardiology)    Future Appointments   Date Time Provider Department Center   1/9/2025  7:50 AM Sergey Conner MD [facility]   4/25/2025  8:00 AM Mario Alberto Gutiérrez MD [facility]       Outpatient Plan as outlined on AVS reviewed with patient.    For any urgent concerns, please contact our 24 hour nurse triage line: [phone] ([phone])       Leela Tamayo RN

## 2024-10-10 NOTE — ED TRIAGE NOTES
PT states she has a hx of afib. She states for the last few days she has felt palpitations but tonight it became more prominent and lasted for longer runs. PT denies cp, sob, or dizziness.

## 2024-10-10 NOTE — DISCHARGE INSTRUCTIONS
Baseline oxygen requirement is 2 L, required 4 on arrival  Now back to baseline of 2    · Monitor oxygen saturations You were evaluated in the Emergency Department today for palpitations. Your evaluation has shown no signs of medical conditions requiring emergent intervention at this time, however we recommend that you follow up with your primary care physician or your cardiologist as soon as possible for further testing as an outpatient.    Please schedule an appointment for follow up with your primary care physician as directed.  Please schedule an appointment with for follow-up with your cardiologist as directed.  Please do not perform vigorous exercise until cleared by cardiology.      Return to the Emergency Department if you experience worsening or uncontrolled chest pain, shortness of breath, lightheadedness, feeling faint, nausea, vomiting, or any other concerning symptoms.    Thank you for choosing us for your care.

## 2024-10-10 NOTE — ED PROVIDER NOTES
EMERGENCY DEPARTMENT ENCOUNTER      NAME: Kelly Robledo  AGE: 78 year old female  YOB: 1945  MRN: [id number]  EVALUATION DATE & TIME: 10/9/2024  7:24 PM    PCP: Selina Pozo    ED PROVIDER: Jimbo Campuzano MD      Chief Complaint   Patient presents with    Palpitations         FINAL IMPRESSION:  1. Palpitations    2. Chronic anticoagulation          ED COURSE & MEDICAL DECISION MAKING:    Pertinent Labs & Imaging studies reviewed. (See chart for details)  78 year old female presents to the Emergency Department for evaluation of palpitations.  Differential diagnosis considered Myocardial infarction, acute coronary syndrome, congestive heart failure, aortic dissection, esophageal rupture, pulmonary embolism, pneumothorax, cardiac tamponade, cocaine mediated chest pain, endocarditis, GERD, pleurisy, rib fracture, costochondritis, pericarditis, herpes zoster, electrolyte disturbance, thyroid disease, stimulants, caffeine use.     ED Course as of 10/10/24 2315   Wed Oct 09, 2024   1930 I met with the patient, obtained history, performed an initial exam, and discussed options and plan for diagnostics and treatment here in the ED. patient has a history of atrial fibrillation currently on metoprolol and Eliquis.  Presents due to 2 days of intermittent palpitations.    No other symptoms.  Pulse initially in triage was 106 however EKG shows 99.  Shows normal sinus rhythm.  No right axis deviation.  EKG initially read as nonspecific ST abnormalities however appears similar to prior EKG.  Will obtain lab labs as well as a TSH with reflex to T4.  Previously elevated TSH within normal T4.  Her PCP is reportedly watching this.  Could have subclinical hypothyroidism.  This typically is treated with metoprolol which patient is on.  He is incredibly well-appearing and nontoxic and her physical exam is reassuring.  She does not currently have signs of atrial fibrillation.  She was observed in the cardiac  monitor.  Low concern for ACS, aortic dissection, pneumonia, pneumothorax, pleural effusions, pulmonary embolism.  She could be having intermittent palpitations that are consistent with her intermittent atrial fibrillation however she does not become so symptomatic that would be of concern.  She does not have syncopal episode, lightheadedness, dizziness.   2124 Heart rate 84, normal sinus rhythm on the cardiac monitor   2124 Troponin T, High Sensitivity  Troponin 11.  Within normal limits.  Symptoms have been ongoing for 4 days.  Low concern for ACS.  Will not obtain repeat   2125 Troponin T, High Sensitivity   2125 CBC with platelets differential  No significant abnormalities   2125 Basic metabolic panel(!)  No electrolyte disturbances   2209 TSH with free T4 reflex(!)  TSH elevated.  Been elevated in the past.  Free T4 is currently pending.   2215 T4 free  Received show are normal.  Patient was observed in the emergency department did not have runs of arrhythmia.  She did have intermittent PVCs that were briefly observed while I was in the room watching the cardiac monitor.  They were intermittent and not persistent.  Instructed her regarding PVCs.  Encouraged her to follow-up with her cardiologist.  She may require Holter monitor or Zio patch if symptoms continue.  No nefarious cause of symptoms currently.       Not Applicable    I discussed the plan for discharge with the patient, and patient is agreeable. We discussed supportive cares at home and reasons for return to the ER including new or worsening symptoms - all questions and concerns addressed to the best of my ability. Strict return precautions discussed. Patient to be discharged by RN.    At the conclusion of the encounter I discussed the results of all of the tests and the disposition. The questions were answered. The patient or family acknowledged understanding and was agreeable with the care plan.     MEDICATIONS GIVEN IN THE EMERGENCY:  Medications -  "No data to display    NEW PRESCRIPTIONS STARTED AT TODAY'S ER VISIT  Discharge Medication List as of 10/9/2024 10:19 PM        Discharge Medication List as of 10/9/2024 10:19 PM          =================================================================    HPI    Patient information was obtained from: Patient    Use of : N/A         Kelly Robledo is a 78 year old female with a pertinent history of Afib, CKD 3A, hyperlipidemia, pulmonary hypertension, and NSVT who presents to this ED for evaluation of palpations.     Patient states for the last few days she has felt intermittent palpitations. Tonight, the palpitations have become more prominent and longer in duration. She shares she has a history of Afib. Denies any shortness of breath, chest pain, lightheadedness, or dizziness. No difficulty walking. Is currently on Eliquis and Toprol. Has a cardiologist she sees. Patient is otherwise in her normal state of health with no other concerns.       PHYSICAL EXAM    BP (!) 161/80   Pulse 86   Temp 97.8  F (36.6  C) (Temporal)   Resp 22   Ht 1.651 m (5' 5\")   Wt 53.5 kg (118 lb)   LMP  (LMP Unknown)   SpO2 95%   BMI 19.64 kg/m    Constitutional: Well developed, well nourished. Comfortable appearing.  Head: Normocephalic, atraumatic, mucous membranes moist, nose normal.   Neck: Supple, gross ROM intact.   Eyes: Pupils mid-range, sclera white.  Respiratory: Clear to auscultation bilaterally, no respiratory distress, no wheezing, speaks full sentences easily.  Cardiovascular: Normal heart rate, regular rhythm, no murmurs. No lower extremity edema.   GI: Soft, no tenderness to deep palpation in all quadrants.  Musculoskeletal: Moving all 4 extremities intentionally and without pain. No obvious deformity.  Skin: Warm, dry, no rash.  Neurologic: Alert & oriented x 3, speech clear, moving all extremities spontaneously   Psychiatric: Affect normal, cooperative.     LAB:  All pertinent labs reviewed and " interpreted.  Results for orders placed or performed during the hospital encounter of 10/09/24   TSH with free T4 reflex   Result Value Ref Range    TSH 7.18 (H) 0.30 - 4.20 uIU/mL   Basic metabolic panel   Result Value Ref Range    Sodium 139 135 - 145 mmol/L    Potassium 4.0 3.4 - 5.3 mmol/L    Chloride 102 98 - 107 mmol/L    Carbon Dioxide (CO2) 24 22 - 29 mmol/L    Anion Gap 13 7 - 15 mmol/L    Urea Nitrogen 15.8 8.0 - 23.0 mg/dL    Creatinine 1.09 (H) 0.51 - 0.95 mg/dL    GFR Estimate 51 (L) >60 mL/min/1.73m2    Calcium 9.2 8.8 - 10.4 mg/dL    Glucose 118 (H) 70 - 99 mg/dL   Result Value Ref Range    Troponin T, High Sensitivity 11 <=14 ng/L   CBC with platelets and differential   Result Value Ref Range    WBC Count 8.3 4.0 - 11.0 10e3/uL    RBC Count 4.14 3.80 - 5.20 10e6/uL    Hemoglobin 13.3 11.7 - 15.7 g/dL    Hematocrit 39.9 35.0 - 47.0 %    MCV 96 78 - 100 fL    MCH 32.1 26.5 - 33.0 pg    MCHC 33.3 31.5 - 36.5 g/dL    RDW 12.9 10.0 - 15.0 %    Platelet Count 161 150 - 450 10e3/uL    % Neutrophils 73 %    % Lymphocytes 16 %    % Monocytes 9 %    % Eosinophils 2 %    % Basophils 0 %    % Immature Granulocytes 0 %    NRBCs per 100 WBC 0 <1 /100    Absolute Neutrophils 6.0 1.6 - 8.3 10e3/uL    Absolute Lymphocytes 1.3 0.8 - 5.3 10e3/uL    Absolute Monocytes 0.7 0.0 - 1.3 10e3/uL    Absolute Eosinophils 0.2 0.0 - 0.7 10e3/uL    Absolute Basophils 0.0 0.0 - 0.2 10e3/uL    Absolute Immature Granulocytes 0.0 <=0.4 10e3/uL    Absolute NRBCs 0.0 10e3/uL   Result Value Ref Range    Free T4 1.20 0.90 - 1.70 ng/dL   ECG 12-LEAD WITH MUSE (LHE)   Result Value Ref Range    Systolic Blood Pressure  mmHg    Diastolic Blood Pressure  mmHg    Ventricular Rate 99 BPM    Atrial Rate 99 BPM    NH Interval 140 ms    QRS Duration 76 ms     ms    QTc 431 ms    P Axis 71 degrees    R AXIS 68 degrees    T Axis 92 degrees    Interpretation ECG       Sinus rhythm  Nonspecific ST abnormality  Abnormal ECG  When compared with ECG  of 11-Dec-2023 14:45,  No significant change was found  Confirmed by SEE ED PROVIDER NOTE FOR, ECG INTERPRETATION ([id number]),  POPPY JIMENES ([id number]) on 10/10/2024 8:16:42 AM         RADIOLOGY:  Reviewed all pertinent imaging. Please see official radiology report.  No orders to display       EKG:    Performed at: 19:07:42    Impression: Sinus rhythm. Nonspecific ST abnormality.     Rate: 99 BPM  Rhythm: Sinus  AL Interval: 140 ms  QRS Interval: 76 ms  QTc Interval: 431 ms  ST Changes: N/A  Comparison: When compared with EKG from 11-Dec-2023 14:45, no significant change was found.     I have independently reviewed and interpreted the EKG(s) documented above.        Jimbo Campuzano MD  [facility] EMERGENCY ROOM  [facility] [facility] [id number] [phone]   =================================================================    BILLING:  Data  Category 1  Non-ED record review, if applicable. External record reviewed: N/A     Clinical information was obtained from an independent historian. History was obtained from: Patient     The following testing was considered but ultimately not selected after discussion with patient/family:  CT of the chest however low concern for pulmonary embolism.  Also consider chest x-ray however patient is not short of breath.     Category 2  My independent interpretation of EKG, rhythm strip, radiology study: An order was placed for cardiac monitoring.  The rhythm on the cardiac monitor was sinus rhythm     Category 3  Discussion of management with other physician/healthcare provider/other source: N/A       Risk  Prescription medication was considered, but ultimately not given after discussion with patient/family: N/A     Chronic conditions affecting care:  Heart disease , chronic anticoagulation     Care significantly affected by Social Determinants of Health: N/A     Consideration of Admission/Observation: Escalation of care including  admission/observation was considered given the complexity and risk of the patient's presenting complaint, exam findings, and/or their underlying comorbidities. However, ultimately I feel the patient is safe for outpatient management with close follow up. Reasoning: Work-up reassuring, does not reveal any acute life/organ threatening processes, patient's symptoms well controlled upon reevaluation, reexamination is reassuring, vitals are stable, patient agreeable with discharge, reliable for follow-up.   Considered admission however patient well-appearing nontoxic and has stable vital signs.  She is essentially asymptomatic.  Will have her follow-up with cardiology.               I, Flaca Durand, am serving as a scribe to document services personally performed by Jimbo Campuzano MD based on my observation and the provider's statements to me. I, Jimbo Campuzano MD, attest that Flaca Durand is acting in a scribe capacity, has observed my performance of the services and has documented them in accordance with my direction.     Jimbo Campuzano MD  10/10/24 [id number]

## 2024-10-30 ENCOUNTER — OFFICE VISIT (OUTPATIENT)
Dept: CARDIOLOGY | Facility: CLINIC | Age: 79
End: 2024-10-30
Payer: COMMERCIAL

## 2024-10-30 ENCOUNTER — ORDERS ONLY (AUTO-RELEASED) (OUTPATIENT)
Dept: CARDIOLOGY | Facility: CLINIC | Age: 79
End: 2024-10-30
Payer: COMMERCIAL

## 2024-10-30 VITALS
RESPIRATION RATE: 14 BRPM | HEART RATE: 88 BPM | SYSTOLIC BLOOD PRESSURE: 124 MMHG | DIASTOLIC BLOOD PRESSURE: 76 MMHG | WEIGHT: 119 LBS | BODY MASS INDEX: 19.8 KG/M2

## 2024-10-30 DIAGNOSIS — R00.2 PALPITATIONS: ICD-10-CM

## 2024-10-30 DIAGNOSIS — Q23.2 CONGENITAL MITRAL STENOSIS: Primary | ICD-10-CM

## 2024-10-30 DIAGNOSIS — Q23.2 CONGENITAL MITRAL STENOSIS: ICD-10-CM

## 2024-10-30 PROCEDURE — 99213 OFFICE O/P EST LOW 20 MIN: CPT | Performed by: INTERNAL MEDICINE

## 2024-10-30 NOTE — LETTER
10/30/2024    Selina Pozo MD  [address] [facility] [address]    RE: Kelly Robledo       Dear Colleague,     I had the pleasure of seeing Kelly Robledo in the [facility].    Thank you, Dr. Conner, for asking the [facility] team to see Ms. Kelly Robledo to evaluate       Assessment/Recommendations   Assessment/Plan:  Palpitation - noted rare and brief NSVT no ischemia on stress imaging Jul, repeat zio patch 3 days, normal K and improving Cr.    S/p MV clip no murmur on exam but MS on last ehco, repeat to examine gradient    Follow up 6 mo unless no issues then can push out to 12 mo     History of Present Illness/Subjective    MsTera Robledo is a 78 year old female with afib s/p ablation, MR s/p clipi with mild MS, with palpitations recently went to ED, every couple has palpitations with holter in August with polymorphic VT and monomorhpic VT both non sustained, no ischemai on stress imaging 7/24, she feels palp associated with shingles.  No chest pain/pressure, PND/rothopne, sycnopale vents, edemas, dyspnea on exertion, no GI/ bleeding.  No fever chills,          Physical Examination Review of Systems   /76 (BP Location: Left arm, Patient Position: Sitting, Cuff Size: Adult Regular)   Pulse 88   Resp 14   Wt 54 kg (119 lb)   LMP  (LMP Unknown)   BMI 19.80 kg/m    Body mass index is 19.8 kg/m .  Wt Readings from Last 3 Encounters:   10/30/24 54 kg (119 lb)   10/09/24 53.5 kg (118 lb)   09/05/24 55.8 kg (123 lb)     [unfilled]  General Appearance:   no distress, normal body habitus   ENT/Mouth: membranes moist, no oral lesions or bleeding gums.      EYES:  no scleral icterus, normal conjunctivae   Neck: no carotid bruits or thyromegaly   Chest/Lungs:   lungs are clear to auscultation, no rales or wheezing,  sternal scar, equal chest wall expansion    Cardiovascular:   Regular. Normal first and second heart sounds with no murmurs, rubs, or  gallops; the carotid, radial and posterior tibial pulses are intact, Jugular venous pressure , edema bilaterally    Abdomen:  no organomegaly, masses, bruits, or tenderness; bowel sounds are present   Extremities: no cyanosis or clubbing   Skin: no xanthelasma, warm.    Neurologic: normal  bilateral, no tremors     Psychiatric: alert and oriented x3, calm     Review of Systems - 12 points nega other than above      Medical History  Surgical History Family History Social History   Past Medical History:   Diagnosis Date     Abdominal pain      Atrial fibrillation (H)      Diverticulosis      Moderate mitral regurgitation      NSVT (nonsustained ventricular tachycardia) (H) 6/17/2020     Palpitations     Past Surgical History:   Procedure Laterality Date     BIOPSY BREAST Bilateral     benign     CV CORONARY ANGIOGRAM N/A 6/18/2020    Procedure: Coronary Angiogram;  Surgeon: Sergey Conner MD;  Location: [facility] Cath Lab;  Service: Cardiology     CV CORONARY ANGIOGRAM N/A 1/13/2021    Procedure: Coronary Angiogram;  Surgeon: Klaus Fitzpatrick MD;  Location: [facility] Cardiac Cath Lab;  Service: Cardiology     CV LEFT HEART CATHETERIZATION WITHOUT LEFT VENTRICULOGRAM Left 6/18/2020    Procedure: Left Heart Catheterization Without Left Ventriculogram;  Surgeon: Sergey Conner MD;  Location: [facility] Cath Lab;  Service: Cardiology     CV RIGHT HEART CATHETERIZATION N/A 6/18/2020    Procedure: Right Heart Catheterization;  Surgeon: Sergey Conner MD;  Location: [facility] Cath Lab;  Service: Cardiology     CV RIGHT HEART CATHETERIZATION N/A 1/13/2021    Procedure: Right Heart Catheterization;  Surgeon: Klaus Fitzpatrick MD;  Location: [facility] Cardiac Cath Lab;  Service: Cardiology     EP ABLATION PULMONARY VEIN ISOLATION N/A 7/3/2023    Procedure: Ablation Atrial Fibrillation;  Surgeon: Kathleen Hernandez MD;  Location: [facility] CV     HYSTERECTOMY  1970's      OOPHORECTOMY Bilateral 1970's     TRANSCATHETER MITRAL VALVE REPAIR N/A 2/1/2021    Procedure: CV MITRAL CLIP;  Surgeon: Klaus Fitzpatrick MD;  Location:  HEART CARDIAC CATH LAB    Family History   Problem Relation Age of Onset     Breast Cancer Sister 47.00    Social History     Socioeconomic History     Marital status:      Spouse name: Not on file     Number of children: Not on file     Years of education: Not on file     Highest education level: Not on file   Occupational History     Not on file   Tobacco Use     Smoking status: Never     Passive exposure: Past (Dad was a smoker)     Smokeless tobacco: Never   Vaping Use     Vaping status: Never Used   Substance and Sexual Activity     Alcohol use: No     Drug use: No     Sexual activity: Not on file   Other Topics Concern     Parent/sibling w/ CABG, MI or angioplasty before 65F 55M? Not Asked   Social History Narrative    . No children. Family in the [place] area.   Enjoys walking- very regular with this especially in [place] when they used to vacation there for 6 months of the year.   Likes reading - romantic happy endings.   Never alcohol or smoking.  Selina Pozo MD       Social Drivers of Health     Financial Resource Strain: Low Risk  (4/30/2024)    Financial Resource Strain      Within the past 12 months, have you or your family members you live with been unable to get utilities (heat, electricity) when it was really needed?: No   Food Insecurity: Low Risk  (4/30/2024)    Food Insecurity      Within the past 12 months, did you worry that your food would run out before you got money to buy more?: No      Within the past 12 months, did the food you bought just not last and you didn t have money to get more?: No   Transportation Needs: Low Risk  (4/30/2024)    Transportation Needs      Within the past 12 months, has lack of transportation kept you from medical appointments, getting your medicines, non-medical meetings or appointments, work, or  from getting things that you need?: No   Physical Activity: Sufficiently Active (4/30/2024)    Exercise Vital Sign      Days of Exercise per Week: 7 days      Minutes of Exercise per Session: 30 min   Stress: No Stress Concern Present (4/30/2024)    [employer] [place] [employer] - Occupational Stress Questionnaire      Feeling of Stress : Not at all   Social Connections: Unknown (4/30/2024)    Social Connection and Isolation Panel [NHANES]      Frequency of Communication with Friends and Family: Not on file      Frequency of Social Gatherings with Friends and Family: Three times a week      Attends Yarsani Services: Not on file      Active Member of Clubs or Organizations: Not on file      Attends Club or Organization Meetings: Not on file      Marital Status: Not on file   Interpersonal Safety: Not on file   Housing Stability: Low Risk  (4/30/2024)    Housing Stability      Do you have housing? : Yes      Are you worried about losing your housing?: No          Medications  Allergies   Scheduled Meds:  No current facility-administered medications for this visit.     Continuous Infusions:  No current facility-administered medications for this visit.     PRN Meds:.  No current facility-administered medications for this visit.    Allergies   Allergen Reactions     Penicillins Hives         Lab Results    Chemistry/lipid CBC Cardiac Enzymes/BNP/TSH/INR   Lab Results   Component Value Date    CHOL 165 07/26/2024    HDL 51 07/26/2024    TRIG 125 07/26/2024    BUN 15.8 10/09/2024     10/09/2024    CO2 24 10/09/2024    Lab Results   Component Value Date    WBC 8.3 10/09/2024    HGB 13.3 10/09/2024    HCT 39.9 10/09/2024    MCV 96 10/09/2024     10/09/2024    Lab Results   Component Value Date    TROPONINI <0.01 11/27/2021     (H) 01/25/2021    TSH 7.18 (H) 10/09/2024    INR 1.22 (H) 01/25/2021              Sergey Conner MD  Interventional Cardiology  [facility]  Care              Thank you for allowing me to participate in the care of your patient.      Sincerely,     Sergey Conner MD     [facility]  cc:   Sergey Conner MD  [address]  [place] [address]

## 2024-10-30 NOTE — PROGRESS NOTES
Thank you, Dr. Conner, for asking the [facility] team to see Ms. Kelly Robledo to evaluate       Assessment/Recommendations   Assessment/Plan:  Palpitation - noted rare and brief NSVT no ischemia on stress imaging Jul, repeat zio patch 3 days, normal K and improving Cr.    S/p MV clip no murmur on exam but MS on last ehco, repeat to examine gradient    Follow up 6 mo unless no issues then can push out to 12 mo     History of Present Illness/Subjective    MsTera Robledo is a 78 year old female with afib s/p ablation, MR s/p clipi with mild MS, with palpitations recently went to ED, every couple has palpitations with holter in August with polymorphic VT and monomorhpic VT both non sustained, no ischemai on stress imaging 7/24, she feels palp associated with shingles.  No chest pain/pressure, PND/rothopne, sycnopale vents, edemas, dyspnea on exertion, no GI/ bleeding.  No fever chills,          Physical Examination Review of Systems   /76 (BP Location: Left arm, Patient Position: Sitting, Cuff Size: Adult Regular)   Pulse 88   Resp 14   Wt 54 kg (119 lb)   LMP  (LMP Unknown)   BMI 19.80 kg/m    Body mass index is 19.8 kg/m .  Wt Readings from Last 3 Encounters:   10/30/24 54 kg (119 lb)   10/09/24 53.5 kg (118 lb)   09/05/24 55.8 kg (123 lb)     [unfilled]  General Appearance:   no distress, normal body habitus   ENT/Mouth: membranes moist, no oral lesions or bleeding gums.      EYES:  no scleral icterus, normal conjunctivae   Neck: no carotid bruits or thyromegaly   Chest/Lungs:   lungs are clear to auscultation, no rales or wheezing,  sternal scar, equal chest wall expansion    Cardiovascular:   Regular. Normal first and second heart sounds with no murmurs, rubs, or gallops; the carotid, radial and posterior tibial pulses are intact, Jugular venous pressure , edema bilaterally    Abdomen:  no organomegaly, masses, bruits, or tenderness; bowel sounds are present   Extremities:  no cyanosis or clubbing   Skin: no xanthelasma, warm.    Neurologic: normal  bilateral, no tremors     Psychiatric: alert and oriented x3, calm     Review of Systems - 12 points nega other than above      Medical History  Surgical History Family History Social History   Past Medical History:   Diagnosis Date    Abdominal pain     Atrial fibrillation (H)     Diverticulosis     Moderate mitral regurgitation     NSVT (nonsustained ventricular tachycardia) (H) 6/17/2020    Palpitations     Past Surgical History:   Procedure Laterality Date    BIOPSY BREAST Bilateral     benign    CV CORONARY ANGIOGRAM N/A 6/18/2020    Procedure: Coronary Angiogram;  Surgeon: Sergey Conner MD;  Location: [facility] Cath Lab;  Service: Cardiology    CV CORONARY ANGIOGRAM N/A 1/13/2021    Procedure: Coronary Angiogram;  Surgeon: Klaus Fitzpatrick MD;  Location: [facility] Cardiac Cath Lab;  Service: Cardiology    CV LEFT HEART CATHETERIZATION WITHOUT LEFT VENTRICULOGRAM Left 6/18/2020    Procedure: Left Heart Catheterization Without Left Ventriculogram;  Surgeon: Sergey Conner MD;  Location: [facility] Cath Lab;  Service: Cardiology    CV RIGHT HEART CATHETERIZATION N/A 6/18/2020    Procedure: Right Heart Catheterization;  Surgeon: Sergey Conner MD;  Location: [facility];  Service: Cardiology    CV RIGHT HEART CATHETERIZATION N/A 1/13/2021    Procedure: Right Heart Catheterization;  Surgeon: Klaus Fitzpatrick MD;  Location: [facility] Cardiac Cath Lab;  Service: Cardiology    EP ABLATION PULMONARY VEIN ISOLATION N/A 7/3/2023    Procedure: Ablation Atrial Fibrillation;  Surgeon: Kathleen Hernandez MD;  [place] [facility] [place]    HYSTERECTOMY  1970's    OOPHORECTOMY Bilateral 1970's    TRANSCATHETER MITRAL VALVE REPAIR N/A 2/1/2021    Procedure: CV MITRAL CLIP;  Surgeon: Klaus Fitzpatrick MD;  Location: [facility] CARDIAC CATH LAB    Family History   Problem Relation Age of Onset    Breast  Cancer Sister 47.00    Social History     Socioeconomic History    Marital status:      Spouse name: Not on file    Number of children: Not on file    Years of education: Not on file    Highest education level: Not on file   Occupational History    Not on file   Tobacco Use    Smoking status: Never     Passive exposure: Past (Dad was a smoker)    Smokeless tobacco: Never   Vaping Use    Vaping status: Never Used   Substance and Sexual Activity    Alcohol use: No    Drug use: No    Sexual activity: Not on file   Other Topics Concern    Parent/sibling w/ CABG, MI or angioplasty before 65F 55M? Not Asked   Social History Narrative    . No children. Family in the [place] area.   Enjoys walking- very regular with this especially in [place] when they used to vacation there for 6 months of the year.   Likes reading - romantic happy endings.   Never alcohol or smoking.  Selina Pozo MD       Social Drivers of Health     Financial Resource Strain: Low Risk  (4/30/2024)    Financial Resource Strain     Within the past 12 months, have you or your family members you live with been unable to get utilities (heat, electricity) when it was really needed?: No   Food Insecurity: Low Risk  (4/30/2024)    Food Insecurity     Within the past 12 months, did you worry that your food would run out before you got money to buy more?: No     Within the past 12 months, did the food you bought just not last and you didn t have money to get more?: No   Transportation Needs: Low Risk  (4/30/2024)    Transportation Needs     Within the past 12 months, has lack of transportation kept you from medical appointments, getting your medicines, non-medical meetings or appointments, work, or from getting things that you need?: No   Physical Activity: Sufficiently Active (4/30/2024)    Exercise Vital Sign     Days of Exercise per Week: 7 days     Minutes of Exercise per Session: 30 min   Stress: No Stress Concern Present (4/30/2024)    [place]  [place] [employer] - Occupational Stress Questionnaire     Feeling of Stress : Not at all   Social Connections: Unknown (4/30/2024)    Social Connection and Isolation Panel [NHANES]     Frequency of Communication with Friends and Family: Not on file     Frequency of Social Gatherings with Friends and Family: Three times a week     Attends Rastafari Services: Not on file     Active Member of Clubs or Organizations: Not on file     Attends Club or Organization Meetings: Not on file     Marital Status: Not on file   Interpersonal Safety: Not on file   Housing Stability: Low Risk  (4/30/2024)    Housing Stability     Do you have housing? : Yes     Are you worried about losing your housing?: No          Medications  Allergies   Scheduled Meds:  No current facility-administered medications for this visit.     Continuous Infusions:  No current facility-administered medications for this visit.     PRN Meds:.  No current facility-administered medications for this visit.    Allergies   Allergen Reactions    Penicillins Hives         Lab Results    Chemistry/lipid CBC Cardiac Enzymes/BNP/TSH/INR   Lab Results   Component Value Date    CHOL 165 07/26/2024    HDL 51 07/26/2024    TRIG 125 07/26/2024    BUN 15.8 10/09/2024     10/09/2024    CO2 24 10/09/2024    Lab Results   Component Value Date    WBC 8.3 10/09/2024    HGB 13.3 10/09/2024    HCT 39.9 10/09/2024    MCV 96 10/09/2024     10/09/2024    Lab Results   Component Value Date    TROPONINI <0.01 11/27/2021     (H) 01/25/2021    TSH 7.18 (H) 10/09/2024    INR 1.22 (H) 01/25/2021              Sergey Conner MD  Interventional Cardiology  [facility]

## 2024-11-11 DIAGNOSIS — I47.29 NSVT (NONSUSTAINED VENTRICULAR TACHYCARDIA) (H): Primary | ICD-10-CM

## 2024-11-11 DIAGNOSIS — I47.10 SVT (SUPRAVENTRICULAR TACHYCARDIA) (H): ICD-10-CM

## 2024-11-11 DIAGNOSIS — R00.2 PALPITATIONS: ICD-10-CM

## 2024-11-11 PROCEDURE — 93244 EXT ECG>48HR<7D REV&INTERPJ: CPT | Performed by: INTERNAL MEDICINE

## 2024-11-11 RX ORDER — DILTIAZEM HYDROCHLORIDE 120 MG/1
120 CAPSULE, EXTENDED RELEASE ORAL DAILY
Qty: 90 CAPSULE | Refills: 0 | Status: SHIPPED | OUTPATIENT
Start: 2024-11-11

## 2024-11-29 ENCOUNTER — HOSPITAL ENCOUNTER (OUTPATIENT)
Dept: CARDIOLOGY | Facility: CLINIC | Age: 79
Discharge: HOME | End: 2024-11-29
Attending: INTERNAL MEDICINE | Admitting: INTERNAL MEDICINE
Payer: COMMERCIAL

## 2024-11-29 DIAGNOSIS — Q23.2 CONGENITAL MITRAL STENOSIS: ICD-10-CM

## 2024-11-29 DIAGNOSIS — R00.2 PALPITATIONS: ICD-10-CM

## 2024-11-29 LAB — LVEF ECHO: NORMAL

## 2024-11-29 PROCEDURE — 93306 TTE W/DOPPLER COMPLETE: CPT | Mod: 26 | Performed by: INTERNAL MEDICINE

## 2024-11-29 PROCEDURE — 93306 TTE W/DOPPLER COMPLETE: CPT

## 2024-12-16 DIAGNOSIS — R00.2 PALPITATIONS: ICD-10-CM

## 2024-12-16 DIAGNOSIS — I47.10 SVT (SUPRAVENTRICULAR TACHYCARDIA) (H): ICD-10-CM

## 2024-12-16 DIAGNOSIS — I47.29 NSVT (NONSUSTAINED VENTRICULAR TACHYCARDIA) (H): Primary | ICD-10-CM

## 2024-12-27 DIAGNOSIS — I48.0 PAROXYSMAL ATRIAL FIBRILLATION (H): ICD-10-CM

## 2024-12-30 RX ORDER — APIXABAN 2.5 MG/1
2.5 TABLET, FILM COATED ORAL 2 TIMES DAILY
Qty: 180 TABLET | Refills: 0 | Status: SHIPPED | OUTPATIENT
Start: 2024-12-30

## 2024-12-30 NOTE — TELEPHONE ENCOUNTER
MEDICATION REFILL  Patient walked in to clinic today requesting outreach to PCP regarding medication refill as she has run out and pharmacy was unable to fill stating provider could not be reached. Please advise and refill if appropriate. Patient requested I state she needs this done as soon as possible.     Refill request for the following medication(s):  Pending Prescriptions:                       Disp   Refills    ELIQUIS ANTICOAGULANT 2.5 MG tablet [Phar*180 ta*0            Sig: TAKE 1 TABLET BY MOUTH TWICE A DAY    Pharmacy: [facility]/PHARMACY #[id number] [place], [place] - [phone]

## 2024-12-31 NOTE — TELEPHONE ENCOUNTER
Please remind pt to schedule AWV in April; looks like she needs BMP as well at that time we can check it

## 2024-12-31 NOTE — TELEPHONE ENCOUNTER
12-31-24  Called pt LM pt due for AWV and LAB in April  [facility] msg sent  Jordital

## 2025-01-15 DIAGNOSIS — I48.0 PAF (PAROXYSMAL ATRIAL FIBRILLATION) (H): ICD-10-CM

## 2025-01-15 RX ORDER — METOPROLOL SUCCINATE 100 MG/1
100 TABLET, EXTENDED RELEASE ORAL AT BEDTIME
Qty: 90 TABLET | Refills: 0 | Status: SHIPPED | OUTPATIENT
Start: 2025-01-15

## 2025-01-23 DIAGNOSIS — I25.10 CORONARY ARTERY DISEASE INVOLVING NATIVE CORONARY ARTERY OF NATIVE HEART WITHOUT ANGINA PECTORIS: ICD-10-CM

## 2025-01-23 RX ORDER — ROSUVASTATIN CALCIUM 40 MG/1
TABLET, COATED ORAL
Qty: 90 TABLET | Refills: 0 | Status: SHIPPED | OUTPATIENT
Start: 2025-01-23

## 2025-03-26 DIAGNOSIS — I25.10 CORONARY ARTERIOSCLEROSIS: ICD-10-CM

## 2025-03-26 DIAGNOSIS — E78.5 HYPERLIPIDEMIA: ICD-10-CM

## 2025-03-26 RX ORDER — EZETIMIBE 10 MG/1
10 TABLET ORAL DAILY
Qty: 90 TABLET | Refills: 0 | Status: SHIPPED | OUTPATIENT
Start: 2025-03-26

## 2025-04-24 ENCOUNTER — LAB (OUTPATIENT)
Dept: CARDIOLOGY | Facility: CLINIC | Age: 80
End: 2025-04-24
Payer: COMMERCIAL

## 2025-04-24 DIAGNOSIS — E78.5 HYPERLIPIDEMIA LDL GOAL <70: ICD-10-CM

## 2025-04-24 DIAGNOSIS — R00.2 PALPITATIONS: ICD-10-CM

## 2025-04-24 LAB
ALBUMIN SERPL BCG-MCNC: 4 G/DL (ref 3.5–5.2)
ALP SERPL-CCNC: 65 U/L (ref 40–150)
ALT SERPL W P-5'-P-CCNC: 30 U/L (ref 0–50)
ANION GAP SERPL CALCULATED.3IONS-SCNC: 9 MMOL/L (ref 7–15)
AST SERPL W P-5'-P-CCNC: 32 U/L (ref 0–45)
BILIRUB SERPL-MCNC: 0.8 MG/DL
BUN SERPL-MCNC: 17.5 MG/DL (ref 8–23)
CALCIUM SERPL-MCNC: 9.2 MG/DL (ref 8.8–10.4)
CHLORIDE SERPL-SCNC: 104 MMOL/L (ref 98–107)
CHOLEST SERPL-MCNC: 169 MG/DL
CREAT SERPL-MCNC: 1.15 MG/DL (ref 0.51–0.95)
EGFRCR SERPLBLD CKD-EPI 2021: 48 ML/MIN/1.73M2
ERYTHROCYTE [DISTWIDTH] IN BLOOD BY AUTOMATED COUNT: 13.6 % (ref 10–15)
FASTING STATUS PATIENT QL REPORTED: YES
GLUCOSE SERPL-MCNC: 112 MG/DL (ref 70–99)
HCO3 SERPL-SCNC: 27 MMOL/L (ref 22–29)
HCT VFR BLD AUTO: 39.9 % (ref 35–47)
HDLC SERPL-MCNC: 48 MG/DL
HGB BLD-MCNC: 12.8 G/DL (ref 11.7–15.7)
LDLC SERPL CALC-MCNC: 100 MG/DL
MAGNESIUM SERPL-MCNC: 2.2 MG/DL (ref 1.7–2.3)
MCH RBC QN AUTO: 31.1 PG (ref 26.5–33)
MCHC RBC AUTO-ENTMCNC: 32.1 G/DL (ref 31.5–36.5)
MCV RBC AUTO: 97 FL (ref 78–100)
NONHDLC SERPL-MCNC: 121 MG/DL
PLATELET # BLD AUTO: 185 10E3/UL (ref 150–450)
POTASSIUM SERPL-SCNC: 4.1 MMOL/L (ref 3.4–5.3)
PROT SERPL-MCNC: 6.7 G/DL (ref 6.4–8.3)
RBC # BLD AUTO: 4.12 10E6/UL (ref 3.8–5.2)
SODIUM SERPL-SCNC: 140 MMOL/L (ref 135–145)
TRIGL SERPL-MCNC: 105 MG/DL
WBC # BLD AUTO: 5.5 10E3/UL (ref 4–11)

## 2025-04-24 PROCEDURE — 80053 COMPREHEN METABOLIC PANEL: CPT

## 2025-04-24 PROCEDURE — 80061 LIPID PANEL: CPT

## 2025-04-24 PROCEDURE — 36415 COLL VENOUS BLD VENIPUNCTURE: CPT

## 2025-04-24 PROCEDURE — 85027 COMPLETE CBC AUTOMATED: CPT

## 2025-04-24 PROCEDURE — 83735 ASSAY OF MAGNESIUM: CPT

## 2025-05-01 ENCOUNTER — OFFICE VISIT (OUTPATIENT)
Dept: FAMILY MEDICINE | Facility: CLINIC | Age: 80
End: 2025-05-01
Payer: COMMERCIAL

## 2025-05-01 VITALS
HEIGHT: 65 IN | TEMPERATURE: 98.1 F | RESPIRATION RATE: 16 BRPM | OXYGEN SATURATION: 98 % | DIASTOLIC BLOOD PRESSURE: 80 MMHG | SYSTOLIC BLOOD PRESSURE: 127 MMHG | HEART RATE: 83 BPM | BODY MASS INDEX: 20.98 KG/M2 | WEIGHT: 125.9 LBS

## 2025-05-01 DIAGNOSIS — Z00.00 MEDICARE ANNUAL WELLNESS VISIT, SUBSEQUENT: Primary | ICD-10-CM

## 2025-05-01 DIAGNOSIS — I42.8 NON-ISCHEMIC CARDIOMYOPATHY (H): ICD-10-CM

## 2025-05-01 DIAGNOSIS — E03.8 SUBCLINICAL HYPOTHYROIDISM: ICD-10-CM

## 2025-05-01 DIAGNOSIS — I25.10 CORONARY ARTERY DISEASE INVOLVING NATIVE CORONARY ARTERY OF NATIVE HEART WITHOUT ANGINA PECTORIS: ICD-10-CM

## 2025-05-01 DIAGNOSIS — I48.0 PAROXYSMAL ATRIAL FIBRILLATION (H): ICD-10-CM

## 2025-05-01 DIAGNOSIS — Z23 NEED FOR VACCINATION: ICD-10-CM

## 2025-05-01 DIAGNOSIS — N18.31 STAGE 3A CHRONIC KIDNEY DISEASE (H): ICD-10-CM

## 2025-05-01 DIAGNOSIS — I27.20 PULMONARY HYPERTENSION (H): ICD-10-CM

## 2025-05-01 DIAGNOSIS — Z95.818 S/P MITRAL VALVE CLIP IMPLANTATION: ICD-10-CM

## 2025-05-01 DIAGNOSIS — Z98.890 S/P MITRAL VALVE CLIP IMPLANTATION: ICD-10-CM

## 2025-05-01 DIAGNOSIS — I10 ESSENTIAL HYPERTENSION: ICD-10-CM

## 2025-05-01 DIAGNOSIS — E78.5 HYPERLIPIDEMIA LDL GOAL <100: ICD-10-CM

## 2025-05-01 LAB
EST. AVERAGE GLUCOSE BLD GHB EST-MCNC: 128 MG/DL
HBA1C MFR BLD: 6.1 % (ref 0–5.6)

## 2025-05-01 RX ORDER — CLINDAMYCIN HYDROCHLORIDE 300 MG/1
CAPSULE ORAL
Qty: 4 CAPSULE | Refills: 1 | Status: SHIPPED | OUTPATIENT
Start: 2025-05-01

## 2025-05-01 SDOH — HEALTH STABILITY: PHYSICAL HEALTH: ON AVERAGE, HOW MANY DAYS PER WEEK DO YOU ENGAGE IN MODERATE TO STRENUOUS EXERCISE (LIKE A BRISK WALK)?: 5 DAYS

## 2025-05-01 SDOH — HEALTH STABILITY: PHYSICAL HEALTH: ON AVERAGE, HOW MANY MINUTES DO YOU ENGAGE IN EXERCISE AT THIS LEVEL?: 20 MIN

## 2025-05-01 ASSESSMENT — SOCIAL DETERMINANTS OF HEALTH (SDOH): HOW OFTEN DO YOU GET TOGETHER WITH FRIENDS OR RELATIVES?: THREE TIMES A WEEK

## 2025-05-01 NOTE — PROGRESS NOTES
Preventive Care Visit  [facility]  Selina Pozo MD, Family Medicine  May 1, 2025      Assessment & Plan     Medicare annual wellness visit, subsequent  - Home safety information was reviewed if pertinent for falls prevention: regular checkups with vision and hearing, mindful medication use rufina with OTCs, using any assisted walking devices, adequate shoes and feet wear, avoiding loose rugs, safety additions to the home if needed, keeping the body in good shape with regular exercise especially walking, and limiting alcohol intake.  - Social history was reviewed  - Patient is independent with activities of daily living  - We reviewed active symptoms and discussed management  - We reviewed list of healthcare providers for this patient.  - We also reviewed PHQ 9     Encouraged PCV20, tdap - she will confirm at pharmacy as she thinks she had it completed (sore arm; separate from her experience with Shingrix which went well)        Essential hypertension  Well controlled. She does tend to have whitecoat hypertension here in the office, last home number was 120/70 range.   - Continues higher dose metoprolol 100mg, lisinopril  10mg  - consider SGLT2 inhibitor for added BP control if needed with hx of CKD/ HF.      Nonrheumatic severe mitral valve regurgitation  s/p Mitral clip x1, residual mild-mod MR  Moderate TR  Pulmonary hypertension (H)  s/p clip x1, MR reduced to trace. Post op TTE with mild-moderate MR, mean gradient of 4mmHg.  Followed by cardiology and pulmonology. most recent echocardiogram 12/14/2023 showing normal ejection fraction of 60 to 65% with normal right ventricular size and systolic function.   - Eliquis 2.5mg bid (for age >80 and weight <60kg)  - follow up with valve clinic & cardiology   - clindamycin for abx dental ppx sent today     Paroxysmal atrial fibrillation  s/p cardiac ablation 11/2023  HFpEF   Moderate non-obstructive CAD  Followed by cardiology. Does have  worsening palpitations however work up somewhat nonspecific and increased metoprolol didn't make much improvement for her sx  - consider magnesium glycinate (Mg seemed to help but the oxide formulation caused diarrhea). April 2025 Mg level normal  -  PFTs in the past/recent pulmonology visit completed last week; note reviewed  - TSH monitoring is performed.  Does have subclinical hypothyroidism as noted below.   - Eliquis   - consider SGLT2 inhibitor for added BP control with hx of CKD/ HF.      HLD, CAD  Stable;  last in April. Due today.   - rosuvstatin 20mg every day  - Zetia 10mg daily  - encouraged to add PCSK9 inhibitor at upcoming cardiology visit in May; goal LDL <70        Stage 3A CKD  BMP from early April reviewed; stable  - avoid nephrotoxics  - microalbumin   - consider SGLT2 inhibitor for added BP control with hx of CKD/ HF.         Subclinical hypothyroidism  subclinical hypothyroidism based on previously noted/elevated TSH and normal T4; starting in June 2020 PRIOR to initiation of amiodarone, though possibly impacted by this medication given flucuations. Her cardiologist Dr. Kim and I had discussed her options and we both feel she should not yet be started on T4 supplementation. I spent time again today to review the dx, options for tx when TSH is >10 in her age, and our concerns. Kelly was made aware of our recommendations to not treat this given her risk factors of CAD, tendency for tachyarrhythemia at baseline; increasing risk of angina or arrhythmia further if on thyroid replacment. She is comfortable with the plan  - has been off amiodarone since Nov 2023  - TSH today     Postmenopausal status  Osteoporosis, unspecified osteoporosis type, unspecified pathological fracture presence  Her last DEXA was 7/27/2018 when she was on a Fosamax holiday, showed she had osteopenia.  She has previously completed several years of Fosamax, most recently from July 2018 to November 2019.  She  "declines IV medication.  She is also had hormone replacement therapy in the past. She declines going back to the osteoporosis clinic at this time.    - DEXA done in 12/2021; declines further at this point  - Encourage calcium, vitamin D and weightbearing activity          Patient has been advised of split billing requirements and indicates understanding: Yes        Counseling  Appropriate preventive services were addressed with this patient via screening, questionnaire, or discussion as appropriate for fall prevention, nutrition, physical activity, Tobacco-use cessation, social engagement, weight loss and cognition.  Checklist reviewing preventive services available has been given to the patient.  Reviewed patient's diet, addressing concerns and/or questions.       Follow-up    Follow-up Visit   Expected date:  May 01, 2026 (Approximate)      Follow Up Appointment Details:     Follow-up with whom?: Me    Follow-Up for what?: Medicare Wellness    Any Additional Chronic Condition Management?:  Hypertension  Heart Failure       Welcome or Annual?: Annual Wellness    How?: In Person                 Subjective   Kelly is a 79 year old, presenting for the following:  Annual Visit (Pt is here for an annual physical. Not fasting. )        5/1/2025    12:56 PM   Additional Questions   Roomed by tanvi hull cma   Accompanied by self           HPI     Feeling good    She states being UTD on her PCV vaccine and thinks it was within the past 5 years- she wants to try to find records before getting at the pharmacy      She feels her heart is \"going crazy\"  with palpitations a few times a day. She states it's actually \"always this way\"  and we reviewed it's her afib.  She did see both her cardiologist and pulmonologist during this time and was given reassurances  She was offered magnesium and it causes diarrhea (Mg level was normal early April so ok to stop but we also talked about a trial of mag glycinate instead of mag oxide " "to see if better tolerated since she did think it helped reduce the irregular HR sx).       HTN: diltiazem 120mg, lisinopril 10mg, metoprolol 100mg,    Patient notes that blood pressures are always elevated at her doctor appointments. BP 120s/70s.      Followed by  pulmonology (hx of amiodarone use but not recently)  and cardiology for multiple conditions:     Afib and hx of NSVT: event in Nov 2023; showing 5 beat run of SVT. Holter monitor showed a couple runs for 6 beats. She had a successful cardiac ablation and amiodarone was stopped at that time 6 months ago. Getting some palpitations but the recent increase in metoprolol seemed to make them worse and not better.   Never was a good sleeper  Avoids caffeine  No heartburn     Patient has history of mitral prolapse and mitral valve clip implantation (02/01/2021). Patient is anticoagulated on Eliquis. Status post mitral clip 2021 with most recent echocardiogram 12/14/2023 showing normal ejection fraction of 60 to 65% with normal right ventricular size and systolic function.      Denies any shortness of breath; recent visit with pulmonology       Hyperlipidemia: On rosuvastatin 40 mg daily and Zetia 10 mg daily.  Last lipids 12/14/2023 showed elevated LDL of 106.  Goal <70. Patient is open to considering PCSK9 inhibitor but not having luck getting through to insurance when she calls a couple times already.       She has osteoporosis and completed 2 years of alendronate (tolerated the severe nausea for that long before desiring to stop); declines for now; last DEXA in 12/2021     She takes care of all the shopping, cooking, cleaning. Her  does the finances. His health is declining; \"bad legs\"         Advance Care Planning    Document on file is a Health Care Directive or POLST.        5/1/2025   General Health   How would you rate your overall physical health? Excellent   Feel stress (tense, anxious, or unable to sleep) Not at all         5/1/2025   Nutrition "   Diet: Regular (no restrictions)    Low salt       Multiple values from one day are sorted in reverse-chronological order         5/1/2025   Exercise   Days per week of moderate/strenous exercise 5 days   Average minutes spent exercising at this level 20 min         5/1/2025   Social Factors   Frequency of gathering with friends or relatives Three times a week   Worry food won't last until get money to buy more No   Food not last or not have enough money for food? No   Do you have housing? (Housing is defined as stable permanent housing and does not include staying outside in a car, in a tent, in an abandoned building, in an overnight shelter, or couch-surfing.) Yes   Are you worried about losing your housing? No   Lack of transportation? No   Unable to get utilities (heat,electricity)? No         5/1/2025   Fall Risk   Fallen 2 or more times in the past year? No    Trouble with walking or balance? No        Proxy-reported          5/1/2025   Activities of Daily Living- Home Safety   Needs help with the following daily activites None of the above   Safety concerns in the home None of the above         5/1/2025   Dental   Dentist two times every year? Yes         5/1/2025   Hearing Screening   Hearing concerns? None of the above         5/1/2025   Driving Risk Screening   Patient/family members have concerns about driving No         5/1/2025   General Alertness/Fatigue Screening   Have you been more tired than usual lately? No         5/1/2025   Urinary Incontinence Screening   Bothered by leaking urine in past 6 months No         Today's PHQ-2 Score:       5/1/2025    12:55 PM   PHQ-2 ( 1999 [employer])   Q1: Little interest or pleasure in doing things 0    Q2: Feeling down, depressed or hopeless 0    PHQ-2 Score 0    Q1: Little interest or pleasure in doing things Not at all   Q2: Feeling down, depressed or hopeless Not at all   PHQ-2 Score 0       Proxy-reported           5/1/2025   Substance Use   Alcohol more than  3/day or more than 7/wk No   Do you have a current opioid prescription? No   How severe/bad is pain from 1 to 10? 1/10   Do you use any other substances recreationally? No     Social History     Tobacco Use    Smoking status: Never     Passive exposure: Past (Dad was a smoker)    Smokeless tobacco: Never   Vaping Use    Vaping status: Never Used   Substance Use Topics    Alcohol use: No    Drug use: No           10/28/2022   LAST FHS-7 RESULTS   1st degree relative breast or ovarian cancer Yes   Any relative bilateral breast cancer No   Any male have breast cancer No   Any ONE woman have BOTH breast AND ovarian cancer No   Any woman with breast cancer before 50yrs Yes   2 or more relatives with breast AND/OR ovarian cancer No   2 or more relatives with breast AND/OR bowel cancer No                      Reviewed and updated as needed this visit by Provider   Tobacco  Allergies  Meds  Problems  Med Hx  Surg Hx  Fam Hx              Current providers sharing in care for this patient include:  Patient Care Team:  Selina Pozo MD as PCP - General (Family Medicine)  Selina Pozo MD as Assigned PCP  Mario Alberto Gutiérrez MD as Assigned Pulmonology Provider  Jailene White PA-C as Physician Assistant (Physician Assistant - Medical)  Sergey Conner MD as MD (Interventional Cardiology)  Sergey Conner MD as Assigned Heart and Vascular Provider    The following health maintenance items are reviewed in Epic and correct as of today:  Health Maintenance   Topic Date Due    HF ACTION PLAN  Never done    DTAP/TDAP/TD IMMUNIZATION (2 - Td or Tdap) 12/19/2023    MICROALBUMIN  04/30/2025    ANNUAL REVIEW OF HM ORDERS  04/30/2025    MEDICARE ANNUAL WELLNESS VISIT  04/30/2025    BMP  10/24/2025    ALT  04/24/2026    LIPID  04/24/2026    CBC  04/24/2026    FALL RISK ASSESSMENT  05/01/2026    ADVANCE CARE PLANNING  04/30/2029    DEXA  12/27/2036    TSH W/FREE T4 REFLEX  Completed    PHQ-2  "(once per calendar year)  Completed    INFLUENZA VACCINE  Completed    Pneumococcal Vaccine: 50+ Years  Completed    URINALYSIS  Completed    ZOSTER IMMUNIZATION  Completed    RSV VACCINE  Completed    COVID-19 Vaccine  Completed    HPV IMMUNIZATION  Aged Out    MENINGITIS IMMUNIZATION  Aged Out    MAMMO SCREENING  Discontinued    HEMOGLOBIN  Discontinued            Objective    Exam  BP (!) 153/87 (BP Location: Left arm, Patient Position: Sitting, Cuff Size: Adult Regular)   Pulse 83   Temp 98.1  F (36.7  C) (Temporal)   Resp 16   Ht 1.651 m (5' 5\")   Wt 57.1 kg (125 lb 14.4 oz)   LMP  (LMP Unknown)   SpO2 98%   BMI 20.95 kg/m     Estimated body mass index is 20.95 kg/m  as calculated from the following:    Height as of this encounter: 1.651 m (5' 5\").    Weight as of this encounter: 57.1 kg (125 lb 14.4 oz).    Physical Exam  GENERAL: alert and no distress  EYES: Eyes grossly normal to inspection, PERRL and conjunctivae and sclerae normal  HENT: ear canals and TM's normal, nose and mouth without ulcers or lesions  NECK: no adenopathy, no asymmetry, masses, or scars  RESP: lungs clear to auscultation - no rales, rhonchi or wheezes  CV: regular rate and rhythm, normal S1 S2, no S3 or S4, no murmur, click or rub, no peripheral edema  ABDOMEN: soft, nontender, no hepatosplenomegaly, no masses and bowel sounds normal  MS: no gross musculoskeletal defects noted, no edema  SKIN: no suspicious lesions or rashes  NEURO: Normal strength and tone, mentation intact and speech normal  PSYCH: mentation appears normal, affect normal/bright         5/1/2025   Mini Cog   Clock Draw Score 2 Normal   3 Item Recall 2 objects recalled   Mini Cog Total Score 4       Signed Electronically by: Selina Pozo MD    "

## 2025-05-31 ENCOUNTER — HOSPITAL ENCOUNTER (EMERGENCY)
Facility: CLINIC | Age: 80
Discharge: HOME | End: 2025-05-31
Attending: EMERGENCY MEDICINE | Admitting: EMERGENCY MEDICINE
Payer: COMMERCIAL

## 2025-05-31 VITALS
SYSTOLIC BLOOD PRESSURE: 150 MMHG | HEIGHT: 65 IN | DIASTOLIC BLOOD PRESSURE: 80 MMHG | HEART RATE: 78 BPM | BODY MASS INDEX: 21.23 KG/M2 | TEMPERATURE: 97.7 F | WEIGHT: 127.4 LBS | OXYGEN SATURATION: 98 % | RESPIRATION RATE: 20 BRPM

## 2025-05-31 DIAGNOSIS — I48.91 ATRIAL FIBRILLATION WITH RAPID VENTRICULAR RESPONSE (H): ICD-10-CM

## 2025-05-31 LAB
ANION GAP SERPL CALCULATED.3IONS-SCNC: 11 MMOL/L (ref 7–15)
ATRIAL RATE - MUSE: 43 BPM
ATRIAL RATE - MUSE: 95 BPM
BASOPHILS # BLD AUTO: 0 10E3/UL (ref 0–0.2)
BASOPHILS NFR BLD AUTO: 1 %
BUN SERPL-MCNC: 17.8 MG/DL (ref 8–23)
CALCIUM SERPL-MCNC: 9.1 MG/DL (ref 8.8–10.4)
CHLORIDE SERPL-SCNC: 103 MMOL/L (ref 98–107)
CREAT SERPL-MCNC: 1.25 MG/DL (ref 0.51–0.95)
DIASTOLIC BLOOD PRESSURE - MUSE: NORMAL MMHG
DIASTOLIC BLOOD PRESSURE - MUSE: NORMAL MMHG
EGFRCR SERPLBLD CKD-EPI 2021: 43 ML/MIN/1.73M2
EOSINOPHIL # BLD AUTO: 0.2 10E3/UL (ref 0–0.7)
EOSINOPHIL NFR BLD AUTO: 3 %
ERYTHROCYTE [DISTWIDTH] IN BLOOD BY AUTOMATED COUNT: 13.2 % (ref 10–15)
GLUCOSE SERPL-MCNC: 267 MG/DL (ref 70–99)
HCO3 SERPL-SCNC: 24 MMOL/L (ref 22–29)
HCT VFR BLD AUTO: 41.7 % (ref 35–47)
HGB BLD-MCNC: 13.2 G/DL (ref 11.7–15.7)
HOLD SPECIMEN: NORMAL
IMM GRANULOCYTES # BLD: 0 10E3/UL
IMM GRANULOCYTES NFR BLD: 1 %
INTERPRETATION ECG - MUSE: NORMAL
INTERPRETATION ECG - MUSE: NORMAL
LYMPHOCYTES # BLD AUTO: 1.4 10E3/UL (ref 0.8–5.3)
LYMPHOCYTES NFR BLD AUTO: 23 %
MAGNESIUM SERPL-MCNC: 2.1 MG/DL (ref 1.7–2.3)
MCH RBC QN AUTO: 31.5 PG (ref 26.5–33)
MCHC RBC AUTO-ENTMCNC: 31.7 G/DL (ref 31.5–36.5)
MCV RBC AUTO: 100 FL (ref 78–100)
MONOCYTES # BLD AUTO: 0.4 10E3/UL (ref 0–1.3)
MONOCYTES NFR BLD AUTO: 7 %
NEUTROPHILS # BLD AUTO: 4.1 10E3/UL (ref 1.6–8.3)
NEUTROPHILS NFR BLD AUTO: 67 %
NRBC # BLD AUTO: 0 10E3/UL
NRBC BLD AUTO-RTO: 0 /100
P AXIS - MUSE: 53 DEGREES
P AXIS - MUSE: NORMAL DEGREES
PLATELET # BLD AUTO: 180 10E3/UL (ref 150–450)
POTASSIUM SERPL-SCNC: 3.6 MMOL/L (ref 3.4–5.3)
PR INTERVAL - MUSE: 152 MS
PR INTERVAL - MUSE: NORMAL MS
QRS DURATION - MUSE: 120 MS
QRS DURATION - MUSE: 80 MS
QT - MUSE: 322 MS
QT - MUSE: 340 MS
QTC - MUSE: 404 MS
QTC - MUSE: 563 MS
R AXIS - MUSE: -50 DEGREES
R AXIS - MUSE: 21 DEGREES
RBC # BLD AUTO: 4.19 10E6/UL (ref 3.8–5.2)
SODIUM SERPL-SCNC: 138 MMOL/L (ref 135–145)
SYSTOLIC BLOOD PRESSURE - MUSE: NORMAL MMHG
SYSTOLIC BLOOD PRESSURE - MUSE: NORMAL MMHG
T AXIS - MUSE: 112 DEGREES
T AXIS - MUSE: 45 DEGREES
T4 FREE SERPL-MCNC: 1.05 NG/DL (ref 0.9–1.7)
TROPONIN T SERPL HS-MCNC: 12 NG/L
TSH SERPL DL<=0.005 MIU/L-ACNC: 6.31 UIU/ML (ref 0.3–4.2)
VENTRICULAR RATE- MUSE: 165 BPM
VENTRICULAR RATE- MUSE: 95 BPM
WBC # BLD AUTO: 6.2 10E3/UL (ref 4–11)

## 2025-05-31 PROCEDURE — 84439 ASSAY OF FREE THYROXINE: CPT | Performed by: EMERGENCY MEDICINE

## 2025-05-31 PROCEDURE — 84484 ASSAY OF TROPONIN QUANT: CPT | Performed by: EMERGENCY MEDICINE

## 2025-05-31 PROCEDURE — 250N000009 HC RX 250: Performed by: EMERGENCY MEDICINE

## 2025-05-31 PROCEDURE — 96374 THER/PROPH/DIAG INJ IV PUSH: CPT

## 2025-05-31 PROCEDURE — 250N000011 HC RX IP 250 OP 636: Performed by: EMERGENCY MEDICINE

## 2025-05-31 PROCEDURE — 85048 AUTOMATED LEUKOCYTE COUNT: CPT | Performed by: EMERGENCY MEDICINE

## 2025-05-31 PROCEDURE — 99291 CRITICAL CARE FIRST HOUR: CPT | Mod: 25

## 2025-05-31 PROCEDURE — 84443 ASSAY THYROID STIM HORMONE: CPT | Performed by: EMERGENCY MEDICINE

## 2025-05-31 PROCEDURE — 36415 COLL VENOUS BLD VENIPUNCTURE: CPT | Performed by: EMERGENCY MEDICINE

## 2025-05-31 PROCEDURE — 92960 CARDIOVERSION ELECTRIC EXT: CPT

## 2025-05-31 PROCEDURE — 83735 ASSAY OF MAGNESIUM: CPT | Performed by: EMERGENCY MEDICINE

## 2025-05-31 PROCEDURE — 93005 ELECTROCARDIOGRAM TRACING: CPT | Performed by: EMERGENCY MEDICINE

## 2025-05-31 PROCEDURE — 96375 TX/PRO/DX INJ NEW DRUG ADDON: CPT

## 2025-05-31 PROCEDURE — 80051 ELECTROLYTE PANEL: CPT | Performed by: EMERGENCY MEDICINE

## 2025-05-31 RX ORDER — METOPROLOL TARTRATE 1 MG/ML
5 INJECTION, SOLUTION INTRAVENOUS
Status: COMPLETED | OUTPATIENT
Start: 2025-05-31 | End: 2025-05-31

## 2025-05-31 RX ORDER — ONDANSETRON 2 MG/ML
4 INJECTION INTRAMUSCULAR; INTRAVENOUS EVERY 30 MIN PRN
Status: DISCONTINUED | OUTPATIENT
Start: 2025-05-31 | End: 2025-05-31 | Stop reason: HOSPADM

## 2025-05-31 RX ORDER — ETOMIDATE 2 MG/ML
8 INJECTION INTRAVENOUS ONCE
Status: COMPLETED | OUTPATIENT
Start: 2025-05-31 | End: 2025-05-31

## 2025-05-31 RX ADMIN — ETOMIDATE 8 MG: 2 INJECTION, SOLUTION INTRAVENOUS at 06:23

## 2025-05-31 RX ADMIN — ONDANSETRON 4 MG: 2 INJECTION, SOLUTION INTRAMUSCULAR; INTRAVENOUS at 07:08

## 2025-05-31 RX ADMIN — METOPROLOL TARTRATE 5 MG: 5 INJECTION INTRAVENOUS at 05:33

## 2025-05-31 RX ADMIN — METOPROLOL TARTRATE 5 MG: 5 INJECTION INTRAVENOUS at 05:44

## 2025-05-31 RX ADMIN — METOPROLOL TARTRATE 5 MG: 5 INJECTION INTRAVENOUS at 05:39

## 2025-05-31 ASSESSMENT — ACTIVITIES OF DAILY LIVING (ADL)
ADLS_ACUITY_SCORE: 45
ADLS_ACUITY_SCORE: 45

## 2025-05-31 ASSESSMENT — COLUMBIA-SUICIDE SEVERITY RATING SCALE - C-SSRS
6. HAVE YOU EVER DONE ANYTHING, STARTED TO DO ANYTHING, OR PREPARED TO DO ANYTHING TO END YOUR LIFE?: NO
2. HAVE YOU ACTUALLY HAD ANY THOUGHTS OF KILLING YOURSELF IN THE PAST MONTH?: NO
1. IN THE PAST MONTH, HAVE YOU WISHED YOU WERE DEAD OR WISHED YOU COULD GO TO SLEEP AND NOT WAKE UP?: NO

## 2025-05-31 NOTE — ED TRIAGE NOTES
Pt reports having palpitations that started an hour ago. Reports having hx of afib.      Triage Assessment (Adult)       Row Name 05/31/25 0522          Triage Assessment    Airway WDL WDL        Respiratory WDL    Respiratory WDL X  palpitations        Skin Circulation/Temperature WDL    Skin Circulation/Temperature WDL WDL        Cardiac WDL    Cardiac WDL WDL        Peripheral/Neurovascular WDL    Peripheral Neurovascular WDL WDL        Cognitive/Neuro/Behavioral WDL    Cognitive/Neuro/Behavioral WDL WDL

## 2025-05-31 NOTE — ED PROVIDER NOTES
EMERGENCY DEPARTMENT ENCOUNTER      NAME: Kelly Robledo  AGE: 79 year old female  YOB: 1945  MRN: [id number]  EVALUATION DATE & TIME: No admission date for patient encounter.    PCP: Selina Pozo    ED PROVIDER: Garland Khan M.D.      Chief Complaint   Patient presents with    Palpitations         FINAL IMPRESSION:  1. Atrial fibrillation with rapid ventricular response (H)          ED COURSE & MEDICAL DECISION MAKING:    Pertinent Labs & Imaging studies reviewed. (See chart for details)  79 year old female presents to the Emergency Department for evaluation of palpitations.  Sudden onset this morning.  Is on Eliquis.  Has a history of A-fib.  On arrival patient tachycardic in the 160s.  EKG shows A-fib.  No chest pain or shortness of breath.  Initially tried metoprolol 5 mg x 3.  Minimal change in her tachycardia.  Did check labs here.  Mild elevated creatinine 1.25.  Electrolytes otherwise normal.  Thyroid is normal.  Troponin is negative.  Given she continues to be tachycardic in the 160s did have a discussion with the patient.  Patient did opt for cardioversion.  Consent obtained.  Patient was cardioverted and converted to sinus rhythm.  Had some mild nausea after anesthesia.  Given Zofran.  Patient will be discharged home.  Follow-up with her cardiologist.    5:25 AM I met with the patient to gather history and to perform my initial exam. I discussed the plan for care while in the Emergency Department.       At the conclusion of the encounter I discussed the results of all of the tests and the disposition. The questions were answered. The patient or family acknowledged understanding and was agreeable with the care plan.     Medical Decision Making    Discharge. I recommended the patient continue their current prescription strength medication(s): metoprolol and eliquis. I considered admission, but discharged patient after significant clinical improvement.    MIPS (CTPE, Dental  "pain, Krishnamurthy, Sinusitis, Asthma/COPD, Head Trauma): Not Applicable    SEPSIS: None          Critical Care     Performed by: Dr Garland Khan    Total critical care time: 45 minutes  Critical care was necessary to treat or prevent imminent or life-threatening deterioration of the following conditions: atrial fibrillation  Critical care was time spent personally by me on the following activities: development of treatment plan with patient or surrogate, discussions with consultants, examination of patient, evaluation of patient's response to treatment, obtaining history from patient or surrogate, ordering and performing treatments and interventions, ordering and review of laboratory studies, ordering and review of radiographic studies, re-evaluation of patient's condition and monitoring for potential decompensation.  Critical care time was exclusive of separately billable procedures and treating other patients.       MEDICATIONS GIVEN IN THE EMERGENCY:  Medications   ondansetron (ZOFRAN) injection 4 mg (has no administration in time range)   metoprolol (LOPRESSOR) injection 5 mg (5 mg Intravenous $Given 5/31/25 0544)   etomidate (AMIDATE) injection 8 mg (8 mg Intravenous $Given 5/31/25 0623)       NEW PRESCRIPTIONS STARTED AT TODAY'S ER VISIT  New Prescriptions    No medications on file          =================================================================    HPI    Patient information was obtained from: patient and     Use of : N/A        Kelly Robledo is a 79 year old female with a pertinent history of palpitations, atrial fibrillation, hypertension, hyperlipidemia, and acute heart failure who presents to this ED for evaluation of palpitations.    Patient woke up this morning and was feeling fine and then she took her morning magnesium and within 10 minutes had diarrhea and her heart began \"going crazy\". She has \"a-fib trouble\" off and on. She has taken the magnesium before and she took all " her other morning medications (Eliquis, ezetimibe, and lisinopril). She has never been cardioverted before. Denies chest pain, shortness of breath, or lightheadedness.     Chart Review:  - [facility] ER on 10/9/24. She did have intermittent PVCs that were briefly observed while I was in the room watching the cardiac monitor. They were intermittent and not persistent. Instructed her regarding PVCs. Encouraged her to follow-up with her cardiologist.   - Echocardiogram on 11/29/24. Left ventricular size, wall motion and function are normal. The ejection fraction is 60-65%. Normal right ventricle size and systolic function. The left atrium is severely dilated. MitraClip is present with stable bileaflet insertion. There is mild (+1) residual mitral regurgitation after MitraClip. There is mild mitral valve stenosis across MitraClip. IVC diameter <2.1 cm collapsing >50% with sniff suggests a normal RA pressure of 3 mmHg.       PAST MEDICAL HISTORY:  Past Medical History:   Diagnosis Date    Abdominal pain     Atrial fibrillation (H)     Diverticulosis     Moderate mitral regurgitation     NSVT (nonsustained ventricular tachycardia) (H) 6/17/2020    Palpitations        PAST SURGICAL HISTORY:  Past Surgical History:   Procedure Laterality Date    BIOPSY BREAST Bilateral     benign    CV CORONARY ANGIOGRAM N/A 6/18/2020    Procedure: Coronary Angiogram;  Surgeon: Sergey Conner MD;  Location: [facility] Cath Lab;  Service: Cardiology    CV CORONARY ANGIOGRAM N/A 1/13/2021    Procedure: Coronary Angiogram;  Surgeon: Klaus Fitzpatrick MD;  Location: [facility] Cardiac Cath Lab;  Service: Cardiology    CV LEFT HEART CATHETERIZATION WITHOUT LEFT VENTRICULOGRAM Left 6/18/2020    Procedure: Left Heart Catheterization Without Left Ventriculogram;  Surgeon: Sergey Conner MD;  Location: [facility] Cath Lab;  Service: Cardiology    CV RIGHT HEART CATHETERIZATION N/A 6/18/2020    Procedure: Right Heart Catheterization;   Surgeon: Sergey Conner MD;  Location: [facility] Cath Lab;  Service: Cardiology    CV RIGHT HEART CATHETERIZATION N/A 1/13/2021    Procedure: Right Heart Catheterization;  Surgeon: Klaus Fitzpatrick MD;  Location: [facility] Cardiac Cath Lab;  Service: Cardiology    EP ABLATION PULMONARY VEIN ISOLATION N/A 7/3/2023    Procedure: Ablation Atrial Fibrillation;  Surgeon: Kathleen Hernandez MD;  Location: [facility] CATH LAB CV    HYSTERECTOMY  1970's    OOPHORECTOMY Bilateral 1970's    TRANSCATHETER MITRAL VALVE REPAIR N/A 2/1/2021    Procedure: CV MITRAL CLIP;  Surgeon: Klaus Fitzpatrick MD;  Location: [facility] CARDIAC CATH LAB           CURRENT MEDICATIONS:    Current Facility-Administered Medications   Medication Dose Route Frequency Provider Last Rate Last Admin    ondansetron (ZOFRAN) injection 4 mg  4 mg Intravenous Q30 Min PRN Garland Khan MD         Current Outpatient Medications   Medication Sig Dispense Refill    apixaban ANTICOAGULANT (ELIQUIS ANTICOAGULANT) 2.5 MG tablet Take 1 tablet (2.5 mg) by mouth 2 times daily. 180 tablet 3    clindamycin (CLEOCIN) 300 MG capsule Take 2 capsules (600 mg total) by mouth once as needed (30-60 minutes prior to any dental visit. Save remaining for next visit. 4 capsule 1    clindamycin (CLEOCIN) 300 MG capsule Take 2 capsules (600 mg total) by mouth once as needed (30-60 minutes prior to any dental visit. Save remaining for next visit. 4 capsule 1    diltiazem ER (DILT-XR) 120 MG 24 hr capsule TAKE 1 CAPSULE BY MOUTH EVERY DAY 90 capsule 2    ezetimibe (ZETIA) 10 MG tablet TAKE 1 TABLET (10 MG) BY MOUTH DAILY. 90 tablet 0    lisinopril (ZESTRIL) 10 MG tablet Take 1 tablet (10 mg) by mouth every 24 hours 90 tablet 3    metoprolol succinate ER (TOPROL XL) 100 MG 24 hr tablet TAKE 1 TABLET BY MOUTH EVERYDAY AT BEDTIME 90 tablet 2    rosuvastatin (CRESTOR) 40 MG tablet TAKE 1 TABLET BY MOUTH EVERY DAY 90 tablet 0         ALLERGIES:  Allergies   Allergen  Reactions    Penicillins Hives       FAMILY HISTORY:  Family History   Problem Relation Age of Onset    Breast Cancer Sister 47.00       SOCIAL HISTORY:   Social History     Socioeconomic History    Marital status:    Tobacco Use    Smoking status: Never     Passive exposure: Past (Dad was a smoker)    Smokeless tobacco: Never   Vaping Use    Vaping status: Never Used   Substance and Sexual Activity    Alcohol use: No    Drug use: No   Social History Narrative    . No children. Family in the [place] area.   Enjoys walking- very regular with this especially in [place] when they used to vacation there for 6 months of the year.   Likes reading - romantic happy endings.   Never alcohol or smoking.  Selina Pozo MD       Social Drivers of Health     Financial Resource Strain: Low Risk  (5/1/2025)    Financial Resource Strain     Within the past 12 months, have you or your family members you live with been unable to get utilities (heat, electricity) when it was really needed?: No   Food Insecurity: Low Risk  (5/1/2025)    Food Insecurity     Within the past 12 months, did you worry that your food would run out before you got money to buy more?: No     Within the past 12 months, did the food you bought just not last and you didn t have money to get more?: No   Transportation Needs: Low Risk  (5/1/2025)    Transportation Needs     Within the past 12 months, has lack of transportation kept you from medical appointments, getting your medicines, non-medical meetings or appointments, work, or from getting things that you need?: No   Physical Activity: Insufficiently Active (5/1/2025)    Exercise Vital Sign     Days of Exercise per Week: 5 days     Minutes of Exercise per Session: 20 min   Stress: No Stress Concern Present (5/1/2025)    [employer] [place] [employer] - Occupational Stress Questionnaire     Feeling of Stress : Not at all   Social Connections: Unknown (5/1/2025)    Social Connection and  "Isolation Panel [NHANES]     Frequency of Social Gatherings with Friends and Family: Three times a week   Interpersonal Safety: Low Risk  (5/1/2025)    Interpersonal Safety     Do you feel physically and emotionally safe where you currently live?: Yes     Within the past 12 months, have you been hit, slapped, kicked or otherwise physically hurt by someone?: No     Within the past 12 months, have you been humiliated or emotionally abused in other ways by your partner or ex-partner?: No   Housing Stability: Low Risk  (5/1/2025)    Housing Stability     Do you have housing? : Yes     Are you worried about losing your housing?: No       VITALS:  BP (!) 153/81   Pulse 83   Temp 97.7  F (36.5  C) (Oral)   Resp 18   Ht 1.651 m (5' 5\")   Wt 57.8 kg (127 lb 6.4 oz)   LMP  (LMP Unknown)   SpO2 98%   BMI 21.20 kg/m      PHYSICAL EXAM    Physical Exam  Vitals and nursing note reviewed.   Constitutional:       General: She is not in acute distress.     Appearance: She is not diaphoretic.   HENT:      Head: Atraumatic.      Mouth/Throat:      Pharynx: No oropharyngeal exudate.   Eyes:      General: No scleral icterus.     Pupils: Pupils are equal, round, and reactive to light.   Cardiovascular:      Rate and Rhythm: Tachycardia present. Rhythm irregular.      Heart sounds: Normal heart sounds.   Pulmonary:      Effort: No respiratory distress.      Breath sounds: Normal breath sounds.   Abdominal:      Palpations: Abdomen is soft.      Tenderness: There is no abdominal tenderness. There is no guarding or rebound. Negative signs include Morales's sign.   Musculoskeletal:         General: No tenderness.   Skin:     General: Skin is warm.      Findings: No rash.   Neurological:      General: No focal deficit present.      Mental Status: She is alert.           LAB:  All pertinent labs reviewed and interpreted.  Labs Ordered and Resulted from Time of ED Arrival to Time of ED Departure   BASIC METABOLIC PANEL - Abnormal       " Result Value    Sodium 138      Potassium 3.6      Chloride 103      Carbon Dioxide (CO2) 24      Anion Gap 11      Urea Nitrogen 17.8      Creatinine 1.25 (*)     GFR Estimate 43 (*)     Calcium 9.1      Glucose 267 (*)    TSH WITH FREE T4 REFLEX - Abnormal    TSH 6.31 (*)    TROPONIN T, HIGH SENSITIVITY - Normal    Troponin T, High Sensitivity 12     MAGNESIUM - Normal    Magnesium 2.1     T4 FREE - Normal    Free T4 1.05     CBC WITH PLATELETS AND DIFFERENTIAL    WBC Count 6.2      RBC Count 4.19      Hemoglobin 13.2      Hematocrit 41.7            MCH 31.5      MCHC 31.7      RDW 13.2      Platelet Count 180      % Neutrophils 67      % Lymphocytes 23      % Monocytes 7      % Eosinophils 3      % Basophils 1      % Immature Granulocytes 1      NRBCs per 100 WBC 0      Absolute Neutrophils 4.1      Absolute Lymphocytes 1.4      Absolute Monocytes 0.4      Absolute Eosinophils 0.2      Absolute Basophils 0.0      Absolute Immature Granulocytes 0.0      Absolute NRBCs 0.0         RADIOLOGY:  Reviewed all pertinent imaging. Please see official radiology report.  No orders to display       EKG:    Performed at: 523  Impression: Atrial fibrillation with a rate of 165.  LVH.  When compared to previous dated October 9, 2024 now in A-fib.  A-fib with rate of 165.  .  QTc 563    Performed at: 625  Impression: Sinus rhythm.  Nonspecific ST changes.  When compared to previous earlier today now in sinus rhythm.  Sinus rhythm rate of 95.  .  QRS 80.  QTc 404    I have independently reviewed and interpreted the EKG(s) documented above.    PROCEDURES:     PROCEDURE: Electrical Cardioversion with Procedural Sedation   INDICATIONS: Sedation is required to allow for Cardioversion for Atrial Fibrilation    CARDIOVERSION TYPE: Biphasic, External   SEDATION PROVIDER: Dr Garland Khan   CARDIOVERSION PROVIDER: Dr Garland Khan   LEVEL OF SEDATION: Moderate Sedation    Defined as:  Minimal = Normal response to  verbal  Moderate = Responds to verbal and light tactile stimulation  Deep = Responds after repeated painful stimulation   CONSENT: Risks, benefits and alternatives were discussed with and Written consent was obtained from Patient.   PROCEDURE SPECIFIC CHECKLIST COMPLETED: Yes   LAST ORAL INTAKE: Clear Liquids >2 hours   ASA CLASS: 1 - Healthy patient, no medical problems   MALLAMPATI:  I - Faucial pillars, soft palate, and uvula are visible   TIME OUT: Universal protocol was followed. TIME OUT conducted just prior to starting procedure confirmed patient identity, site/side, procedure, patient position, and availability of correct equipment. Yes    Immediately prior to initiation of sedation, reassessment of clinical condition was performed which was unchanged.   MEDICATIONS GIVEN: Etomidate, 8 mg, IV    MONITORING: heart rate, cardiac monitor, continuous pulse oximeter, continuous capnometry (end tidal CO2), frequent blood pressure checks, level of consciousness checks, IV access, constant attendance by RN until patient is recovered, and constant attendance by MD until patient is stable   RESPONSE: vital signs stable, airway patent, and O2 saturations remained >92%   POST-SEDATION ASSESSMENT/PROCEDURE NOTE: CARDIOVERSION: Quick combo electrodes were placed anterior-posterior  Trial 1: Synchronized shock at 200 joules was Successful    SEDATION:  Lowest level oxygen saturation reached was 85%.    Post procedure patient was alert and responds to verbal stimuli    Patient was monitored during recovery and returned to pre-procedure baseline.   TOTAL MD DRUG ADMINISTRATION / MONITORING TIME: 15 minutes.   COMPLICATIONS: Patient tolerated procedure well, without complication           IDavid, am serving as a scribe to document services personally performed by Dr. Garland Khan, based on my observation and the provider's statements to me. IGarland MD attest that David Zacarias is acting in a  rafael campos, has observed my performance of the services and has documented them in accordance with my direction.    Garland Khan M.D.  Emergency Medicine  [facility] EMERGENCY ROOM  [address] [address]  [phone]  Dept: [phone]       Garland Khan MD  05/31/25 0704

## 2025-06-11 ENCOUNTER — OFFICE VISIT (OUTPATIENT)
Dept: CARDIOLOGY | Facility: CLINIC | Age: 80
End: 2025-06-11
Attending: EMERGENCY MEDICINE
Payer: COMMERCIAL

## 2025-06-11 VITALS
RESPIRATION RATE: 16 BRPM | WEIGHT: 122.6 LBS | HEIGHT: 65 IN | BODY MASS INDEX: 20.43 KG/M2 | SYSTOLIC BLOOD PRESSURE: 148 MMHG | HEART RATE: 80 BPM | DIASTOLIC BLOOD PRESSURE: 92 MMHG

## 2025-06-11 DIAGNOSIS — I48.91 ATRIAL FIBRILLATION WITH RAPID VENTRICULAR RESPONSE (H): ICD-10-CM

## 2025-06-11 RX ORDER — METOPROLOL TARTRATE 25 MG/1
25 TABLET, FILM COATED ORAL PRN
Qty: 60 TABLET | Refills: 1 | Status: SHIPPED | OUTPATIENT
Start: 2025-06-11

## 2025-06-11 NOTE — LETTER
"6/11/2025    Selina Pozo MD  [address] [address]    RE: Kelly ULISSES Robledo       Dear Colleague,     I had the pleasure of seeing Kelly GTZ Robledo in the [facility].    [facility]   [address]  [place] [address]   [email or web address]   OFFICE: [phone]     CARDIOLOGY CLINIC NOTE     Assessment/Recommendations   Assessment:    1.  A-fib RVR: Usually episodes are very brief and infrequent, she had a prolonged episode 2 weeks ago requiring cardioversion.  I have prescribed her metoprolol tartrate that she can take for any breakthrough episodes and referred to EP for further evaluation.       Primary cardiologist: Dr. Conner  79 year old female with s/p MV clip, Afib s/p ablatioon, on eliquis, mild mitral stenosis.  She usually has brief infrequent episodes of A-fib lasting a few minutes, on 5/31/2025 she went to the emergency room for long period of atrial fibrillation but did not resolve by itself and was associated with shortness of breath.  She was cardioverted in the emergency room and discharged home.  Since then she has been feeling fine without any recurrence of symptoms.  Compliant with medications    Physical Examination Review of Systems   Vitals: BP (!) 148/92 (BP Location: Left arm, Patient Position: Sitting, Cuff Size: Adult Regular)   Pulse 80   Resp 16   Ht 1.651 m (5' 5\")   Wt 55.6 kg (122 lb 9.6 oz)   LMP  (LMP Unknown)   BMI 20.40 kg/m    BMI= Body mass index is 20.4 kg/m .  Wt Readings from Last 3 Encounters:   06/11/25 55.6 kg (122 lb 9.6 oz)   05/31/25 57.8 kg (127 lb 6.4 oz)   05/01/25 57.1 kg (125 lb 14.4 oz)       General: pleasant female. No acute distress.   Neck: No JVD  Lungs: clear to auscultation  COR:  regular rate and rhythm, No murmurs, rubs, or gallops  Extrem: No edema   Per HPI     Medications  Allergies   Current Outpatient Medications   Medication Sig Dispense Refill     " apixaban ANTICOAGULANT (ELIQUIS ANTICOAGULANT) 2.5 MG tablet Take 1 tablet (2.5 mg) by mouth 2 times daily. 180 tablet 3     clindamycin (CLEOCIN) 300 MG capsule Take 2 capsules (600 mg total) by mouth once as needed (30-60 minutes prior to any dental visit. Save remaining for next visit. 4 capsule 1     clindamycin (CLEOCIN) 300 MG capsule Take 2 capsules (600 mg total) by mouth once as needed (30-60 minutes prior to any dental visit. Save remaining for next visit. 4 capsule 1     diltiazem ER (DILT-XR) 120 MG 24 hr capsule TAKE 1 CAPSULE BY MOUTH EVERY DAY 90 capsule 2     ezetimibe (ZETIA) 10 MG tablet TAKE 1 TABLET (10 MG) BY MOUTH DAILY. 90 tablet 0     lisinopril (ZESTRIL) 10 MG tablet Take 1 tablet (10 mg) by mouth every 24 hours 90 tablet 3     metoprolol succinate ER (TOPROL XL) 100 MG 24 hr tablet TAKE 1 TABLET BY MOUTH EVERYDAY AT BEDTIME 90 tablet 2     metoprolol tartrate (LOPRESSOR) 25 MG tablet Take 1 tablet (25 mg) by mouth as needed (take 1 pill every 1-2 hours for afib with high heart rate.). 60 tablet 1     rosuvastatin (CRESTOR) 40 MG tablet TAKE 1 TABLET BY MOUTH EVERY DAY 90 tablet 0      Allergies   Allergen Reactions     Penicillins Hives             Lab Results    Chemistry/lipid CBC Cardiac Enzymes/BNP/TSH/INR   Lab Results   Component Value Date    CHOL 172 05/01/2025    HDL 52 05/01/2025    TRIG 131 05/01/2025    BUN 17.8 05/31/2025     05/31/2025    CO2 24 05/31/2025    Lab Results   Component Value Date    WBC 6.2 05/31/2025    HGB 13.2 05/31/2025    HCT 41.7 05/31/2025     05/31/2025     05/31/2025    Lab Results   Component Value Date    TROPONINI <0.01 11/27/2021     (H) 01/25/2021    TSH 6.31 (H) 05/31/2025    INR 1.22 (H) 01/25/2021          The longitudinal plan of care for the diagnosis(es)/condition(s) as documented were addressed during this visit. Due to the added complexity in care, I will continue to support Kelly in the subsequent management  and with ongoing continuity of care.          Paul Pantoja MD, MPH  Non-invasive Cardiologist  [facility]         Thank you for allowing me to participate in the care of your patient.      Sincerely,     Paul CROFT [facility]  cc:   Garland Khan MD  [address] [place] [address]

## 2025-06-11 NOTE — PROGRESS NOTES
"[facility]   [address]  [place] [address]   [email or web address]   OFFICE: [phone]     CARDIOLOGY CLINIC NOTE     Assessment/Recommendations   Assessment:    1.  A-fib RVR: Usually episodes are very brief and infrequent, she had a prolonged episode 2 weeks ago requiring cardioversion.  I have prescribed her metoprolol tartrate that she can take for any breakthrough episodes and referred to EP for further evaluation.       Primary cardiologist: Dr. Conner  79 year old female with s/p MV clip, Afib s/p ablatioon, on eliquis, mild mitral stenosis.  She usually has brief infrequent episodes of A-fib lasting a few minutes, on 5/31/2025 she went to the emergency room for long period of atrial fibrillation but did not resolve by itself and was associated with shortness of breath.  She was cardioverted in the emergency room and discharged home.  Since then she has been feeling fine without any recurrence of symptoms.  Compliant with medications    Physical Examination Review of Systems   Vitals: BP (!) 148/92 (BP Location: Left arm, Patient Position: Sitting, Cuff Size: Adult Regular)   Pulse 80   Resp 16   Ht 1.651 m (5' 5\")   Wt 55.6 kg (122 lb 9.6 oz)   LMP  (LMP Unknown)   BMI 20.40 kg/m    BMI= Body mass index is 20.4 kg/m .  Wt Readings from Last 3 Encounters:   06/11/25 55.6 kg (122 lb 9.6 oz)   05/31/25 57.8 kg (127 lb 6.4 oz)   05/01/25 57.1 kg (125 lb 14.4 oz)       General: pleasant female. No acute distress.   Neck: No JVD  Lungs: clear to auscultation  COR:  regular rate and rhythm, No murmurs, rubs, or gallops  Extrem: No edema   Per HPI     Medications  Allergies   Current Outpatient Medications   Medication Sig Dispense Refill    apixaban ANTICOAGULANT (ELIQUIS ANTICOAGULANT) 2.5 MG tablet Take 1 tablet (2.5 mg) by mouth 2 times daily. 180 tablet 3    clindamycin (CLEOCIN) 300 MG capsule Take 2 capsules (600 mg total) by mouth once as needed " (30-60 minutes prior to any dental visit. Save remaining for next visit. 4 capsule 1    clindamycin (CLEOCIN) 300 MG capsule Take 2 capsules (600 mg total) by mouth once as needed (30-60 minutes prior to any dental visit. Save remaining for next visit. 4 capsule 1    diltiazem ER (DILT-XR) 120 MG 24 hr capsule TAKE 1 CAPSULE BY MOUTH EVERY DAY 90 capsule 2    ezetimibe (ZETIA) 10 MG tablet TAKE 1 TABLET (10 MG) BY MOUTH DAILY. 90 tablet 0    lisinopril (ZESTRIL) 10 MG tablet Take 1 tablet (10 mg) by mouth every 24 hours 90 tablet 3    metoprolol succinate ER (TOPROL XL) 100 MG 24 hr tablet TAKE 1 TABLET BY MOUTH EVERYDAY AT BEDTIME 90 tablet 2    metoprolol tartrate (LOPRESSOR) 25 MG tablet Take 1 tablet (25 mg) by mouth as needed (take 1 pill every 1-2 hours for afib with high heart rate.). 60 tablet 1    rosuvastatin (CRESTOR) 40 MG tablet TAKE 1 TABLET BY MOUTH EVERY DAY 90 tablet 0      Allergies   Allergen Reactions    Penicillins Hives             Lab Results    Chemistry/lipid CBC Cardiac Enzymes/BNP/TSH/INR   Lab Results   Component Value Date    CHOL 172 05/01/2025    HDL 52 05/01/2025    TRIG 131 05/01/2025    BUN 17.8 05/31/2025     05/31/2025    CO2 24 05/31/2025    Lab Results   Component Value Date    WBC 6.2 05/31/2025    HGB 13.2 05/31/2025    HCT 41.7 05/31/2025     05/31/2025     05/31/2025    Lab Results   Component Value Date    TROPONINI <0.01 11/27/2021     (H) 01/25/2021    TSH 6.31 (H) 05/31/2025    INR 1.22 (H) 01/25/2021          The longitudinal plan of care for the diagnosis(es)/condition(s) as documented were addressed during this visit. Due to the added complexity in care, I will continue to support Kelly in the subsequent management and with ongoing continuity of care.          Paul Pantoja MD, MPH  Non-invasive Cardiologist  [facility]

## 2025-06-11 NOTE — PATIENT INSTRUCTIONS
Keep taking regular medicines, if this happens again take the new metoprolol 25mg every 1-2 hours until you come out of it.  If you have taken 3-4 pills and remain in afib, go to ER.  Make apt with heart rhythm specialist.

## 2025-06-21 DIAGNOSIS — I25.10 CORONARY ARTERIOSCLEROSIS: ICD-10-CM

## 2025-06-21 DIAGNOSIS — E78.5 HYPERLIPIDEMIA: ICD-10-CM

## 2025-06-21 DIAGNOSIS — I25.10 CORONARY ARTERY DISEASE INVOLVING NATIVE CORONARY ARTERY OF NATIVE HEART WITHOUT ANGINA PECTORIS: ICD-10-CM

## 2025-06-23 RX ORDER — ROSUVASTATIN CALCIUM 40 MG/1
40 TABLET, COATED ORAL DAILY
Qty: 90 TABLET | Refills: 2 | Status: SHIPPED | OUTPATIENT
Start: 2025-06-23

## 2025-06-23 RX ORDER — EZETIMIBE 10 MG/1
10 TABLET ORAL DAILY
Qty: 90 TABLET | Refills: 3 | Status: SHIPPED | OUTPATIENT
Start: 2025-06-23

## 2025-07-31 ENCOUNTER — OFFICE VISIT (OUTPATIENT)
Dept: CARDIOLOGY | Facility: CLINIC | Age: 80
End: 2025-07-31
Payer: COMMERCIAL

## 2025-07-31 VITALS
HEART RATE: 80 BPM | HEIGHT: 65 IN | RESPIRATION RATE: 16 BRPM | DIASTOLIC BLOOD PRESSURE: 72 MMHG | WEIGHT: 123 LBS | BODY MASS INDEX: 20.49 KG/M2 | SYSTOLIC BLOOD PRESSURE: 144 MMHG

## 2025-07-31 DIAGNOSIS — I48.91 ATRIAL FIBRILLATION WITH RAPID VENTRICULAR RESPONSE (H): ICD-10-CM

## 2025-07-31 DIAGNOSIS — I48.0 PAROXYSMAL ATRIAL FIBRILLATION (H): Primary | ICD-10-CM

## 2025-07-31 NOTE — PROGRESS NOTES
[facility]  Cardiac Electrophysiology  [address]  [place] [address]   Office: [phone]  Fax: [phone]     Patient: Kelly Robledo   : 1945       CHIEF COMPLAINT/REASON FOR VISIT  Paroxysmal atrial fibrillation    Assessment/Recommendations     Paroxysmal atrial fibrillation - symptomatic.  History of co-existing short AR, ventricular pre-excitation - possibly recurrent antegrade left AVAP by 2025 post-DCCV ECG.  Apparent co-existing sinus node dysfunction/chronotropic insufficiency  [id number] 5, severe left atrial dilation in the setting of longstanding mitral regurgitation  PVI and left posterior AV accessory pathway; severe LAE and scattered fibrosis - 7/3/2023 (myself)  We reviewed recurrent atrial fibrillation and AVAP physiology and management considerations including managing stroke risk, antiarrhythmic drug therapy, and catheter ablation. We discussed atrial fibrillation ablation procedures, anticipated success rates (lower given severe LA dilation and co-existing mitral regurgitation), the potential need for re-do ablation vs addition of anti-arrhythmic drugs, procedural risks (including groin bleeding, tamponade, phrenic or esophageal injury, stroke, pulmonary vein stenosis) and recovery expectations.  - given her single significant occurrence of AF to date, she would prefer ongoing observation for time to atrial fibrillation recurrence, with further consideration for redo ablation vs antiarrhythmic drug therapy pending time to recurrence  - follow-up visit in 1 year, sooner if needed  - if redo ablation elected upon in interim, redo PVI and EPS/possible ablation in case of AVAP (MDT only), general anesthesia, continue apixaban  - if AAD elected upon in interim, we can evaluate for sotalol initiation in place of metoprolol XL (note CKD)  - continue metoprolol XL 100mg daily  - continue metoprolol 25mg as needed  - continue diltiazem ER  "120mg daily  - continue apixaban 5mg twice daily  - the longitudinal plan of care was addressed during this visit. Due to the added complexity in care, our team will remain engaged subsequent management of this condition and ongoing continuity of care    Nonsustained wide complex tachycardia - nonsustained SVT with aberrancy vs pre-excitation vs NSVT - prior note of rate related IVCD in AF/RVR, eg 5/31/2025.  Normal LV function  - continue medications as above    Follow up: as above         History of Present Illness   Kelly Robledo is a 79 year old female with paroxysmal atrial fibrillation and left atrioventricular accessory pathway with prior ablation 7/3/2023, MitraClip 2021 for severe mitral regurgitation, moderate nonobstructive CAD, HTN, CKD presenting for follow-up regarding atrial fibrillation.    Mrs. Robledo's atrial fibrillation history is as summarized below:  Symptoms: palpitations  Symptom onset date: 2020  Diagnosis date: 6/2020 - found to have progressive mitral regurgitation and underwent MitraClip 2021  Admissions/ER visits: 5/31/2025  Prior medical therapies: amiodarone (6/2020-8/2023)  Prior DCCVs: 5/31/2025 (ER)  Prior ablations: PVI and left posterior AV accessory pathway; severe LAE and scattered fibrosis - 7/3/2023 (myself)  Percutaneous left atrial appendage occlusion: none    She notes recurrent symptomatic atrial fibrillation 5/31/2025 leading to ER evaluation and DCCV.  She has had some brief palpitations lasting a few seconds, though has not had further significant AF recurrence and feels as though she has been doing well recently.  She denies chest pain, syncope.       Physical Examination  Review of Systems   VITALS: BP (!) 144/72 (BP Location: Left arm, Patient Position: Sitting, Cuff Size: Adult Regular)   Pulse 80   Resp 16   Ht 1.651 m (5' 5\")   Wt 55.8 kg (123 lb)   LMP  (LMP Unknown)   BMI 20.47 kg/m    Wt Readings from Last 3 Encounters:   06/11/25 55.6 kg (122 lb 9.6 oz) "   05/31/25 57.8 kg (127 lb 6.4 oz)   05/01/25 57.1 kg (125 lb 14.4 oz)     CONSTITUTIONAL: well nourished, comfortable, no distress  EYES:  Conjunctivae pink, sclerae clear.    E/N/T:  Oral mucosa pink  RESPIRATORY:  Respiratory effort is normal  CARDIOVASCULAR:  normal S1 and S2  GASTROINTESTINAL:  Abdomen without masses or tenderness  EXTREMITIES:  No clubbing or cyanosis.    MUSCULOSKELETAL:  Overall grossly normal muscle strength  SKIN:  Overall, skin warm and dry, no lesions.  NEURO/PSYCH:  Oriented x 3 with normal affect.   Constitutional:  No weight loss or loss of appetite    Eyes:  No difficulty with vision, no double vision, no dry eyes  ENT:  No sore throat, difficulty swallowing; changes in hearing or tinnitus  Cardiovascular: As detailed above  Respiratory:  No cough  Musculoskeletal  No joint pain, muscle aches  Neurologic:  No syncope, lightheadedness, fainting spells   Hematologic: No easy bruising, excessive bleeding tendency   Gastrointestinal:  No jaundice, abdominal pain or abdominal bloating  Genitourinary: No changes in urinary habits, no trouble urinating    Psychiatric: No anxiety or depression      Medical History  Surgical History   Past Medical History:   Diagnosis Date    Abdominal pain     Atrial fibrillation (H)     Diverticulosis     Moderate mitral regurgitation     NSVT (nonsustained ventricular tachycardia) (H) 6/17/2020    Palpitations     Past Surgical History:   Procedure Laterality Date    BIOPSY BREAST Bilateral     benign    CV CORONARY ANGIOGRAM N/A 6/18/2020    Procedure: Coronary Angiogram;  Surgeon: Sergey Conner MD;  Location: [facility] Cath Lab;  Service: Cardiology    CV CORONARY ANGIOGRAM N/A 1/13/2021    Procedure: Coronary Angiogram;  Surgeon: Klaus Fitzpatrick MD;  Location: [facility] Cardiac Cath Lab;  Service: Cardiology    CV LEFT HEART CATHETERIZATION WITHOUT LEFT VENTRICULOGRAM Left 6/18/2020    Procedure: Left Heart Catheterization Without Left  Ventriculogram;  Surgeon: Sergey Conner MD;  Location: [facility] Cath Lab;  Service: Cardiology    CV RIGHT HEART CATHETERIZATION N/A 6/18/2020    Procedure: Right Heart Catheterization;  Surgeon: Sergey Conner MD;  Location: [facility] Cath Lab;  Service: Cardiology    CV RIGHT HEART CATHETERIZATION N/A 1/13/2021    Procedure: Right Heart Catheterization;  Surgeon: Klaus Fitzpatrick MD;  Location: [facility] Cardiac Cath Lab;  Service: Cardiology    EP ABLATION PULMONARY VEIN ISOLATION N/A 7/3/2023    Procedure: Ablation Atrial Fibrillation;  Surgeon: Kathleen Hernandez MD;  Location: [facility] CATH LAB CV    HYSTERECTOMY  1970's    OOPHORECTOMY Bilateral 1970's    TRANSCATHETER MITRAL VALVE REPAIR N/A 2/1/2021    Procedure: CV MITRAL CLIP;  Surgeon: Klaus Fitzpatrick MD;  Location: [facility] CARDIAC CATH LAB         Family History Social History   Family History   Problem Relation Age of Onset    Breast Cancer Sister 47.00        Social History     Tobacco Use    Smoking status: Never     Passive exposure: Past (Dad was a smoker)    Smokeless tobacco: Never   Vaping Use    Vaping status: Never Used   Substance Use Topics    Alcohol use: No    Drug use: No         Medications  Allergies     Current Outpatient Medications:     apixaban ANTICOAGULANT (ELIQUIS ANTICOAGULANT) 2.5 MG tablet, Take 1 tablet (2.5 mg) by mouth 2 times daily., Disp: 180 tablet, Rfl: 3    clindamycin (CLEOCIN) 300 MG capsule, Take 2 capsules (600 mg total) by mouth once as needed (30-60 minutes prior to any dental visit. Save remaining for next visit., Disp: 4 capsule, Rfl: 1    clindamycin (CLEOCIN) 300 MG capsule, Take 2 capsules (600 mg total) by mouth once as needed (30-60 minutes prior to any dental visit. Save remaining for next visit., Disp: 4 capsule, Rfl: 1    diltiazem ER (DILT-XR) 120 MG 24 hr capsule, TAKE 1 CAPSULE BY MOUTH EVERY DAY, Disp: 90 capsule, Rfl: 2    ezetimibe (ZETIA) 10 MG tablet, TAKE 1 TABLET  (10 MG) BY MOUTH DAILY., Disp: 90 tablet, Rfl: 3    lisinopril (ZESTRIL) 10 MG tablet, Take 1 tablet (10 mg) by mouth every 24 hours, Disp: 90 tablet, Rfl: 3    metoprolol succinate ER (TOPROL XL) 100 MG 24 hr tablet, TAKE 1 TABLET BY MOUTH EVERYDAY AT BEDTIME, Disp: 90 tablet, Rfl: 2    metoprolol tartrate (LOPRESSOR) 25 MG tablet, Take 1 tablet (25 mg) by mouth as needed (take 1 pill every 1-2 hours for afib with high heart rate.)., Disp: 60 tablet, Rfl: 1    rosuvastatin (CRESTOR) 40 MG tablet, TAKE 1 TABLET BY MOUTH EVERY DAY, Disp: 90 tablet, Rfl: 2     Allergies   Allergen Reactions    Penicillins Hives          Lab Results    Chemistry CBC Cardiac Enzymes/BNP/TSH/INR   Recent Labs   Lab Test 05/31/25  0528      POTASSIUM 3.6   CHLORIDE 103   CO2 24   *   BUN 17.8   CR 1.25*   GFRESTIMATED 43*   SHADI 9.1     Recent Labs   Lab Test 05/31/25  0528 04/24/25  0731 10/09/24  1950   CR 1.25* 1.15* 1.09*          Recent Labs   Lab Test 05/31/25 0528   WBC 6.2   HGB 13.2   HCT 41.7           Recent Labs   Lab Test 05/31/25  0528 04/24/25  0731 10/09/24  1950   HGB 13.2 12.8 13.3    Recent Labs   Lab Test 11/27/21  1234 11/27/21  0843 11/27/21  0331   TROPONINI <0.01 <0.01 <0.01     Recent Labs   Lab Test 01/25/21  1355 10/14/20  1319 09/23/20  1920   * 344* 624*     Recent Labs   Lab Test 05/31/25  0528   TSH 6.31*     Recent Labs   Lab Test 01/25/21  1355 09/23/20  1920   INR 1.22* 1.37*         Data Review    ECGs (tracings independently reviewed)  5/31/2025 - SR 95bpm, AZ 152ms, possible slight pre-excitation  5/31/2025 - AF, 165bpm, LAFB QRS 120ms  10/9/2024 - SR 99bpm, AZ 140ms  11/27/2021 - SR 72bpm, short AZ, possible slight ventricular pre-excitation    6/17/2020 - AF, ventricular rate 122bpm, intermittent aberrancy vs pre-excitation      [facility] monitoring from 12/22/2024 to 12/25/2024 (duration 3d).  Predominant underlying rhythm was sinus rhythm, 66 to 96bpm, average 77bpm.  No  atrial fibrillation.  There were no pauses of greater than 3 seconds.  Rare supraventricular ectopic beats (2%).  42 runs of wide complex beats occurred, the run with the fastest interval lasting 6 beats with a max rate of 200 bpm, the longest lasting 20 beats with an avg rate of 103 bpm. Differential includes SVT with aberrancy vs NSVT.   Rare premature ventricular contractions (1.5%).  Symptom triggers - none.    Holter monitoring from 11/10/2022 to 11/11/2022 (duration 24hrs) (independently reviewed)  Predominant underlying rhythm was sinus rhythm, 53 to 69bpm, average 63bpm.   No nonsustained or sustained tachyarrhythmias.   No atrial fibrillation.   There were no pauses of greater than 3 seconds.   Rare supraventricular ectopic beats (<1%).   Rare premature ventricular contractions (<1%).   Symptom triggers none    11/29/2024 TTE  Left ventricular size, wall motion and function are normal. The ejection  fraction is 60-65%.  Normal right ventricle size and systolic function.  The left atrium is severely dilated.  MitraClip is present with stable bileaflet insertion.  There is mild (+1) residual mitral regurgitation after MitraClip.  There is mild mitral valve stenosis across MitraClip.  IVC diameter <2.1 cm collapsing >50% with sniff suggests a normal RA pressure  of 3 mmHg.    3/16/2023 PFTs  Mild diffusion impairment.   There is flattening of the inspiratory limb of the flow volume curve which could represent variable extrathoracic obstruction. Consider additional testing.        Cc: Paul Pantoja MD, Nohelia Isidro CNP, Jennifer Marie, MD Amila Dilusha William, MD  7/31/2025  10:39 AM

## 2025-07-31 NOTE — LETTER
2025    Selina Pozo MD  [address] [facility] [address]    RE: Kelly Robledo       Dear Colleague,     I had the pleasure of seeing Kelly Robledo in the [facility].     [facility]  Cardiac Electrophysiology  [address]  [place] [address]   Office: [phone]  Fax: [phone]     Patient: Kelly Robledo   : 1945       CHIEF COMPLAINT/REASON FOR VISIT  Paroxysmal atrial fibrillation    Assessment/Recommendations     Paroxysmal atrial fibrillation - symptomatic.  History of co-existing short SC, ventricular pre-excitation - possibly recurrent antegrade left AVAP by 2025 post-DCCV ECG.  Apparent co-existing sinus node dysfunction/chronotropic insufficiency  [id number], severe left atrial dilation in the setting of longstanding mitral regurgitation  PVI and left posterior AV accessory pathway; severe LAE and scattered fibrosis - 7/3/2023 (myself)  We reviewed recurrent atrial fibrillation and AVAP physiology and management considerations including managing stroke risk, antiarrhythmic drug therapy, and catheter ablation. We discussed atrial fibrillation ablation procedures, anticipated success rates (lower given severe LA dilation and co-existing mitral regurgitation), the potential need for re-do ablation vs addition of anti-arrhythmic drugs, procedural risks (including groin bleeding, tamponade, phrenic or esophageal injury, stroke, pulmonary vein stenosis) and recovery expectations.  - given her single significant occurrence of AF to date, she would prefer ongoing observation for time to atrial fibrillation recurrence, with further consideration for redo ablation vs antiarrhythmic drug therapy pending time to recurrence  - follow-up visit in 1 year, sooner if needed  - if redo ablation elected upon in interim, redo PVI and EPS/possible ablation in case of AVAP (MDT only), general anesthesia, continue apixaban  -  if AAD elected upon in interim, we can evaluate for sotalol initiation in place of metoprolol XL (note CKD)  - continue metoprolol XL 100mg daily  - continue metoprolol 25mg as needed  - continue diltiazem ER 120mg daily  - continue apixaban 5mg twice daily  - the longitudinal plan of care was addressed during this visit. Due to the added complexity in care, our team will remain engaged subsequent management of this condition and ongoing continuity of care    Nonsustained wide complex tachycardia - nonsustained SVT with aberrancy vs pre-excitation vs NSVT - prior note of rate related IVCD in AF/RVR, eg 5/31/2025.  Normal LV function  - continue medications as above    Follow up: as above         History of Present Illness   Kelly Robledo is a 79 year old female with paroxysmal atrial fibrillation and left atrioventricular accessory pathway with prior ablation 7/3/2023, MitraClip 2021 for severe mitral regurgitation, moderate nonobstructive CAD, HTN, CKD presenting for follow-up regarding atrial fibrillation.    Mrs. Robledo's atrial fibrillation history is as summarized below:  Symptoms: palpitations  Symptom onset date: 2020  Diagnosis date: 6/2020 - found to have progressive mitral regurgitation and underwent MitraClip 2021  Admissions/ER visits: 5/31/2025  Prior medical therapies: amiodarone (6/2020-8/2023)  Prior DCCVs: 5/31/2025 (ER)  Prior ablations: PVI and left posterior AV accessory pathway; severe LAE and scattered fibrosis - 7/3/2023 (myself)  Percutaneous left atrial appendage occlusion: none    She notes recurrent symptomatic atrial fibrillation 5/31/2025 leading to ER evaluation and DCCV.  She has had some brief palpitations lasting a few seconds, though has not had further significant AF recurrence and feels as though she has been doing well recently.  She denies chest pain, syncope.       Physical Examination  Review of Systems   VITALS: BP (!) 144/72 (BP Location: Left arm, Patient Position: Sitting,  "Cuff Size: Adult Regular)   Pulse 80   Resp 16   Ht 1.651 m (5' 5\")   Wt 55.8 kg (123 lb)   LMP  (LMP Unknown)   BMI 20.47 kg/m    Wt Readings from Last 3 Encounters:   06/11/25 55.6 kg (122 lb 9.6 oz)   05/31/25 57.8 kg (127 lb 6.4 oz)   05/01/25 57.1 kg (125 lb 14.4 oz)     CONSTITUTIONAL: well nourished, comfortable, no distress  EYES:  Conjunctivae pink, sclerae clear.    E/N/T:  Oral mucosa pink  RESPIRATORY:  Respiratory effort is normal  CARDIOVASCULAR:  normal S1 and S2  GASTROINTESTINAL:  Abdomen without masses or tenderness  EXTREMITIES:  No clubbing or cyanosis.    MUSCULOSKELETAL:  Overall grossly normal muscle strength  SKIN:  Overall, skin warm and dry, no lesions.  NEURO/PSYCH:  Oriented x 3 with normal affect.   Constitutional:  No weight loss or loss of appetite    Eyes:  No difficulty with vision, no double vision, no dry eyes  ENT:  No sore throat, difficulty swallowing; changes in hearing or tinnitus  Cardiovascular: As detailed above  Respiratory:  No cough  Musculoskeletal  No joint pain, muscle aches  Neurologic:  No syncope, lightheadedness, fainting spells   Hematologic: No easy bruising, excessive bleeding tendency   Gastrointestinal:  No jaundice, abdominal pain or abdominal bloating  Genitourinary: No changes in urinary habits, no trouble urinating    Psychiatric: No anxiety or depression      Medical History  Surgical History   Past Medical History:   Diagnosis Date     Abdominal pain      Atrial fibrillation (H)      Diverticulosis      Moderate mitral regurgitation      NSVT (nonsustained ventricular tachycardia) (H) 6/17/2020     Palpitations     Past Surgical History:   Procedure Laterality Date     BIOPSY BREAST Bilateral     benign     CV CORONARY ANGIOGRAM N/A 6/18/2020    Procedure: Coronary Angiogram;  Surgeon: Sergey Conner MD;  Location: [facility] Cath Lab;  Service: Cardiology     CV CORONARY ANGIOGRAM N/A 1/13/2021    Procedure: Coronary Angiogram;  " Surgeon: Klaus Fitzpatrick MD;  Location: [facility] Cardiac Cath Lab;  Service: Cardiology     CV LEFT HEART CATHETERIZATION WITHOUT LEFT VENTRICULOGRAM Left 6/18/2020    Procedure: Left Heart Catheterization Without Left Ventriculogram;  Surgeon: Sergey Conner MD;  Location: [facility] Cath Lab;  Service: Cardiology     CV RIGHT HEART CATHETERIZATION N/A 6/18/2020    Procedure: Right Heart Catheterization;  Surgeon: Sergey Conner MD;  Location: [facility] Cath Lab;  Service: Cardiology     CV RIGHT HEART CATHETERIZATION N/A 1/13/2021    Procedure: Right Heart Catheterization;  Surgeon: Klaus Fitzpatrick MD;  Location: [facility] Cardiac Cath Lab;  Service: Cardiology     EP ABLATION PULMONARY VEIN ISOLATION N/A 7/3/2023    Procedure: Ablation Atrial Fibrillation;  Surgeon: Kathleen Hernandez MD;  Location: [facility] CV     HYSTERECTOMY  1970's     OOPHORECTOMY Bilateral 1970's     TRANSCATHETER MITRAL VALVE REPAIR N/A 2/1/2021    Procedure: CV MITRAL CLIP;  Surgeon: Klaus Fitzpatrick MD;  Location: [facility] CARDIAC CATH LAB         Family History Social History   Family History   Problem Relation Age of Onset     Breast Cancer Sister 47.00        Social History     Tobacco Use     Smoking status: Never     Passive exposure: Past (Dad was a smoker)     Smokeless tobacco: Never   Vaping Use     Vaping status: Never Used   Substance Use Topics     Alcohol use: No     Drug use: No         Medications  Allergies     Current Outpatient Medications:      apixaban ANTICOAGULANT (ELIQUIS ANTICOAGULANT) 2.5 MG tablet, Take 1 tablet (2.5 mg) by mouth 2 times daily., Disp: 180 tablet, Rfl: 3     clindamycin (CLEOCIN) 300 MG capsule, Take 2 capsules (600 mg total) by mouth once as needed (30-60 minutes prior to any dental visit. Save remaining for next visit., Disp: 4 capsule, Rfl: 1     clindamycin (CLEOCIN) 300 MG capsule, Take 2 capsules (600 mg total) by mouth once as needed (30-60 minutes  prior to any dental visit. Save remaining for next visit., Disp: 4 capsule, Rfl: 1     diltiazem ER (DILT-XR) 120 MG 24 hr capsule, TAKE 1 CAPSULE BY MOUTH EVERY DAY, Disp: 90 capsule, Rfl: 2     ezetimibe (ZETIA) 10 MG tablet, TAKE 1 TABLET (10 MG) BY MOUTH DAILY., Disp: 90 tablet, Rfl: 3     lisinopril (ZESTRIL) 10 MG tablet, Take 1 tablet (10 mg) by mouth every 24 hours, Disp: 90 tablet, Rfl: 3     metoprolol succinate ER (TOPROL XL) 100 MG 24 hr tablet, TAKE 1 TABLET BY MOUTH EVERYDAY AT BEDTIME, Disp: 90 tablet, Rfl: 2     metoprolol tartrate (LOPRESSOR) 25 MG tablet, Take 1 tablet (25 mg) by mouth as needed (take 1 pill every 1-2 hours for afib with high heart rate.)., Disp: 60 tablet, Rfl: 1     rosuvastatin (CRESTOR) 40 MG tablet, TAKE 1 TABLET BY MOUTH EVERY DAY, Disp: 90 tablet, Rfl: 2     Allergies   Allergen Reactions     Penicillins Hives          Lab Results    Chemistry CBC Cardiac Enzymes/BNP/TSH/INR   Recent Labs   Lab Test 05/31/25 0528      POTASSIUM 3.6   CHLORIDE 103   CO2 24   *   BUN 17.8   CR 1.25*   GFRESTIMATED 43*   SHADI 9.1     Recent Labs   Lab Test 05/31/25  0528 04/24/25  0731 10/09/24  1950   CR 1.25* 1.15* 1.09*          Recent Labs   Lab Test 05/31/25  0528   WBC 6.2   HGB 13.2   HCT 41.7           Recent Labs   Lab Test 05/31/25  0528 04/24/25  0731 10/09/24  1950   HGB 13.2 12.8 13.3    Recent Labs   Lab Test 11/27/21  1234 11/27/21  0843 11/27/21  0331   TROPONINI <0.01 <0.01 <0.01     Recent Labs   Lab Test 01/25/21  1355 10/14/20  1319 09/23/20  1920   * 344* 624*     Recent Labs   Lab Test 05/31/25  0528   TSH 6.31*     Recent Labs   Lab Test 01/25/21  1355 09/23/20  1920   INR 1.22* 1.37*         Data Review    ECGs (tracings independently reviewed)  5/31/2025 - SR 95bpm, WA 152ms, possible slight pre-excitation  5/31/2025 - AF, 165bpm, LAFB QRS 120ms  10/9/2024 - SR 99bpm, WA 140ms  11/27/2021 - SR 72bpm, short WA, possible slight  ventricular pre-excitation    6/17/2020 - AF, ventricular rate 122bpm, intermittent aberrancy vs pre-excitation      [employer] monitoring from 12/22/2024 to 12/25/2024 (duration 3d).  Predominant underlying rhythm was sinus rhythm, 66 to 96bpm, average 77bpm.  No atrial fibrillation.  There were no pauses of greater than 3 seconds.  Rare supraventricular ectopic beats (2%).  42 runs of wide complex beats occurred, the run with the fastest interval lasting 6 beats with a max rate of 200 bpm, the longest lasting 20 beats with an avg rate of 103 bpm. Differential includes SVT with aberrancy vs NSVT.   Rare premature ventricular contractions (1.5%).  Symptom triggers - none.    Holter monitoring from 11/10/2022 to 11/11/2022 (duration 24hrs) (independently reviewed)  Predominant underlying rhythm was sinus rhythm, 53 to 69bpm, average 63bpm.   No nonsustained or sustained tachyarrhythmias.   No atrial fibrillation.   There were no pauses of greater than 3 seconds.   Rare supraventricular ectopic beats (<1%).   Rare premature ventricular contractions (<1%).   Symptom triggers none    11/29/2024 TTE  Left ventricular size, wall motion and function are normal. The ejection  fraction is 60-65%.  Normal right ventricle size and systolic function.  The left atrium is severely dilated.  MitraClip is present with stable bileaflet insertion.  There is mild (+1) residual mitral regurgitation after MitraClip.  There is mild mitral valve stenosis across MitraClip.  IVC diameter <2.1 cm collapsing >50% with sniff suggests a normal RA pressure  of 3 mmHg.    3/16/2023 PFTs  Mild diffusion impairment.   There is flattening of the inspiratory limb of the flow volume curve which could represent variable extrathoracic obstruction. Consider additional testing.        Cc: Paul Pantoja MD, Nohelia Isidro CNP, Jennifer Marie, MD Amila Dilusha William, MD  7/31/2025  10:39 AM          Thank you for allowing me to participate in the  care of your patient.      Sincerely,     Kathleen Hernandez MD     [facility]  cc:   Paul Pantoja MD  [address] [place] [id number]

## 2025-07-31 NOTE — PATIENT INSTRUCTIONS
[facility]  Cardiac Electrophysiology  [address]  [place] [address]   Office: [phone]  Fax: [phone]     Thank you for seeing us in clinic today - it is a pleasure to be a part of your care team.  Below is a summary of our plan from today's visit.       You have atrial fibrillation and underwent prior atrial fibrillation and accessory pathway ablation 7/3/2023.  You have had recurrent atrial fibrillation on 5/31/2025.  We reviewed atrial fibrillation, treatment options (redo ablation vs antiarrhythmic drug therapy), and long term stroke risk prevention (blood thinner).    We will plan for the following:  - given your single significant reccurrence of atrial fibrillation to date, we will plan for ongoing observation for time to atrial fibrillation recurrence, with further consideration for redo ablation vs antiarrhythmic drug therapy pending time to recurrence  - follow-up visit in 1 year, sooner if needed  - continue metoprolol XL 100mg daily  - continue metoprolol 25mg as needed  - continue diltiazem ER 120mg daily  - continue apixaban 5mg twice daily     Please do not hesitate to be in touch with our office at [phone] with any questions that may arise.       Thank you for trusting us with your care,    Kathleen Hernandez MD  Clinical Cardiac Electrophysiology  [facility]  [address]  [place] [address]   Office: [phone]  Fax: [phone]        ATRIAL FIBRILLATION: Patient Information    What is atrial fibrillation?  Atrial fibrillation (AF, A-fib) is a common heart rhythm problem (arrhythmia) occurring within the upper chambers of the heart (the atria).  In normal rhythm, the upper and lower chambers of the heart are electrically driven to contract in a coordinated sequence.  In atrial fibrillation, the atria lose their ability to contract because of rapid and chaotic electrical activity.  The lower  chambers of the heart (the ventricles) continue to pump blood throughout the body, though with irregular and often faster rate due to the chaotic activity within the atria.        How do I know if I have atrial fibrillation?   Some people may feel their heart beating faster, harder, or irregularly while in atrial fibrillation.  Others may be lightheaded, fatigued, feel weak or tired or become more short of breath especially with activities.  Some patients have no symptoms at all.  Atrial fibrillation may be found due to an irregular pulse or on an electrocardiogram (ECG). Atrial fibrillation can start and stop on its own, and episodes can last from seconds to several months.      How common is atrial fibrillation?   An estimated 3-6 million people in the [place] have atrial fibrillation.  Atrial fibrillation is a common heart rhythm problem for older persons, affecting as estimated 12-15% of people over the age of 65 years of age.    What causes atrial fibrillation?   Age is the most important risk factor for atrial fibrillation.  Atrial fibrillation is more common in people with other heart disease, high blood pressure, diabetes, obesity, sleep apnea and in people who regularly consume alcohol.  Surgery, lung disease, or thyroid problems can lead to atrial fibrillation.  Atrial fibrillation has multiple possible causes, and in most cases a single cause cannot be found.  Atrial fibrillation is a progressive condition, usually starting with at an early stage with short and infrequent episodes.  In later stages of disease, more frequent and longer lasting episodes of atrial fibrillation occur, ultimately culminating in episodes which do not spontaneously terminate.  Generally, more enlargement and scarring within the upper chambers of the heart is observed as atrial fibrillation progresses from early to late-stage disease.    How is atrial fibrillation diagnosed and evaluated?    Because of its start-stop  nature, atrial fibrillation can be challenging to diagnose.  Atrial fibrillation is most commonly diagnosed via cardiac rhythm recordings - either an ECG or wearable cardiac rhythm monitor.  For patients with pacemakers, defibrillators or implantable loop recorders, atrial fibrillation may be recorded via these devices.  Recently, commercially available devices (eg. Apple Watch, [employer] device, certain [employer] devices, others) can allow patients to take 30 second cardiac rhythm recordings which may document atrial fibrillation.  Once atrial fibrillation is diagnosed, additional tests include blood tests and an echocardiogram.  The echocardiogram uses ultrasound to look at your heart to assess your cardiac function and evaluate for other heart disease.  Additional evaluation may include CT or MRI studies.    Is atrial fibrillation dangerous?   Atrial fibrillation is not usually a life-threatening arrhythmia.  The most serious consequences of atrial fibrillation including stroke and worsening of overall cardiac function.  While in atrial fibrillation, the upper cardiac chambers do not contract normally, resulting in slower blood flow and increased risk of clot formation.  If this blood clot becomes detached from the heart a stroke can occur.  Unfortunately, stroke can be the first sign of atrial fibrillation for some people.  With a stroke, you may notice abnormal sensation, weakness on one side of the body or face, changes in your vision or speech.  If you have any of these signs, you should contact EMS and be evaluated in an emergency room as soon as possible.      How is atrial fibrillation treated?     Several treatment options exist for suppressing atrial fibrillation - however, it is not an easily curable arrhythmia.  The first goal in managing atrial fibrillation is to minimize stroke risk.  The second goal is to improve symptoms associated with atrial fibrillation.  Finally, in patients with reduced cardiac  function, maintaining normal rhythm can help improve cardiac function.      Blood thinners are used to reduce the risk of stroke in patients with high estimated stroke risk related to atrial fibrillation.  For patients at higher risk of bleeding related to blood thinner use, implantable devices may be an option to reduce stroke risk without the need for long term blood thinner use.      Atrial fibrillation can be managed via two strategies: rate control and rhythm control.  In a rate control strategy, continued atrial fibrillation is accepted and medications (eg. beta-blockers or calcium channel blockers) are used to control the lower chamber rate.  In a rhythm control strategy, anti-arrhythmic medications or catheter ablation are used to maintain normal cardiac rhythm and slow disease progression by suppressing atrial fibrillation.  A procedure called a cardioversion, in which an electric shock is delivered through patches placed on the chest wall while under deep sedation, can be performed to temporarily restore normal cardiac rhythm, though does not address the chance of atrial fibrillation recurrence.  Treatments are more effective for earlier rather than later stage atrial fibrillation.  Lifestyle modifications (maintaining a healthy weight, aerobic exercise, diagnosing and treating sleep apnea, and minimizing alcohol intake) are important elements of atrial fibrillation rhythm control.     What is catheter ablation for atrial fibrillation?  Cardiac catheter ablation is a commonly performed, minimally invasive procedure performed by a cardiac electrophysiologist to treat many different cardiac rhythm abnormalities.  During catheter ablation, long, thin, flexible tubes are advanced into the heart via small sheaths inserted into the femoral veins and thermal energy (either heating or cooling) is applied within the heart to disrupt abnormal electrical activity.  Atrial fibrillation ablation is performed under  general anesthesia, with procedures generally taking approximately 2-3 hours.  Patients are typically observed for 3-5 hours after the ablation, and in most cases can be discharged home the same day.  Atrial fibrillation ablation is associated with better outcomes (mortality, cardiovascular hospitalizations, atrial arrhythmia recurrences) compared to antiarrhythmic drug therapy.  However, atrial fibrillation recurrences are not uncommon, and repeat catheter ablation may be offered.  Your electrophysiology team can review atrial fibrillation ablation, anticipated success rates, risks, and recovery expectations with you.    What are anti-arrhythmic medications?  Anti-arrhythmic medications are specialized drugs which alter cardiac electrical functioning to suppress arrhythmias.  There are several anti-arrhythmic medications available, each with its own success rate and side effects.  Some anti-arrhythmic medications are less effective though safer to use, others are more effective though have serious potential toxicities.  Atrial fibrillation recurrences are common and may require dose adjustment or change in antiarrhythmic therapy.  Your electrophysiology team will carefully consider which medication would be the best and safest for your particular case.      Can I live a normal life?    The goal of atrial fibrillation management is for patients to live normal lives without being limited by symptoms related to atrial fibrillation.    Are any additional educational resources available?  There are a number of excellent atrial fibrillation education resources available to you online.  A few options you may wish to review include:  hrsonline.org/guide-atrial-fibrillation  afibmatters.org  getsmartaboutafib.com  stopaf.com    What comes next?    Consider your management options and let us know how we can help in your decision process.  Please take medications as they have been prescribed.  You should also get any tests  that may have been ordered for you.      When to Call Your Doctor or seek emergency care:  Call your doctor or seek emergency care if you have any significant changes with the following:  Weakness  Dizziness  Fainting  Fatigue  Shortness of breath  Chest pain with increased activity  If you are concerned that your heart rate is too fast or too slow  Bleeding that does not stop in 10 minutes  Coughing or throwing up blood  Bloody diarrhea or bleeding hemorrhoids  Dark-colored urine or black stool  Allergic reactions:  Rash  Itching  Swelling  Trouble breathing or swallowing      Please call the [facility] at [phone] if you have concerns about your symptoms, your medicines, or your follow-up appointments.

## (undated) DEVICE — 8F SOUNDSTAR ECO ULTRASOUND CATHETER

## (undated) DEVICE — PATCH CARTO 3 EXTERNAL REFERENCE 3D MAPPING CREFP6

## (undated) DEVICE — INTRO SHEATH 4FRX10CM PINNACLE RSS402

## (undated) DEVICE — GUIDEWIRE VASC SAFARI2 0.035X275CM H74939406XS1

## (undated) DEVICE — INTRO SHEATH 9FRX10CM PINNACLE RSS902

## (undated) DEVICE — Device

## (undated) DEVICE — ELECTRODE DEFIB CADENCE 22550R

## (undated) DEVICE — INTRO SHEATH 6FRX10CM PINNACLE RSS602

## (undated) DEVICE — DILATOR VASCULAR 16FRX20CM G01212

## (undated) DEVICE — PACK HEART LEFT CUSTOM

## (undated) DEVICE — INTRO SHEATH 8FRX10CM PINNACLE RSS802

## (undated) DEVICE — MITRACLIP CATHETER GUIDE STEERABLE SGC0701

## (undated) DEVICE — CATH EP WOVENFLEXIE QUAD 5FRX110CM REPRO SYK200597

## (undated) DEVICE — INTRO SHEATH MICRO PLATINUM TIP 4FRX40CM 7274

## (undated) DEVICE — GUIDEWIRE PROTRACK PGTL .025IN DIA 23

## (undated) DEVICE — CLOSURE DEVICE 6FR VASC PROGLIDE MEDICATED SUTURE 12673-03

## (undated) DEVICE — GUIDEWIRE L80CM OD.035IN 3 CM J-TIP V

## (undated) DEVICE — 71CM NRG TRANSSEPTAL NEEDLE, C0, 8F OR 8.5F FIXED CURVE - 63CM

## (undated) DEVICE — KIT HAND CONTROL ACIST 014644 AR-P54

## (undated) DEVICE — CATH THERMOCOOL SMARTTOUCH SF FJ CURVE

## (undated) DEVICE — TRANSDUCER TRAY ARTERIAL 42646-06

## (undated) DEVICE — CATH EP DECAPOLAR CS 7FR BI-DIRECTL FJ

## (undated) DEVICE — NEEDLE 71CM NRG TRANSSEPTAL C0  8F FIX CURVE NRG-E-HF-71-C0

## (undated) DEVICE — INTRODUCER SHEATH VASC CATH 8.5FR CARTO GIDE STH MED D138502

## (undated) DEVICE — TUBE SET SMARKABLATE IRRIGATION

## (undated) DEVICE — CATHETER OCTARAY LONG SPLINE CURVE F 3-3-3-3-3 D160906

## (undated) DEVICE — CUSTOM PACK EP

## (undated) DEVICE — DILATOR VASCULAR 20FRX20CM G01471

## (undated) DEVICE — MANIFOLD KIT ANGIO AUTOMATED 014613

## (undated) DEVICE — TUBING PRESSURE 30"

## (undated) RX ORDER — FUROSEMIDE 10 MG/ML
INJECTION INTRAMUSCULAR; INTRAVENOUS
Status: DISPENSED
Start: 2021-02-01

## (undated) RX ORDER — GLYCOPYRROLATE 0.2 MG/ML
INJECTION, SOLUTION INTRAMUSCULAR; INTRAVENOUS
Status: DISPENSED
Start: 2023-07-03

## (undated) RX ORDER — FENTANYL CITRATE 50 UG/ML
INJECTION, SOLUTION INTRAMUSCULAR; INTRAVENOUS
Status: DISPENSED
Start: 2021-02-01

## (undated) RX ORDER — PROTAMINE SULFATE 10 MG/ML
INJECTION, SOLUTION INTRAVENOUS
Status: DISPENSED
Start: 2023-07-03

## (undated) RX ORDER — HEPARIN SODIUM 10000 [USP'U]/100ML
INJECTION, SOLUTION INTRAVENOUS
Status: DISPENSED
Start: 2023-07-03

## (undated) RX ORDER — LIDOCAINE HYDROCHLORIDE 10 MG/ML
INJECTION, SOLUTION EPIDURAL; INFILTRATION; INTRACAUDAL; PERINEURAL
Status: DISPENSED
Start: 2023-07-03

## (undated) RX ORDER — FENTANYL CITRATE-0.9 % NACL/PF 10 MCG/ML
PLASTIC BAG, INJECTION (ML) INTRAVENOUS
Status: DISPENSED
Start: 2023-07-03

## (undated) RX ORDER — CEFAZOLIN SODIUM 1 G/3ML
INJECTION, POWDER, FOR SOLUTION INTRAMUSCULAR; INTRAVENOUS
Status: DISPENSED
Start: 2021-02-01

## (undated) RX ORDER — ASPIRIN 325 MG
TABLET ORAL
Status: DISPENSED
Start: 2021-02-01

## (undated) RX ORDER — ONDANSETRON 2 MG/ML
INJECTION INTRAMUSCULAR; INTRAVENOUS
Status: DISPENSED
Start: 2023-07-03

## (undated) RX ORDER — CLINDAMYCIN PHOSPHATE 900 MG/50ML
INJECTION, SOLUTION INTRAVENOUS
Status: DISPENSED
Start: 2021-02-01

## (undated) RX ORDER — PROPOFOL 10 MG/ML
INJECTION, EMULSION INTRAVENOUS
Status: DISPENSED
Start: 2023-07-03

## (undated) RX ORDER — DEXAMETHASONE SODIUM PHOSPHATE 10 MG/ML
INJECTION, SOLUTION INTRAMUSCULAR; INTRAVENOUS
Status: DISPENSED
Start: 2023-07-03

## (undated) RX ORDER — BUPIVACAINE HYDROCHLORIDE 5 MG/ML
INJECTION, SOLUTION EPIDURAL; INTRACAUDAL
Status: DISPENSED
Start: 2023-07-03

## (undated) RX ORDER — ADENOSINE 3 MG/ML
INJECTION, SOLUTION INTRAVENOUS
Status: DISPENSED
Start: 2023-07-03

## (undated) RX ORDER — HEPARIN SODIUM 1000 [USP'U]/ML
INJECTION, SOLUTION INTRAVENOUS; SUBCUTANEOUS
Status: DISPENSED
Start: 2021-02-01

## (undated) RX ORDER — PROTAMINE SULFATE 10 MG/ML
INJECTION, SOLUTION INTRAVENOUS
Status: DISPENSED
Start: 2021-02-01

## (undated) RX ORDER — EPHEDRINE SULFATE 50 MG/ML
INJECTION, SOLUTION INTRAMUSCULAR; INTRAVENOUS; SUBCUTANEOUS
Status: DISPENSED
Start: 2023-07-03

## (undated) RX ORDER — FENTANYL CITRATE 50 UG/ML
INJECTION, SOLUTION INTRAMUSCULAR; INTRAVENOUS
Status: DISPENSED
Start: 2023-07-03